# Patient Record
Sex: FEMALE | Race: BLACK OR AFRICAN AMERICAN | URBAN - METROPOLITAN AREA
[De-identification: names, ages, dates, MRNs, and addresses within clinical notes are randomized per-mention and may not be internally consistent; named-entity substitution may affect disease eponyms.]

---

## 2019-10-08 ENCOUNTER — OUTPATIENT (OUTPATIENT)
Dept: OUTPATIENT SERVICES | Facility: HOSPITAL | Age: 29
LOS: 1 days | Discharge: HOME | End: 2019-10-08
Payer: MEDICAID

## 2019-10-08 VITALS — HEART RATE: 108 BPM | SYSTOLIC BLOOD PRESSURE: 105 MMHG | DIASTOLIC BLOOD PRESSURE: 59 MMHG

## 2019-10-08 VITALS — HEART RATE: 121 BPM | DIASTOLIC BLOOD PRESSURE: 71 MMHG | SYSTOLIC BLOOD PRESSURE: 113 MMHG

## 2019-10-08 LAB — GLUCOSE BLDC GLUCOMTR-MCNC: 102 MG/DL — HIGH (ref 70–99)

## 2019-10-08 PROCEDURE — 76815 OB US LIMITED FETUS(S): CPT | Mod: 26

## 2019-10-08 PROCEDURE — 59025 FETAL NON-STRESS TEST: CPT | Mod: 26

## 2019-10-08 PROCEDURE — 99283 EMERGENCY DEPT VISIT LOW MDM: CPT

## 2019-10-08 NOTE — OB PROVIDER TRIAGE NOTE - NSHPPHYSICALEXAM_GEN_ALL_CORE
Vital Signs Last 24 Hrs  T(C): 37 (08 Oct 2019 10:56), Max: 37 (08 Oct 2019 10:56)  T(F): 98.6 (08 Oct 2019 10:56), Max: 98.6 (08 Oct 2019 10:56)  HR: 108 (08 Oct 2019 11:38) (108 - 121)  BP: 105/59 (08 Oct 2019 11:38) (105/59 - 113/71)  RR: 22 (08 Oct 2019 10:56) (22 - 22)  FS: 102  EFM: 150/mod/accels+  [place]: no ctx  Abd: soft, gravid, nontender, no palpable ctx, no incisional tenderness  Speculum: no active bleeding per os, cervix appears normal, closed   SVE: C/L/P, breech by sono, intact

## 2019-10-08 NOTE — OB PROVIDER TRIAGE NOTE - NS_OBGYNHISTORY_OBGYN_ALL_OB_FT
ObHx:     1. 7/4/16- FT C/S for failure of dilation and NRFHR, 3700g, (F)   2. 1/10/18- FT C/S, repeat C/S, 2900g, (F)     GynHx: denies h/o fibroids, ovarian cysts, abnormal pap smears, STIs.

## 2019-10-08 NOTE — OB PROVIDER TRIAGE NOTE - HISTORY OF PRESENT ILLNESS
28yo  at 33w1d GA by LMP c/w 1st trim joy, visiting from [place], planning to return to her country in 2 weeks and deliver there, GDMA2 on metformin BID (unsure of dose), c/o decreased FM last night and right sided abdominal pain, 3/10 intensity, worse with standing/walking, relieved with rest. H/o prior C/S x2. Denies ctx, incisional pain, VB/LOF. Good FM currently. Admits to decreased PO hydration yesterday- only 2 glasess of water. Pt states she does not take FS regularly as she was not told to do so by PMD. Denies fever, chills, nausea/vomiting, diarrhea/constipation, dysuria, urgency, frequency. Denies dizziness/lightheadedness/CP/SOB/palpitations. Last BM- yesterday. Last ate-2 hours prior. Last intercourse- 4 days ago. Pt has G6PD deficiency, FOB neg. Denies other complications during this pregnancy.

## 2019-10-08 NOTE — OB PROVIDER TRIAGE NOTE - NSOBPROVIDERNOTE_OBGYN_ALL_OB_FT
30yo  at 33w1d GA by LMP c/w 1st trim sono, visiting from [place], planning to return to her country in 2 weeks and deliver there, GDMA2 on metformin BID (unsure of dose), with BPP 10/10, reassuring maternal and fetal status, not in  labor  - counseled patient that in gestational diabetes, fingerstick should be measured fasting, 2hr postprandial and to discuss with her PMD  - if patient decides to deliver here, f/u at [facility] to transfer care  - d/c home   - ambulation/PO hydration  -  labor precautions/FKC     Dr. BIGG Zazueta aware.

## 2019-10-09 ENCOUNTER — OUTPATIENT (OUTPATIENT)
Dept: OUTPATIENT SERVICES | Facility: HOSPITAL | Age: 29
LOS: 1 days | Discharge: HOME | End: 2019-10-09

## 2019-10-09 ENCOUNTER — RESULT CHARGE (OUTPATIENT)
Age: 29
End: 2019-10-09

## 2019-10-09 ENCOUNTER — APPOINTMENT (OUTPATIENT)
Dept: OBGYN | Facility: CLINIC | Age: 29
End: 2019-10-09
Payer: MEDICAID

## 2019-10-09 ENCOUNTER — NON-APPOINTMENT (OUTPATIENT)
Age: 29
End: 2019-10-09

## 2019-10-09 VITALS
HEIGHT: 69 IN | DIASTOLIC BLOOD PRESSURE: 64 MMHG | BODY MASS INDEX: 32.58 KG/M2 | SYSTOLIC BLOOD PRESSURE: 98 MMHG | WEIGHT: 220 LBS

## 2019-10-09 DIAGNOSIS — D75.A GLUCOSE-6-PHOSPHATE DEHYDROGENASE: ICD-10-CM

## 2019-10-09 PROBLEM — Z00.00 ENCOUNTER FOR PREVENTIVE HEALTH EXAMINATION: Status: ACTIVE | Noted: 2019-10-09

## 2019-10-09 PROBLEM — E74.01 VON GIERKE DISEASE: Chronic | Status: ACTIVE | Noted: 2019-10-08

## 2019-10-09 LAB
BILIRUB UR QL STRIP: NORMAL
CLARITY UR: CLEAR
COLLECTION METHOD: NORMAL
GLUCOSE UR-MCNC: NORMAL
HCG UR QL: 0.2 EU/DL
HGB UR QL STRIP.AUTO: NORMAL
KETONES UR-MCNC: NORMAL
LEUKOCYTE ESTERASE UR QL STRIP: NORMAL
NITRITE UR QL STRIP: NORMAL
PH UR STRIP: 5
PROT UR STRIP-MCNC: NORMAL
SP GR UR STRIP: 1.01

## 2019-10-09 PROCEDURE — 99214 OFFICE O/P EST MOD 30 MIN: CPT | Mod: 25

## 2019-10-09 PROCEDURE — 76815 OB US LIMITED FETUS(S): CPT | Mod: 26

## 2019-10-09 RX ORDER — METFORMIN HYDROCHLORIDE 625 MG/1
TABLET ORAL
Refills: 0 | Status: ACTIVE | COMMUNITY

## 2019-10-10 ENCOUNTER — ASOB RESULT (OUTPATIENT)
Age: 29
End: 2019-10-10

## 2019-10-10 ENCOUNTER — APPOINTMENT (OUTPATIENT)
Dept: ANTEPARTUM | Facility: CLINIC | Age: 29
End: 2019-10-10
Payer: MEDICAID

## 2019-10-10 ENCOUNTER — OUTPATIENT (OUTPATIENT)
Dept: OUTPATIENT SERVICES | Facility: HOSPITAL | Age: 29
LOS: 1 days | Discharge: HOME | End: 2019-10-10

## 2019-10-10 ENCOUNTER — OTHER (OUTPATIENT)
Age: 29
End: 2019-10-10

## 2019-10-10 VITALS
SYSTOLIC BLOOD PRESSURE: 99 MMHG | HEART RATE: 114 BPM | WEIGHT: 216.56 LBS | OXYGEN SATURATION: 97 % | DIASTOLIC BLOOD PRESSURE: 59 MMHG | BODY MASS INDEX: 31.98 KG/M2

## 2019-10-10 DIAGNOSIS — O24.419 GESTATIONAL DIABETES MELLITUS IN PREGNANCY, UNSPECIFIED CONTROL: ICD-10-CM

## 2019-10-10 LAB
BILIRUB UR QL STRIP: NORMAL
CLARITY UR: CLEAR
COLLECTION METHOD: NORMAL
GLUCOSE BLDC GLUCOMTR-MCNC: 97
GLUCOSE BLDC GLUCOMTR-MCNC: 97 MG/DL — SIGNIFICANT CHANGE UP (ref 70–99)
GLUCOSE UR-MCNC: NORMAL
HCG UR QL: 0.2 EU/DL
HGB UR QL STRIP.AUTO: NORMAL
KETONES UR-MCNC: NORMAL
LEUKOCYTE ESTERASE UR QL STRIP: NORMAL
NITRITE UR QL STRIP: NORMAL
PH UR STRIP: 7
PROT UR STRIP-MCNC: NORMAL
SP GR UR STRIP: 1.01

## 2019-10-10 PROCEDURE — 99243 OFF/OP CNSLTJ NEW/EST LOW 30: CPT | Mod: 25

## 2019-10-10 PROCEDURE — ZZZZZ: CPT

## 2019-10-10 PROCEDURE — 76811 OB US DETAILED SNGL FETUS: CPT | Mod: 26

## 2019-10-10 PROCEDURE — 76819 FETAL BIOPHYS PROFIL W/O NST: CPT | Mod: 26

## 2019-10-10 RX ORDER — LANCETS 33 GAUGE
EACH MISCELLANEOUS
Qty: 120 | Refills: 6 | Status: ACTIVE | COMMUNITY
Start: 2019-10-10 | End: 1900-01-01

## 2019-10-10 RX ORDER — BLOOD SUGAR DIAGNOSTIC
STRIP MISCELLANEOUS 4 TIMES DAILY
Qty: 120 | Refills: 6 | Status: ACTIVE | COMMUNITY
Start: 2019-10-10 | End: 1900-01-01

## 2019-10-10 RX ORDER — SYRING-NEEDL,DISP,INSUL,0.3 ML 30 GX5/16"
SYRINGE, EMPTY DISPOSABLE MISCELLANEOUS
Qty: 1 | Refills: 0 | Status: ACTIVE | COMMUNITY
Start: 2019-10-10 | End: 1900-01-01

## 2019-10-10 RX ORDER — BLOOD-GLUCOSE METER
W/DEVICE EACH MISCELLANEOUS
Qty: 1 | Refills: 0 | Status: ACTIVE | COMMUNITY
Start: 2019-10-10 | End: 1900-01-01

## 2019-10-10 NOTE — HISTORY OF PRESENT ILLNESS
[FreeTextEntry1] : 28 yo  at 33w4d with RAMONE  by LMP  and first trimester sono per pt, late transfer from [facility] with first prenatal visit yesterday, presents for GDMA2 this pregnancy. Pt found out about GDM 4-5 weeks ago after taking the sugar test, was started on Metformin 500 mg BID. Has not been recording her FS.  Reports following regularly for prenatal care in [facility], with no issues and normal sonograms. Good FM, no LOF, VB or contractions.

## 2019-10-10 NOTE — OB HISTORY
[___] : of [unfilled] [Pregnancy History] : girl [LMP: ___] : LMP: [unfilled] [RAMONE: ___] : RAMONE: [unfilled] [EGA: ___ wks] : EGA: [unfilled] wks [Spontaneous] : Spontaneous conception [Sonogram] : sonogram [FreeTextEntry1] : GDMA2 on Metformin, repeat  [de-identified] : as per pt

## 2019-10-10 NOTE — END OF VISIT
[FreeTextEntry3] : [facility] Staff\par \par I saw and evaluated Ms. FROST with Dr. Schaefer and I agree with the documentation above.   I modified the note above (if indicated) and agree with its contents in the present form.  GDM A2, on Metformin.\par \par Counseling: \par \par 1.  The patient will be evaluated by a nutritionist and instructed in adhering to a diabetic diet.\par \par 2.  She was counseled by a nurse and instructed in capillary blood glucose testing (fasting and 2 hours after each meal).\par \par 3.  The significance and usual management of diabetes in pregnancy were reviewed, including risks of preeclampsia, Intra-Uterine Fetal Demise, macrosomia, birth trauma, pre-term labor,  section, NICU admission (the main reasons include  hypoglycemia and  jaundice) and the possible need for insulin therapy.\par \par 4.  Exercise was encouraged.  Ideally, she should walk briskly  after each meal.\par \par Recommendations:\par \par 1.  Glycemic Control.  Target glucose ranges to minimize excessive fetal growth are:  Fasting 60-90 mg/dl; preprandial  mg/dl; and 2-hour postprandial < 120 mg/dl.  If these values are elevated, insulin therapy is encouraged, although oral hypoglycemic agents are reasonable to try depending on the finger stick log.\par \par 2.  Antepartum testing.  Daily fetal movement counts are recommended from 28 weeks.  Weekly or twice weekly biophysical profiles are recommended, the timing will depend on glucose control.  Ultrasound assessment of fetal growth and macrosomic features is recommended.\par \par 3.  Timing of Delivery.  If spontaneous delivery has not occurred by 39 weeks gestation, delivery may be planned between 39-40 weeks.   If complications, such as preeclampsia, macrosomia or poor glucose control develop, then delivery plans may need to be revised.\par \par 4.  Route of delivery.  Diabetes is associated with about a six-fold risk for shoulder dystocia.  Operative vaginal deliveries should be approached with caution if at all.\par \par 5.  Glucose control in labor.  During labor, capillary glucose values should be checked every few hours (depending on their stability).  Insulin may be required to cover elevated glucose levels.\par \par 6.  Long-term diabetes surveillance.  The risk of developing diabetes outside of pregnancy over the next five years may be as high as 50%.  I recommend that she have a fasting plasma glucose at 6 to 12 weeks postpartum and counseling about ways to minimize her risk.  If her fasting glucose is normal, annual glucose screening by her primary care physician is advised.\par \par Ms. Frost has G6PD deficiency.  The father of the baby was tested per the patient and he does not have G6PD.  If the fetus is a boy there is a 50% chance he will be affected and if  the fetus is a girl there is a 50% she will be a carrier. \par Stay away from sulfa drugs.\par \par Prenatal care is with Dr. Curiel.\par \par Hypoglycemia, fetal movement, and labor precautions discussed.\par \par Weekly biophysical profiles.\par RTC 1 week with fingerstick log. \par \par Yvan Ontiveros MD\par \par \par \par \par \par \par \par

## 2019-10-10 NOTE — DISCUSSION/SUMMARY
[FreeTextEntry1] : 28 yo  at 33w4d with RAMONE  by LMP  and first trimester sono per pt, late transfer from [facility] with first prenatal visit yesterday, presents for GDMA2 this pregnancy.  Good FM, no LOF, VB or contractions. \par \par #GDM\par -No records today\par -Diagnosed with elevated sugar test 4-5 weeks ago\par -Started on Metformin 500 mg BID in [facility], has not been checking FS\par -s/p Diabetic teaching and counseling. \par -Will schedule with nutritionist\par -Discussed risks of GDM including but not limited to: macrosomia, shoulder dystocia, increased risk of , stillbirth, NICU admission for hypoglycemia and jaundice, and preeclampsia. Explained that if sugars are well controlled the above complications decrease.\par -Discussed increased risk of developing DM after pregnancy. She will need PP GTT and if normal, repeat glucose testing every 1-2 years. \par - Patient should start to measure FS four times a day. FS goal fasting <95, 2 hour PP<120\par -Supplies sent to pharmacy\par -Weekly BPPs, Monthly EFW. \par -Will do BPP today, EFW next week.\par \par #G6PD Deficiency\par -Avoid sulfa drugs\par -Per pt,  is negative.\par \par #Pregnancy\par -f/u prenatal labs, HbA1C\par -Continue care with Dr. Curiel\par \par FS logs\par RTC in 1 week\par

## 2019-10-10 NOTE — PHYSICAL EXAM
[FreeTextEntry1] : Gen: NAD, AAOx3\par CVS: RRR, normal S1, S2\par Thyroid:  No goiter\par Lungs: CTAB\par Abd: soft, non tender, gravid, no palpable contractions\par LE: no edema or tenderness\par Pelvic: deferred\par Neuro: 2+ DTR in b/l upper extremities.\par Psychiatry:  Happy mood.  Awake, alert, oriented to person, place, time.\par Face:  Hyperpigmented papules (she states she has had those since birth)

## 2019-10-11 ENCOUNTER — NON-APPOINTMENT (OUTPATIENT)
Age: 29
End: 2019-10-11

## 2019-10-11 ENCOUNTER — APPOINTMENT (OUTPATIENT)
Dept: OBGYN | Facility: CLINIC | Age: 29
End: 2019-10-11

## 2019-10-15 DIAGNOSIS — Z34.90 ENCOUNTER FOR SUPERVISION OF NORMAL PREGNANCY, UNSPECIFIED, UNSPECIFIED TRIMESTER: ICD-10-CM

## 2019-10-18 ENCOUNTER — APPOINTMENT (OUTPATIENT)
Dept: ANTEPARTUM | Facility: CLINIC | Age: 29
End: 2019-10-18
Payer: MEDICAID

## 2019-10-18 ENCOUNTER — ASOB RESULT (OUTPATIENT)
Age: 29
End: 2019-10-18

## 2019-10-18 ENCOUNTER — OUTPATIENT (OUTPATIENT)
Dept: OUTPATIENT SERVICES | Facility: HOSPITAL | Age: 29
LOS: 1 days | Discharge: HOME | End: 2019-10-18

## 2019-10-18 DIAGNOSIS — D75.A GLUCOSE-6-PHOSPHATE DEHYDROGENASE (G6PD) DEFICIENCY WITHOUT ANEMIA: ICD-10-CM

## 2019-10-18 DIAGNOSIS — O24.415 GESTATIONAL DIABETES MELLITUS IN PREGNANCY, CONTROLLED BY ORAL HYPOGLYCEMIC DRUGS: ICD-10-CM

## 2019-10-18 DIAGNOSIS — O36.8190 DECREASED FETAL MOVEMENTS, UNSPECIFIED TRIMESTER, NOT APPLICABLE OR UNSPECIFIED: ICD-10-CM

## 2019-10-18 DIAGNOSIS — O26.893 OTHER SPECIFIED PREGNANCY RELATED CONDITIONS, THIRD TRIMESTER: ICD-10-CM

## 2019-10-18 DIAGNOSIS — Z3A.33 33 WEEKS GESTATION OF PREGNANCY: ICD-10-CM

## 2019-10-18 PROCEDURE — 76819 FETAL BIOPHYS PROFIL W/O NST: CPT | Mod: 26

## 2019-10-18 PROCEDURE — 76816 OB US FOLLOW-UP PER FETUS: CPT | Mod: 26

## 2019-10-21 LAB
ABO + RH PNL BLD: NORMAL
BACTERIA UR CULT: NORMAL
BASOPHILS # BLD AUTO: 0.01 K/UL
BASOPHILS NFR BLD AUTO: 0.1 %
BLD GP AB SCN SERPL QL: NORMAL
EOSINOPHIL # BLD AUTO: 0.2 K/UL
EOSINOPHIL NFR BLD AUTO: 2.2 %
ESTIMATED AVERAGE GLUCOSE: 117 MG/DL
HBA1C MFR BLD HPLC: 5.7 %
HBV SURFACE AG SER QL: NONREACTIVE
HCT VFR BLD CALC: 32.5 %
HGB BLD-MCNC: 10.8 G/DL
HIV1+2 AB SPEC QL IA.RAPID: NONREACTIVE
IMM GRANULOCYTES NFR BLD AUTO: 0.8 %
LEAD BLD-MCNC: 1 UG/DL
LYMPHOCYTES # BLD AUTO: 2.24 K/UL
LYMPHOCYTES NFR BLD AUTO: 24.1 %
M TB IFN-G BLD-IMP: ABNORMAL
MAN DIFF?: NORMAL
MCHC RBC-ENTMCNC: 31.4 PG
MCHC RBC-ENTMCNC: 33.2 G/DL
MCV RBC AUTO: 94.5 FL
MEV IGG FLD QL IA: 102 AU/ML
MEV IGG+IGM SER-IMP: POSITIVE
MONOCYTES # BLD AUTO: 0.77 K/UL
MONOCYTES NFR BLD AUTO: 8.3 %
NEUTROPHILS # BLD AUTO: 6 K/UL
NEUTROPHILS NFR BLD AUTO: 64.5 %
PLATELET # BLD AUTO: 264 K/UL
QUANTIFERON TB PLUS MITOGEN MINUS NIL: 0.01 IU/ML
QUANTIFERON TB PLUS NIL: 0.01 IU/ML
QUANTIFERON TB PLUS TB1 MINUS NIL: 0 IU/ML
QUANTIFERON TB PLUS TB2 MINUS NIL: 0 IU/ML
RBC # BLD: 3.44 M/UL
RBC # FLD: 13.1 %
RUBV IGG FLD-ACNC: 13.2 INDEX
RUBV IGG SER-IMP: POSITIVE
T PALLIDUM AB SER QL IA: NEGATIVE
VZV AB TITR SER: POSITIVE
VZV IGG SER IF-ACNC: 396.5 INDEX
WBC # FLD AUTO: 9.29 K/UL

## 2019-10-22 ENCOUNTER — FORM ENCOUNTER (OUTPATIENT)
Age: 29
End: 2019-10-22

## 2019-10-22 LAB
HGB A MFR BLD: 96.4 %
HGB A2 MFR BLD: 2.6 %
HGB F MFR BLD: 1 %
HGB FRACT BLD-IMP: NORMAL

## 2019-10-23 ENCOUNTER — APPOINTMENT (OUTPATIENT)
Dept: OBGYN | Facility: CLINIC | Age: 29
End: 2019-10-23
Payer: MEDICAID

## 2019-10-23 ENCOUNTER — NON-APPOINTMENT (OUTPATIENT)
Age: 29
End: 2019-10-23

## 2019-10-23 ENCOUNTER — OUTPATIENT (OUTPATIENT)
Dept: OUTPATIENT SERVICES | Facility: HOSPITAL | Age: 29
LOS: 1 days | Discharge: HOME | End: 2019-10-23

## 2019-10-23 ENCOUNTER — RESULT CHARGE (OUTPATIENT)
Age: 29
End: 2019-10-23

## 2019-10-23 ENCOUNTER — OUTPATIENT (OUTPATIENT)
Dept: OUTPATIENT SERVICES | Facility: HOSPITAL | Age: 29
LOS: 1 days | Discharge: HOME | End: 2019-10-23
Payer: MEDICAID

## 2019-10-23 VITALS
HEIGHT: 69 IN | DIASTOLIC BLOOD PRESSURE: 60 MMHG | SYSTOLIC BLOOD PRESSURE: 100 MMHG | BODY MASS INDEX: 32.44 KG/M2 | WEIGHT: 219 LBS

## 2019-10-23 DIAGNOSIS — R79.9 ABNORMAL FINDING OF BLOOD CHEMISTRY, UNSPECIFIED: ICD-10-CM

## 2019-10-23 PROCEDURE — 71045 X-RAY EXAM CHEST 1 VIEW: CPT | Mod: 26

## 2019-10-23 PROCEDURE — 99213 OFFICE O/P EST LOW 20 MIN: CPT

## 2019-10-23 RX ORDER — METFORMIN HYDROCHLORIDE 500 MG/1
500 TABLET, COATED ORAL 3 TIMES DAILY
Qty: 63 | Refills: 0 | Status: ACTIVE | COMMUNITY
Start: 2019-10-23 | End: 1900-01-01

## 2019-10-24 DIAGNOSIS — O99.210 OBESITY COMPLICATING PREGNANCY, UNSPECIFIED TRIMESTER: ICD-10-CM

## 2019-10-24 DIAGNOSIS — O24.415 GESTATIONAL DIABETES MELLITUS IN PREGNANCY, CONTROLLED BY ORAL HYPOGLYCEMIC DRUGS: ICD-10-CM

## 2019-10-24 DIAGNOSIS — Z3A.34 34 WEEKS GESTATION OF PREGNANCY: ICD-10-CM

## 2019-10-24 DIAGNOSIS — Z04.89 ENCOUNTER FOR EXAMINATION AND OBSERVATION FOR OTHER SPECIFIED REASONS: ICD-10-CM

## 2019-10-25 DIAGNOSIS — Z34.90 ENCOUNTER FOR SUPERVISION OF NORMAL PREGNANCY, UNSPECIFIED, UNSPECIFIED TRIMESTER: ICD-10-CM

## 2019-10-25 DIAGNOSIS — O24.415 GESTATIONAL DIABETES MELLITUS IN PREGNANCY, CONTROLLED BY ORAL HYPOGLYCEMIC DRUGS: ICD-10-CM

## 2019-10-28 LAB
BILIRUB UR QL STRIP: NORMAL
CLARITY UR: CLEAR
COLLECTION METHOD: NORMAL
GLUCOSE UR-MCNC: NORMAL
HCG UR QL: 0.2 EU/DL
HGB UR QL STRIP.AUTO: NORMAL
KETONES UR-MCNC: NORMAL
LEUKOCYTE ESTERASE UR QL STRIP: NORMAL
NITRITE UR QL STRIP: NORMAL
PH UR STRIP: 6
PROT UR STRIP-MCNC: NORMAL
SP GR UR STRIP: 1.02

## 2019-10-30 ENCOUNTER — OUTPATIENT (OUTPATIENT)
Dept: OUTPATIENT SERVICES | Facility: HOSPITAL | Age: 29
LOS: 1 days | Discharge: HOME | End: 2019-10-30

## 2019-10-30 ENCOUNTER — RESULT CHARGE (OUTPATIENT)
Age: 29
End: 2019-10-30

## 2019-10-30 ENCOUNTER — APPOINTMENT (OUTPATIENT)
Dept: OBGYN | Facility: CLINIC | Age: 29
End: 2019-10-30
Payer: MEDICAID

## 2019-10-30 ENCOUNTER — NON-APPOINTMENT (OUTPATIENT)
Age: 29
End: 2019-10-30

## 2019-10-30 ENCOUNTER — APPOINTMENT (OUTPATIENT)
Dept: ANTEPARTUM | Facility: CLINIC | Age: 29
End: 2019-10-30
Payer: MEDICAID

## 2019-10-30 ENCOUNTER — ASOB RESULT (OUTPATIENT)
Age: 29
End: 2019-10-30

## 2019-10-30 VITALS
BODY MASS INDEX: 32.05 KG/M2 | DIASTOLIC BLOOD PRESSURE: 66 MMHG | WEIGHT: 217.06 LBS | SYSTOLIC BLOOD PRESSURE: 100 MMHG

## 2019-10-30 DIAGNOSIS — Z34.90 ENCOUNTER FOR SUPERVISION OF NORMAL PREGNANCY, UNSPECIFIED, UNSPECIFIED TRIMESTER: ICD-10-CM

## 2019-10-30 PROCEDURE — 99213 OFFICE O/P EST LOW 20 MIN: CPT

## 2019-10-30 PROCEDURE — 76819 FETAL BIOPHYS PROFIL W/O NST: CPT | Mod: 26

## 2019-10-31 DIAGNOSIS — Z3A.36 36 WEEKS GESTATION OF PREGNANCY: ICD-10-CM

## 2019-10-31 DIAGNOSIS — Z34.03 ENCOUNTER FOR SUPERVISION OF NORMAL FIRST PREGNANCY, THIRD TRIMESTER: ICD-10-CM

## 2019-10-31 DIAGNOSIS — O24.415 GESTATIONAL DIABETES MELLITUS IN PREGNANCY, CONTROLLED BY ORAL HYPOGLYCEMIC DRUGS: ICD-10-CM

## 2019-10-31 DIAGNOSIS — O99.210 OBESITY COMPLICATING PREGNANCY, UNSPECIFIED TRIMESTER: ICD-10-CM

## 2019-10-31 LAB
BILIRUB UR QL STRIP: NORMAL
CLARITY UR: CLEAR
COLLECTION METHOD: NORMAL
GLUCOSE UR-MCNC: NORMAL
HCG UR QL: 0.2 EU/DL
HGB UR QL STRIP.AUTO: NORMAL
KETONES UR-MCNC: NORMAL
LEUKOCYTE ESTERASE UR QL STRIP: NORMAL
NITRITE UR QL STRIP: NORMAL
PH UR STRIP: 6
PROT UR STRIP-MCNC: NORMAL
SP GR UR STRIP: 1.03

## 2019-11-01 ENCOUNTER — APPOINTMENT (OUTPATIENT)
Dept: ANTEPARTUM | Facility: CLINIC | Age: 29
End: 2019-11-01

## 2019-11-04 LAB
C TRACH RRNA SPEC QL NAA+PROBE: NOT DETECTED
GP B STREP DNA SPEC QL NAA+PROBE: NORMAL
GP B STREP DNA SPEC QL NAA+PROBE: NOT DETECTED
N GONORRHOEA RRNA SPEC QL NAA+PROBE: NOT DETECTED
SOURCE AMPLIFICATION: NORMAL
SOURCE GBS: NORMAL

## 2019-11-08 ENCOUNTER — APPOINTMENT (OUTPATIENT)
Dept: ANTEPARTUM | Facility: CLINIC | Age: 29
End: 2019-11-08

## 2019-11-11 ENCOUNTER — TRANSCRIPTION ENCOUNTER (OUTPATIENT)
Age: 29
End: 2019-11-11

## 2019-11-11 ENCOUNTER — APPOINTMENT (OUTPATIENT)
Dept: OBGYN | Facility: CLINIC | Age: 29
End: 2019-11-11
Payer: MEDICAID

## 2019-11-11 ENCOUNTER — OUTPATIENT (OUTPATIENT)
Dept: OUTPATIENT SERVICES | Facility: HOSPITAL | Age: 29
LOS: 1 days | Discharge: HOME | End: 2019-11-11

## 2019-11-11 ENCOUNTER — RESULT CHARGE (OUTPATIENT)
Age: 29
End: 2019-11-11

## 2019-11-11 ENCOUNTER — NON-APPOINTMENT (OUTPATIENT)
Age: 29
End: 2019-11-11

## 2019-11-11 VITALS
WEIGHT: 21 LBS | BODY MASS INDEX: 3.11 KG/M2 | DIASTOLIC BLOOD PRESSURE: 70 MMHG | SYSTOLIC BLOOD PRESSURE: 112 MMHG | HEIGHT: 69 IN

## 2019-11-11 PROCEDURE — 99213 OFFICE O/P EST LOW 20 MIN: CPT

## 2019-11-13 DIAGNOSIS — Z34.80 ENCOUNTER FOR SUPERVISION OF OTHER NORMAL PREGNANCY, UNSPECIFIED TRIMESTER: ICD-10-CM

## 2019-11-15 ENCOUNTER — ASOB RESULT (OUTPATIENT)
Age: 29
End: 2019-11-15

## 2019-11-15 ENCOUNTER — APPOINTMENT (OUTPATIENT)
Dept: ANTEPARTUM | Facility: CLINIC | Age: 29
End: 2019-11-15
Payer: MEDICAID

## 2019-11-15 DIAGNOSIS — Z34.90 ENCOUNTER FOR SUPERVISION OF NORMAL PREGNANCY, UNSPECIFIED, UNSPECIFIED TRIMESTER: ICD-10-CM

## 2019-11-15 PROCEDURE — 76816 OB US FOLLOW-UP PER FETUS: CPT | Mod: 26

## 2019-11-15 PROCEDURE — 76819 FETAL BIOPHYS PROFIL W/O NST: CPT | Mod: 26

## 2019-11-18 ENCOUNTER — RESULT CHARGE (OUTPATIENT)
Age: 29
End: 2019-11-18

## 2019-11-18 ENCOUNTER — OUTPATIENT (OUTPATIENT)
Dept: OUTPATIENT SERVICES | Facility: HOSPITAL | Age: 29
LOS: 1 days | Discharge: HOME | End: 2019-11-18

## 2019-11-18 ENCOUNTER — APPOINTMENT (OUTPATIENT)
Dept: OBGYN | Facility: CLINIC | Age: 29
End: 2019-11-18
Payer: MEDICAID

## 2019-11-18 ENCOUNTER — NON-APPOINTMENT (OUTPATIENT)
Age: 29
End: 2019-11-18

## 2019-11-18 VITALS
BODY MASS INDEX: 32.29 KG/M2 | HEIGHT: 69 IN | WEIGHT: 218 LBS | DIASTOLIC BLOOD PRESSURE: 72 MMHG | SYSTOLIC BLOOD PRESSURE: 114 MMHG

## 2019-11-18 DIAGNOSIS — Z3A.38 38 WEEKS GESTATION OF PREGNANCY: ICD-10-CM

## 2019-11-18 DIAGNOSIS — Z36.89 ENCOUNTER FOR OTHER SPECIFIED ANTENATAL SCREENING: ICD-10-CM

## 2019-11-18 DIAGNOSIS — O99.210 OBESITY COMPLICATING PREGNANCY, UNSPECIFIED TRIMESTER: ICD-10-CM

## 2019-11-18 DIAGNOSIS — O40.3XX1 POLYHYDRAMNIOS, THIRD TRIMESTER, FETUS 1: ICD-10-CM

## 2019-11-18 DIAGNOSIS — O09.90 SUPERVISION OF HIGH RISK PREGNANCY, UNSPECIFIED, UNSPECIFIED TRIMESTER: ICD-10-CM

## 2019-11-18 DIAGNOSIS — O24.415 GESTATIONAL DIABETES MELLITUS IN PREGNANCY, CONTROLLED BY ORAL HYPOGLYCEMIC DRUGS: ICD-10-CM

## 2019-11-18 PROCEDURE — 99213 OFFICE O/P EST LOW 20 MIN: CPT

## 2019-11-19 LAB
BILIRUB UR QL STRIP: NORMAL
CLARITY UR: CLEAR
COLLECTION METHOD: NORMAL
GLUCOSE UR-MCNC: NORMAL
HCG UR QL: 0.2 EU/DL
HGB UR QL STRIP.AUTO: NORMAL
KETONES UR-MCNC: NORMAL
LEUKOCYTE ESTERASE UR QL STRIP: NORMAL
NITRITE UR QL STRIP: NORMAL
PH UR STRIP: 6
PROT UR STRIP-MCNC: NORMAL
SP GR UR STRIP: 1.01

## 2019-11-20 ENCOUNTER — OUTPATIENT (OUTPATIENT)
Dept: OUTPATIENT SERVICES | Facility: HOSPITAL | Age: 29
LOS: 1 days | Discharge: HOME | End: 2019-11-20

## 2019-11-20 DIAGNOSIS — O34.211 MATERNAL CARE FOR LOW TRANSVERSE SCAR FROM PREVIOUS CESAREAN DELIVERY: ICD-10-CM

## 2019-11-20 DIAGNOSIS — Z01.818 ENCOUNTER FOR OTHER PREPROCEDURAL EXAMINATION: ICD-10-CM

## 2019-11-20 LAB
ALBUMIN SERPL ELPH-MCNC: 3.7 G/DL — SIGNIFICANT CHANGE UP (ref 3.5–5.2)
ALP SERPL-CCNC: 70 U/L — SIGNIFICANT CHANGE UP (ref 30–115)
ALT FLD-CCNC: 11 U/L — SIGNIFICANT CHANGE UP (ref 0–41)
ANION GAP SERPL CALC-SCNC: 12 MMOL/L — SIGNIFICANT CHANGE UP (ref 7–14)
APPEARANCE UR: CLEAR — SIGNIFICANT CHANGE UP
APTT BLD: 28.4 SEC — SIGNIFICANT CHANGE UP (ref 27–39.2)
AST SERPL-CCNC: 15 U/L — SIGNIFICANT CHANGE UP (ref 0–41)
BACTERIA # UR AUTO: ABNORMAL
BASOPHILS # BLD AUTO: 0.01 K/UL — SIGNIFICANT CHANGE UP (ref 0–0.2)
BASOPHILS NFR BLD AUTO: 0.1 % — SIGNIFICANT CHANGE UP (ref 0–1)
BILIRUB SERPL-MCNC: 0.5 MG/DL — SIGNIFICANT CHANGE UP (ref 0.2–1.2)
BILIRUB UR-MCNC: NEGATIVE — SIGNIFICANT CHANGE UP
BLD GP AB SCN SERPL QL: SIGNIFICANT CHANGE UP
BUN SERPL-MCNC: 9 MG/DL — LOW (ref 10–20)
CALCIUM SERPL-MCNC: 9.1 MG/DL — SIGNIFICANT CHANGE UP (ref 8.5–10.1)
CHLORIDE SERPL-SCNC: 103 MMOL/L — SIGNIFICANT CHANGE UP (ref 98–110)
CO2 SERPL-SCNC: 22 MMOL/L — SIGNIFICANT CHANGE UP (ref 17–32)
COLOR SPEC: YELLOW — SIGNIFICANT CHANGE UP
CREAT SERPL-MCNC: 0.7 MG/DL — SIGNIFICANT CHANGE UP (ref 0.7–1.5)
DIFF PNL FLD: NEGATIVE — SIGNIFICANT CHANGE UP
EOSINOPHIL # BLD AUTO: 0.1 K/UL — SIGNIFICANT CHANGE UP (ref 0–0.7)
EOSINOPHIL NFR BLD AUTO: 1.1 % — SIGNIFICANT CHANGE UP (ref 0–8)
EPI CELLS # UR: 3 /HPF — SIGNIFICANT CHANGE UP (ref 0–5)
GLUCOSE SERPL-MCNC: 107 MG/DL — HIGH (ref 70–99)
GLUCOSE UR QL: NEGATIVE — SIGNIFICANT CHANGE UP
HCT VFR BLD CALC: 32 % — LOW (ref 37–47)
HGB BLD-MCNC: 10.5 G/DL — LOW (ref 12–16)
HYALINE CASTS # UR AUTO: 5 /LPF — SIGNIFICANT CHANGE UP (ref 0–7)
IMM GRANULOCYTES NFR BLD AUTO: 0.7 % — HIGH (ref 0.1–0.3)
INR BLD: 0.96 RATIO — SIGNIFICANT CHANGE UP (ref 0.65–1.3)
KETONES UR-MCNC: NEGATIVE — SIGNIFICANT CHANGE UP
LEUKOCYTE ESTERASE UR-ACNC: NEGATIVE — SIGNIFICANT CHANGE UP
LYMPHOCYTES # BLD AUTO: 2.13 K/UL — SIGNIFICANT CHANGE UP (ref 1.2–3.4)
LYMPHOCYTES # BLD AUTO: 22.4 % — SIGNIFICANT CHANGE UP (ref 20.5–51.1)
MCHC RBC-ENTMCNC: 31.5 PG — HIGH (ref 27–31)
MCHC RBC-ENTMCNC: 32.8 G/DL — SIGNIFICANT CHANGE UP (ref 32–37)
MCV RBC AUTO: 96.1 FL — SIGNIFICANT CHANGE UP (ref 81–99)
MONOCYTES # BLD AUTO: 0.8 K/UL — HIGH (ref 0.1–0.6)
MONOCYTES NFR BLD AUTO: 8.4 % — SIGNIFICANT CHANGE UP (ref 1.7–9.3)
NEUTROPHILS # BLD AUTO: 6.4 K/UL — SIGNIFICANT CHANGE UP (ref 1.4–6.5)
NEUTROPHILS NFR BLD AUTO: 67.3 % — SIGNIFICANT CHANGE UP (ref 42.2–75.2)
NITRITE UR-MCNC: NEGATIVE — SIGNIFICANT CHANGE UP
NRBC # BLD: 0 /100 WBCS — SIGNIFICANT CHANGE UP (ref 0–0)
PH UR: 6.5 — SIGNIFICANT CHANGE UP (ref 5–8)
PLATELET # BLD AUTO: 224 K/UL — SIGNIFICANT CHANGE UP (ref 130–400)
POTASSIUM SERPL-MCNC: 4.2 MMOL/L — SIGNIFICANT CHANGE UP (ref 3.5–5)
POTASSIUM SERPL-SCNC: 4.2 MMOL/L — SIGNIFICANT CHANGE UP (ref 3.5–5)
PROT SERPL-MCNC: 6.6 G/DL — SIGNIFICANT CHANGE UP (ref 6–8)
PROT UR-MCNC: ABNORMAL
PROTHROM AB SERPL-ACNC: 11.1 SEC — SIGNIFICANT CHANGE UP (ref 9.95–12.87)
RBC # BLD: 3.33 M/UL — LOW (ref 4.2–5.4)
RBC # FLD: 13.1 % — SIGNIFICANT CHANGE UP (ref 11.5–14.5)
RBC CASTS # UR COMP ASSIST: 0 /HPF — SIGNIFICANT CHANGE UP (ref 0–4)
SODIUM SERPL-SCNC: 137 MMOL/L — SIGNIFICANT CHANGE UP (ref 135–146)
SP GR SPEC: 1.03 — HIGH (ref 1.01–1.02)
UROBILINOGEN FLD QL: SIGNIFICANT CHANGE UP
WBC # BLD: 9.51 K/UL — SIGNIFICANT CHANGE UP (ref 4.8–10.8)
WBC # FLD AUTO: 9.51 K/UL — SIGNIFICANT CHANGE UP (ref 4.8–10.8)
WBC UR QL: 3 /HPF — SIGNIFICANT CHANGE UP (ref 0–5)

## 2019-11-21 ENCOUNTER — INPATIENT (INPATIENT)
Facility: HOSPITAL | Age: 29
LOS: 2 days | Discharge: HOME | End: 2019-11-24
Attending: OBSTETRICS & GYNECOLOGY | Admitting: OBSTETRICS & GYNECOLOGY
Payer: MEDICAID

## 2019-11-21 ENCOUNTER — TRANSCRIPTION ENCOUNTER (OUTPATIENT)
Age: 29
End: 2019-11-21

## 2019-11-21 VITALS
SYSTOLIC BLOOD PRESSURE: 119 MMHG | RESPIRATION RATE: 18 BRPM | HEART RATE: 110 BPM | DIASTOLIC BLOOD PRESSURE: 64 MMHG | TEMPERATURE: 99 F

## 2019-11-21 DIAGNOSIS — D75.A GLUCOSE-6-PHOSPHATE DEHYDROGENASE (G6PD) DEFICIENCY WITHOUT ANEMIA: Chronic | ICD-10-CM

## 2019-11-21 DIAGNOSIS — Z34.80 ENCOUNTER FOR SUPERVISION OF OTHER NORMAL PREGNANCY, UNSPECIFIED TRIMESTER: ICD-10-CM

## 2019-11-21 LAB
ABO RH CONFIRMATION: SIGNIFICANT CHANGE UP
AMPHET UR-MCNC: NEGATIVE — SIGNIFICANT CHANGE UP
BARBITURATES UR SCN-MCNC: NEGATIVE — SIGNIFICANT CHANGE UP
BASOPHILS # BLD AUTO: 0.01 K/UL — SIGNIFICANT CHANGE UP (ref 0–0.2)
BASOPHILS NFR BLD AUTO: 0.1 % — SIGNIFICANT CHANGE UP (ref 0–1)
BENZODIAZ UR-MCNC: NEGATIVE — SIGNIFICANT CHANGE UP
BUPRENORPHINE SCREEN, URINE RESULT: NEGATIVE — SIGNIFICANT CHANGE UP
COCAINE METAB.OTHER UR-MCNC: NEGATIVE — SIGNIFICANT CHANGE UP
EOSINOPHIL # BLD AUTO: 0.04 K/UL — SIGNIFICANT CHANGE UP (ref 0–0.7)
EOSINOPHIL NFR BLD AUTO: 0.4 % — SIGNIFICANT CHANGE UP (ref 0–8)
FENTANYL UR QL: NEGATIVE — SIGNIFICANT CHANGE UP
GLUCOSE BLDC GLUCOMTR-MCNC: 109 MG/DL — HIGH (ref 70–99)
HCT VFR BLD CALC: 29.5 % — LOW (ref 37–47)
HGB BLD-MCNC: 9.9 G/DL — LOW (ref 12–16)
IMM GRANULOCYTES NFR BLD AUTO: 0.4 % — HIGH (ref 0.1–0.3)
L&D DRUG SCREEN, URINE: SIGNIFICANT CHANGE UP
LYMPHOCYTES # BLD AUTO: 1.44 K/UL — SIGNIFICANT CHANGE UP (ref 1.2–3.4)
LYMPHOCYTES # BLD AUTO: 15.1 % — LOW (ref 20.5–51.1)
MCHC RBC-ENTMCNC: 32 PG — HIGH (ref 27–31)
MCHC RBC-ENTMCNC: 33.6 G/DL — SIGNIFICANT CHANGE UP (ref 32–37)
MCV RBC AUTO: 95.5 FL — SIGNIFICANT CHANGE UP (ref 81–99)
METHADONE UR-MCNC: NEGATIVE — SIGNIFICANT CHANGE UP
MONOCYTES # BLD AUTO: 0.81 K/UL — HIGH (ref 0.1–0.6)
MONOCYTES NFR BLD AUTO: 8.5 % — SIGNIFICANT CHANGE UP (ref 1.7–9.3)
NEUTROPHILS # BLD AUTO: 7.2 K/UL — HIGH (ref 1.4–6.5)
NEUTROPHILS NFR BLD AUTO: 75.5 % — HIGH (ref 42.2–75.2)
NRBC # BLD: 0 /100 WBCS — SIGNIFICANT CHANGE UP (ref 0–0)
OPIATES UR-MCNC: NEGATIVE — SIGNIFICANT CHANGE UP
OXYCODONE UR-MCNC: NEGATIVE — SIGNIFICANT CHANGE UP
PCP UR-MCNC: NEGATIVE — SIGNIFICANT CHANGE UP
PLATELET # BLD AUTO: 205 K/UL — SIGNIFICANT CHANGE UP (ref 130–400)
PRENATAL SYPHILIS TEST: SIGNIFICANT CHANGE UP
PROPOXYPHENE QUALITATIVE URINE RESULT: NEGATIVE — SIGNIFICANT CHANGE UP
RBC # BLD: 3.09 M/UL — LOW (ref 4.2–5.4)
RBC # FLD: 12.9 % — SIGNIFICANT CHANGE UP (ref 11.5–14.5)
T PALLIDUM AB TITR SER: NEGATIVE — SIGNIFICANT CHANGE UP
WBC # BLD: 9.54 K/UL — SIGNIFICANT CHANGE UP (ref 4.8–10.8)
WBC # FLD AUTO: 9.54 K/UL — SIGNIFICANT CHANGE UP (ref 4.8–10.8)

## 2019-11-21 PROCEDURE — 59514 CESAREAN DELIVERY ONLY: CPT | Mod: U9

## 2019-11-21 RX ORDER — ONDANSETRON 8 MG/1
4 TABLET, FILM COATED ORAL EVERY 6 HOURS
Refills: 0 | Status: DISCONTINUED | OUTPATIENT
Start: 2019-11-21 | End: 2019-11-24

## 2019-11-21 RX ORDER — LANOLIN
1 OINTMENT (GRAM) TOPICAL EVERY 6 HOURS
Refills: 0 | Status: DISCONTINUED | OUTPATIENT
Start: 2019-11-21 | End: 2019-11-24

## 2019-11-21 RX ORDER — ACETAMINOPHEN 500 MG
1000 TABLET ORAL ONCE
Refills: 0 | Status: COMPLETED | OUTPATIENT
Start: 2019-11-21 | End: 2019-11-21

## 2019-11-21 RX ORDER — ENOXAPARIN SODIUM 100 MG/ML
40 INJECTION SUBCUTANEOUS EVERY 24 HOURS
Refills: 0 | Status: DISCONTINUED | OUTPATIENT
Start: 2019-11-21 | End: 2019-11-24

## 2019-11-21 RX ORDER — CEFAZOLIN SODIUM 1 G
2000 VIAL (EA) INJECTION ONCE
Refills: 0 | Status: COMPLETED | OUTPATIENT
Start: 2019-11-21 | End: 2019-11-21

## 2019-11-21 RX ORDER — CITRIC ACID/SODIUM CITRATE 300-500 MG
30 SOLUTION, ORAL ORAL ONCE
Refills: 0 | Status: DISCONTINUED | OUTPATIENT
Start: 2019-11-21 | End: 2019-11-21

## 2019-11-21 RX ORDER — OXYCODONE HYDROCHLORIDE 5 MG/1
5 TABLET ORAL
Refills: 0 | Status: DISCONTINUED | OUTPATIENT
Start: 2019-11-21 | End: 2019-11-24

## 2019-11-21 RX ORDER — KETOROLAC TROMETHAMINE 30 MG/ML
30 SYRINGE (ML) INJECTION EVERY 6 HOURS
Refills: 0 | Status: DISCONTINUED | OUTPATIENT
Start: 2019-11-21 | End: 2019-11-22

## 2019-11-21 RX ORDER — DIPHENHYDRAMINE HCL 50 MG
25 CAPSULE ORAL EVERY 6 HOURS
Refills: 0 | Status: DISCONTINUED | OUTPATIENT
Start: 2019-11-21 | End: 2019-11-24

## 2019-11-21 RX ORDER — ACETAMINOPHEN 500 MG
975 TABLET ORAL
Refills: 0 | Status: DISCONTINUED | OUTPATIENT
Start: 2019-11-21 | End: 2019-11-24

## 2019-11-21 RX ORDER — SIMETHICONE 80 MG/1
80 TABLET, CHEWABLE ORAL
Refills: 0 | Status: DISCONTINUED | OUTPATIENT
Start: 2019-11-21 | End: 2019-11-24

## 2019-11-21 RX ORDER — SODIUM CHLORIDE 9 MG/ML
500 INJECTION, SOLUTION INTRAVENOUS ONCE
Refills: 0 | Status: DISCONTINUED | OUTPATIENT
Start: 2019-11-21 | End: 2019-11-24

## 2019-11-21 RX ORDER — SODIUM CHLORIDE 9 MG/ML
1000 INJECTION, SOLUTION INTRAVENOUS ONCE
Refills: 0 | Status: COMPLETED | OUTPATIENT
Start: 2019-11-21 | End: 2019-11-21

## 2019-11-21 RX ORDER — CEFAZOLIN SODIUM 1 G
2000 VIAL (EA) INJECTION EVERY 8 HOURS
Refills: 0 | Status: COMPLETED | OUTPATIENT
Start: 2019-11-21 | End: 2019-11-22

## 2019-11-21 RX ORDER — OXYCODONE HYDROCHLORIDE 5 MG/1
5 TABLET ORAL ONCE
Refills: 0 | Status: DISCONTINUED | OUTPATIENT
Start: 2019-11-21 | End: 2019-11-24

## 2019-11-21 RX ORDER — OXYTOCIN 10 UNIT/ML
333.33 VIAL (ML) INJECTION
Qty: 20 | Refills: 0 | Status: DISCONTINUED | OUTPATIENT
Start: 2019-11-21 | End: 2019-11-24

## 2019-11-21 RX ORDER — FAMOTIDINE 10 MG/ML
20 INJECTION INTRAVENOUS ONCE
Refills: 0 | Status: COMPLETED | OUTPATIENT
Start: 2019-11-21 | End: 2019-11-21

## 2019-11-21 RX ORDER — IBUPROFEN 200 MG
600 TABLET ORAL EVERY 6 HOURS
Refills: 0 | Status: COMPLETED | OUTPATIENT
Start: 2019-11-21 | End: 2020-10-19

## 2019-11-21 RX ORDER — SIMETHICONE 80 MG/1
80 TABLET, CHEWABLE ORAL EVERY 4 HOURS
Refills: 0 | Status: DISCONTINUED | OUTPATIENT
Start: 2019-11-21 | End: 2019-11-21

## 2019-11-21 RX ORDER — SODIUM CHLORIDE 9 MG/ML
1000 INJECTION, SOLUTION INTRAVENOUS
Refills: 0 | Status: DISCONTINUED | OUTPATIENT
Start: 2019-11-21 | End: 2019-11-21

## 2019-11-21 RX ORDER — METOCLOPRAMIDE HCL 10 MG
10 TABLET ORAL ONCE
Refills: 0 | Status: COMPLETED | OUTPATIENT
Start: 2019-11-21 | End: 2019-11-21

## 2019-11-21 RX ORDER — MAGNESIUM HYDROXIDE 400 MG/1
30 TABLET, CHEWABLE ORAL
Refills: 0 | Status: DISCONTINUED | OUTPATIENT
Start: 2019-11-21 | End: 2019-11-24

## 2019-11-21 RX ORDER — GLYCERIN ADULT
1 SUPPOSITORY, RECTAL RECTAL AT BEDTIME
Refills: 0 | Status: DISCONTINUED | OUTPATIENT
Start: 2019-11-21 | End: 2019-11-24

## 2019-11-21 RX ORDER — SODIUM CHLORIDE 9 MG/ML
500 INJECTION, SOLUTION INTRAVENOUS ONCE
Refills: 0 | Status: COMPLETED | OUTPATIENT
Start: 2019-11-21 | End: 2019-11-21

## 2019-11-21 RX ADMIN — SIMETHICONE 80 MILLIGRAM(S): 80 TABLET, CHEWABLE ORAL at 17:52

## 2019-11-21 RX ADMIN — Medication 10 MILLIGRAM(S): at 04:05

## 2019-11-21 RX ADMIN — SODIUM CHLORIDE 2000 MILLILITER(S): 9 INJECTION, SOLUTION INTRAVENOUS at 04:30

## 2019-11-21 RX ADMIN — Medication 0.2 MILLIGRAM(S): at 18:26

## 2019-11-21 RX ADMIN — ONDANSETRON 4 MILLIGRAM(S): 8 TABLET, FILM COATED ORAL at 12:58

## 2019-11-21 RX ADMIN — Medication 0.2 MILLIGRAM(S): at 12:56

## 2019-11-21 RX ADMIN — SODIUM CHLORIDE 125 MILLILITER(S): 9 INJECTION, SOLUTION INTRAVENOUS at 05:22

## 2019-11-21 RX ADMIN — Medication 100 MILLIGRAM(S): at 14:05

## 2019-11-21 RX ADMIN — SODIUM CHLORIDE 500 MILLILITER(S): 9 INJECTION, SOLUTION INTRAVENOUS at 15:50

## 2019-11-21 RX ADMIN — Medication 975 MILLIGRAM(S): at 10:36

## 2019-11-21 RX ADMIN — Medication 400 MILLIGRAM(S): at 02:20

## 2019-11-21 RX ADMIN — ENOXAPARIN SODIUM 40 MILLIGRAM(S): 100 INJECTION SUBCUTANEOUS at 17:53

## 2019-11-21 RX ADMIN — Medication 100 MILLIGRAM(S): at 04:08

## 2019-11-21 RX ADMIN — Medication 30 MILLIGRAM(S): at 06:44

## 2019-11-21 RX ADMIN — Medication 975 MILLIGRAM(S): at 15:02

## 2019-11-21 RX ADMIN — SODIUM CHLORIDE 1000 MILLILITER(S): 9 INJECTION, SOLUTION INTRAVENOUS at 10:45

## 2019-11-21 RX ADMIN — FAMOTIDINE 20 MILLIGRAM(S): 10 INJECTION INTRAVENOUS at 04:03

## 2019-11-21 RX ADMIN — Medication 30 MILLIGRAM(S): at 17:53

## 2019-11-21 RX ADMIN — Medication 30 MILLIGRAM(S): at 18:25

## 2019-11-21 RX ADMIN — Medication 30 MILLIGRAM(S): at 23:25

## 2019-11-21 RX ADMIN — Medication 100 MILLIGRAM(S): at 21:09

## 2019-11-21 NOTE — BRIEF OPERATIVE NOTE - NSICDXBRIEFPREOP_GEN_ALL_CORE_FT
PRE-OP DIAGNOSIS:  History of  section 2019 06:12:19  Candace Yang  Uterine contractions during pregnancy 2019 06:09:26  Candace Yang  History of glucose-6-phosphatase deficiency (G6PD) 2019 06:06:05  Candace Yang  Gestational diabetes 2019 06:05:36  Candace Yang  39 weeks gestation of pregnancy 2019 06:05:15  Candace Yang

## 2019-11-21 NOTE — OB PROVIDER TRIAGE NOTE - NSOBPROVIDERNOTE_OBGYN_ALL_OB_FT
29  at 39.4wks, h/o G6PD deficiency, h/o 2 prior  sections, GBS neg, with reassuring maternal and fetal wellbeing, for scheduled repeat  section today  -

## 2019-11-21 NOTE — OB PROVIDER TRIAGE NOTE - NSHPLABSRESULTS_GEN_ALL_CORE
38.5 sono: AGA, vtx, ant placenta, MVP 10.39, BPP 8/8, 3700gm 76%ile  36.3 sono: vtx, ant placenta, MVP 10.09, BPP 8/8  34.5 sono: vtx, ant placenta, MVP 9.25cm, BPP 8/8  33.2 sono: vtx, ant placenta, MVP 10.27cm, BPP 8/8, 1986gm 16%ile

## 2019-11-21 NOTE — PROGRESS NOTE ADULT - ASSESSMENT
28 y/o P3, s/p repeat  #3, POD0, EBL 800cc, with vaginal bleeding postop total 370cc and decreased UO, s/p methergine x 1, clinically and hemodynamically stable.   - monitor vital signs, UO  - monitor bleeding  - crossed for 2 units PRBCs  - f/u AM CBC  - routine postop care      Dr. Goodman and Dr. Dee aware. 30 y/o P3, s/p repeat  #3, POD0, EBL 800cc, with vaginal bleeding postop total 370cc and decreased UO, s/p methergine x 1, clinically and hemodynamically stable.   - monitor vital signs, UO  - monitor bleeding  - crossed for 2 units PRBCs  - f/u stat CBC  - routine postop care      Dr. Goodman and Dr. Dee aware.

## 2019-11-21 NOTE — PROGRESS NOTE ADULT - SUBJECTIVE AND OBJECTIVE BOX
PGY4 Note    Patient seen at bedside for evaluation of labor progression, she is feeling her contractions much more now, stronger, more frequent, q4 mins, 8/10 pain.     T(F): 98.6 ( @ 02:00), Max: 98.6 ( @ 00:38)  HR: 88 ( @ 03:30)  BP: 108/60 ( @ 03:30) (108/60 - 119/64)  RR: 18 ( @ 00:38)  Abd: soft, gravid, strong palpable contractions, no incisional tenderness  EFM: 150, mod lucille, +accel  TOCO: q 4-5 mins  SVE: C/L/P    Medications:  acetaminophen  IVPB ..: 400 ( @ 02:20)    Labs:                        10.5   9.51  )-----------( 224      ( 2019 13:25 )             32.0         137  |  103  |  9<L>  ----------------------------<  107<H>  4.2   |  22  |  0.7    Ca    9.1      2019 13:25    TPro  6.6  /  Alb  3.7  /  TBili  0.5  /  DBili  x   /  AST  15  /  ALT  11  /  AlkPhos  70      Prenatal Syphilis Test: Nonreact ( @ 02:09)  ABO RH Interpretation: A POS (19 @ 13:56)  Antibody Screen: NEG (19 @ 13:56)    Urinalysis Basic - ( 2019 13:25 )  Color: Yellow / Appearance: Clear / S.027 / pH: x  Gluc: x / Ketone: Negative  / Bili: Negative / Urobili: <2 mg/dL   Blood: x / Protein: 30 mg/dL / Nitrite: Negative   Leuk Esterase: Negative / RBC: 0 /HPF / WBC 3 /HPF   Sq Epi: x / Non Sq Epi: 3 /HPF / Bacteria: Few    A/P:   29  at 39.4, GBS neg, jaime, cannot r/o labor, for scheduled  section #3 today, now with worsening pain and stronger and more frequent contractions  -in light of her increasing pain and more frequent contractions, the decision was made to proceed with  delivery now instead of waiting for the afternoon  -preop meds/ancef given  -on call to OR  -2u PRBC on hold    Dr. Antoine aware

## 2019-11-21 NOTE — PROGRESS NOTE ADULT - SUBJECTIVE AND OBJECTIVE BOX
MICHAEL LAL  29y  Female    PGY3 Note:    Pt recovering well, no acute complaints. Denies abdominal pain, heavy vaginal bleeding/foul smelling vaginal discharge. Denies fever, chills, nausea/vomiting, diarrhea/constipation, dysuria, urgency, frequency.     Ambulating: No  Voiding: Shen d/colette; 0973-6973: 60cc/hr (min 50cc/hr)   Flatus: No  Bowel movements: No  Diet: Regular    MEDICATIONS  (STANDING):  acetaminophen   Tablet .. 975 milliGRAM(s) Oral <User Schedule>  ceFAZolin   IVPB 2000 milliGRAM(s) IV Intermittent every 8 hours  enoxaparin Injectable 40 milliGRAM(s) SubCutaneous every 24 hours  ibuprofen  Tablet. 600 milliGRAM(s) Oral every 6 hours  ketorolac   Injectable 30 milliGRAM(s) IV Push every 6 hours  lactated ringers Bolus 500 milliLiter(s) IV Bolus once  methylergonovine 0.2 milliGRAM(s) Oral every 6 hours  oxytocin Infusion 333.333 milliUNIT(s)/Min (1000 mL/Hr) IV Continuous <Continuous>  simethicone 80 milliGRAM(s) Chew two times a day    MEDICATIONS  (PRN):  bisacodyl 5 milliGRAM(s) Oral every 12 hours PRN Constipation  diphenhydrAMINE 25 milliGRAM(s) Oral every 6 hours PRN Itching  glycerin Suppository - Adult 1 Suppository(s) Rectal at bedtime PRN Constipation  lanolin Ointment 1 Application(s) Topical every 6 hours PRN Sore Nipples  magnesium hydroxide Suspension 30 milliLiter(s) Oral two times a day PRN Constipation  ondansetron    Tablet 4 milliGRAM(s) Oral every 6 hours PRN Nausea and/or Vomiting  oxyCODONE    IR 5 milliGRAM(s) Oral every 3 hours PRN Moderate to Severe Pain (4-10)  oxyCODONE    IR 5 milliGRAM(s) Oral once PRN Moderate to Severe Pain (4-10)      PAST MEDICAL & SURGICAL HISTORY:  H/O  section  G6PD deficiency      Physical Exam  Vital Signs Last 24 Hrs  T(C): 36.6 (2019 12:31), Max: 37 (2019 00:38)  T(F): 97.8 (2019 12:31), Max: 98.6 (2019 00:38)  HR: 77 (2019 16:09) (74 - 110)  BP: 110/67 (2019 16:09) (100/55 - 121/68)  RR: 18 (2019 12:31) (18 - 18)  SpO2: 99% (2019 08:54) (94% - 100%)    Gen: AAOx3, NAD  Heart: RRR, S1S2+  Lungs: CTA B/L, no r/r/w   Fundus: firm, below umbilicus   Wound: dressing in place c/d/i   Abd: Soft, nontender, moderately distended, BS+  Lochia: minimal   Ext: No calf tenderness, no swelling; SCDs in place     Labs:                        9.9    9.54  )-----------( 205      ( 2019 14:34 )             29.5                         10.5   9.51  )-----------( 224      ( 2019 13:25 )             32.0

## 2019-11-21 NOTE — OB PROVIDER TRIAGE NOTE - HISTORY OF PRESENT ILLNESS
29y  at 39.4wks here with c/o LOF at 2300, clear, with scant drop of blood, still feels wet but denies it running down her leg. She also has contractions, q5-6mins, 6/10 pain, started around the same time. Denies eric VB. Good fetal movement. H/o G6PD deficiency. GDMA2 on metforming 1000mg qhs and 500mg AM. Fasting fs 90s, 2hpp 120s. Denies other complications with the pregnancy.

## 2019-11-21 NOTE — OB PROVIDER H&P - ASSESSMENT
29  at 39.4, GBS neg, jaime, cannot r/o labor, for scheduled  section #3 today, with reassuring maternal and fetal wellbeing  -admit to labor and delivery  -pain management prn  -cont efm/toco  -admission labs (RPR)  -iv hydration  -clear liquid diet  -ancef prior to c/section  -appreciate labs from pretesting  -NPO  -IVF  -garcia/skin prep    Dr. Antoine aware

## 2019-11-21 NOTE — PROGRESS NOTE ADULT - SUBJECTIVE AND OBJECTIVE BOX
PGY3 progress note    Patient transferred from  to L&D for monitoring after vaginal bleeding postop on maternity floor.   Patient seen and examined at bedside, resting comfortably. Reports mild abdominal tenderness. Denies dizziness, headache, chest pain, SOB, palpitations. Did not ambulate yet.   Postop patient had vaginal bleeding, assessed as 370cc together. She is s/p 1 L bolus and methergine x1 dose.     PE:  Vital Signs Last 24 Hrs  T(C): 36.6 (21 Nov 2019 12:31), Max: 37 (21 Nov 2019 00:38)  T(F): 97.8 (21 Nov 2019 12:31), Max: 98.6 (21 Nov 2019 00:38)  HR: 75 (21 Nov 2019 14:09) (74 - 110)  BP: 118/65 (21 Nov 2019 14:09) (100/55 - 121/68)  BP(mean): --  RR: 18 (21 Nov 2019 12:31) (18 - 18)  SpO2: 99% (21 Nov 2019 08:54) (94% - 100%)    GEN: NAD, AAOX3  abd: soft, mildly distended, appropriately tender, no r/g/r, fundus firm 2 cm below umbilicus  incision: dressing c/d/i  ext: no calf tenderness or edema  lochia: no vaginal bleeding on pad or chux    labs:    Preop CBC:                        10.5   9.51  )-----------( 224      ( 20 Nov 2019 13:25 )             32.0     MEDICATIONS  (STANDING):  acetaminophen   Tablet .. 975 milliGRAM(s) Oral <User Schedule>  ceFAZolin   IVPB 2000 milliGRAM(s) IV Intermittent every 8 hours  enoxaparin Injectable 40 milliGRAM(s) SubCutaneous every 24 hours  ibuprofen  Tablet. 600 milliGRAM(s) Oral every 6 hours  ketorolac   Injectable 30 milliGRAM(s) IV Push every 6 hours  methylergonovine 0.2 milliGRAM(s) Oral every 6 hours  oxytocin Infusion 333.333 milliUNIT(s)/Min (1000 mL/Hr) IV Continuous <Continuous>  simethicone 80 milliGRAM(s) Chew two times a day    MEDICATIONS  (PRN):  bisacodyl 5 milliGRAM(s) Oral every 12 hours PRN Constipation  diphenhydrAMINE 25 milliGRAM(s) Oral every 6 hours PRN Itching  glycerin Suppository - Adult 1 Suppository(s) Rectal at bedtime PRN Constipation  lanolin Ointment 1 Application(s) Topical every 6 hours PRN Sore Nipples  magnesium hydroxide Suspension 30 milliLiter(s) Oral two times a day PRN Constipation  ondansetron    Tablet 4 milliGRAM(s) Oral every 6 hours PRN Nausea and/or Vomiting  oxyCODONE    IR 5 milliGRAM(s) Oral every 3 hours PRN Moderate to Severe Pain (4-10)  oxyCODONE    IR 5 milliGRAM(s) Oral once PRN Moderate to Severe Pain (4-10)

## 2019-11-21 NOTE — BRIEF OPERATIVE NOTE - OPERATION/FINDINGS
normal bilateral fallopian tubes and ovaries, normal uterus. Female infant delivered in cephalic presentation, meconium amniotic fluid. APGAR 9/, 3300gr

## 2019-11-21 NOTE — CHART NOTE - NSCHARTNOTEFT_GEN_A_CORE
Patient was examined for bleeding, 350cc of clots were evacuated, bimanual massage of the uterus was performed. The fundus was nontender, firm, 2 cm below the umbilicus. The lower uterine segment was mildly soft, improved after massage. Patient denies headaches, blurry vision, palpitations, CP, SOB, n/v, dizziness.     Vital Signs Last 24 Hrs  T(C): 36.1 (21 Nov 2019 09:20), Max: 37 (21 Nov 2019 00:38)  T(F): 97 (21 Nov 2019 09:20), Max: 98.6 (21 Nov 2019 00:38)  HR: 91 (21 Nov 2019 09:20) (74 - 110) Bedside manual check 60bpm  BP: 112/57 (21 Nov 2019 09:20) (103/64 - 119/64)  RR: 18 (21 Nov 2019 09:20) (18 - 18)  SpO2: 99% (21 Nov 2019 08:54) (94% - 100%)    UO: 35cc in the past 15 minutes    Plan:  -reassess in 1 hour  -monitor vitals q4  -continue postpartum pitocin  -if bleeding persists give PO methergine x 24hrs    Dr. Goodman and Dr. Curiel aware. Patient was examined for bleeding, 350cc of clots were evacuated, bimanual massage of the uterus was performed. The fundus was nontender, firm, 2 cm below the umbilicus. The lower uterine segment was mildly soft, improved after massage. Patient denies headaches, blurry vision, palpitations, CP, SOB, n/v, dizziness.     Vital Signs Last 24 Hrs  T(C): 36.1 (21 Nov 2019 09:20), Max: 37 (21 Nov 2019 00:38)  T(F): 97 (21 Nov 2019 09:20), Max: 98.6 (21 Nov 2019 00:38)  HR: 91 (21 Nov 2019 09:20) (74 - 110) Bedside manual check 60bpm  BP: 112/57 (21 Nov 2019 09:20) (103/64 - 119/64)  RR: 18 (21 Nov 2019 09:20) (18 - 18)  SpO2: 99% (21 Nov 2019 08:54) (94% - 100%)    UO: 15cc in the past 15 minutes    Plan:  -reassess in 1 hour  -monitor vitals q4  -continue postpartum pitocin  -if bleeding persists give PO methergine x 24hrs    Dr. Goodman and Dr. Curiel aware.

## 2019-11-21 NOTE — OB PROVIDER H&P - NS_OBGYNHISTORY_OBGYN_ALL_OB_FT
ObHx: h/o c/s x2 in [facility]  GynHx: Denies history of STIs, abnormal pap, ovarian cysts, or fibroids

## 2019-11-21 NOTE — OB RN PATIENT PROFILE - NS TRANSFER PATIENT BELONGINGS
Fax received from [employer] for patient informing us that patient's Ron Elizabetho has shipped via [employer].  Given to [facility] to scan Clothing

## 2019-11-21 NOTE — OB PROVIDER TRIAGE NOTE - NS_OBGYNHISTORY_OBGYN_ALL_OB_FT
ObHx: h/o c/s x2 in [facility]  GynHx: Denies history of STIs, abnormal pap, ovarian cysts, or fibroids

## 2019-11-21 NOTE — OB PROVIDER H&P - NSHPPHYSICALEXAM_GEN_ALL_CORE
PHYSICAL EXAM:  T(F): 98.6 (11-21 @ 00:38)  HR: 110 (11-21 @ 00:38)  BP: 119/64 (11-21 @ 00:38)  RR: 18 (11-21 @ 00:38)  Constitutional: AAOx3, NAD  Abdomen: Soft, gravid, nontender, very lightly palpable ctx  EFM: 140, mod lucille, +accel  [place]: q6mins  SVE: C/L/P

## 2019-11-21 NOTE — ANESTHESIA FOLLOW-UP NOTE - NSEVALATIONFT_GEN_ALL_CORE
No anesthesia complications  Awake and alert  Airway: patent  Pain: controlled 2/10  BP: 112/92  HR: 87  RR: 18  Temp: 37  SpO2: 98  PONV:

## 2019-11-21 NOTE — PROGRESS NOTE ADULT - ASSESSMENT
A/P: 30yo P3, PMHx significant for GDMA2, G6PD deficiency, S/P repeat C/S #3, POD0, vaginal bleeding postop s/p methergine x1, now resolved, recovering well   - transfer back to 4D   - encourage ambulation, PO hydration  - f/u AM CBC  - TOV 2230   - monitor vitals, bleeding   - crossed for 2U pRBC   - regular diet   - encourage incentive spirometry   - pain mgmt   - simethicone/dulcolax   - routine postop care   - lovenox     Will inform Dr. Curiel A/P: 30yo P3, PMHx significant for GDMA2, G6PD deficiency, S/P repeat C/S #3, POD0, vaginal bleeding postop s/p methergine x1, now resolved, recovering well   - transfer back to 4D   - encourage ambulation, PO hydration  - f/u AM CBC  - TOV 2230   - monitor vitals, bleeding   - crossed for 2U pRBC   - c/w methergine PO x 24hr   - regular diet   - encourage incentive spirometry   - pain mgmt   - simethicone/dulcolax   - routine postop care   - [facility]     Will inform Dr. Curiel

## 2019-11-22 ENCOUNTER — APPOINTMENT (OUTPATIENT)
Dept: ANTEPARTUM | Facility: CLINIC | Age: 29
End: 2019-11-22

## 2019-11-22 LAB
BASOPHILS # BLD AUTO: 0.01 K/UL — SIGNIFICANT CHANGE UP (ref 0–0.2)
BASOPHILS NFR BLD AUTO: 0.1 % — SIGNIFICANT CHANGE UP (ref 0–1)
EOSINOPHIL # BLD AUTO: 0.13 K/UL — SIGNIFICANT CHANGE UP (ref 0–0.7)
EOSINOPHIL NFR BLD AUTO: 1.2 % — SIGNIFICANT CHANGE UP (ref 0–8)
HCT VFR BLD CALC: 24.8 % — LOW (ref 37–47)
HCT VFR BLD CALC: 26.9 % — LOW (ref 37–47)
HGB BLD-MCNC: 8.2 G/DL — LOW (ref 12–16)
HGB BLD-MCNC: 9 G/DL — LOW (ref 12–16)
IMM GRANULOCYTES NFR BLD AUTO: 0.4 % — HIGH (ref 0.1–0.3)
LYMPHOCYTES # BLD AUTO: 1.86 K/UL — SIGNIFICANT CHANGE UP (ref 1.2–3.4)
LYMPHOCYTES # BLD AUTO: 17.5 % — LOW (ref 20.5–51.1)
MCHC RBC-ENTMCNC: 31.9 PG — HIGH (ref 27–31)
MCHC RBC-ENTMCNC: 31.9 PG — HIGH (ref 27–31)
MCHC RBC-ENTMCNC: 33.1 G/DL — SIGNIFICANT CHANGE UP (ref 32–37)
MCHC RBC-ENTMCNC: 33.5 G/DL — SIGNIFICANT CHANGE UP (ref 32–37)
MCV RBC AUTO: 95.4 FL — SIGNIFICANT CHANGE UP (ref 81–99)
MCV RBC AUTO: 96.5 FL — SIGNIFICANT CHANGE UP (ref 81–99)
MONOCYTES # BLD AUTO: 0.79 K/UL — HIGH (ref 0.1–0.6)
MONOCYTES NFR BLD AUTO: 7.5 % — SIGNIFICANT CHANGE UP (ref 1.7–9.3)
NEUTROPHILS # BLD AUTO: 7.77 K/UL — HIGH (ref 1.4–6.5)
NEUTROPHILS NFR BLD AUTO: 73.3 % — SIGNIFICANT CHANGE UP (ref 42.2–75.2)
NRBC # BLD: 0 /100 WBCS — SIGNIFICANT CHANGE UP (ref 0–0)
NRBC # BLD: 0 /100 WBCS — SIGNIFICANT CHANGE UP (ref 0–0)
PLATELET # BLD AUTO: 209 K/UL — SIGNIFICANT CHANGE UP (ref 130–400)
PLATELET # BLD AUTO: 222 K/UL — SIGNIFICANT CHANGE UP (ref 130–400)
RBC # BLD: 2.57 M/UL — LOW (ref 4.2–5.4)
RBC # BLD: 2.82 M/UL — LOW (ref 4.2–5.4)
RBC # FLD: 12.8 % — SIGNIFICANT CHANGE UP (ref 11.5–14.5)
RBC # FLD: 13.2 % — SIGNIFICANT CHANGE UP (ref 11.5–14.5)
WBC # BLD: 10.6 K/UL — SIGNIFICANT CHANGE UP (ref 4.8–10.8)
WBC # BLD: 12.6 K/UL — HIGH (ref 4.8–10.8)
WBC # FLD AUTO: 10.6 K/UL — SIGNIFICANT CHANGE UP (ref 4.8–10.8)
WBC # FLD AUTO: 12.6 K/UL — HIGH (ref 4.8–10.8)

## 2019-11-22 PROCEDURE — 93010 ELECTROCARDIOGRAM REPORT: CPT

## 2019-11-22 PROCEDURE — 99231 SBSQ HOSP IP/OBS SF/LOW 25: CPT

## 2019-11-22 RX ORDER — IBUPROFEN 200 MG
600 TABLET ORAL EVERY 6 HOURS
Refills: 0 | Status: DISCONTINUED | OUTPATIENT
Start: 2019-11-22 | End: 2019-11-24

## 2019-11-22 RX ADMIN — ENOXAPARIN SODIUM 40 MILLIGRAM(S): 100 INJECTION SUBCUTANEOUS at 18:00

## 2019-11-22 RX ADMIN — Medication 600 MILLIGRAM(S): at 12:30

## 2019-11-22 RX ADMIN — Medication 975 MILLIGRAM(S): at 09:00

## 2019-11-22 RX ADMIN — Medication 30 MILLIGRAM(S): at 05:35

## 2019-11-22 RX ADMIN — SIMETHICONE 80 MILLIGRAM(S): 80 TABLET, CHEWABLE ORAL at 17:28

## 2019-11-22 RX ADMIN — Medication 975 MILLIGRAM(S): at 08:33

## 2019-11-22 RX ADMIN — SIMETHICONE 80 MILLIGRAM(S): 80 TABLET, CHEWABLE ORAL at 05:36

## 2019-11-22 RX ADMIN — Medication 975 MILLIGRAM(S): at 21:06

## 2019-11-22 RX ADMIN — Medication 975 MILLIGRAM(S): at 15:45

## 2019-11-22 RX ADMIN — Medication 600 MILLIGRAM(S): at 18:00

## 2019-11-22 RX ADMIN — Medication 0.2 MILLIGRAM(S): at 06:00

## 2019-11-22 RX ADMIN — Medication 25 MILLIGRAM(S): at 09:27

## 2019-11-22 RX ADMIN — Medication 975 MILLIGRAM(S): at 15:14

## 2019-11-22 RX ADMIN — Medication 600 MILLIGRAM(S): at 18:30

## 2019-11-22 RX ADMIN — Medication 600 MILLIGRAM(S): at 12:06

## 2019-11-22 RX ADMIN — Medication 0.2 MILLIGRAM(S): at 00:07

## 2019-11-22 RX ADMIN — Medication 100 MILLIGRAM(S): at 05:35

## 2019-11-22 RX ADMIN — Medication 600 MILLIGRAM(S): at 23:47

## 2019-11-22 NOTE — DISCHARGE NOTE OB - PATIENT PORTAL LINK FT
You can access the FollowMyHealth Patient Portal offered by [facility] by registering at the following website: [email or web address]. By joining [employer]’s FollowMyHealth portal, you will also be able to view your health information using other applications (apps) compatible with our system.

## 2019-11-22 NOTE — DISCHARGE NOTE OB - CARE PROVIDERS DIRECT ADDRESSES
[email or web address]

## 2019-11-22 NOTE — PROGRESS NOTE ADULT - SUBJECTIVE AND OBJECTIVE BOX
PGY4 Note    Subjective:   Patient doing well. No complaints. Minimal lochia. Pain controlled.    Objective:   T(F): 97.6 (11-22 @ 03:30), Max: 98.2 (11-21 @ 23:39)  HR: 84 (11-22 @ 03:30)  BP: 104/55 (11-22 @ 03:30) (100/55 - 121/68)  RR: 18 (11-22 @ 03:30)  Gen: AAOx3, NAD  Chest: S1S2 RRR, lungs CTA bilaterally  Abd: Soft, appropriately tender, mildly distended, BS+  Incision: Dressing removed. Clean, dry, intact, steris in place.  Fundus: Firm, appropriately tender, below the umbilicus  Lochia: Normal  Ext: No calf tenderness, no swelling    Labs:                        9.9    9.54  )-----------( 205      ( 21 Nov 2019 14:34 )             29.5   11-20    137  |  103  |  9<L>  ----------------------------<  107<H>  4.2   |  22  |  0.7    Ca    9.1      20 Nov 2019 13:25    TPro  6.6  /  Alb  3.7  /  TBili  0.5  /  DBili  x   /  AST  15  /  ALT  11  /  AlkPhos  70  11-20    Ambulating: Yes   Voiding: Yes   Flatus: Yes   Bowel movements: No  Breast or bottle feeding: Breast  Diet: regular    Assessment:   29y s/p repeat c/section POD#1, doing well    Plan:  -Encourage ambulation and PO hydration  -Regular diet  -F/u AM CBC  -Cont routine postpartum care  -pain management prn

## 2019-11-22 NOTE — DISCHARGE NOTE OB - HOSPITAL COURSE
Patient underwent an uncomplicated repeat LTCS for likely early labor. EBL 800cc, H/H as below. Patient’s postoperative course was unremarkable and she remained hemodynamically stable and afebrile throughout. Upon discharge on POD3, the patient is ambulating and voiding spontaneously, tolerating oral intake, pain was well controlled with oral medication, and vital signs were stable.

## 2019-11-22 NOTE — DISCHARGE NOTE OB - CARE PROVIDER_API CALL
Rajeev Mcbride (MD)  Obstetrics and Gynecology  [facility] [place] [address]  Phone: [phone]  Fax: [phone]  Follow Up Time: 1 week

## 2019-11-22 NOTE — CHART NOTE - NSCHARTNOTEFT_GEN_A_CORE
Called to patient bedside for complaint of chest pain. Reports feeling midsternal pain radiating to right arm since this morning 3/10 intensity. Pain is intermittent, worse with moving and ambulation. Denies lightheadedness, dizziness, palpitations, SOB. She has been ambulating, tolerating PO, voiding, passing flatus. Bedside HR 108bpm. 02 sat 99%, BP wnl, afebrile. Abdomen soft, nontender, +BS, firm uterine fundus below umbilicus. Lungs CTA no r/r/w, RRR no m/r/g. Mild tenderness to palpation of midsternal chest area. Likely musculoskeletal pain but will order EKG and repeat CBC to r/o anemia, cardiac origin of pain.    Dr. Diaz aware.

## 2019-11-22 NOTE — DISCHARGE NOTE OB - CARE PLAN
Principal Discharge DX:	H/O  section  Goal:	healthy patient  Assessment and plan of treatment:	Follow up with your doctor in 1 week. For the next 6 weeks, nothing in the vagina. No sex, tampons, or douching. No swimming pools or tub baths. May shower per usual. If you have fever higher than 100.4, severe abdominal pain, or heavy vaginal bleeding, call your doctor or return to the emergency room.  Secondary Diagnosis:	G6PD deficiency  Goal:	healthy patient  Assessment and plan of treatment:	Followup with your primary doctor for routine care

## 2019-11-22 NOTE — DISCHARGE NOTE OB - MEDICATION SUMMARY - MEDICATIONS TO TAKE
I will START or STAY ON the medications listed below when I get home from the hospital:    oxycodone-acetaminophen 5 mg-325 mg oral tablet  -- 1 tab(s) by mouth every 6 hours, As Needed MDD:4 tablets   -- Caution federal law prohibits the transfer of this drug to any person other  than the person for whom it was prescribed.  May cause drowsiness.  Alcohol may intensify this effect.  Use care when operating dangerous machinery.  This prescription cannot be refilled.  This product contains acetaminophen.  Do not use  with any other product containing acetaminophen to prevent possible liver damage.  Using more of this medication than prescribed may cause serious breathing problems.    -- Indication: For moderate pain    ibuprofen 600 mg oral tablet  -- 1 tab(s) by mouth every 6 hours, As Needed  -- Indication: For mild pain    Colace 100 mg oral capsule  -- 1 cap(s) by mouth 2 times a day, As Needed MDD:2 capsules   -- Medication should be taken with plenty of water.    -- Indication: For Constipation

## 2019-11-22 NOTE — DISCHARGE NOTE OB - PLAN OF CARE
healthy patient Follow up with your doctor in 1 week. For the next 6 weeks, nothing in the vagina. No sex, tampons, or douching. No swimming pools or tub baths. May shower per usual. If you have fever higher than 100.4, severe abdominal pain, or heavy vaginal bleeding, call your doctor or return to the emergency room. Followup with your primary doctor for routine care

## 2019-11-23 PROCEDURE — 99231 SBSQ HOSP IP/OBS SF/LOW 25: CPT

## 2019-11-23 RX ADMIN — Medication 975 MILLIGRAM(S): at 21:01

## 2019-11-23 RX ADMIN — Medication 600 MILLIGRAM(S): at 06:13

## 2019-11-23 RX ADMIN — Medication 975 MILLIGRAM(S): at 04:05

## 2019-11-23 RX ADMIN — SIMETHICONE 80 MILLIGRAM(S): 80 TABLET, CHEWABLE ORAL at 06:13

## 2019-11-23 RX ADMIN — Medication 600 MILLIGRAM(S): at 12:44

## 2019-11-23 RX ADMIN — Medication 975 MILLIGRAM(S): at 15:59

## 2019-11-23 RX ADMIN — ENOXAPARIN SODIUM 40 MILLIGRAM(S): 100 INJECTION SUBCUTANEOUS at 18:18

## 2019-11-23 RX ADMIN — Medication 975 MILLIGRAM(S): at 09:30

## 2019-11-23 RX ADMIN — Medication 600 MILLIGRAM(S): at 18:19

## 2019-11-23 RX ADMIN — SIMETHICONE 80 MILLIGRAM(S): 80 TABLET, CHEWABLE ORAL at 18:18

## 2019-11-23 NOTE — PROGRESS NOTE ADULT - ASSESSMENT
A/P: 29y yo P3 s/p repeat LTCS #3 POD  , recovering well   -ambulation encouraged  -PO hydration encouraged  -regular diet  -lovenox for DVT prophylaxis  -Incentive Spirometry encouraged  -pain management prn  -crossed for 2u PRBCs  - anticipate d/c home is XXX  - instructed to follow up in 1 week for incision check, and 6 weeks for postpartum check A/P: 29y yo P3 s/p repeat LTCS #3, POD#2, with fever of 100.6F 11/22 @2033, afebrile since, low likelihood of VTE, recovering well   -ambulation encouraged  -PO hydration encouraged  -regular diet  -lovenox for DVT prophylaxis  -Incentive Spirometry encouraged  -pain management prn  -crossed for 2u PRBCs  -anticipate d/c home tomorrow     Dr. Shin and Dr. Savage aware.

## 2019-11-23 NOTE — PROGRESS NOTE ADULT - SUBJECTIVE AND OBJECTIVE BOX
MICHAEL LAL  29y  Female    PGY1 Note:  Patient seen and examined bedside. Reported chest pain yesterday @1600 with stat EKG showing normal rhythm with sinus tachy. No overnight events and chest pain has now resolved. Denies heavy vaginal bleeding. Pain well controlled. Ambulating without difficulty. Tolerating diet, voiding, passing flatus, no BM. Denies fevers/chills, leg pain/swelling, SOB.    Physical Exam  Vital Signs Last 24 Hrs  T(C): 37.2 (2019 15:44), Max: 38.1 (2019 20:33)  T(F): 98.9 (2019 15:44), Max: 100.6 (2019 20:33)  HR: 99 (2019 15:44) (89 - 108)  BP: 88/60 (2019 15:44) (88/60 - 114/67)  RR: 18 (2019 15:44) (18 - 18)    Gen: NAD, sitting comfortably  CV: RRR. No murmurs gallops or rubs.  Pulm: CTAB. No wheezes or rales.  Ext: No calf tenderness, no swelling.   Abd: Nondistended, soft, nontender, BS+, fundus firm, and below umbilicus.   Lochia: Minimal rubra  Wound: Incision clean, dry intact. Steris in place. No surrounding edema or erythema.    PAST MEDICAL & SURGICAL HISTORY:  H/O  section  G6PD deficiency      Diet: regular    Labs:                        8.2    12.60 )-----------( 222      ( 2019 22:57 )             24.8                         9.0    10.60 )-----------( 209      ( 2019 06:26 )             26.9          MEDICATIONS  (STANDING):  acetaminophen   Tablet .. 975 milliGRAM(s) Oral <User Schedule>  enoxaparin Injectable 40 milliGRAM(s) SubCutaneous every 24 hours  ibuprofen  Tablet. 600 milliGRAM(s) Oral every 6 hours  simethicone 80 milliGRAM(s) Chew two times a day    MEDICATIONS  (PRN):  bisacodyl 5 milliGRAM(s) Oral every 12 hours PRN Constipation  diphenhydrAMINE 25 milliGRAM(s) Oral every 6 hours PRN Itching  glycerin Suppository - Adult 1 Suppository(s) Rectal at bedtime PRN Constipation  lanolin Ointment 1 Application(s) Topical every 6 hours PRN Sore Nipples  magnesium hydroxide Suspension 30 milliLiter(s) Oral two times a day PRN Constipation  ondansetron    Tablet 4 milliGRAM(s) Oral every 6 hours PRN Nausea and/or Vomiting  oxyCODONE    IR 5 milliGRAM(s) Oral every 3 hours PRN Moderate to Severe Pain (4-10)  oxyCODONE    IR 5 milliGRAM(s) Oral once PRN Moderate to Severe Pain (4-10) MICHAEL LAL  29y  Female    PGY1 Note:  Patient seen and examined bedside. Reported chest pain yesterday @1600 with stat EKG showing normal rhythm with sinus tachy. No overnight events and chest pain has now resolved. Denies heavy vaginal bleeding. Pain well controlled. Ambulating without difficulty. Tolerating diet, voiding, passing flatus, no BM. Denies fevers/chills, leg pain/swelling, SOB.    Physical Exam  Vital Signs Last 24 Hrs  T(C): 37.2 (2019 15:44), Max: 38.1 (2019 20:33)  T(F): 98.9 (2019 15:44), Max: 100.6 (2019 20:33)  HR: 99 (2019 15:44) (89 - 108)  BP: 88/60 (2019 15:44) (88/60 - 114/67)  RR: 18 (2019 15:44) (18 - 18)    Gen: NAD, sitting comfortably  CV: RRR. No murmurs gallops or rubs.  Pulm: CTAB. No wheezes or rales.  Ext: No calf tenderness, no swelling.   Abd: Nondistended, soft, nontender, BS+, fundus firm, and below umbilicus, no fundal tenderness  Lochia: Minimal rubra  Wound: Incision clean, dry intact. Steris in place. No surrounding edema or erythema.    PAST MEDICAL & SURGICAL HISTORY:  H/O  section  G6PD deficiency      Diet: regular    Labs:                        8.2    12.60 )-----------( 222      ( 2019 22:57 )             24.8                         9.0    10.60 )-----------( 209      ( 2019 06:26 )             26.9                 9.9    9.54  )-----------( 205      ( 2019 14:34 )             29.5   11-20    137  |  103  |  9<L>  ----------------------------<  107<H>  4.2   |  22  |  0.7    Ca    9.1      2019 13:25    TPro  6.6  /  Alb  3.7  /  TBili  0.5  /  DBili  x   /  AST  15  /  ALT  11  /  AlkPhos  70      pre h/h  10.5  UDS neg  RPR NR  A pos, antibody neg    EKG  sinus tachy 102    MEDICATIONS  (STANDING):  acetaminophen   Tablet .. 975 milliGRAM(s) Oral <User Schedule>  enoxaparin Injectable 40 milliGRAM(s) SubCutaneous every 24 hours  ibuprofen  Tablet. 600 milliGRAM(s) Oral every 6 hours  simethicone 80 milliGRAM(s) Chew two times a day    MEDICATIONS  (PRN):  bisacodyl 5 milliGRAM(s) Oral every 12 hours PRN Constipation  diphenhydrAMINE 25 milliGRAM(s) Oral every 6 hours PRN Itching  glycerin Suppository - Adult 1 Suppository(s) Rectal at bedtime PRN Constipation  lanolin Ointment 1 Application(s) Topical every 6 hours PRN Sore Nipples  magnesium hydroxide Suspension 30 milliLiter(s) Oral two times a day PRN Constipation  ondansetron    Tablet 4 milliGRAM(s) Oral every 6 hours PRN Nausea and/or Vomiting  oxyCODONE  IR 5 milliGRAM(s) Oral every 3 hours PRN Moderate to Severe Pain (4-10)  oxyCODONE  IR 5 milliGRAM(s) Oral once PRN Moderate to Severe Pain (4-10) MICHAEL LAL  29y  Female    PGY1 Note:  Patient seen and examined bedside. Reported chest pain yesterday @1600 with stat EKG showing normal rhythm with sinus tachy. No overnight events and chest pain has now resolved. Denies heavy vaginal bleeding. Pain well controlled. Ambulating without difficulty. Tolerating diet, voiding, passing flatus, no BM. Denies fevers/chills, leg pain/swelling, SOB.    Physical Exam  Vital Signs Last 24 Hrs  T(C): 37.2 (2019 15:44), Max: 38.1 (2019 20:33)  T(F): 98.9 (2019 15:44), Max: 100.6 (2019 20:33)  HR: 99 (2019 15:44) (89 - 108)  BP: 88/60 (2019 15:44) (88/60 - 114/67)  RR: 18 (2019 15:44) (18 - 18)    Gen: NAD, sitting comfortably  CV: RRR. No murmurs gallops or rubs.  Pulm: CTAB. No wheezes or rales.  Ext: No calf tenderness, no swelling.   Abd: Nondistended, soft, nontender, BS+, fundus firm, and below umbilicus, no fundal tenderness  Lochia: Minimal rubra  Wound: Pfannenstiel incision clean, dry intact. Steris in place. No surrounding edema or erythema.    PAST MEDICAL & SURGICAL HISTORY:  H/O  section  G6PD deficiency      Diet: regular    Labs:                        8.2    12.60 )-----------( 222      ( 2019 22:57 )             24.8                         9.0    10.60 )-----------( 209      ( 2019 06:26 )             26.9                 9.9    9.54  )-----------( 205      ( 2019 14:34 )             29.5   11-20    137  |  103  |  9<L>  ----------------------------<  107<H>  4.2   |  22  |  0.7    Ca    9.1      2019 13:25    TPro  6.6  /  Alb  3.7  /  TBili  0.5  /  DBili  x   /  AST  15  /  ALT  11  /  AlkPhos  70  -    pre h/h  10.5/32  UDS neg  RPR NR  A pos, antibody neg    EKG  sinus tachy 102    MEDICATIONS  (STANDING):  acetaminophen   Tablet .. 975 milliGRAM(s) Oral <User Schedule>  enoxaparin Injectable 40 milliGRAM(s) SubCutaneous every 24 hours  ibuprofen  Tablet. 600 milliGRAM(s) Oral every 6 hours  simethicone 80 milliGRAM(s) Chew two times a day    MEDICATIONS  (PRN):  bisacodyl 5 milliGRAM(s) Oral every 12 hours PRN Constipation  diphenhydrAMINE 25 milliGRAM(s) Oral every 6 hours PRN Itching  glycerin Suppository - Adult 1 Suppository(s) Rectal at bedtime PRN Constipation  lanolin Ointment 1 Application(s) Topical every 6 hours PRN Sore Nipples  magnesium hydroxide Suspension 30 milliLiter(s) Oral two times a day PRN Constipation  ondansetron    Tablet 4 milliGRAM(s) Oral every 6 hours PRN Nausea and/or Vomiting  oxyCODONE  IR 5 milliGRAM(s) Oral every 3 hours PRN Moderate to Severe Pain (4-10)  oxyCODONE  IR 5 milliGRAM(s) Oral once PRN Moderate to Severe Pain (4-10)

## 2019-11-24 VITALS
DIASTOLIC BLOOD PRESSURE: 51 MMHG | RESPIRATION RATE: 18 BRPM | HEART RATE: 104 BPM | TEMPERATURE: 99 F | SYSTOLIC BLOOD PRESSURE: 104 MMHG

## 2019-11-24 PROCEDURE — 99238 HOSP IP/OBS DSCHRG MGMT 30/<: CPT

## 2019-11-24 RX ORDER — IBUPROFEN 200 MG
1 TABLET ORAL
Qty: 0 | Refills: 0 | DISCHARGE
Start: 2019-11-24

## 2019-11-24 RX ORDER — DOCUSATE SODIUM 100 MG
1 CAPSULE ORAL
Qty: 10 | Refills: 0
Start: 2019-11-24 | End: 2019-11-28

## 2019-11-24 RX ADMIN — Medication 600 MILLIGRAM(S): at 00:18

## 2019-11-24 RX ADMIN — Medication 975 MILLIGRAM(S): at 03:06

## 2019-11-24 RX ADMIN — Medication 600 MILLIGRAM(S): at 12:08

## 2019-11-24 RX ADMIN — Medication 600 MILLIGRAM(S): at 06:16

## 2019-11-24 RX ADMIN — SIMETHICONE 80 MILLIGRAM(S): 80 TABLET, CHEWABLE ORAL at 06:16

## 2019-11-24 RX ADMIN — Medication 975 MILLIGRAM(S): at 15:56

## 2019-11-24 NOTE — PROGRESS NOTE ADULT - SUBJECTIVE AND OBJECTIVE BOX
PGY 1 Note:    Pt doing well, pain well controlled. No acute complaints. Denies headache, fever, chills, N/V, CP, SOB, palpitations, severe abdominal pain or heavy vaginal bleeding, dysuria.    Ambulating: Yes  Voiding  Flatus: Yes  Bowel movements: No  Breast or bottle feeding: Both  Diet: Regular    PAST MEDICAL & SURGICAL HISTORY:  H/O  section  G6PD deficiency      Physical Exam  Vital Signs Last 24 Hrs  T(F): 97.8 (2019 08:04), Max: 98.9 (2019 15:44)  HR: 96 (2019 08:04) (93 - 101) Bedside check 90bpm  BP: 111/62 (2019 08:04) (88/60 - 111/62)  RR: 18 (2019 08:04) (18 - 18)      Gen: AAOx3, NAD  Heart: S1S2, RRR  Lungs: CTABL  Abd: Soft, appropriately tender, mildly distended, BS+  Incision: Dressing changed, no saturation. Steri strips in place, incision c/d/i, no erythema/induration/drainage.  Fundus: Firm, appropriately tender, below the umbilicus  Lochia: Normal  Ext: No calf tenderness, no swelling    Labs:                        8.2    12.60 )-----------( 222      ( 2019 22:57 )             24.8                         9.0    10.60 )-----------( 209      ( 2019 06:26 )             26.9         137  |  103  |  9<L>  ----------------------------<  107<H>  4.2   |  22  |  0.7    Ca    9.1      2019 13:25    TPro  6.6  /  Alb  3.7  /  TBili  0.5  /  DBili  x   /  AST  15  /  ALT  11  /  AlkPhos  70      pre h/h  10.5/32  UDS neg  RPR NR  A pos, antibody neg    EKG  sinus tachy 102    MEDICATIONS  (STANDING):  acetaminophen   Tablet .. 975 milliGRAM(s) Oral <User Schedule>  enoxaparin Injectable 40 milliGRAM(s) SubCutaneous every 24 hours  ibuprofen  Tablet. 600 milliGRAM(s) Oral every 6 hours  lactated ringers Bolus 500 milliLiter(s) IV Bolus once  oxytocin Infusion 333.333 milliUNIT(s)/Min (1000 mL/Hr) IV Continuous <Continuous>  simethicone 80 milliGRAM(s) Chew two times a day

## 2019-11-24 NOTE — PROGRESS NOTE ADULT - ASSESSMENT
A/P: 29y yo P3 s/p repeat LTCS #3, POD#3, with fever of 100.6F 11/22 @2033, afebrile since, low likelihood of VTE, recovering well     -ambulation encouraged  -PO hydration encouraged  -regular diet  -lovenox for DVT prophylaxis  -Incentive Spirometry encouraged  -pain management prn  -d/c home today    Dr. Bravo and Dr. Fitch to be made aware.

## 2019-11-27 ENCOUNTER — OUTPATIENT (OUTPATIENT)
Dept: OUTPATIENT SERVICES | Facility: HOSPITAL | Age: 29
LOS: 1 days | Discharge: HOME | End: 2019-11-27

## 2019-11-27 ENCOUNTER — APPOINTMENT (OUTPATIENT)
Dept: OBGYN | Facility: CLINIC | Age: 29
End: 2019-11-27
Payer: MEDICAID

## 2019-11-27 VITALS
SYSTOLIC BLOOD PRESSURE: 104 MMHG | HEIGHT: 69 IN | DIASTOLIC BLOOD PRESSURE: 74 MMHG | WEIGHT: 199.5 LBS | BODY MASS INDEX: 29.55 KG/M2

## 2019-11-27 DIAGNOSIS — D75.A GLUCOSE-6-PHOSPHATE DEHYDROGENASE (G6PD) DEFICIENCY WITHOUT ANEMIA: Chronic | ICD-10-CM

## 2019-11-27 DIAGNOSIS — O99.210 OBESITY COMPLICATING PREGNANCY, UNSPECIFIED TRIMESTER: ICD-10-CM

## 2019-11-27 NOTE — HISTORY OF PRESENT ILLNESS
[Postpartum Follow Up] : postpartum follow up [Complications:___] : complications include: [unfilled] [Repeat C/S] : delivered by  section (repeat) [Breastfeeding] : not currently nursing [None] : The patient is currently asymptomatic [Clean/Dry/Intact] : clean, dry and intact [Erythema] : not erythematous [Swelling] : not swollen [Dehiscence] : not dehisced [Healed] : not healed [Doing Well] : is doing well [No Sign of Infection] : is showing no signs of infection [Excellent Pain Control] : has excellent pain control [de-identified] : no depression [de-identified] : routine instructions, wound care, 5 week f/u

## 2019-11-28 DIAGNOSIS — R11.0 NAUSEA: ICD-10-CM

## 2019-11-28 DIAGNOSIS — O26.893 OTHER SPECIFIED PREGNANCY RELATED CONDITIONS, THIRD TRIMESTER: ICD-10-CM

## 2019-11-28 DIAGNOSIS — O34.211 MATERNAL CARE FOR LOW TRANSVERSE SCAR FROM PREVIOUS CESAREAN DELIVERY: ICD-10-CM

## 2019-11-28 DIAGNOSIS — Z3A.39 39 WEEKS GESTATION OF PREGNANCY: ICD-10-CM

## 2019-11-28 DIAGNOSIS — O99.89 OTHER SPECIFIED DISEASES AND CONDITIONS COMPLICATING PREGNANCY, CHILDBIRTH AND THE PUERPERIUM: ICD-10-CM

## 2019-11-28 DIAGNOSIS — Z28.21 IMMUNIZATION NOT CARRIED OUT BECAUSE OF PATIENT REFUSAL: ICD-10-CM

## 2019-11-28 DIAGNOSIS — D75.A GLUCOSE-6-PHOSPHATE DEHYDROGENASE (G6PD) DEFICIENCY WITHOUT ANEMIA: ICD-10-CM

## 2019-11-28 DIAGNOSIS — Z88.2 ALLERGY STATUS TO SULFONAMIDES: ICD-10-CM

## 2019-12-30 ENCOUNTER — OUTPATIENT (OUTPATIENT)
Dept: OUTPATIENT SERVICES | Facility: HOSPITAL | Age: 29
LOS: 1 days | Discharge: HOME | End: 2019-12-30

## 2019-12-30 ENCOUNTER — APPOINTMENT (OUTPATIENT)
Dept: OBGYN | Facility: CLINIC | Age: 29
End: 2019-12-30
Payer: MEDICAID

## 2019-12-30 VITALS
DIASTOLIC BLOOD PRESSURE: 82 MMHG | BODY MASS INDEX: 29.19 KG/M2 | WEIGHT: 197.06 LBS | SYSTOLIC BLOOD PRESSURE: 112 MMHG | HEIGHT: 69 IN

## 2019-12-30 DIAGNOSIS — O09.90 SUPERVISION OF HIGH RISK PREGNANCY, UNSPECIFIED, UNSPECIFIED TRIMESTER: ICD-10-CM

## 2019-12-30 DIAGNOSIS — O40.3XX1 POLYHYDRAMNIOS, THIRD TRIMESTER, FETUS 1: ICD-10-CM

## 2019-12-30 DIAGNOSIS — D75.A GLUCOSE-6-PHOSPHATE DEHYDROGENASE (G6PD) DEFICIENCY WITHOUT ANEMIA: Chronic | ICD-10-CM

## 2019-12-30 DIAGNOSIS — O24.415 GESTATIONAL DIABETES MELLITUS IN PREGNANCY, CONTROLLED BY ORAL HYPOGLYCEMIC DRUGS: ICD-10-CM

## 2019-12-30 RX ORDER — NORETHINDRONE ACETATE AND ETHINYL ESTRADIOL 1MG-20(21)
1-20 KIT ORAL DAILY
Qty: 28 | Refills: 12 | Status: ACTIVE | COMMUNITY
Start: 2019-12-30 | End: 1900-01-01

## 2019-12-30 NOTE — HISTORY OF PRESENT ILLNESS
[Postpartum Follow Up] : postpartum follow up [Complications:___] : complications include: [unfilled] [Repeat C/S] : delivered by  section (repeat) [BF with Difficulty] : nursing without difficulty [Breastfeeding] : currently nursing [None] : No associated symptoms are reported [Clean/Dry/Intact] : clean, dry and intact [Erythema] : not erythematous [Swelling] : not swollen [Dehiscence] : not dehisced [Back to Normal] : is back to normal in size [Doing Well] : is doing well [No Sign of Infection] : is showing no signs of infection [de-identified] : discussed birth control-loestyrin-risk of dvt/pe/mi/stroke, 2hr ogtt

## 2020-01-22 ENCOUNTER — APPOINTMENT (OUTPATIENT)
Dept: OBGYN | Facility: CLINIC | Age: 30
End: 2020-01-22

## 2022-09-13 NOTE — OB PROVIDER DELIVERY SUMMARY - NS_DELIVERYANESTHES_OBGYN_ALL_OB_FT
Tanner
AAOx3 c/o NVD x 3 days. Denies trauma, fall, HA, SOB, or CP. States her PCP recommends hydration and blood work. Well appearing, clear speech noted.

## 2022-09-19 ENCOUNTER — OUTPATIENT (OUTPATIENT)
Dept: OUTPATIENT SERVICES | Facility: HOSPITAL | Age: 32
LOS: 1 days | Discharge: HOME | End: 2022-09-19

## 2022-09-19 ENCOUNTER — RESULT CHARGE (OUTPATIENT)
Age: 32
End: 2022-09-19

## 2022-09-19 ENCOUNTER — APPOINTMENT (OUTPATIENT)
Dept: OBGYN | Facility: CLINIC | Age: 32
End: 2022-09-19
Payer: MEDICAID

## 2022-09-19 VITALS
BODY MASS INDEX: 35.99 KG/M2 | SYSTOLIC BLOOD PRESSURE: 110 MMHG | WEIGHT: 243 LBS | HEIGHT: 69 IN | DIASTOLIC BLOOD PRESSURE: 78 MMHG

## 2022-09-19 DIAGNOSIS — D75.A GLUCOSE-6-PHOSPHATE DEHYDROGENASE (G6PD) DEFICIENCY WITHOUT ANEMIA: Chronic | ICD-10-CM

## 2022-09-19 PROCEDURE — 76815 OB US LIMITED FETUS(S): CPT | Mod: 26

## 2022-09-19 PROCEDURE — 99214 OFFICE O/P EST MOD 30 MIN: CPT | Mod: 25

## 2022-09-21 ENCOUNTER — NON-APPOINTMENT (OUTPATIENT)
Age: 32
End: 2022-09-21

## 2022-09-22 ENCOUNTER — OUTPATIENT (OUTPATIENT)
Dept: OUTPATIENT SERVICES | Facility: HOSPITAL | Age: 32
LOS: 1 days | Discharge: HOME | End: 2022-09-22

## 2022-09-22 ENCOUNTER — APPOINTMENT (OUTPATIENT)
Dept: ANTEPARTUM | Facility: CLINIC | Age: 32
End: 2022-09-22
Payer: MEDICAID

## 2022-09-22 ENCOUNTER — RESULT CHARGE (OUTPATIENT)
Age: 32
End: 2022-09-22

## 2022-09-22 ENCOUNTER — ASOB RESULT (OUTPATIENT)
Age: 32
End: 2022-09-22

## 2022-09-22 ENCOUNTER — APPOINTMENT (OUTPATIENT)
Dept: OBGYN | Facility: CLINIC | Age: 32
End: 2022-09-22
Payer: MEDICAID

## 2022-09-22 ENCOUNTER — NON-APPOINTMENT (OUTPATIENT)
Age: 32
End: 2022-09-22

## 2022-09-22 ENCOUNTER — APPOINTMENT (OUTPATIENT)
Dept: NUTRITION | Facility: CLINIC | Age: 32
End: 2022-09-22

## 2022-09-22 VITALS
TEMPERATURE: 98.6 F | BODY MASS INDEX: 35.84 KG/M2 | WEIGHT: 242 LBS | HEIGHT: 69 IN | HEART RATE: 108 BPM | SYSTOLIC BLOOD PRESSURE: 107 MMHG | DIASTOLIC BLOOD PRESSURE: 59 MMHG | OXYGEN SATURATION: 100 %

## 2022-09-22 DIAGNOSIS — D75.A GLUCOSE-6-PHOSPHATE DEHYDROGENASE (G6PD) DEFICIENCY WITHOUT ANEMIA: Chronic | ICD-10-CM

## 2022-09-22 DIAGNOSIS — O24.419 GESTATIONAL DIABETES MELLITUS IN PREGNANCY, UNSPECIFIED CONTROL: ICD-10-CM

## 2022-09-22 DIAGNOSIS — R94.31 ABNORMAL ELECTROCARDIOGRAM [ECG] [EKG]: ICD-10-CM

## 2022-09-22 LAB
BILIRUB UR QL STRIP: NORMAL
BP DIAS: 59 MM HG
BP SYS: 107 MM HG
CLARITY UR: CLEAR
COLLECTION METHOD: NORMAL
FETAL HEART RATE (BPM): 156
FETAL MOVEMENT: PRESENT
GLUCOSE BLDC GLUCOMTR-MCNC: 98
GLUCOSE BLDC GLUCOMTR-MCNC: 98 MG/DL — SIGNIFICANT CHANGE UP (ref 70–99)
GLUCOSE UR-MCNC: NORMAL
HCG UR QL: 0.2 EU/DL
HGB UR QL STRIP.AUTO: NORMAL
KETONES UR-MCNC: NORMAL
LEUKOCYTE ESTERASE UR QL STRIP: NORMAL
NITRITE UR QL STRIP: NORMAL
OB COMMENTS: NORMAL
PH UR STRIP: 5
PROT UR STRIP-MCNC: NORMAL
SCHEDULED VISIT: YES
SP GR UR STRIP: 1.01
URINE ALBUMIN/PROTEIN: NORMAL
URINE GLUCOSE: NORMAL
URINE KETONES: NORMAL
WEEKS GESTATION: 36.3

## 2022-09-22 PROCEDURE — 99213 OFFICE O/P EST LOW 20 MIN: CPT | Mod: 25

## 2022-09-22 PROCEDURE — 76816 OB US FOLLOW-UP PER FETUS: CPT | Mod: 26

## 2022-09-22 PROCEDURE — 76818 FETAL BIOPHYS PROFILE W/NST: CPT | Mod: 26,59

## 2022-09-22 PROCEDURE — 93010 ELECTROCARDIOGRAM REPORT: CPT

## 2022-09-22 RX ORDER — BLOOD-GLUCOSE METER
W/DEVICE EACH MISCELLANEOUS
Qty: 1 | Refills: 0 | Status: ACTIVE | COMMUNITY
Start: 2022-09-22 | End: 1900-01-01

## 2022-09-22 RX ORDER — BLOOD SUGAR DIAGNOSTIC
STRIP MISCELLANEOUS 4 TIMES DAILY
Qty: 100 | Refills: 1 | Status: ACTIVE | COMMUNITY
Start: 2022-09-22 | End: 1900-01-01

## 2022-09-22 RX ORDER — LANCETS 33 GAUGE
EACH MISCELLANEOUS
Qty: 100 | Refills: 1 | Status: ACTIVE | COMMUNITY
Start: 2022-09-22 | End: 1900-01-01

## 2022-09-22 NOTE — END OF VISIT
[FreeTextEntry3] : [facility] Staff\par \par I saw and evaluated Ms. FROST with Dr. Lantigua.  She has gestational diabetes and is on Metformin 500 mg PO BID.  She had gestational diabetes her last pregnancy as well and was on Metformin. \par \par She has some prenatal records with her including a report of the anatomy ultrasound.  She also has her due date as , confirmed by a first trimester ultrasound.\par \par Gestational diabetes\par \par Counseling: \par \par 1.  The patient will be evaluated by a nutritionist and instructed in adhering to a diabetic diet.\par \par 2.  She is familiar with capillary blood glucose testing (fasting and 2 hours after each meal).\par \par 3.  The significance and usual management of diabetes in pregnancy were reviewed, including risks of preeclampsia, Intra-Uterine Fetal Demise, macrosomia, birth trauma, pre-term labor,  section, NICU admission (the main reasons include  hypoglycemia and  jaundice) and the possible need for insulin therapy.\par \par 4.  Exercise was encouraged.  Ideally, she should walk briskly  after each meal.\par \par Recommendations:\par \par 1.  Glycemic Control.  Target glucose ranges to minimize excessive fetal growth are:  Fasting 60-90 mg/dl; preprandial  mg/dl; and 2-hour postprandial < 120 mg/dl.  If these values are elevated, insulin therapy is encouraged.\par \par 2.  Antepartum testing.  Daily fetal movement counts are recommended from 28 weeks.  Weekly or twice weekly biophysical profiles may be recommended, the frequency and timing will depend on glucose control.  Ultrasound assessment of fetal growth and macrosomic features is recommended.\par \par 3.  Timing of Delivery.  If spontaneous delivery has not occurred by 39 weeks gestation, delivery may be planned between 39-40 weeks.   If complications, such as preeclampsia, macrosomia or poor glucose control develop, then delivery plans may need to be revised.\par \par 4.  Route of delivery.  Via repeat  delivery\par \par 5.  Glucose control in labor.  During labor, capillary glucose values should be checked every few hours (depending on their stability).  Insulin may be required to cover elevated glucose levels.\par \par 6.  Long-term diabetes surveillance.  The risk of developing diabetes outside of pregnancy over the next five years may be as high as 50%.  I recommend that she have a 2 hour GTT or similar diabetes screen  at 6 to 12 weeks postpartum with her primary Obstetrician and counseling about ways to minimize her risk.  If her fasting glucose is normal, annual glucose screening by her primary care physician is advised.\par \par - On Metformin 500 mg PO BID for around the past month.\par - Limited Fingerstick log values were recorded  (in mmoL/L).  Below is a conversion of those values into mg/dL.\par \par                   Fasting               Breakfast                Lunch                 Dinner\par              100                       111                        124                     120\par               97                        118                        122                     108\par                 91                        102                        108                     117\par 9/3                88                        106                        106                     115\par                 90                        102                        100\par         \par Insulin is the first line medication to treat elevated blood sugars in pregnancy.  It does not cross the placenta.  It can cause hypoglycemia. Metformin is an alternative medication  that can be used.  It does cross the placenta.  Short term data regarding the use of Metformin in pregnancy and the effects on the  have been reassuring.  However long term data are lacking.  Metformin can cause GI upset, and a CMP should be checked when using Metformin.  Ms. Frost desires to remain on Metformin.\par \par She was prescribed a new glucometer that will record fingersticks in mg/dL.\par \par On ultrasound today the estimated fetal weight was 3302 grams (90th percentile).  The NST was equivocal and further evaluation on labor and delivery was recommended.  Ms. Monson declineddeclined.\par \par She has a history of three previous  deliveries and will deliver via repeat  delivery this pregnancy as well.\par \par The pulse today was 104 beats per minute.  We will order an EKG.\par \par She may (or may not) have G6PD diagnosed in [facility].  At the present time sulfa drugs should be avoided.\par \par Prenatal care is with Dr. Curiel.\par Continue Metformin 500 mg PO BID.\par Recommend twice weekly biophysical profiles with non stress tests.\par Fetal movement and labor precautions discussed\par Follow up in one week with the fingerstick log.\par \par Yvan Ontiveros MD\par \par \par \par \par \par \par

## 2022-09-22 NOTE — DISCUSSION/SUMMARY
[FreeTextEntry1] : Ms. Frost is a 31yo  @36w3d, RAMONE: 10/19/22 by LMP, with GDMA2, recently came from [facility], here for initial MFM consultation. Co-managed with Dr. Curiel. Denies contractions, vaginal bleeding, leakage of fluid. Good fetal movement. \par \par FS log from [facility] reviewed: appears to be moderately controlled on Metformin 500 mg BID. \par \par RAMONE: 10/19/22 \par \par Complications \par GDMA2, currently on Metformin 500 BID \par G6PD deficiency in mother \par Prior c/s x3 \par Late transfer from [facility] \par \par OBHx: \par FT LTCS x3 \par \par GynHx: denies \par \par PMHx: ?G6PD deficiency, patient was recently told her bloodwork did not confirm this, was however counseled to avoid sulfa drugs \par \par PSHx: c/s x3 \par \par Meds: Metformin 500 mg PO BID \par \par All: denies \par \par SocHx: denies \par \par Gen: AOx3, NAD\par Cardiac: RRR, S1S2, no m/r/g\par Pulm: CTA b/l\par Abd: soft, gravid, nontender, no palpable ctx\par Ext: no edema, nontender, no erythema or warmth\par Fundal Height: 38cm\par   \par A/P: 31yo  @36w3d, RAMONE: 10/19/22 by LMP, with GDMA2, here for initial MFM consultation. Co-managed with Dr. Curiel. \par \par #GDMA2  \par \par Counseling:  \par \par 1. The patient will be evaluated by a nutritionist and instructed in adhering to a diabetic diet. \par \par 2. She was counseled by a nurse and instructed in capillary blood glucose testing (fasting and 2 hours after each meal). \par \par 3. The significance and usual management of diabetes in pregnancy were reviewed, including risks of preeclampsia, Intra-Uterine Fetal Demise, macrosomia, birth trauma, pre-term labor,  section, NICU admission (the main reasons include  hypoglycemia and  jaundice) and the possible need for insulin therapy. \par \par 4. Exercise was encouraged. Ideally, she should walk briskly after each meal. \par \par Recommendations: \par \par 1. Glycemic Control. Target glucose ranges to minimize excessive fetal growth are: Fasting 60-90 mg/dl; preprandial  mg/dl; and 2-hour postprandial < 120 mg/dl. If these values are elevated, insulin therapy is encouraged. Patient encouraged to maintain fingerstick log. \par -Currently on Metformin 500 mg BID, continue with this dosage\par \par 2. Antepartum testing. Daily fetal movement counts are recommended from 28 weeks. Weekly or twice weekly biophysical profiles may be recommended, the frequency and timing will depend on glucose control. Ultrasound assessment of fetal growth and macrosomic features is recommended. \par \par 3. Timing of Delivery. To be determined \par \par 4. Route of delivery. Plan for repeat  section \par \par 5. Glucose control in labor. During labor, capillary glucose values should be checked every few hours (depending on their stability). Insulin may be required to cover elevated glucose levels. \par \par 6. Long-term diabetes surveillance. The risk of developing diabetes outside of pregnancy over the next five years may be as high as 50%. I recommend that she have a 2 hour GTT or similar diabetes screen at 6 to 12 weeks postpartum with her primary Obstetrician and counseling about ways to minimize her risk. If her fasting glucose is normal, annual glucose screening by her primary care physician is advised. \par \par #mild tachycardia\par -HR today: 104\par -EKG ordered\par \par #pregnancy \par -continue prenatal care with Dr. Curiel \par - twice weekly biophysical profiles with non stress tests\par -hydration and ambulation encouraged \par -PTL precautions given \par \par RTC 1w with FS log

## 2022-09-23 ENCOUNTER — ASOB RESULT (OUTPATIENT)
Age: 32
End: 2022-09-23

## 2022-09-23 ENCOUNTER — APPOINTMENT (OUTPATIENT)
Dept: ANTEPARTUM | Facility: CLINIC | Age: 32
End: 2022-09-23
Payer: MEDICAID

## 2022-09-23 ENCOUNTER — OUTPATIENT (OUTPATIENT)
Dept: OUTPATIENT SERVICES | Facility: HOSPITAL | Age: 32
LOS: 1 days | Discharge: HOME | End: 2022-09-23

## 2022-09-23 ENCOUNTER — RESULT CHARGE (OUTPATIENT)
Age: 32
End: 2022-09-23

## 2022-09-23 VITALS
TEMPERATURE: 97.8 F | HEART RATE: 102 BPM | BODY MASS INDEX: 35.59 KG/M2 | DIASTOLIC BLOOD PRESSURE: 59 MMHG | SYSTOLIC BLOOD PRESSURE: 102 MMHG | OXYGEN SATURATION: 100 % | WEIGHT: 241 LBS

## 2022-09-23 DIAGNOSIS — D75.A GLUCOSE-6-PHOSPHATE DEHYDROGENASE (G6PD) DEFICIENCY WITHOUT ANEMIA: Chronic | ICD-10-CM

## 2022-09-23 DIAGNOSIS — Z3A.36 36 WEEKS GESTATION OF PREGNANCY: ICD-10-CM

## 2022-09-23 DIAGNOSIS — O34.218 MATERNAL CARE FOR OTHER TYPE SCAR FROM PREVIOUS CESAREAN DELIVERY: ICD-10-CM

## 2022-09-23 DIAGNOSIS — O24.415 GESTATIONAL DIABETES MELLITUS IN PREGNANCY, CONTROLLED BY ORAL HYPOGLYCEMIC DRUGS: ICD-10-CM

## 2022-09-23 LAB
A VAGINAE DNA VAG QL NAA+PROBE: NORMAL
ABO + RH PNL BLD: NORMAL
BASOPHILS # BLD AUTO: 0.02 K/UL
BASOPHILS NFR BLD AUTO: 0.2 %
BLD GP AB SCN SERPL QL: NORMAL
BVAB2 DNA VAG QL NAA+PROBE: NORMAL
C KRUSEI DNA VAG QL NAA+PROBE: NEGATIVE
C TRACH RRNA SPEC QL NAA+PROBE: NEGATIVE
EOSINOPHIL # BLD AUTO: 0.16 K/UL
EOSINOPHIL NFR BLD AUTO: 1.7 %
ESTIMATED AVERAGE GLUCOSE: 126 MG/DL
GLUCOSE BLDC GLUCOMTR-MCNC: 94 MG/DL — SIGNIFICANT CHANGE UP (ref 70–99)
GP B STREP DNA SPEC QL NAA+PROBE: NORMAL
GP B STREP DNA SPEC QL NAA+PROBE: NOT DETECTED
HBA1C MFR BLD HPLC: 6 %
HBV SURFACE AG SER QL: NONREACTIVE
HCT VFR BLD CALC: 33.1 %
HGB BLD-MCNC: 10.9 G/DL
HIV1+2 AB SPEC QL IA.RAPID: NONREACTIVE
IMM GRANULOCYTES NFR BLD AUTO: 0.5 %
LYMPHOCYTES # BLD AUTO: 2.57 K/UL
LYMPHOCYTES NFR BLD AUTO: 27.5 %
MAN DIFF?: NORMAL
MCHC RBC-ENTMCNC: 31.5 PG
MCHC RBC-ENTMCNC: 32.9 G/DL
MCV RBC AUTO: 95.7 FL
MEGA1 DNA VAG QL NAA+PROBE: NORMAL
MONOCYTES # BLD AUTO: 0.69 K/UL
MONOCYTES NFR BLD AUTO: 7.4 %
N GONORRHOEA RRNA SPEC QL NAA+PROBE: NEGATIVE
NEUTROPHILS # BLD AUTO: 5.87 K/UL
NEUTROPHILS NFR BLD AUTO: 62.7 %
PLATELET # BLD AUTO: 271 K/UL
RBC # BLD: 3.46 M/UL
RBC # FLD: 13.2 %
SOURCE GBS: NORMAL
T VAGINALIS RRNA SPEC QL NAA+PROBE: NEGATIVE
WBC # FLD AUTO: 9.36 K/UL

## 2022-09-23 PROCEDURE — 76805 OB US >/= 14 WKS SNGL FETUS: CPT | Mod: 26

## 2022-09-23 PROCEDURE — 76818 FETAL BIOPHYS PROFILE W/NST: CPT | Mod: 26

## 2022-09-26 ENCOUNTER — OUTPATIENT (OUTPATIENT)
Dept: OUTPATIENT SERVICES | Facility: HOSPITAL | Age: 32
LOS: 1 days | Discharge: HOME | End: 2022-09-26

## 2022-09-26 ENCOUNTER — APPOINTMENT (OUTPATIENT)
Dept: ANTEPARTUM | Facility: CLINIC | Age: 32
End: 2022-09-26
Payer: MEDICAID

## 2022-09-26 ENCOUNTER — APPOINTMENT (OUTPATIENT)
Dept: ANTEPARTUM | Facility: CLINIC | Age: 32
End: 2022-09-26

## 2022-09-26 ENCOUNTER — APPOINTMENT (OUTPATIENT)
Dept: OBGYN | Facility: CLINIC | Age: 32
End: 2022-09-26
Payer: MEDICAID

## 2022-09-26 ENCOUNTER — ASOB RESULT (OUTPATIENT)
Age: 32
End: 2022-09-26

## 2022-09-26 DIAGNOSIS — Z3A.36 36 WEEKS GESTATION OF PREGNANCY: ICD-10-CM

## 2022-09-26 DIAGNOSIS — O34.218 MATERNAL CARE FOR OTHER TYPE SCAR FROM PREVIOUS CESAREAN DELIVERY: ICD-10-CM

## 2022-09-26 DIAGNOSIS — D75.A GLUCOSE-6-PHOSPHATE DEHYDROGENASE (G6PD) DEFICIENCY WITHOUT ANEMIA: Chronic | ICD-10-CM

## 2022-09-26 DIAGNOSIS — O24.419 GESTATIONAL DIABETES MELLITUS IN PREGNANCY, UNSPECIFIED CONTROL: ICD-10-CM

## 2022-09-26 DIAGNOSIS — O40.9XX0 POLYHYDRAMNIOS, UNSPECIFIED TRIMESTER, NOT APPLICABLE OR UNSPECIFIED: ICD-10-CM

## 2022-09-26 LAB
BACTERIA UR CULT: NORMAL
HCV RNA SERPL NAA+PROBE-LOG IU: NOT DETECTED LOGIU/ML
HEPC RNA INTERP: NOT DETECTED IU/ML
LEAD BLD-MCNC: <1 UG/DL
MEV IGG FLD QL IA: 50.8 AU/ML
MEV IGG+IGM SER-IMP: POSITIVE
RUBV IGG FLD-ACNC: 7.9 INDEX
RUBV IGG SER-IMP: POSITIVE
T PALLIDUM AB SER QL IA: NEGATIVE
VZV AB TITR SER: POSITIVE
VZV IGG SER IF-ACNC: 356.3 INDEX

## 2022-09-26 PROCEDURE — 99213 OFFICE O/P EST LOW 20 MIN: CPT

## 2022-09-26 PROCEDURE — 76818 FETAL BIOPHYS PROFILE W/NST: CPT | Mod: 26

## 2022-09-26 PROCEDURE — ZZZZZ: CPT

## 2022-09-28 ENCOUNTER — NON-APPOINTMENT (OUTPATIENT)
Age: 32
End: 2022-09-28

## 2022-09-29 ENCOUNTER — ASOB RESULT (OUTPATIENT)
Age: 32
End: 2022-09-29

## 2022-09-29 ENCOUNTER — RESULT REVIEW (OUTPATIENT)
Age: 32
End: 2022-09-29

## 2022-09-29 ENCOUNTER — APPOINTMENT (OUTPATIENT)
Dept: ANTEPARTUM | Facility: CLINIC | Age: 32
End: 2022-09-29
Payer: MEDICAID

## 2022-09-29 ENCOUNTER — TRANSCRIPTION ENCOUNTER (OUTPATIENT)
Age: 32
End: 2022-09-29

## 2022-09-29 ENCOUNTER — INPATIENT (INPATIENT)
Facility: HOSPITAL | Age: 32
LOS: 2 days | Discharge: HOME | End: 2022-10-02
Attending: OBSTETRICS & GYNECOLOGY | Admitting: OBSTETRICS & GYNECOLOGY

## 2022-09-29 ENCOUNTER — APPOINTMENT (OUTPATIENT)
Dept: ANTEPARTUM | Facility: CLINIC | Age: 32
End: 2022-09-29

## 2022-09-29 ENCOUNTER — OUTPATIENT (OUTPATIENT)
Dept: OUTPATIENT SERVICES | Facility: HOSPITAL | Age: 32
LOS: 1 days | Discharge: HOME | End: 2022-09-29

## 2022-09-29 VITALS — DIASTOLIC BLOOD PRESSURE: 59 MMHG | SYSTOLIC BLOOD PRESSURE: 121 MMHG

## 2022-09-29 DIAGNOSIS — O24.419 GESTATIONAL DIABETES MELLITUS IN PREGNANCY, UNSPECIFIED CONTROL: ICD-10-CM

## 2022-09-29 DIAGNOSIS — D75.A GLUCOSE-6-PHOSPHATE DEHYDROGENASE (G6PD) DEFICIENCY WITHOUT ANEMIA: Chronic | ICD-10-CM

## 2022-09-29 LAB
AMPHET UR-MCNC: NEGATIVE — SIGNIFICANT CHANGE UP
APPEARANCE UR: ABNORMAL
BACTERIA # UR AUTO: ABNORMAL
BARBITURATES UR SCN-MCNC: NEGATIVE — SIGNIFICANT CHANGE UP
BASOPHILS # BLD AUTO: 0.02 K/UL — SIGNIFICANT CHANGE UP (ref 0–0.2)
BASOPHILS NFR BLD AUTO: 0.2 % — SIGNIFICANT CHANGE UP (ref 0–1)
BENZODIAZ UR-MCNC: NEGATIVE — SIGNIFICANT CHANGE UP
BILIRUB UR-MCNC: NEGATIVE — SIGNIFICANT CHANGE UP
BLD GP AB SCN SERPL QL: SIGNIFICANT CHANGE UP
BUPRENORPHINE SCREEN, URINE RESULT: NEGATIVE — SIGNIFICANT CHANGE UP
COCAINE METAB.OTHER UR-MCNC: NEGATIVE — SIGNIFICANT CHANGE UP
COLOR SPEC: COLORLESS — SIGNIFICANT CHANGE UP
DIFF PNL FLD: NEGATIVE — SIGNIFICANT CHANGE UP
EOSINOPHIL # BLD AUTO: 0.15 K/UL — SIGNIFICANT CHANGE UP (ref 0–0.7)
EOSINOPHIL NFR BLD AUTO: 1.8 % — SIGNIFICANT CHANGE UP (ref 0–8)
EPI CELLS # UR: 2 /HPF — SIGNIFICANT CHANGE UP (ref 0–5)
FENTANYL UR QL: NEGATIVE — SIGNIFICANT CHANGE UP
GLUCOSE BLDC GLUCOMTR-MCNC: 84 MG/DL — SIGNIFICANT CHANGE UP (ref 70–99)
GLUCOSE UR QL: NEGATIVE — SIGNIFICANT CHANGE UP
HCT VFR BLD CALC: 34 % — LOW (ref 37–47)
HGB BLD-MCNC: 11.5 G/DL — LOW (ref 12–16)
HYALINE CASTS # UR AUTO: 1 /LPF — SIGNIFICANT CHANGE UP (ref 0–7)
IMM GRANULOCYTES NFR BLD AUTO: 0.5 % — HIGH (ref 0.1–0.3)
KETONES UR-MCNC: NEGATIVE — SIGNIFICANT CHANGE UP
L&D DRUG SCREEN, URINE: SIGNIFICANT CHANGE UP
LEUKOCYTE ESTERASE UR-ACNC: NEGATIVE — SIGNIFICANT CHANGE UP
LYMPHOCYTES # BLD AUTO: 2.38 K/UL — SIGNIFICANT CHANGE UP (ref 1.2–3.4)
LYMPHOCYTES # BLD AUTO: 28.5 % — SIGNIFICANT CHANGE UP (ref 20.5–51.1)
MCHC RBC-ENTMCNC: 31.4 PG — HIGH (ref 27–31)
MCHC RBC-ENTMCNC: 33.8 G/DL — SIGNIFICANT CHANGE UP (ref 32–37)
MCV RBC AUTO: 92.9 FL — SIGNIFICANT CHANGE UP (ref 81–99)
METHADONE UR-MCNC: NEGATIVE — SIGNIFICANT CHANGE UP
MONOCYTES # BLD AUTO: 0.66 K/UL — HIGH (ref 0.1–0.6)
MONOCYTES NFR BLD AUTO: 7.9 % — SIGNIFICANT CHANGE UP (ref 1.7–9.3)
NEUTROPHILS # BLD AUTO: 5.1 K/UL — SIGNIFICANT CHANGE UP (ref 1.4–6.5)
NEUTROPHILS NFR BLD AUTO: 61.1 % — SIGNIFICANT CHANGE UP (ref 42.2–75.2)
NITRITE UR-MCNC: NEGATIVE — SIGNIFICANT CHANGE UP
NRBC # BLD: 0 /100 WBCS — SIGNIFICANT CHANGE UP (ref 0–0)
OPIATES UR-MCNC: NEGATIVE — SIGNIFICANT CHANGE UP
OXYCODONE UR-MCNC: NEGATIVE — SIGNIFICANT CHANGE UP
PCP UR-MCNC: NEGATIVE — SIGNIFICANT CHANGE UP
PH UR: 6.5 — SIGNIFICANT CHANGE UP (ref 5–8)
PLATELET # BLD AUTO: 265 K/UL — SIGNIFICANT CHANGE UP (ref 130–400)
PRENATAL SYPHILIS TEST: SIGNIFICANT CHANGE UP
PROPOXYPHENE QUALITATIVE URINE RESULT: NEGATIVE — SIGNIFICANT CHANGE UP
PROT UR-MCNC: SIGNIFICANT CHANGE UP
RBC # BLD: 3.66 M/UL — LOW (ref 4.2–5.4)
RBC # FLD: 13.2 % — SIGNIFICANT CHANGE UP (ref 11.5–14.5)
RBC CASTS # UR COMP ASSIST: 1 /HPF — SIGNIFICANT CHANGE UP (ref 0–4)
SARS-COV-2 RNA SPEC QL NAA+PROBE: SIGNIFICANT CHANGE UP
SP GR SPEC: 1.01 — LOW (ref 1.01–1.03)
UROBILINOGEN FLD QL: SIGNIFICANT CHANGE UP
WBC # BLD: 8.35 K/UL — SIGNIFICANT CHANGE UP (ref 4.8–10.8)
WBC # FLD AUTO: 8.35 K/UL — SIGNIFICANT CHANGE UP (ref 4.8–10.8)
WBC UR QL: 4 /HPF — SIGNIFICANT CHANGE UP (ref 0–5)

## 2022-09-29 PROCEDURE — 59514 CESAREAN DELIVERY ONLY: CPT | Mod: U7

## 2022-09-29 PROCEDURE — 88307 TISSUE EXAM BY PATHOLOGIST: CPT | Mod: 26

## 2022-09-29 PROCEDURE — 99215 OFFICE O/P EST HI 40 MIN: CPT | Mod: 25

## 2022-09-29 PROCEDURE — 76815 OB US LIMITED FETUS(S): CPT | Mod: 26

## 2022-09-29 PROCEDURE — 88305 TISSUE EXAM BY PATHOLOGIST: CPT | Mod: 26

## 2022-09-29 PROCEDURE — 76818 FETAL BIOPHYS PROFILE W/NST: CPT | Mod: 26

## 2022-09-29 PROCEDURE — ZZZZZ: CPT

## 2022-09-29 PROCEDURE — 58925 REMOVAL OF OVARIAN CYST(S): CPT

## 2022-09-29 RX ORDER — OXYCODONE HYDROCHLORIDE 5 MG/1
5 TABLET ORAL
Refills: 0 | Status: COMPLETED | OUTPATIENT
Start: 2022-09-29 | End: 2022-10-06

## 2022-09-29 RX ORDER — TETANUS TOXOID, REDUCED DIPHTHERIA TOXOID AND ACELLULAR PERTUSSIS VACCINE, ADSORBED 5; 2.5; 8; 8; 2.5 [IU]/.5ML; [IU]/.5ML; UG/.5ML; UG/.5ML; UG/.5ML
0.5 SUSPENSION INTRAMUSCULAR ONCE
Refills: 0 | Status: DISCONTINUED | OUTPATIENT
Start: 2022-09-29 | End: 2022-10-02

## 2022-09-29 RX ORDER — SODIUM CHLORIDE 9 MG/ML
1000 INJECTION, SOLUTION INTRAVENOUS
Refills: 0 | Status: DISCONTINUED | OUTPATIENT
Start: 2022-09-29 | End: 2022-09-29

## 2022-09-29 RX ORDER — ACETAMINOPHEN 500 MG
975 TABLET ORAL
Refills: 0 | Status: DISCONTINUED | OUTPATIENT
Start: 2022-09-29 | End: 2022-10-02

## 2022-09-29 RX ORDER — OXYTOCIN 10 UNIT/ML
333.33 VIAL (ML) INJECTION
Qty: 20 | Refills: 0 | Status: DISCONTINUED | OUTPATIENT
Start: 2022-09-29 | End: 2022-10-02

## 2022-09-29 RX ORDER — KETOROLAC TROMETHAMINE 30 MG/ML
30 SYRINGE (ML) INJECTION EVERY 6 HOURS
Refills: 0 | Status: DISCONTINUED | OUTPATIENT
Start: 2022-09-29 | End: 2022-09-30

## 2022-09-29 RX ORDER — OXYTOCIN 10 UNIT/ML
333.33 VIAL (ML) INJECTION
Qty: 20 | Refills: 0 | Status: COMPLETED | OUTPATIENT
Start: 2022-09-29 | End: 2022-09-29

## 2022-09-29 RX ORDER — LANOLIN
1 OINTMENT (GRAM) TOPICAL EVERY 6 HOURS
Refills: 0 | Status: DISCONTINUED | OUTPATIENT
Start: 2022-09-29 | End: 2022-10-02

## 2022-09-29 RX ORDER — SODIUM CHLORIDE 9 MG/ML
1000 INJECTION, SOLUTION INTRAVENOUS ONCE
Refills: 0 | Status: COMPLETED | OUTPATIENT
Start: 2022-09-29 | End: 2022-09-29

## 2022-09-29 RX ORDER — SIMETHICONE 80 MG/1
80 TABLET, CHEWABLE ORAL EVERY 4 HOURS
Refills: 0 | Status: DISCONTINUED | OUTPATIENT
Start: 2022-09-29 | End: 2022-10-02

## 2022-09-29 RX ORDER — SENNA PLUS 8.6 MG/1
2 TABLET ORAL AT BEDTIME
Refills: 0 | Status: DISCONTINUED | OUTPATIENT
Start: 2022-09-29 | End: 2022-10-02

## 2022-09-29 RX ORDER — DIPHENHYDRAMINE HCL 50 MG
25 CAPSULE ORAL EVERY 6 HOURS
Refills: 0 | Status: DISCONTINUED | OUTPATIENT
Start: 2022-09-29 | End: 2022-10-02

## 2022-09-29 RX ORDER — MAGNESIUM HYDROXIDE 400 MG/1
30 TABLET, CHEWABLE ORAL
Refills: 0 | Status: DISCONTINUED | OUTPATIENT
Start: 2022-09-29 | End: 2022-10-02

## 2022-09-29 RX ORDER — INFLUENZA VIRUS VACCINE 15; 15; 15; 15 UG/.5ML; UG/.5ML; UG/.5ML; UG/.5ML
0.5 SUSPENSION INTRAMUSCULAR ONCE
Refills: 0 | Status: COMPLETED | OUTPATIENT
Start: 2022-09-29 | End: 2022-09-29

## 2022-09-29 RX ORDER — FAMOTIDINE 10 MG/ML
20 INJECTION INTRAVENOUS ONCE
Refills: 0 | Status: COMPLETED | OUTPATIENT
Start: 2022-09-29 | End: 2022-09-29

## 2022-09-29 RX ORDER — SODIUM CHLORIDE 9 MG/ML
1000 INJECTION, SOLUTION INTRAVENOUS
Refills: 0 | Status: DISCONTINUED | OUTPATIENT
Start: 2022-09-29 | End: 2022-10-02

## 2022-09-29 RX ORDER — ONDANSETRON 8 MG/1
8 TABLET, FILM COATED ORAL ONCE
Refills: 0 | Status: COMPLETED | OUTPATIENT
Start: 2022-09-29 | End: 2022-09-29

## 2022-09-29 RX ORDER — OXYCODONE HYDROCHLORIDE 5 MG/1
5 TABLET ORAL ONCE
Refills: 0 | Status: DISCONTINUED | OUTPATIENT
Start: 2022-09-29 | End: 2022-10-02

## 2022-09-29 RX ORDER — IBUPROFEN 200 MG
600 TABLET ORAL EVERY 6 HOURS
Refills: 0 | Status: COMPLETED | OUTPATIENT
Start: 2022-09-29 | End: 2023-08-28

## 2022-09-29 RX ORDER — CEFAZOLIN SODIUM 1 G
2000 VIAL (EA) INJECTION ONCE
Refills: 0 | Status: COMPLETED | OUTPATIENT
Start: 2022-09-29 | End: 2022-09-29

## 2022-09-29 RX ORDER — CITRIC ACID/SODIUM CITRATE 300-500 MG
30 SOLUTION, ORAL ORAL ONCE
Refills: 0 | Status: COMPLETED | OUTPATIENT
Start: 2022-09-29 | End: 2022-09-29

## 2022-09-29 RX ORDER — ENOXAPARIN SODIUM 100 MG/ML
60 INJECTION SUBCUTANEOUS EVERY 24 HOURS
Refills: 0 | Status: DISCONTINUED | OUTPATIENT
Start: 2022-09-30 | End: 2022-10-02

## 2022-09-29 RX ADMIN — Medication 30 MILLILITER(S): at 17:06

## 2022-09-29 RX ADMIN — Medication 975 MILLIGRAM(S): at 21:08

## 2022-09-29 RX ADMIN — FAMOTIDINE 20 MILLIGRAM(S): 10 INJECTION INTRAVENOUS at 17:07

## 2022-09-29 RX ADMIN — SODIUM CHLORIDE 2000 MILLILITER(S): 9 INJECTION, SOLUTION INTRAVENOUS at 17:04

## 2022-09-29 RX ADMIN — Medication 100 MILLIGRAM(S): at 17:40

## 2022-09-29 RX ADMIN — Medication 1000 MILLIUNIT(S)/MIN: at 18:47

## 2022-09-29 NOTE — OB PROVIDER H&P - HISTORY OF PRESENT ILLNESS
31yo  at 37w1d GA (by LMP, RAMONE 10/19/22) presents to triage from clinic due to new findings on her US. Today US was significant for mild ascites (4mm) and subcutaneous edema (11mm) suggestive of early mild hydrops and polyhydramnios (10.57cm). The decision was made to start delivery planning for repeat  delivery tonight. She denies contractions, leakage of fluids, and vaginal bleeding. Endorses good fetal movement.    Pregnancy complicated by:  - GDMA2     - on metformin 500mg qd     - fasting 95-98, 2hr -115  - polyhydramnios (10.57cm on )  - mild ascites (4mm) and subcutaneous edema (11mm) suggestive of early mild hydrops on  US  - late transfer of care  - h/o 3 prior  deliveries 31yo  at 37w1d GA (by LMP, RAMONE 10/19/22) presents to triage from clinic due to new findings of possible fetal hydrops on her US. Today US was significant for mild ascites (4mm) and subcutaneous edema (11mm) suggestive of early mild hydrops and polyhydramnios (10.57cm). The decision was made to start delivery planning for repeat  delivery tonight. She denies contractions, leakage of fluids, and vaginal bleeding. Endorses good fetal movement.    Pregnancy complicated by:  - GDMA2     - on metformin 500mg qd     - fasting 95-98, 2hr -115  - polyhydramnios (10.57cm on )  - mild ascites (4mm) and subcutaneous edema (11mm) suggestive of early mild hydrops on  US  - late transfer of care  - h/o 3 prior  deliveries

## 2022-09-29 NOTE — OB PROVIDER H&P - ASSESSMENT
A/P: 32y  at 37w1d GA, Rh pos, GBS neg, GDMA2 on metflormin, h/o 3 prior  deliveries, G6P deficiency, for repeat  delivery.  -discussed risks, benefits, and alternatives of    -dicussed the risks of bleeding, infection, injury to surrounding organs and structures, and potential need for a blood transfusion   -admit to L&D  -NPO   -IVF hydration   -ancef   -garcia   -skin prep   -pepcid, bicitra, reglan per routine  -peds notified of admission  -on call to OR  -anesthesia notified of admission    Dr. Cespedes and Dr. Curiel aware. A/P: 32y  at 37w1d GA, Rh pos, GBS neg, GDMA2 on metformin, G6P deficiency, now with suspected fetal hydrops for repeat  delivery.  -discussed risks, benefits, and alternatives of    discussed the risks of bleeding, infection, injury to surrounding organs and structures, and potential need for a blood transfusion   -admit to L&D  -NPO   -IVF hydration   -ancef preop  -garcia   -skin prep   -pepcid, bicitra, reglan per routine  -peds notified of admission  -on call to OR  -anesthesia notified of admission    Dr. Cespedes and Dr. Curiel aware.

## 2022-09-29 NOTE — OB RN INTRAOPERATIVE NOTE - NSSELHIDDEN_OBGYN_ALL_OB_FT
[NS_DeliveryAttending1_OBGYN_ALL_OB_FT:[email or web address]=],[NS_DeliveryAssist1_OBGYN_ALL_OB_FT:[id number]=],[NS_DeliveryRN_OBGYN_ALL_OB_FT:[id number]=]

## 2022-09-29 NOTE — OB PROVIDER H&P - NSPPHRISKEVAL_OBGYN_ALL_OB
Grand parity (>4 full term pregnancies)/Over distended uterus (polyhydramnios, macrosomia, multiple gestation)/Prior , uterine surgery, or multiple laparotomies Over distended uterus (polyhydramnios, macrosomia, multiple gestation)/Prior , uterine surgery, or multiple laparotomies

## 2022-09-29 NOTE — OB PROVIDER DELIVERY SUMMARY - NSSELHIDDEN_OBGYN_ALL_OB_FT
[NS_DeliveryAttending1_OBGYN_ALL_OB_FT:[email or web address]=],[NS_DeliveryAssist1_OBGYN_ALL_OB_FT:[id number]=],[NS_DeliveryRN_OBGYN_ALL_OB_FT:[id number]=]

## 2022-09-29 NOTE — CHART NOTE - NSCHARTNOTEFT_GEN_A_CORE
PACU ANESTHESIA ADMISSION NOTE      Procedure:   Post op diagnosis:  IUP, poly, prior section x3      ____  Intubated  TV:______       Rate: ______      FiO2: ______    __x__  Patent Airway    __x__  Full return of protective reflexes    __x__  Full recovery from anesthesia / back to baseline     Vitals:   T: 98           R:  16                BP:  115/76                Sat: 99%                  P: 84      Mental Status:  __x__ Awake   _____ Alert   _____ Drowsy   _____ Sedated    Nausea/Vomiting:  ___x_ NO  ______Yes,   See Post - Op Orders          Pain Scale (0-10):  _____    Treatment: ____ None    ___x_ See Post - Op/PCA Orders    Post - Operative Fluids:   ____ Oral   __x__ See Post - Op Orders    Plan: Discharge:   ____Home       ___x__Floor     _____Critical Care    _____  Other:_________________    Comments:

## 2022-09-29 NOTE — PRE-ANESTHESIA EVALUATION ADULT - NSANTHOSAYNRD_GEN_A_CORE
No. GAIL screening performed.  STOP BANG Legend: 0-2 = LOW Risk; 3-4 = INTERMEDIATE Risk; 5-8 = HIGH Risk

## 2022-09-29 NOTE — OB RN DELIVERY SUMMARY - NSSELHIDDEN_OBGYN_ALL_OB_FT
[NS_DeliveryAttending1_OBGYN_ALL_OB_FT:[email or web address]=],[NS_DeliveryAssist1_OBGYN_ALL_OB_FT:[id number]=],[NS_DeliveryRN_OBGYN_ALL_OB_FT:[id number]=]

## 2022-09-29 NOTE — OB PROVIDER H&P - NSHPPHYSICALEXAM_GEN_ALL_CORE
Vital Signs Last 24 Hrs  T(C): 36.9 (29 Sep 2022 14:22), Max: 36.9 (29 Sep 2022 14:22)  T(F): 98.4 (29 Sep 2022 14:22), Max: 98.4 (29 Sep 2022 14:22)  HR: 80 (29 Sep 2022 14:22) (80 - 80)  BP: 118/66 (29 Sep 2022 14:22) (118/66 - 121/59)  RR: 16 (29 Sep 2022 14:22) (16 - 16)    Physical Exam  Gen: AAOx3, NAD  Abd: soft, gravid, nontender, nondistended, no palpable contractions  Ext: no edema or erythema in bilateral upper and lower extremities  SVE: deferred    EFM: 140 bpm/moderate variability/+accels  Prentice: q4-7min  BSUS: R lateral placenta, vertex

## 2022-09-29 NOTE — BRIEF OPERATIVE NOTE - OPERATION/FINDINGS
Pfannenstiel skin incision, low transverse uterine incision, viable male infant delivered in cephalic presentation, APGARs 9/9, weight 8-6lbs. 0.5cm paratubal cyst, removed, sent to pathology. Minimal adhesive disease Pfannenstiel skin incision, low transverse uterine incision, viable male infant delivered in cephalic presentation, APGARs 9/9, weight 8-6lbs. ovarian cyst, removed, sent to pathology. Minimal adhesive disease

## 2022-09-29 NOTE — OB PROVIDER H&P - NSHPLABSRESULTS_GEN_ALL_CORE
Labs:   A+  antibody neg    HBsAg NR   Rubella immune   Measles immune   Trep NR   HIV NR     US  @37w1d: vtx, R lateral to ant placenta, polyhydraminos (MVP 10.57cm), mild ascites (4mm) and subcutaneous edema (11mm) suggestive of early mild hydrops, MVP 10.57, BPP 10/10  @36w5d: vtx, R lateral to ant placenta, MVP 10.86, BPP 10/10   @36w2d: EFW 3302gm, (90%ile), vtx, R lateral placenta, MVP 9.14, BPP 10/10

## 2022-09-29 NOTE — BRIEF OPERATIVE NOTE - NSICDXBRIEFPOSTOP_GEN_ALL_CORE_FT
POST-OP DIAGNOSIS:  37 weeks gestation of pregnancy 29-Sep-2022 19:08:35  Sarina Brunner  Gestational diabetes mellitus, class A2 29-Sep-2022 19:08:33  Sarina Brunner

## 2022-09-29 NOTE — BRIEF OPERATIVE NOTE - NSICDXBRIEFPREOP_GEN_ALL_CORE_FT
PRE-OP DIAGNOSIS:  Fetal hydrops 29-Sep-2022 19:08:27 suspected Sarina Brunner  Gestational diabetes mellitus, class A2 29-Sep-2022 19:07:55  Sarina Brunner  37 weeks gestation of pregnancy 29-Sep-2022 19:07:38  Sarina Brunner

## 2022-09-29 NOTE — OB RN PATIENT PROFILE - NS_OBGYNHISTORY_OBGYN_ALL_OB_FT
ObHx: :  delivery for failure to progress, 40w, 3700g  G2: repeat  delivery, 39w, 2900g  G3: repeat  delivery, 39w, 3400g, GDMA2 on metformin  G4: current    GynHx: patient denies h/o fibroids, ovarian cysts, abnormal PAPs, and STIs

## 2022-09-29 NOTE — DISCUSSION/SUMMARY
[FreeTextEntry1] : 22\par [facility]-US Att'g f/u Consult Note:\par 32y  @37w1d (RAMONE: 10/19/22 by LMP c/w outside 25wUS ) recent arrival from [facility], first seen by MFM 1w ago at which time she declined transfer to L&D for non-reactive NST in suboptimal control of GDMA2. Today she denies contractions, vaginal bleeding, leakage of fluid. +Active fetal movement. \par Pt did not \par Pt did not bring FS log for today's US visit. \par \par \par PMHx: ?G6PD deficiency, recently told her bloodwork did not confirm this, was however counseled to avoid sulfa drugs \par PSHx:  c/s x3 at term \par Meds: Metformin 500 mg PO BID \par All: denies \par SocHx: denies \par OBHx: FT LTCSx x3.  Clear reason.  \par GynHx: denies \par Vaccinations: Not reviewed.\par Family Planning: Not reviewed. \par ROS: As above. \par \par Cheerful, pleasant woman in NAD. VS not performed prior to transfer to L&D.  \par HEENT: NC/AT\par Cardiac: rrr\par Abd: Fundus soft, gravid, nontender, no palpable ctx\par Ext: no edema, nontender, no erythema or warmth\par Fundal Height: 38cm\par Extr: No CCE\par LAB: 22: Apos/AntibNeg, HbEP AA.  A1C 6.0. \par                       9.4k>10.9/33%<271k  RPR/HIV/HBSAg/HCAb } all NR\par OBUS: : SFVtx, Plac Ant R Lat, DVP 9.1, increased. Limited anatomy normal. EFW 3302g at 90th%ile for GA; BPP 8/10, -2 for NR NST. Declined referral to L&D. \par            : SFVtx, Plac ant R lat, DVP 10.6cm. BPP 10/10, NST initially not reactive.  Mild subcutaneous trunk edema (11mm) and ascites (4.5mm) suggest early fetal hydrops.\par \par Impression/Recommendations:  \par A/P: 33yo  @36w3d, RAMONE: 10/19/22 by LMP, with GDMA2, here for initial MFM consultation. Co-managed with Dr. Curiel. \par \par 1. Early fetal hydrops on today's US:\par -->Discussed with Dr Curiel, MFM and L&D teams:\par      With pt at term, suboptimal/undocumented BS control and new finding of ascites and subcutaneous edema, we recommend admisstion to the hospital for delivery.  Plan has been for CS #4. \par \par 2. GDMA2:  Derrick reinforce importance of f/u adult GTT after 6w PP. \par \par 3. Family Planning: Per Dr Mcbride.  \par \par MD Coty, FACOG\par \par 50 min total spent on this encounter. samaria

## 2022-09-30 DIAGNOSIS — Z02.9 ENCOUNTER FOR ADMINISTRATIVE EXAMINATIONS, UNSPECIFIED: ICD-10-CM

## 2022-09-30 DIAGNOSIS — O36.23X0 MATERNAL CARE FOR HYDROPS FETALIS, THIRD TRIMESTER, NOT APPLICABLE OR UNSPECIFIED: ICD-10-CM

## 2022-09-30 DIAGNOSIS — Z3A.37 37 WEEKS GESTATION OF PREGNANCY: ICD-10-CM

## 2022-09-30 DIAGNOSIS — O40.3XX1 POLYHYDRAMNIOS, THIRD TRIMESTER, FETUS 1: ICD-10-CM

## 2022-09-30 DIAGNOSIS — O24.415 GESTATIONAL DIABETES MELLITUS IN PREGNANCY, CONTROLLED BY ORAL HYPOGLYCEMIC DRUGS: ICD-10-CM

## 2022-09-30 DIAGNOSIS — O99.210 OBESITY COMPLICATING PREGNANCY, UNSPECIFIED TRIMESTER: ICD-10-CM

## 2022-09-30 LAB
BASOPHILS # BLD AUTO: 0.01 K/UL — SIGNIFICANT CHANGE UP (ref 0–0.2)
BASOPHILS NFR BLD AUTO: 0.1 % — SIGNIFICANT CHANGE UP (ref 0–1)
EOSINOPHIL # BLD AUTO: 0.14 K/UL — SIGNIFICANT CHANGE UP (ref 0–0.7)
EOSINOPHIL NFR BLD AUTO: 1.2 % — SIGNIFICANT CHANGE UP (ref 0–8)
HCT VFR BLD CALC: 27.9 % — LOW (ref 37–47)
HGB BLD-MCNC: 9.6 G/DL — LOW (ref 12–16)
IMM GRANULOCYTES NFR BLD AUTO: 0.4 % — HIGH (ref 0.1–0.3)
LYMPHOCYTES # BLD AUTO: 2.33 K/UL — SIGNIFICANT CHANGE UP (ref 1.2–3.4)
LYMPHOCYTES # BLD AUTO: 20.5 % — SIGNIFICANT CHANGE UP (ref 20.5–51.1)
MCHC RBC-ENTMCNC: 31.6 PG — HIGH (ref 27–31)
MCHC RBC-ENTMCNC: 34.4 G/DL — SIGNIFICANT CHANGE UP (ref 32–37)
MCV RBC AUTO: 91.8 FL — SIGNIFICANT CHANGE UP (ref 81–99)
MONOCYTES # BLD AUTO: 0.86 K/UL — HIGH (ref 0.1–0.6)
MONOCYTES NFR BLD AUTO: 7.6 % — SIGNIFICANT CHANGE UP (ref 1.7–9.3)
NEUTROPHILS # BLD AUTO: 7.96 K/UL — HIGH (ref 1.4–6.5)
NEUTROPHILS NFR BLD AUTO: 70.2 % — SIGNIFICANT CHANGE UP (ref 42.2–75.2)
NRBC # BLD: 0 /100 WBCS — SIGNIFICANT CHANGE UP (ref 0–0)
PLATELET # BLD AUTO: 198 K/UL — SIGNIFICANT CHANGE UP (ref 130–400)
RBC # BLD: 3.04 M/UL — LOW (ref 4.2–5.4)
RBC # FLD: 13.2 % — SIGNIFICANT CHANGE UP (ref 11.5–14.5)
WBC # BLD: 11.34 K/UL — HIGH (ref 4.8–10.8)
WBC # FLD AUTO: 11.34 K/UL — HIGH (ref 4.8–10.8)

## 2022-09-30 PROCEDURE — 99231 SBSQ HOSP IP/OBS SF/LOW 25: CPT | Mod: 24

## 2022-09-30 RX ORDER — IBUPROFEN 200 MG
600 TABLET ORAL EVERY 6 HOURS
Refills: 0 | Status: DISCONTINUED | OUTPATIENT
Start: 2022-09-30 | End: 2022-10-02

## 2022-09-30 RX ORDER — ONDANSETRON 8 MG/1
8 TABLET, FILM COATED ORAL ONCE
Refills: 0 | Status: COMPLETED | OUTPATIENT
Start: 2022-09-30 | End: 2022-09-30

## 2022-09-30 RX ORDER — DIPHENHYDRAMINE HCL 50 MG
25 CAPSULE ORAL ONCE
Refills: 0 | Status: COMPLETED | OUTPATIENT
Start: 2022-09-30 | End: 2022-09-30

## 2022-09-30 RX ADMIN — Medication 975 MILLIGRAM(S): at 10:22

## 2022-09-30 RX ADMIN — Medication 30 MILLIGRAM(S): at 06:38

## 2022-09-30 RX ADMIN — SIMETHICONE 80 MILLIGRAM(S): 80 TABLET, CHEWABLE ORAL at 15:03

## 2022-09-30 RX ADMIN — Medication 975 MILLIGRAM(S): at 21:39

## 2022-09-30 RX ADMIN — ENOXAPARIN SODIUM 60 MILLIGRAM(S): 100 INJECTION SUBCUTANEOUS at 06:37

## 2022-09-30 RX ADMIN — Medication 975 MILLIGRAM(S): at 22:28

## 2022-09-30 RX ADMIN — Medication 600 MILLIGRAM(S): at 19:30

## 2022-09-30 RX ADMIN — SENNA PLUS 2 TABLET(S): 8.6 TABLET ORAL at 21:39

## 2022-09-30 RX ADMIN — SIMETHICONE 80 MILLIGRAM(S): 80 TABLET, CHEWABLE ORAL at 06:48

## 2022-09-30 RX ADMIN — SIMETHICONE 80 MILLIGRAM(S): 80 TABLET, CHEWABLE ORAL at 18:09

## 2022-09-30 RX ADMIN — SIMETHICONE 80 MILLIGRAM(S): 80 TABLET, CHEWABLE ORAL at 10:20

## 2022-09-30 RX ADMIN — Medication 30 MILLIGRAM(S): at 01:16

## 2022-09-30 RX ADMIN — Medication 25 MILLIGRAM(S): at 16:43

## 2022-09-30 RX ADMIN — SIMETHICONE 80 MILLIGRAM(S): 80 TABLET, CHEWABLE ORAL at 21:39

## 2022-09-30 RX ADMIN — ONDANSETRON 8 MILLIGRAM(S): 8 TABLET, FILM COATED ORAL at 00:30

## 2022-09-30 RX ADMIN — Medication 600 MILLIGRAM(S): at 18:43

## 2022-09-30 RX ADMIN — Medication 600 MILLIGRAM(S): at 23:52

## 2022-09-30 RX ADMIN — Medication 975 MILLIGRAM(S): at 14:57

## 2022-09-30 RX ADMIN — Medication 975 MILLIGRAM(S): at 15:30

## 2022-09-30 NOTE — PROGRESS NOTE ADULT - ASSESSMENT
A/P: 31 y/o P4 s/p repeat LTCS x4, EBL 800cc, POD#1, pmhx of G6PD deficiency, recovering well  - continue routine PP care  -ambulation encouraged, PO hydration encouraged  -regular diet  -lovenox ordered for DVT prophylaxis  -Incentive Spirometry encouraged  -pain management per protocol  - IVF hydration until voiding  - TOV by 1200  - crossed for 2u  - f/u AM CBC     Dr. Curiel to be made aware

## 2022-09-30 NOTE — PROGRESS NOTE ADULT - ASSESSMENT
A/P: 32y P4 s/p repeat LTCS x4, EBL 800cc, GDMA2 on metformin, G6P deficiency, recovering well    -ambulation encouraged  -PO hydration encouraged  -regular diet  -lovenox ordered for DVT prophylaxis  -Incentive Spirometry encouraged  -pain management per routine  -UO adequate, garcia in until AM  -f/u AM CBC       Dr. Nielsen and Dr. Curiel to be aware

## 2022-10-01 ENCOUNTER — TRANSCRIPTION ENCOUNTER (OUTPATIENT)
Age: 32
End: 2022-10-01

## 2022-10-01 PROCEDURE — 99238 HOSP IP/OBS DSCHRG MGMT 30/<: CPT | Mod: 24

## 2022-10-01 RX ORDER — ACETAMINOPHEN 500 MG
3 TABLET ORAL
Qty: 96 | Refills: 0
Start: 2022-10-01 | End: 2022-10-08

## 2022-10-01 RX ORDER — FERROUS SULFATE 325(65) MG
1 TABLET ORAL
Qty: 60 | Refills: 1
Start: 2022-10-01 | End: 2022-11-29

## 2022-10-01 RX ORDER — DOCUSATE SODIUM 100 MG
1 CAPSULE ORAL
Qty: 30 | Refills: 1
Start: 2022-10-01 | End: 2022-11-29

## 2022-10-01 RX ORDER — IBUPROFEN 200 MG
1 TABLET ORAL
Qty: 32 | Refills: 0
Start: 2022-10-01 | End: 2022-10-08

## 2022-10-01 RX ORDER — OXYCODONE HYDROCHLORIDE 5 MG/1
5 TABLET ORAL ONCE
Refills: 0 | Status: DISCONTINUED | OUTPATIENT
Start: 2022-10-01 | End: 2022-10-01

## 2022-10-01 RX ORDER — SIMETHICONE 80 MG/1
1 TABLET, CHEWABLE ORAL
Qty: 30 | Refills: 0
Start: 2022-10-01 | End: 2022-10-05

## 2022-10-01 RX ORDER — OXYCODONE HYDROCHLORIDE 5 MG/1
5 TABLET ORAL
Refills: 0 | Status: DISCONTINUED | OUTPATIENT
Start: 2022-10-01 | End: 2022-10-02

## 2022-10-01 RX ADMIN — ENOXAPARIN SODIUM 60 MILLIGRAM(S): 100 INJECTION SUBCUTANEOUS at 06:15

## 2022-10-01 RX ADMIN — Medication 600 MILLIGRAM(S): at 12:00

## 2022-10-01 RX ADMIN — SIMETHICONE 80 MILLIGRAM(S): 80 TABLET, CHEWABLE ORAL at 14:11

## 2022-10-01 RX ADMIN — Medication 600 MILLIGRAM(S): at 11:30

## 2022-10-01 RX ADMIN — Medication 600 MILLIGRAM(S): at 00:43

## 2022-10-01 RX ADMIN — Medication 975 MILLIGRAM(S): at 10:20

## 2022-10-01 RX ADMIN — SIMETHICONE 80 MILLIGRAM(S): 80 TABLET, CHEWABLE ORAL at 01:24

## 2022-10-01 RX ADMIN — Medication 975 MILLIGRAM(S): at 09:51

## 2022-10-01 RX ADMIN — OXYCODONE HYDROCHLORIDE 5 MILLIGRAM(S): 5 TABLET ORAL at 23:48

## 2022-10-01 RX ADMIN — Medication 975 MILLIGRAM(S): at 14:45

## 2022-10-01 RX ADMIN — SENNA PLUS 2 TABLET(S): 8.6 TABLET ORAL at 21:27

## 2022-10-01 RX ADMIN — SIMETHICONE 80 MILLIGRAM(S): 80 TABLET, CHEWABLE ORAL at 18:03

## 2022-10-01 RX ADMIN — Medication 975 MILLIGRAM(S): at 14:12

## 2022-10-01 RX ADMIN — Medication 600 MILLIGRAM(S): at 06:16

## 2022-10-01 RX ADMIN — Medication 600 MILLIGRAM(S): at 07:07

## 2022-10-01 RX ADMIN — SIMETHICONE 80 MILLIGRAM(S): 80 TABLET, CHEWABLE ORAL at 21:27

## 2022-10-01 RX ADMIN — Medication 975 MILLIGRAM(S): at 03:40

## 2022-10-01 RX ADMIN — SIMETHICONE 80 MILLIGRAM(S): 80 TABLET, CHEWABLE ORAL at 09:51

## 2022-10-01 RX ADMIN — Medication 975 MILLIGRAM(S): at 21:27

## 2022-10-01 RX ADMIN — Medication 600 MILLIGRAM(S): at 18:35

## 2022-10-01 RX ADMIN — OXYCODONE HYDROCHLORIDE 5 MILLIGRAM(S): 5 TABLET ORAL at 03:53

## 2022-10-01 RX ADMIN — SIMETHICONE 80 MILLIGRAM(S): 80 TABLET, CHEWABLE ORAL at 06:15

## 2022-10-01 RX ADMIN — Medication 975 MILLIGRAM(S): at 02:53

## 2022-10-01 RX ADMIN — Medication 600 MILLIGRAM(S): at 18:04

## 2022-10-01 RX ADMIN — OXYCODONE HYDROCHLORIDE 5 MILLIGRAM(S): 5 TABLET ORAL at 04:45

## 2022-10-01 NOTE — DISCHARGE NOTE OB - MEDICATION SUMMARY - MEDICATIONS TO TAKE
I will START or STAY ON the medications listed below when I get home from the hospital:    acetaminophen 325 mg oral tablet  -- 3 tab(s) by mouth every 6 hours   -- Indication: For pain    ibuprofen 600 mg oral tablet  -- 1 tab(s) by mouth every 6 hours  -- Indication: For pain    ferrous sulfate 325 mg (65 mg elemental iron) oral tablet  -- 1 tab(s) by mouth 2 times a day   -- Check with your doctor before becoming pregnant.  Do not chew, break, or crush.  May discolor urine or feces.    -- Indication: For anemia    Colace 100 mg oral capsule  -- 1 cap(s) by mouth once a day (at bedtime)   -- Medication should be taken with plenty of water.    -- Indication: For constipation    simethicone 80 mg oral tablet, chewable  -- 1 tab(s) by mouth every 4 hours, As Needed  -- Indication: For gas

## 2022-10-01 NOTE — DISCHARGE NOTE OB - CARE PROVIDER_API CALL
Alexis Momin)  OBN  [address] [place] [address]  Phone: [phone]  Fax: [phone]  Follow Up Time:

## 2022-10-01 NOTE — DISCHARGE NOTE OB - CARE PLAN
Principal Discharge DX:	Delivery of pregnancy by  section  Assessment and plan of treatment:	Nothing in the vagina for 6 weeks (no sex, no tampons, no douching). Avoid tub baths, you may shower.  If you have a fever of 100.4F or greater, severe vaginal bleeding, or severe abdominal pain, call your Ob/Gyn or come to the emergency department immediately.  Please follow up with your provider in 1 week for incision check and 6 weeks for postpartum visit.  Secondary Diagnosis:	Gestational diabetes  Assessment and plan of treatment:	Please complete the Glucose tolerance test in 5 weeks to check if your gestational diabetes has resolved. The prescription has been sent to the lab   1

## 2022-10-01 NOTE — DISCHARGE NOTE OB - PLAN OF CARE
Nothing in the vagina for 6 weeks (no sex, no tampons, no douching). Avoid tub baths, you may shower.  If you have a fever of 100.4F or greater, severe vaginal bleeding, or severe abdominal pain, call your Ob/Gyn or come to the emergency department immediately.  Please follow up with your provider in 1 week for incision check and 6 weeks for postpartum visit. Please complete the Glucose tolerance test in 5 weeks to check if your gestational diabetes has resolved. The prescription has been sent to the lab

## 2022-10-01 NOTE — DISCHARGE NOTE OB - PATIENT PORTAL LINK FT
You can access the FollowMyHealth Patient Portal offered by [facility] by registering at the following website: [email or web address]. By joining [employer]’s FollowMyHealth portal, you will also be able to view your health information using other applications (apps) compatible with our system.

## 2022-10-01 NOTE — PROGRESS NOTE ADULT - ATTENDING COMMENTS
pod#1  doing well  stable  no complaints  routine care
POD2  no complaints  labs/VS stable  RH+  d/c home

## 2022-10-01 NOTE — PROGRESS NOTE ADULT - ASSESSMENT
A/P: 33 y/o P4 s/p repeat LTCS x4, EBL 800cc, POD#2, pmhx of G6PD deficiency, recovering well    - continue routine PP care  -ambulation encouraged, PO hydration encouraged  -regular diet  -lovenox ordered for DVT prophylaxis  -Incentive Spirometry encouraged  -pain management per protocol  - IVF hydration until voiding  - crossed for 2u prbcs  - anticipate discharge today     Dr. Lantigua and Dr. Momin aware.    A/P: 33 y/o P4 s/p repeat LTCS x4, EBL 800cc, POD#2, pmhx of G6PD deficiency, recovering well    - continue routine PP care  -ambulation encouraged, PO hydration encouraged  -regular diet  -lovenox ordered for DVT prophylaxis  -Incentive Spirometry encouraged  -pain management per protocol  - anticipate discharge today        A/P: 33 y/o P4 s/p repeat LTCS x4, EBL 800cc, POD#2, pmhx of G6PD deficiency, recovering well    - continue routine PP care  -ambulation encouraged, PO hydration encouraged  -regular diet  -lovenox ordered for DVT prophylaxis  -Incentive Spirometry encouraged  -pain management per protocol  - anticipate discharge today     Dr. Momin to be made aware and Dr. Grzegorz steiner

## 2022-10-02 VITALS
DIASTOLIC BLOOD PRESSURE: 58 MMHG | TEMPERATURE: 98 F | RESPIRATION RATE: 18 BRPM | SYSTOLIC BLOOD PRESSURE: 105 MMHG | HEART RATE: 92 BPM

## 2022-10-02 RX ADMIN — ENOXAPARIN SODIUM 60 MILLIGRAM(S): 100 INJECTION SUBCUTANEOUS at 06:01

## 2022-10-02 RX ADMIN — SIMETHICONE 80 MILLIGRAM(S): 80 TABLET, CHEWABLE ORAL at 09:52

## 2022-10-02 RX ADMIN — Medication 600 MILLIGRAM(S): at 06:01

## 2022-10-02 RX ADMIN — SIMETHICONE 80 MILLIGRAM(S): 80 TABLET, CHEWABLE ORAL at 06:01

## 2022-10-02 RX ADMIN — Medication 975 MILLIGRAM(S): at 09:53

## 2022-10-02 NOTE — PROGRESS NOTE ADULT - ASSESSMENT
A/P: 33 y/o P4 s/p repeat LTCS x4, EBL 800cc, POD#3, pmhx of G6PD deficiency, recovering well  -continue routine PP care  -ambulation encouraged, PO hydration encouraged  -regular diet  -lovenox ordered for DVT prophylaxis  -Incentive Spirometry encouraged  -pain management per protocol    Dr. Cespedes and OB attending to be made aware

## 2022-10-02 NOTE — PROGRESS NOTE ADULT - SUBJECTIVE AND OBJECTIVE BOX
MICHAEL LAL  32y  Female    PGY1 Note:  Patient seen and examined bedside. Denies heavy vaginal bleeding. Pain well controlled. Not yet ambulating. TOlerating fluids, not yet eating. No flatus. Breastfeeding.   \  Physical Exam  Vital Signs Last 24 Hrs  T(C): 35.6 (29 Sep 2022 22:30), Max: 36.9 (29 Sep 2022 14:22)  T(F): 96.1 (29 Sep 2022 22:30), Max: 98.4 (29 Sep 2022 14:22)  HR: 88 (29 Sep 2022 22:30) (80 - 111)  BP: 116/56 (29 Sep 2022 22:30) (102/58 - 121/59)  RR: 16 (29 Sep 2022 22:30) (16 - 16)  SpO2: 100% (29 Sep 2022 21:43) (98% - 100%)    UO: (min: 55cc/hr): (6718-8915) 150cc (2043-5012) 325cc      Gen: NAD, sitting comfortably  CV: RRR. No murmurs gallops or rubs.  Pulm: CTAB. No wheezes or rales.  Ext: No calf tenderness, no swelling.   Abd: Nondistended, soft, nontender, BS+, fundus firm, and below umbilicus.   Lochia: Minimal rubra  Wound: Dressing in place over. Pfannenstiel skin incision clean, dry intact.. No surrounding edema or erythema.      PAST MEDICAL & SURGICAL HISTORY:  Glucose 6 phosphatase deficiency   delivery delivered    Diet: regular    Labs:                        11.5   8.35  )-----------( 265      ( 29 Sep 2022 14:20 )             34.0          acetaminophen     Tablet .. 975 milliGRAM(s) Oral <User Schedule>  diphenhydrAMINE 25 milliGRAM(s) Oral every 6 hours PRN  diphtheria/tetanus/pertussis (acellular) Vaccine (ADAcel) 0.5 milliLiter(s) IntraMuscular once  enoxaparin Injectable 60 milliGRAM(s) SubCutaneous every 24 hours  ibuprofen  Tablet. 600 milliGRAM(s) Oral every 6 hours  ketorolac   Injectable 30 milliGRAM(s) IV Push every 6 hours  lactated ringers. 1000 milliLiter(s) IV Continuous <Continuous>  lanolin Ointment 1 Application(s) Topical every 6 hours PRN  magnesium hydroxide Suspension 30 milliLiter(s) Oral two times a day PRN  oxyCODONE    IR 5 milliGRAM(s) Oral every 3 hours PRN  oxyCODONE    IR 5 milliGRAM(s) Oral once PRN  oxytocin Infusion 333.333 milliUNIT(s)/Min IV Continuous <Continuous>  senna 2 Tablet(s) Oral at bedtime  simethicone 80 milliGRAM(s) Chew every 4 hours      
PGY1 Note:  Section    POD2. Pt seen and evaluated at bedside. Pain well controlled. Denies dizziness/lightheadedness/CP/SOB/palpitations. Denies fever, chills, nausea/vomiting, diarrhea, dysuria, LE pain.     Ambulating: Yes  Voiding: Yes  Flatus: Yes  Bowel movements: No  Breast or bottle feeding: breast  Diet: Tolerating regular diet  Contraception: does not want at this time.     Physical Exam  Vital Signs Last 24 Hrs  T(C): 37.4 (01 Oct 2022 08:08), Max: 37.4 (01 Oct 2022 08:08)  T(F): 99.3 (01 Oct 2022 08:08), Max: 99.3 (01 Oct 2022 08:08)  HR: 94 (01 Oct 2022 08:08) (88 - 96)  BP: 105/64 (01 Oct 2022 08:08) (98/53 - 114/59)  RR: 18 (01 Oct 2022 08:08) (18 - 18)    Gen: AAOx3, NAD  Heart: RRR, S1S2+  Lungs: CTA B/L, no r/r/w   Fundus: firm, below umbilicus   Wound: Pfannenstiel incision, steris in place c/d/i   Abd: Soft, nontender, nondistended, BS+  Lochia: minimal   Ext: No calf tenderness, no swelling    Labs:                        9.6    11.34 )-----------( 198      ( 30 Sep 2022 09:29 )             27.9                         11.5   8.35  )-----------( 265      ( 29 Sep 2022 14:20 )             34.0        MEDICATIONS  (STANDING):  acetaminophen     Tablet .. 975 milliGRAM(s) Oral <User Schedule>  diphtheria/tetanus/pertussis (acellular) Vaccine (ADAcel) 0.5 milliLiter(s) IntraMuscular once  enoxaparin Injectable 60 milliGRAM(s) SubCutaneous every 24 hours  ibuprofen  Tablet. 600 milliGRAM(s) Oral every 6 hours  lactated ringers. 1000 milliLiter(s) (125 mL/Hr) IV Continuous <Continuous>  oxytocin Infusion 333.333 milliUNIT(s)/Min (1000 mL/Hr) IV Continuous <Continuous>  senna 2 Tablet(s) Oral at bedtime  simethicone 80 milliGRAM(s) Chew every 4 hours    MEDICATIONS  (PRN):  diphenhydrAMINE 25 milliGRAM(s) Oral every 6 hours PRN Pruritus  lanolin Ointment 1 Application(s) Topical every 6 hours PRN Sore Nipples  magnesium hydroxide Suspension 30 milliLiter(s) Oral two times a day PRN Constipation  oxyCODONE    IR 5 milliGRAM(s) Oral every 3 hours PRN Severe Pain (7 - 10)  oxyCODONE    IR 5 milliGRAM(s) Oral once PRN Severe Pain (7 - 10)    
Pt without problems
CC: postpartum    HPI: Patient evaluated at bedside this morning on POD#1, resting comfortable in bed with no acute events overnight. She reports pain is well controlled with PO meds. She has not tried ambulating since procedure, garcia removed at this time, tolerating liquid diet. Has not passed flatus. She denies fever, chills, dizziness, chest pain, palpitations, shortness of breath, nausea, vomiting, or heavy vaginal bleeding.     Physical Exam:  Vital Signs Last 24 Hrs  T(C): 36 (30 Sep 2022 04:00), Max: 36.9 (29 Sep 2022 14:22)  T(F): 96.8 (30 Sep 2022 04:00), Max: 98.4 (29 Sep 2022 14:22)  HR: 82 (30 Sep 2022 04:00) (80 - 111)  BP: 97/56 (30 Sep 2022 04:00) (97/56 - 121/59)  RR: 17 (30 Sep 2022 04:00) (16 - 17)  SpO2: 100% (29 Sep 2022 21:43) (98% - 100%)    UO: (0217-7326) 325cc    GA: comfortable in NAD  Abd: BS +, soft, nontender, nondistended, no rebound or guarding, dressing changed today, pfannenstiel skin incision clean, dry and intact, uterus firm at midline, fundus below umbilicus  : garcia removed, lochia WNL  Extremities: no swelling or calf tenderness, SCDs in place                            11.5   8.35  )-----------( 265      ( 29 Sep 2022 14:20 )             34.0               acetaminophen     Tablet .. 975 milliGRAM(s) Oral <User Schedule>  diphenhydrAMINE 25 milliGRAM(s) Oral every 6 hours PRN  diphtheria/tetanus/pertussis (acellular) Vaccine (ADAcel) 0.5 milliLiter(s) IntraMuscular once  enoxaparin Injectable 60 milliGRAM(s) SubCutaneous every 24 hours  ibuprofen  Tablet. 600 milliGRAM(s) Oral every 6 hours  ketorolac   Injectable 30 milliGRAM(s) IV Push every 6 hours  lactated ringers. 1000 milliLiter(s) IV Continuous <Continuous>  lanolin Ointment 1 Application(s) Topical every 6 hours PRN  magnesium hydroxide Suspension 30 milliLiter(s) Oral two times a day PRN  oxyCODONE    IR 5 milliGRAM(s) Oral every 3 hours PRN  oxyCODONE    IR 5 milliGRAM(s) Oral once PRN  oxytocin Infusion 333.333 milliUNIT(s)/Min IV Continuous <Continuous>  senna 2 Tablet(s) Oral at bedtime  simethicone 80 milliGRAM(s) Chew every 4 hours  
PGY1 Note:  Section    POD3. Pt seen and evaluated at bedside. Pain well controlled. Denies dizziness/lightheadedness/CP/SOB/palpitations. Denies fever, chills, nausea/vomiting, diarrhea, dysuria, LE pain. Feels ready to go home.    Ambulating: Yes  Voiding: Yes  Flatus: Yes  Bowel movements: No  Breast or bottle feeding: breast  Diet: Tolerating regular diet    Physical Exam  Vital Signs Last 24 Hrs  T(C): 37.6 (01 Oct 2022 23:25), Max: 37.6 (01 Oct 2022 23:25)  T(F): 99.7 (01 Oct 2022 23:25), Max: 99.7 (01 Oct 2022 23:25)  HR: 101 (01 Oct 2022 23:25) (97 - 101)  BP: 111/57 (01 Oct 2022 23:25) (95/56 - 111/57)  RR: 18 (01 Oct 2022 23:25) (18 - 18)    Gen: AAOx3, NAD  Heart: RRR, S1S2+  Lungs: CTA B/L, no r/r/w   Fundus: firm, below umbilicus   Wound: Pfannenstiel incision, steris in place c/d/i   Abd: Soft, nontender, nondistended, BS+  Lochia: minimal   Ext: No calf tenderness, no swelling    Labs:                        9.6    11.34 )-----------( 198      ( 30 Sep 2022 09:29 )             27.9                         11.5   8.35  )-----------( 265      ( 29 Sep 2022 14:20 )             34.0        MEDICATIONS  (STANDING):  acetaminophen     Tablet .. 975 milliGRAM(s) Oral <User Schedule>  enoxaparin Injectable 60 milliGRAM(s) SubCutaneous every 24 hours  ibuprofen  Tablet. 600 milliGRAM(s) Oral every 6 hours  senna 2 Tablet(s) Oral at bedtime  simethicone 80 milliGRAM(s) Chew every 4 hours    MEDICATIONS  (PRN):  diphenhydrAMINE 25 milliGRAM(s) Oral every 6 hours PRN Pruritus  lanolin Ointment 1 Application(s) Topical every 6 hours PRN Sore Nipples  magnesium hydroxide Suspension 30 milliLiter(s) Oral two times a day PRN Constipation  oxyCODONE    IR 5 milliGRAM(s) Oral every 3 hours PRN Severe Pain (7 - 10)  oxyCODONE    IR 5 milliGRAM(s) Oral once PRN Severe Pain (7 - 10)

## 2022-10-03 ENCOUNTER — APPOINTMENT (OUTPATIENT)
Dept: ANTEPARTUM | Facility: CLINIC | Age: 32
End: 2022-10-03

## 2022-10-04 ENCOUNTER — NON-APPOINTMENT (OUTPATIENT)
Age: 32
End: 2022-10-04

## 2022-10-04 DIAGNOSIS — Z34.90 ENCOUNTER FOR SUPERVISION OF NORMAL PREGNANCY, UNSPECIFIED, UNSPECIFIED TRIMESTER: ICD-10-CM

## 2022-10-04 LAB — SURGICAL PATHOLOGY STUDY: SIGNIFICANT CHANGE UP

## 2022-10-05 ENCOUNTER — APPOINTMENT (OUTPATIENT)
Dept: OBGYN | Facility: CLINIC | Age: 32
End: 2022-10-05

## 2022-10-05 LAB — AR GENE MUT ANL BLD/T: NORMAL

## 2022-10-06 ENCOUNTER — APPOINTMENT (OUTPATIENT)
Dept: ANTEPARTUM | Facility: CLINIC | Age: 32
End: 2022-10-06

## 2022-10-06 DIAGNOSIS — O34.211 MATERNAL CARE FOR LOW TRANSVERSE SCAR FROM PREVIOUS CESAREAN DELIVERY: ICD-10-CM

## 2022-10-06 DIAGNOSIS — N83.201 UNSPECIFIED OVARIAN CYST, RIGHT SIDE: ICD-10-CM

## 2022-10-06 DIAGNOSIS — O34.83 MATERNAL CARE FOR OTHER ABNORMALITIES OF PELVIC ORGANS, THIRD TRIMESTER: ICD-10-CM

## 2022-10-06 DIAGNOSIS — O40.3XX0 POLYHYDRAMNIOS, THIRD TRIMESTER, NOT APPLICABLE OR UNSPECIFIED: ICD-10-CM

## 2022-10-06 DIAGNOSIS — O36.23X0 MATERNAL CARE FOR HYDROPS FETALIS, THIRD TRIMESTER, NOT APPLICABLE OR UNSPECIFIED: ICD-10-CM

## 2022-10-06 DIAGNOSIS — Z28.09 IMMUNIZATION NOT CARRIED OUT BECAUSE OF OTHER CONTRAINDICATION: ICD-10-CM

## 2022-10-06 DIAGNOSIS — Z28.21 IMMUNIZATION NOT CARRIED OUT BECAUSE OF PATIENT REFUSAL: ICD-10-CM

## 2022-10-06 DIAGNOSIS — Z20.822 CONTACT WITH AND (SUSPECTED) EXPOSURE TO COVID-19: ICD-10-CM

## 2022-10-06 DIAGNOSIS — Z3A.37 37 WEEKS GESTATION OF PREGNANCY: ICD-10-CM

## 2022-10-11 ENCOUNTER — NON-APPOINTMENT (OUTPATIENT)
Age: 32
End: 2022-10-11

## 2022-10-11 ENCOUNTER — APPOINTMENT (OUTPATIENT)
Dept: ANTEPARTUM | Facility: CLINIC | Age: 32
End: 2022-10-11

## 2022-10-12 ENCOUNTER — APPOINTMENT (OUTPATIENT)
Dept: OBGYN | Facility: CLINIC | Age: 32
End: 2022-10-12
Payer: SELF-PAY

## 2022-10-12 ENCOUNTER — OUTPATIENT (OUTPATIENT)
Dept: OUTPATIENT SERVICES | Facility: HOSPITAL | Age: 32
LOS: 1 days | Discharge: HOME | End: 2022-10-12

## 2022-10-12 VITALS
HEIGHT: 69 IN | SYSTOLIC BLOOD PRESSURE: 100 MMHG | WEIGHT: 220 LBS | DIASTOLIC BLOOD PRESSURE: 60 MMHG | BODY MASS INDEX: 32.58 KG/M2

## 2022-10-12 DIAGNOSIS — Z23 ENCOUNTER FOR IMMUNIZATION: ICD-10-CM

## 2022-10-12 PROCEDURE — 99213 OFFICE O/P EST LOW 20 MIN: CPT | Mod: 24

## 2022-10-12 NOTE — HISTORY OF PRESENT ILLNESS
[Postpartum Follow Up] : postpartum follow up [Complications:___] : complications include: [unfilled] [Repeat C/S] : delivered by  section (repeat) [Breastfeeding] : currently nursing [None] : No associated symptoms are reported [Clean/Dry/Intact] : clean, dry and intact [Erythema] : not erythematous [Swelling] : not swollen [Dehiscence] : not dehisced [Healed] : healed [Doing Well] : is doing well [No Sign of Infection] : is showing no signs of infection [de-identified] : f/u 4 weeks, flu vaccine

## 2022-10-13 ENCOUNTER — APPOINTMENT (OUTPATIENT)
Dept: ANTEPARTUM | Facility: CLINIC | Age: 32
End: 2022-10-13

## 2022-10-17 ENCOUNTER — APPOINTMENT (OUTPATIENT)
Dept: ANTEPARTUM | Facility: CLINIC | Age: 32
End: 2022-10-17

## 2022-10-20 ENCOUNTER — APPOINTMENT (OUTPATIENT)
Dept: ANTEPARTUM | Facility: CLINIC | Age: 32
End: 2022-10-20

## 2022-11-23 ENCOUNTER — APPOINTMENT (OUTPATIENT)
Dept: OBGYN | Facility: CLINIC | Age: 32
End: 2022-11-23

## 2023-01-27 LAB
BILIRUB UR QL STRIP: NEGATIVE
CLARITY UR: CLEAR
COLLECTION METHOD: NORMAL
FETAL HEART DESCRIPTION: NORMAL
FETAL HEART RATE (BPM): 140
FETAL MOVEMENT: PRESENT
GLUCOSE BLDC GLUCOMTR-MCNC: 94
GLUCOSE UR-MCNC: NEGATIVE
HCG UR QL: 0.2 EU/DL
HGB UR QL STRIP.AUTO: NEGATIVE
KETONES UR-MCNC: NEGATIVE
LEUKOCYTE ESTERASE UR QL STRIP: NEGATIVE
NITRITE UR QL STRIP: NEGATIVE
OB COMMENTS: NORMAL
PH UR STRIP: 6.5
PROT UR STRIP-MCNC: NORMAL
SCHEDULED VISIT: YES
SP GR UR STRIP: 1.01
URINE ALBUMIN/PROTEIN: NORMAL
URINE GLUCOSE: NEGATIVE
URINE KETONES: NEGATIVE
WEEKS GESTATION: 36.2

## 2023-10-05 NOTE — PROCEDURAL SAFETY CHECKLIST WITH OR WITHOUT SEDATION - NSPOSTPXDISPO3SD_GEN_ALL_CORE
Overdue to come in for dm2, BP etc, schedule with myself or partners  30 day refill given, needs to come in done

## 2024-06-24 NOTE — OB PROVIDER H&P - PRO PRENATAL LABS ORI SOURCE HBSAG
Medication: olmesartan  Last office visit date: 1/24/24  Medication Refill Protocol Failed.     Last BP was under 140/90 or if patient has diabetes, CAD, or PVD, BP under 130/80 in past year       SCM

## 2024-06-27 ENCOUNTER — INPATIENT (INPATIENT)
Facility: HOSPITAL | Age: 34
LOS: 10 days | Discharge: HOME HEALTH | DRG: 850 | End: 2024-07-08
Attending: STUDENT IN AN ORGANIZED HEALTH CARE EDUCATION/TRAINING PROGRAM | Admitting: STUDENT IN AN ORGANIZED HEALTH CARE EDUCATION/TRAINING PROGRAM
Payer: MEDICAID

## 2024-06-27 VITALS
WEIGHT: 198.42 LBS | SYSTOLIC BLOOD PRESSURE: 124 MMHG | RESPIRATION RATE: 17 BRPM | DIASTOLIC BLOOD PRESSURE: 84 MMHG | HEART RATE: 102 BPM | OXYGEN SATURATION: 100 % | TEMPERATURE: 99 F

## 2024-06-27 DIAGNOSIS — K80.20 CALCULUS OF GALLBLADDER WITHOUT CHOLECYSTITIS WITHOUT OBSTRUCTION: ICD-10-CM

## 2024-06-27 LAB
ALBUMIN SERPL ELPH-MCNC: 3.9 G/DL — SIGNIFICANT CHANGE UP (ref 3.5–5.2)
ALP SERPL-CCNC: 782 U/L — HIGH (ref 30–115)
ALT FLD-CCNC: 204 U/L — HIGH (ref 0–41)
ANION GAP SERPL CALC-SCNC: 11 MMOL/L — SIGNIFICANT CHANGE UP (ref 7–14)
AST SERPL-CCNC: 285 U/L — HIGH (ref 0–41)
BASOPHILS # BLD AUTO: 0.01 K/UL — SIGNIFICANT CHANGE UP (ref 0–0.2)
BASOPHILS NFR BLD AUTO: 0.2 % — SIGNIFICANT CHANGE UP (ref 0–1)
BILIRUB SERPL-MCNC: 1.1 MG/DL — SIGNIFICANT CHANGE UP (ref 0.2–1.2)
BUN SERPL-MCNC: 6 MG/DL — LOW (ref 10–20)
CALCIUM SERPL-MCNC: 9.4 MG/DL — SIGNIFICANT CHANGE UP (ref 8.4–10.4)
CHLORIDE SERPL-SCNC: 102 MMOL/L — SIGNIFICANT CHANGE UP (ref 98–110)
CO2 SERPL-SCNC: 25 MMOL/L — SIGNIFICANT CHANGE UP (ref 17–32)
CREAT SERPL-MCNC: 0.6 MG/DL — LOW (ref 0.7–1.5)
EGFR: 122 ML/MIN/1.73M2 — SIGNIFICANT CHANGE UP
EOSINOPHIL # BLD AUTO: 0.16 K/UL — SIGNIFICANT CHANGE UP (ref 0–0.7)
EOSINOPHIL NFR BLD AUTO: 3.2 % — SIGNIFICANT CHANGE UP (ref 0–8)
GLUCOSE SERPL-MCNC: 83 MG/DL — SIGNIFICANT CHANGE UP (ref 70–99)
HCG SERPL QL: NEGATIVE — SIGNIFICANT CHANGE UP
HCT VFR BLD CALC: 32 % — LOW (ref 37–47)
HGB BLD-MCNC: 10.5 G/DL — LOW (ref 12–16)
IMM GRANULOCYTES NFR BLD AUTO: 0.2 % — SIGNIFICANT CHANGE UP (ref 0.1–0.3)
LIDOCAIN IGE QN: 55 U/L — SIGNIFICANT CHANGE UP (ref 7–60)
LYMPHOCYTES # BLD AUTO: 1.86 K/UL — SIGNIFICANT CHANGE UP (ref 1.2–3.4)
LYMPHOCYTES # BLD AUTO: 37.7 % — SIGNIFICANT CHANGE UP (ref 20.5–51.1)
MCHC RBC-ENTMCNC: 29.4 PG — SIGNIFICANT CHANGE UP (ref 27–31)
MCHC RBC-ENTMCNC: 32.8 G/DL — SIGNIFICANT CHANGE UP (ref 32–37)
MCV RBC AUTO: 89.6 FL — SIGNIFICANT CHANGE UP (ref 81–99)
MONOCYTES # BLD AUTO: 0.39 K/UL — SIGNIFICANT CHANGE UP (ref 0.1–0.6)
MONOCYTES NFR BLD AUTO: 7.9 % — SIGNIFICANT CHANGE UP (ref 1.7–9.3)
NEUTROPHILS # BLD AUTO: 2.5 K/UL — SIGNIFICANT CHANGE UP (ref 1.4–6.5)
NEUTROPHILS NFR BLD AUTO: 50.8 % — SIGNIFICANT CHANGE UP (ref 42.2–75.2)
NRBC # BLD: 0 /100 WBCS — SIGNIFICANT CHANGE UP (ref 0–0)
PLATELET # BLD AUTO: 373 K/UL — SIGNIFICANT CHANGE UP (ref 130–400)
PMV BLD: 10.3 FL — SIGNIFICANT CHANGE UP (ref 7.4–10.4)
POTASSIUM SERPL-MCNC: 3.4 MMOL/L — LOW (ref 3.5–5)
POTASSIUM SERPL-SCNC: 3.4 MMOL/L — LOW (ref 3.5–5)
PROT SERPL-MCNC: 7.2 G/DL — SIGNIFICANT CHANGE UP (ref 6–8)
RBC # BLD: 3.57 M/UL — LOW (ref 4.2–5.4)
RBC # FLD: 16.6 % — HIGH (ref 11.5–14.5)
SODIUM SERPL-SCNC: 138 MMOL/L — SIGNIFICANT CHANGE UP (ref 135–146)
WBC # BLD: 4.93 K/UL — SIGNIFICANT CHANGE UP (ref 4.8–10.8)
WBC # FLD AUTO: 4.93 K/UL — SIGNIFICANT CHANGE UP (ref 4.8–10.8)

## 2024-06-27 PROCEDURE — 84133 ASSAY OF URINE POTASSIUM: CPT

## 2024-06-27 PROCEDURE — 87070 CULTURE OTHR SPECIMN AEROBIC: CPT

## 2024-06-27 PROCEDURE — C1729: CPT

## 2024-06-27 PROCEDURE — 47534 PLMT BILIARY DRAINAGE CATH: CPT

## 2024-06-27 PROCEDURE — 82746 ASSAY OF FOLIC ACID SERUM: CPT

## 2024-06-27 PROCEDURE — C1889: CPT

## 2024-06-27 PROCEDURE — 74183 MRI ABD W/O CNTR FLWD CNTR: CPT | Mod: MC

## 2024-06-27 PROCEDURE — 36415 COLL VENOUS BLD VENIPUNCTURE: CPT

## 2024-06-27 PROCEDURE — 85610 PROTHROMBIN TIME: CPT

## 2024-06-27 PROCEDURE — A9579: CPT

## 2024-06-27 PROCEDURE — 83615 LACTATE (LD) (LDH) ENZYME: CPT

## 2024-06-27 PROCEDURE — 99285 EMERGENCY DEPT VISIT HI MDM: CPT

## 2024-06-27 PROCEDURE — 82248 BILIRUBIN DIRECT: CPT

## 2024-06-27 PROCEDURE — 82607 VITAMIN B-12: CPT

## 2024-06-27 PROCEDURE — 83010 ASSAY OF HAPTOGLOBIN QUANT: CPT

## 2024-06-27 PROCEDURE — 85027 COMPLETE CBC AUTOMATED: CPT

## 2024-06-27 PROCEDURE — 82977 ASSAY OF GGT: CPT

## 2024-06-27 PROCEDURE — 82728 ASSAY OF FERRITIN: CPT

## 2024-06-27 PROCEDURE — 76770 US EXAM ABDO BACK WALL COMP: CPT

## 2024-06-27 PROCEDURE — 85045 AUTOMATED RETICULOCYTE COUNT: CPT

## 2024-06-27 PROCEDURE — 87077 CULTURE AEROBIC IDENTIFY: CPT

## 2024-06-27 PROCEDURE — 86901 BLOOD TYPING SEROLOGIC RH(D): CPT

## 2024-06-27 PROCEDURE — 81001 URINALYSIS AUTO W/SCOPE: CPT

## 2024-06-27 PROCEDURE — 74018 RADEX ABDOMEN 1 VIEW: CPT

## 2024-06-27 PROCEDURE — 93005 ELECTROCARDIOGRAM TRACING: CPT

## 2024-06-27 PROCEDURE — 88305 TISSUE EXAM BY PATHOLOGIST: CPT

## 2024-06-27 PROCEDURE — 86850 RBC ANTIBODY SCREEN: CPT

## 2024-06-27 PROCEDURE — 83935 ASSAY OF URINE OSMOLALITY: CPT

## 2024-06-27 PROCEDURE — 86900 BLOOD TYPING SEROLOGIC ABO: CPT

## 2024-06-27 PROCEDURE — C1769: CPT

## 2024-06-27 PROCEDURE — 83540 ASSAY OF IRON: CPT

## 2024-06-27 PROCEDURE — 74177 CT ABD & PELVIS W/CONTRAST: CPT | Mod: 26,MC

## 2024-06-27 PROCEDURE — 88312 SPECIAL STAINS GROUP 1: CPT

## 2024-06-27 PROCEDURE — C9399: CPT

## 2024-06-27 PROCEDURE — 80053 COMPREHEN METABOLIC PANEL: CPT

## 2024-06-27 PROCEDURE — 85025 COMPLETE CBC W/AUTO DIFF WBC: CPT

## 2024-06-27 PROCEDURE — C1748: CPT

## 2024-06-27 PROCEDURE — 82247 BILIRUBIN TOTAL: CPT

## 2024-06-27 PROCEDURE — 84300 ASSAY OF URINE SODIUM: CPT

## 2024-06-27 PROCEDURE — 80048 BASIC METABOLIC PNL TOTAL CA: CPT

## 2024-06-27 PROCEDURE — 84156 ASSAY OF PROTEIN URINE: CPT

## 2024-06-27 PROCEDURE — C1887: CPT

## 2024-06-27 PROCEDURE — 87040 BLOOD CULTURE FOR BACTERIA: CPT

## 2024-06-27 PROCEDURE — 84540 ASSAY OF URINE/UREA-N: CPT

## 2024-06-27 PROCEDURE — 82570 ASSAY OF URINE CREATININE: CPT

## 2024-06-27 PROCEDURE — 83735 ASSAY OF MAGNESIUM: CPT

## 2024-06-27 RX ORDER — MORPHINE SULFATE 100 MG/1
4 TABLET, EXTENDED RELEASE ORAL ONCE
Refills: 0 | Status: DISCONTINUED | OUTPATIENT
Start: 2024-06-27 | End: 2024-06-27

## 2024-06-27 RX ORDER — POTASSIUM CHLORIDE 600 MG/1
40 TABLET, FILM COATED, EXTENDED RELEASE ORAL ONCE
Refills: 0 | Status: COMPLETED | OUTPATIENT
Start: 2024-06-27 | End: 2024-06-27

## 2024-06-27 RX ORDER — KETOROLAC TROMETHAMINE 30 MG/ML
15 INJECTION, SOLUTION INTRAMUSCULAR ONCE
Refills: 0 | Status: DISCONTINUED | OUTPATIENT
Start: 2024-06-27 | End: 2024-06-27

## 2024-06-27 RX ORDER — SODIUM CHLORIDE 0.9 % (FLUSH) 0.9 %
1000 SYRINGE (ML) INJECTION ONCE
Refills: 0 | Status: COMPLETED | OUTPATIENT
Start: 2024-06-27 | End: 2024-06-27

## 2024-06-27 RX ORDER — CEFOTETAN DISODIUM 2 G
1 VIAL (EA) INJECTION ONCE
Refills: 0 | Status: COMPLETED | OUTPATIENT
Start: 2024-06-27 | End: 2024-06-27

## 2024-06-27 RX ADMIN — Medication 100 GRAM(S): at 21:06

## 2024-06-27 RX ADMIN — KETOROLAC TROMETHAMINE 15 MILLIGRAM(S): 30 INJECTION, SOLUTION INTRAMUSCULAR at 14:58

## 2024-06-27 RX ADMIN — Medication 1000 MILLILITER(S): at 14:58
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
## 2024-06-27 NOTE — ED PROVIDER NOTE - PROGRESS NOTE DETAILS
EP: Patient endorsed to Dr Garg to follow-up imaging, reassess and dispo. MS– Consult placed to surgery who are aware and will evaluate the patient shortly.

## 2024-06-27 NOTE — ED PROVIDER NOTE - PHYSICAL EXAMINATION
VITAL SIGNS: I have reviewed nursing notes and confirm.  CONSTITUTIONAL: well-appearing, non-toxic, NAD  SKIN: Warm dry, normal skin turgor  HEAD: NCAT  EYES: EOMI, PERRLA, no scleral icterus  ENT: Moist mucous membranes, normal pharynx with no erythema or exudates  NECK: Supple; non tender. Full ROM. No cervical LAD  CARD: RRR, no murmurs, rubs or gallops  RESP: clear to ausculation b/l.  No rales, rhonchi, or wheezing.  ABD: soft, (+) localized firm swelling underlying vertical scar in the upper abdomen, gentle pressure causes purulent drainage from his skin defect inferior to it. No CVA tenderness  EXT: Full ROM, no bony tenderness, no pedal edema, no calf tenderness  NEURO: normal motor. normal sensory. CN II-XII intact. Cerebellar testing normal. Normal gait.  PSYCH: Cooperative, appropriate.
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
## 2024-06-27 NOTE — ED PROVIDER NOTE - CLINICAL SUMMARY MEDICAL DECISION MAKING FREE TEXT BOX
Pt evaluated after sign out. Comfortable. Work up reviewed. Pt evaluated by surgery, recommend admission to medicine for GI consult and possible MRCP. Will admit.

## 2024-06-28 LAB
ALBUMIN SERPL ELPH-MCNC: 3.7 G/DL — SIGNIFICANT CHANGE UP (ref 3.5–5.2)
ALBUMIN SERPL ELPH-MCNC: 4.2 G/DL — SIGNIFICANT CHANGE UP (ref 3.5–5.2)
ALP SERPL-CCNC: 945 U/L — HIGH (ref 30–115)
ALP SERPL-CCNC: 953 U/L — HIGH (ref 30–115)
ALT FLD-CCNC: 379 U/L — HIGH (ref 0–41)
ALT FLD-CCNC: 457 U/L — HIGH (ref 0–41)
ANION GAP SERPL CALC-SCNC: 13 MMOL/L — SIGNIFICANT CHANGE UP (ref 7–14)
ANION GAP SERPL CALC-SCNC: 14 MMOL/L — SIGNIFICANT CHANGE UP (ref 7–14)
AST SERPL-CCNC: 353 U/L — HIGH (ref 0–41)
AST SERPL-CCNC: 465 U/L — HIGH (ref 0–41)
BASOPHILS # BLD AUTO: 0.02 K/UL — SIGNIFICANT CHANGE UP (ref 0–0.2)
BASOPHILS NFR BLD AUTO: 0.4 % — SIGNIFICANT CHANGE UP (ref 0–1)
BILIRUB DIRECT SERPL-MCNC: 0.7 MG/DL — HIGH (ref 0–0.3)
BILIRUB INDIRECT FLD-MCNC: 1 MG/DL — SIGNIFICANT CHANGE UP (ref 0.2–1.2)
BILIRUB SERPL-MCNC: 1.6 MG/DL — HIGH (ref 0.2–1.2)
BILIRUB SERPL-MCNC: 1.7 MG/DL — HIGH (ref 0.2–1.2)
BILIRUB SERPL-MCNC: 1.7 MG/DL — HIGH (ref 0.2–1.2)
BUN SERPL-MCNC: <3 MG/DL — LOW (ref 10–20)
BUN SERPL-MCNC: <3 MG/DL — LOW (ref 10–20)
CALCIUM SERPL-MCNC: 9.3 MG/DL — SIGNIFICANT CHANGE UP (ref 8.4–10.5)
CALCIUM SERPL-MCNC: 9.5 MG/DL — SIGNIFICANT CHANGE UP (ref 8.4–10.4)
CHLORIDE SERPL-SCNC: 100 MMOL/L — SIGNIFICANT CHANGE UP (ref 98–110)
CHLORIDE SERPL-SCNC: 103 MMOL/L — SIGNIFICANT CHANGE UP (ref 98–110)
CO2 SERPL-SCNC: 24 MMOL/L — SIGNIFICANT CHANGE UP (ref 17–32)
CO2 SERPL-SCNC: 26 MMOL/L — SIGNIFICANT CHANGE UP (ref 17–32)
CREAT SERPL-MCNC: 0.5 MG/DL — LOW (ref 0.7–1.5)
CREAT SERPL-MCNC: 0.5 MG/DL — LOW (ref 0.7–1.5)
EGFR: 128 ML/MIN/1.73M2 — SIGNIFICANT CHANGE UP
EGFR: 128 ML/MIN/1.73M2 — SIGNIFICANT CHANGE UP
EOSINOPHIL # BLD AUTO: 0.2 K/UL — SIGNIFICANT CHANGE UP (ref 0–0.7)
EOSINOPHIL NFR BLD AUTO: 4.4 % — SIGNIFICANT CHANGE UP (ref 0–8)
FERRITIN SERPL-MCNC: 551 NG/ML — HIGH (ref 15–150)
FOLATE SERPL-MCNC: 8.4 NG/ML — SIGNIFICANT CHANGE UP
GGT SERPL-CCNC: 870 U/L — HIGH (ref 1–40)
GLUCOSE SERPL-MCNC: 87 MG/DL — SIGNIFICANT CHANGE UP (ref 70–99)
GLUCOSE SERPL-MCNC: 96 MG/DL — SIGNIFICANT CHANGE UP (ref 70–99)
HAPTOGLOB SERPL-MCNC: 287 MG/DL — HIGH (ref 34–200)
HCT VFR BLD CALC: 34.7 % — LOW (ref 37–47)
HGB BLD-MCNC: 11.3 G/DL — LOW (ref 12–16)
IMM GRANULOCYTES NFR BLD AUTO: 0.2 % — SIGNIFICANT CHANGE UP (ref 0.1–0.3)
INR BLD: 1.14 RATIO — SIGNIFICANT CHANGE UP (ref 0.65–1.3)
IRON SATN MFR SERPL: 90 UG/DL — SIGNIFICANT CHANGE UP (ref 35–150)
LDH SERPL L TO P-CCNC: 484 — HIGH (ref 50–242)
LYMPHOCYTES # BLD AUTO: 1.49 K/UL — SIGNIFICANT CHANGE UP (ref 1.2–3.4)
LYMPHOCYTES # BLD AUTO: 32.6 % — SIGNIFICANT CHANGE UP (ref 20.5–51.1)
MAGNESIUM SERPL-MCNC: 1.9 MG/DL — SIGNIFICANT CHANGE UP (ref 1.8–2.4)
MCHC RBC-ENTMCNC: 29.2 PG — SIGNIFICANT CHANGE UP (ref 27–31)
MCHC RBC-ENTMCNC: 32.6 G/DL — SIGNIFICANT CHANGE UP (ref 32–37)
MCV RBC AUTO: 89.7 FL — SIGNIFICANT CHANGE UP (ref 81–99)
MONOCYTES # BLD AUTO: 0.41 K/UL — SIGNIFICANT CHANGE UP (ref 0.1–0.6)
MONOCYTES NFR BLD AUTO: 9 % — SIGNIFICANT CHANGE UP (ref 1.7–9.3)
NEUTROPHILS # BLD AUTO: 2.44 K/UL — SIGNIFICANT CHANGE UP (ref 1.4–6.5)
NEUTROPHILS NFR BLD AUTO: 53.4 % — SIGNIFICANT CHANGE UP (ref 42.2–75.2)
NRBC # BLD: 0 /100 WBCS — SIGNIFICANT CHANGE UP (ref 0–0)
PLATELET # BLD AUTO: 393 K/UL — SIGNIFICANT CHANGE UP (ref 130–400)
PMV BLD: 10.8 FL — HIGH (ref 7.4–10.4)
POTASSIUM SERPL-MCNC: 3.5 MMOL/L — SIGNIFICANT CHANGE UP (ref 3.5–5)
POTASSIUM SERPL-MCNC: 3.8 MMOL/L — SIGNIFICANT CHANGE UP (ref 3.5–5)
POTASSIUM SERPL-SCNC: 3.5 MMOL/L — SIGNIFICANT CHANGE UP (ref 3.5–5)
POTASSIUM SERPL-SCNC: 3.8 MMOL/L — SIGNIFICANT CHANGE UP (ref 3.5–5)
PROT SERPL-MCNC: 7.2 G/DL — SIGNIFICANT CHANGE UP (ref 6–8)
PROT SERPL-MCNC: 7.9 G/DL — SIGNIFICANT CHANGE UP (ref 6–8)
PROTHROM AB SERPL-ACNC: 13 SEC — HIGH (ref 9.95–12.87)
RBC # BLD: 3.87 M/UL — LOW (ref 4.2–5.4)
RBC # BLD: 3.87 M/UL — LOW (ref 4.2–5.4)
RBC # FLD: 16.6 % — HIGH (ref 11.5–14.5)
RETICS #: 53 K/UL — SIGNIFICANT CHANGE UP (ref 25–125)
RETICS/RBC NFR: 1.4 % — SIGNIFICANT CHANGE UP (ref 0.5–1.5)
SODIUM SERPL-SCNC: 138 MMOL/L — SIGNIFICANT CHANGE UP (ref 135–146)
SODIUM SERPL-SCNC: 142 MMOL/L — SIGNIFICANT CHANGE UP (ref 135–146)
VIT B12 SERPL-MCNC: >2000 PG/ML — HIGH (ref 232–1245)
WBC # BLD: 4.57 K/UL — LOW (ref 4.8–10.8)
WBC # FLD AUTO: 4.57 K/UL — LOW (ref 4.8–10.8)

## 2024-06-28 PROCEDURE — 99232 SBSQ HOSP IP/OBS MODERATE 35: CPT

## 2024-06-28 PROCEDURE — 99221 1ST HOSP IP/OBS SF/LOW 40: CPT

## 2024-06-28 PROCEDURE — 99223 1ST HOSP IP/OBS HIGH 75: CPT

## 2024-06-28 RX ORDER — CEFTRIAXONE SODIUM 500 MG
2000 VIAL (EA) INJECTION EVERY 24 HOURS
Refills: 0 | Status: DISCONTINUED | OUTPATIENT
Start: 2024-06-28 | End: 2024-07-02

## 2024-06-28 RX ORDER — METRONIDAZOLE 500 MG/1
500 TABLET ORAL EVERY 8 HOURS
Refills: 0 | Status: DISCONTINUED | OUTPATIENT
Start: 2024-06-28 | End: 2024-07-02

## 2024-06-28 RX ORDER — ONDANSETRON HYDROCHLORIDE 2 MG/ML
4 INJECTION INTRAMUSCULAR; INTRAVENOUS ONCE
Refills: 0 | Status: COMPLETED | OUTPATIENT
Start: 2024-06-28 | End: 2024-06-28

## 2024-06-28 RX ORDER — POLYETHYLENE GLYCOL 3350 1 G/G
17 POWDER ORAL DAILY
Refills: 0 | Status: DISCONTINUED | OUTPATIENT
Start: 2024-06-28 | End: 2024-07-08

## 2024-06-28 RX ORDER — DEXTROSE MONOHYDRATE AND SODIUM CHLORIDE 5; .3 G/100ML; G/100ML
1000 INJECTION, SOLUTION INTRAVENOUS
Refills: 0 | Status: DISCONTINUED | OUTPATIENT
Start: 2024-06-28 | End: 2024-06-29

## 2024-06-28 RX ORDER — ENOXAPARIN SODIUM 100 MG/ML
40 INJECTION SUBCUTANEOUS EVERY 24 HOURS
Refills: 0 | Status: DISCONTINUED | OUTPATIENT
Start: 2024-06-28 | End: 2024-07-01

## 2024-06-28 RX ORDER — METRONIDAZOLE 500 MG/1
500 TABLET ORAL EVERY 8 HOURS
Refills: 0 | Status: DISCONTINUED | OUTPATIENT
Start: 2024-06-28 | End: 2024-06-28

## 2024-06-28 RX ORDER — CEFTRIAXONE SODIUM 500 MG
1000 VIAL (EA) INJECTION EVERY 24 HOURS
Refills: 0 | Status: DISCONTINUED | OUTPATIENT
Start: 2024-06-28 | End: 2024-06-28

## 2024-06-28 RX ORDER — SENNOSIDES 8.6 MG
2 TABLET ORAL AT BEDTIME
Refills: 0 | Status: DISCONTINUED | OUTPATIENT
Start: 2024-06-28 | End: 2024-07-08

## 2024-06-28 RX ADMIN — Medication 2 TABLET(S): at 21:43

## 2024-06-28 RX ADMIN — ONDANSETRON HYDROCHLORIDE 4 MILLIGRAM(S): 2 INJECTION INTRAMUSCULAR; INTRAVENOUS at 22:09

## 2024-06-28 RX ADMIN — METRONIDAZOLE 500 MILLIGRAM(S): 500 TABLET ORAL at 21:43

## 2024-06-28 RX ADMIN — POTASSIUM CHLORIDE 40 MILLIEQUIVALENT(S): 600 TABLET, FILM COATED, EXTENDED RELEASE ORAL at 01:14

## 2024-06-28 RX ADMIN — METRONIDAZOLE 500 MILLIGRAM(S): 500 TABLET ORAL at 15:18

## 2024-06-28 RX ADMIN — DEXTROSE MONOHYDRATE AND SODIUM CHLORIDE 70 MILLILITER(S): 5; .3 INJECTION, SOLUTION INTRAVENOUS at 06:54

## 2024-06-28 RX ADMIN — Medication 100 MILLIGRAM(S): at 08:54

## 2024-06-28 RX ADMIN — Medication 2 TABLET(S): at 18:24

## 2024-06-28 RX ADMIN — METRONIDAZOLE 100 MILLIGRAM(S): 500 TABLET ORAL at 02:18

## 2024-06-28 RX ADMIN — METRONIDAZOLE 100 MILLIGRAM(S): 500 TABLET ORAL at 08:54

## 2024-06-28 RX ADMIN — ENOXAPARIN SODIUM 40 MILLIGRAM(S): 100 INJECTION SUBCUTANEOUS at 18:25

## 2024-06-28 RX ADMIN — Medication 100 MILLIGRAM(S): at 18:26

## 2024-06-28 NOTE — CONSULT NOTE ADULT - CONSULT REASON
33 year old female here with abdominal pain CTAP with intra hepatic biliary dilatation and GB fossa rim enchancing fluid. IR consult to assess for possible drainage of rim enchancing fluid if no endoscopic intervention
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
## 2024-06-28 NOTE — CONSULT NOTE ADULT - ASSESSMENT
Pt is a 34yo Female with PMH/PSH G6PD, s/p lap vania (4/22) c/b postop bile leak s/p ex lap x 2 who presented to the ED with abdominal pain & drainage from incision, admitted with abscess in the gallbladder fossa.  consulted for left hydro. Cr is wnl.    PLAN:  -CTAP reviewed, mild hydro  -Send UA, UCx  -Check bedside bladder scan PVR  -Follow-up outpatient with urology for further evaluation  -Will d/w attending Pt is a 34yo Female with PMH/PSH G6PD, s/p lap vania (4/22) c/b postop bile leak s/p ex lap x 2 who presented to the ED with abdominal pain & drainage from incision, admitted with abscess in the gallbladder fossa.  consulted for left hydro. Cr is wnl.    PLAN:  -CTAP reviewed, - I did not appreciate any hydronephrosis   -Send UA, UCx  -Check bedside bladder scan PVR  -Follow-up outpatient with urology for further evaluation  -no indication for intervention

## 2024-06-28 NOTE — PROGRESS NOTE ADULT - SUBJECTIVE AND OBJECTIVE BOX
GENERAL SURGERY PROGRESS NOTE    Patient: MICHAEL LAL , 33y ([birth year])Female   MRN: [id number]  Location: [facility] ED Hold 004 A  Visit: 24 Inpatient  Date: 24 @ 10:59    Hospital Day #: 2    Procedure/Dx/Injuries: S/P MACO SMITH (2024 IN [place])   WOUND INFECTION AND BILOMA    Events of past 24 hours: no acute events overnight    PAST MEDICAL & SURGICAL HISTORY:  Glucose 6 phosphatase deficiency   delivery delivered    Vitals:   T(F): 97.8 (24 @ 07:37), Max: 99 (24 @ 12:58)  HR: 83 (24 @ 07:37)  BP: 137/93 (24 @ 07:37)  RR: 18 (24 @ 07:37)  SpO2: 100% (24 @ 07:37)    Diet, NPO:   Except Medications    Fluids: lactated ringers.: Solution, 1000 milliLiter(s) infuse at 125 mL/Hr, Stop After 24 Hours    PHYSICAL EXAM:  General: NAD, resting comfortably  Cardiovascular: RRR  Respiratory: chest rise equal b/l  Abdominal: Soft, nontender, nondistended. Midline incision nearly fully closed but with two small open areas with purulent drainage  Skin: Warm, dry, no color changes    MEDICATIONS  (STANDING):  cefTRIAXone   IVPB 2000 milliGRAM(s) IV Intermittent every 24 hours  enoxaparin Injectable 40 milliGRAM(s) SubCutaneous every 24 hours  lactated ringers. 1000 milliLiter(s) (125 mL/Hr) IV Continuous <Continuous>  metroNIDAZOLE    Tablet 500 milliGRAM(s) Oral every 8 hours  polyethylene glycol 3350 17 Gram(s) Oral daily  senna 2 Tablet(s) Oral at bedtime    MEDICATIONS  (PRN):  DVT PROPHYLAXIS: enoxaparin Injectable 40 milliGRAM(s) SubCutaneous every 24 hours  ANTIBIOTICS:  cefTRIAXone   IVPB 2000 milliGRAM(s)  metroNIDAZOLE    Tablet 500 milliGRAM(s)      LAB/STUDIES:               11.3   4.57  )-----------( 393      ( 2024 08:03 )             34.7       Auto Neutrophil %: 53.4 % (24 @ 08:03)  Auto Immature Granulocyte %: 0.2 % (24 @ 08:03)  Auto Neutrophil %: 50.8 % (24 @ 14:20)  Auto Immature Granulocyte %: 0.2 % (24 @ 14:20)        142  |  103  |  <3<L>  ----------------------------<  96  3.8   |  26  |  0.5<L>      Calcium: 9.3 mg/dL (24 @ 08:03)      LFTs:             7.9  | 1.7  | 465      ------------------[953     ( 2024 08:03 )  4.2  | 0.7  | 457         Urinalysis Basic - ( 2024 08:03 )    Color: x / Appearance: x / SG: x / pH: x  Gluc: 96 mg/dL / Ketone: x  / Bili: x / Urobili: x   Blood: x / Protein: x / Nitrite: x   Leuk Esterase: x / RBC: x / WBC x   Sq Epi: x / Non Sq Epi: x / Bacteria: x
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
## 2024-06-28 NOTE — CONSULT NOTE ADULT - SUBJECTIVE AND OBJECTIVE BOX
Pt is a 34yo Female with PMH/PSH G6PD, s/p lap vania () c/b postop bile leak s/p ex lap x 2 who presented to the ED with abdominal pain & drainage from incision, admitted with gallbladder fossa abscess.      consulted for left hydro. Patient reports hx of UTI following second ex lap, but reports that it was treated at that time and she has been asymptomatic since. Currently Reporting RUQ discomfort & suprapubic cramps (pt reports she is currently menstruating & that these feel like her normal cramps). Otherwise denies sxs.     PAST MEDICAL & SURGICAL HISTORY:  Glucose 6 phosphatase deficiency   delivery delivered    MEDICATIONS  (STANDING):  cefTRIAXone   IVPB 2000 milliGRAM(s) IV Intermittent every 24 hours  enoxaparin Injectable 40 milliGRAM(s) SubCutaneous every 24 hours  lactated ringers. 1000 milliLiter(s) (125 mL/Hr) IV Continuous <Continuous>  lactobacillus acidophilus 2 Tablet(s) Oral daily  metroNIDAZOLE    Tablet 500 milliGRAM(s) Oral every 8 hours  polyethylene glycol 3350 17 Gram(s) Oral daily  senna 2 Tablet(s) Oral at bedtime    Allergies  sulfa drugs (Rash)    SOCIAL HISTORY: No illicit drug use    FAMILY HISTORY:  FHx: diabetes mellitus (Mother)    REVIEW OF SYSTEMS   [x] A ten-point review of systems was otherwise negative except as noted.    Vital Signs Last 24 Hrs  T(C): 36.8 (2024 16:02), Max: 37 (2024 20:29)  T(F): 98.2 (2024 16:02), Max: 98.6 (2024 20:29)  HR: 83 (2024 16:02) (83 - 93)  BP: 121/77 (2024 16:02) (121/77 - 144/99)  BP(mean): 92 (2024 16:02) (92 - 92)  RR: 18 (2024 16:02) (18 - 18)  SpO2: 96% (2024 16:02) (96% - 100%)    Parameters below as of 2024 16:02  Patient On (Oxygen Delivery Method): room air    PHYSICAL EXAM:  GEN: NAD, awake and alert.  SKIN: Non diaphoretic.  RESP: Non-labored breathing. No use of accessory muscles.  CARDIO: +S1/S2  ABDO: Abdominal dressing in place, c/d/i. Soft, NT/ND, no palpable bladder, no suprapubic tenderness.  BACK: No CVAT B/L.  : Voiding freely.  EXT: HUDDLESTON x 4    LABS:                      11.3   4.57  )-----------( 393      ( 2024 08:03 )             34.7       142  |  103  |  <3<L>  ----------------------------<  96  3.8   |  26  |  0.5<L>    Ca    9.3      2024 08:03  Mg     1.9         TPro  7.9  /  Alb  4.2  /  TBili  1.7<H>  /  DBili  0.7<H>  /  AST  465<H>  /  ALT  457<H>  /  AlkPhos  953<H>      RADIOLOGY & ADDITIONAL STUDIES:  < from: CT Abdomen and Pelvis w/ IV Cont (24 @ 16:20) >  FINDINGS:    LOWER CHEST/HEART: Unremarkable.    HEPATOBILIARY: Status post cholecystectomy.   In the gallbladder fossa   there is a 2.5 cm rim-enhancing fluid collection. Intrahepatic biliary   ductal dilatation.    SPLEEN: Unremarkable.    PANCREAS: Unremarkable.    ADRENAL GLANDS: Unremarkable.    KIDNEYS: Symmetric renal enhancement. Left-sided hydronephrosis. An   obstructing stone isn'tvisualized.    ABDOMINOPELVIC NODES: No lymphadenopathy by size criteria.    PELVIC ORGANS: Distended urinary bladder. Calcified fibroid. Trace pelvic   ascites.    PERITONEUM/MESENTERY/BOWEL: No obstruction or pneumoperitoneum.    BONES/SOFT TISSUES: Mild degenerative changes. Inflammatory changes from   the gallbladder fossa extends anteriorly to the abdominal wall. There is   diffuse subcutaneous edema and soft tissue swelling of the right abdomen.    VASCULAR: Normal caliber abdominal aorta.    IMPRESSION:    Status post cholecystectomy. In the gallbladder fossa there is a 2.5 cm   rim-enhancing fluid collection which may represent an abscess.    Inflammatory changes from the gallbladder fossa extends anteriorly to the   abdominal wall.There is diffuse subcutaneous edema and soft tissue   swelling of the right abdomen. A fistulous tract cannot be excluded.  < end of copied text >
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
## 2024-06-28 NOTE — PATIENT PROFILE ADULT - FUNCTIONAL ASSESSMENT - DAILY ACTIVITY 2.
Leukocytosis Leukocytosis Leukocytosis Liver laceration Liver laceration 4 = No assist / stand by assistance

## 2024-06-28 NOTE — PATIENT PROFILE ADULT - FALL HARM RISK - HARM RISK INTERVENTIONS

## 2024-06-28 NOTE — PROGRESS NOTE ADULT - ASSESSMENT
33 yo female w/ PMHx of G6PD deficiency, c-sections x4, s/p laparoscopic cholecystectomy (4/22/24) complicated by post-op leak s/p ex lap x2 (4/28/24 and May 2024) presenting to the ED for evaluation of abdominal pain. General surgery consulted d/t CT findings of rim enhancing fluid collection in gallbladder fossa. Patient reports that she had a laparoscopic cholecystectomy in [facility] on 4/22/2022 which was complicated by a bile leak requiring 2 subsequent exploratory laparotomies, drain was placed after first ex lap.  CT shows 2.5 cm rim-enhancing fluid collection which may represent an abscess in the gallbladder fossa, inflammatory changes from the gallbladder fossa extending anteriorly to the abdominal wall, diffuse subcutaneous edema and soft tissue swelling of the right abdomen, a fistulous tract cannot be excluded. Lab work significantly elevated LFTs (ASP, AST, ALT) no elevated WBC, bilirubin not elevated.     PLAN:  - Clinical and radiographic findings suggestive of superficial wound infection, currently draining  - Recommend advanced GI consult to evaluate for possible MRCP/ERCP  - Local wound care  - Recall surgery PRN    TRAUMA SURGERY SPECTRA: [id number] 34 yo female w/ PMHx of G6PD deficiency, c-sections x4, s/p laparoscopic cholecystectomy (4/22/24) complicated by post-op leak s/p ex lap x2 (4/28/24 and May 2024) presenting to the ED for evaluation of abdominal pain. General surgery consulted d/t CT findings of rim enhancing fluid collection in gallbladder fossa. Patient reports that she had a laparoscopic cholecystectomy in [facility] on 4/22/2022 which was complicated by a bile leak requiring 2 subsequent exploratory laparotomies, drain was placed after first ex lap.  CT shows 2.5 cm rim-enhancing fluid collection which may represent an abscess in the gallbladder fossa, inflammatory changes from the gallbladder fossa extending anteriorly to the abdominal wall, diffuse subcutaneous edema and soft tissue swelling of the right abdomen, a fistulous tract cannot be excluded. Lab work significantly elevated LFTs (ASP, AST, ALT) no elevated WBC, bilirubin not elevated.     PLAN:  - Clinical and radiographic findings suggestive of superficial wound infection, currently draining  - Recommend advanced GI consult to evaluate for possible MRCP/ERCP  - Local wound care  - Recall surgery PRN    TRAUMA SURGERY SPECTRA: [id number]
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
## 2024-06-28 NOTE — H&P ADULT - NSHPPHYSICALEXAM_GEN_ALL_CORE
Gen: no apparent distress, lying comfortably on bed  Eyes: bilateral equal and reactive pupils   HENT: normal ear an throat exam  CV: normal s1/s2, no murmur  Resp: bilateral clear  Abd: soft, non tender  Back: No CVAT bilaterally, no midline ttp  Skin: No rash  MSK: No LE edema   Neuro: AOx3, no focal deficits

## 2024-06-28 NOTE — H&P ADULT - NSHPLABSRESULTS_GEN_ALL_CORE
Labs: Hb 10.5, MCV 89.6, K 3.4, AST/ALT/ALK phos 285/204/782, T.bili 1.1     CT abdomen/pelvis with iv contrast:  Status post cholecystectomy. In the gallbladder fossa there is a 2.5 cm   rim-enhancing fluid collection which may represent an abscess. Intrahepatic biliary   ductal dilatation.      Inflammatory changes from the gallbladder fossa extends anteriorly to the   abdominal wall. There is diffuse subcutaneous edema and soft tissue   swelling of the right abdomen. A fistulous tract cannot be excluded.    Left-sided hydronephrosis. An obstructing stone isn'tvisualized.

## 2024-06-29 LAB
ALBUMIN SERPL ELPH-MCNC: 4 G/DL — SIGNIFICANT CHANGE UP (ref 3.5–5.2)
ALP SERPL-CCNC: 920 U/L — HIGH (ref 30–115)
ALT FLD-CCNC: 335 U/L — HIGH (ref 0–41)
ANION GAP SERPL CALC-SCNC: 16 MMOL/L — HIGH (ref 7–14)
APPEARANCE UR: ABNORMAL
AST SERPL-CCNC: 202 U/L — HIGH (ref 0–41)
BILIRUB SERPL-MCNC: 1.2 MG/DL — SIGNIFICANT CHANGE UP (ref 0.2–1.2)
BILIRUB UR-MCNC: NEGATIVE — SIGNIFICANT CHANGE UP
BUN SERPL-MCNC: <3 MG/DL — LOW (ref 10–20)
CALCIUM SERPL-MCNC: 9 MG/DL — SIGNIFICANT CHANGE UP (ref 8.4–10.5)
CHLORIDE SERPL-SCNC: 100 MMOL/L — SIGNIFICANT CHANGE UP (ref 98–110)
CO2 SERPL-SCNC: 24 MMOL/L — SIGNIFICANT CHANGE UP (ref 17–32)
COLOR SPEC: YELLOW — SIGNIFICANT CHANGE UP
CREAT SERPL-MCNC: 0.5 MG/DL — LOW (ref 0.7–1.5)
DIFF PNL FLD: ABNORMAL
EGFR: 128 ML/MIN/1.73M2 — SIGNIFICANT CHANGE UP
GLUCOSE SERPL-MCNC: 100 MG/DL — HIGH (ref 70–99)
GLUCOSE UR QL: NEGATIVE MG/DL — SIGNIFICANT CHANGE UP
HCT VFR BLD CALC: 33.6 % — LOW (ref 37–47)
HGB BLD-MCNC: 11.2 G/DL — LOW (ref 12–16)
INR BLD: 1.15 RATIO — SIGNIFICANT CHANGE UP (ref 0.65–1.3)
KETONES UR-MCNC: 15 MG/DL
LEUKOCYTE ESTERASE UR-ACNC: ABNORMAL
MAGNESIUM SERPL-MCNC: 1.7 MG/DL — LOW (ref 1.8–2.4)
MCHC RBC-ENTMCNC: 29.7 PG — SIGNIFICANT CHANGE UP (ref 27–31)
MCHC RBC-ENTMCNC: 33.3 G/DL — SIGNIFICANT CHANGE UP (ref 32–37)
MCV RBC AUTO: 89.1 FL — SIGNIFICANT CHANGE UP (ref 81–99)
NITRITE UR-MCNC: NEGATIVE — SIGNIFICANT CHANGE UP
NRBC # BLD: 0 /100 WBCS — SIGNIFICANT CHANGE UP (ref 0–0)
PH UR: 7 — SIGNIFICANT CHANGE UP (ref 5–8)
PLATELET # BLD AUTO: 399 K/UL — SIGNIFICANT CHANGE UP (ref 130–400)
PMV BLD: 11.2 FL — HIGH (ref 7.4–10.4)
POTASSIUM SERPL-MCNC: 3.6 MMOL/L — SIGNIFICANT CHANGE UP (ref 3.5–5)
POTASSIUM SERPL-SCNC: 3.6 MMOL/L — SIGNIFICANT CHANGE UP (ref 3.5–5)
PROT SERPL-MCNC: 7.4 G/DL — SIGNIFICANT CHANGE UP (ref 6–8)
PROT UR-MCNC: NEGATIVE MG/DL — SIGNIFICANT CHANGE UP
PROTHROM AB SERPL-ACNC: 13.1 SEC — HIGH (ref 9.95–12.87)
RBC # BLD: 3.77 M/UL — LOW (ref 4.2–5.4)
RBC # FLD: 16.4 % — HIGH (ref 11.5–14.5)
SODIUM SERPL-SCNC: 140 MMOL/L — SIGNIFICANT CHANGE UP (ref 135–146)
SP GR SPEC: 1.01 — SIGNIFICANT CHANGE UP (ref 1–1.03)
UROBILINOGEN FLD QL: 1 MG/DL — SIGNIFICANT CHANGE UP (ref 0.2–1)
WBC # BLD: 5.66 K/UL — SIGNIFICANT CHANGE UP (ref 4.8–10.8)
WBC # FLD AUTO: 5.66 K/UL — SIGNIFICANT CHANGE UP (ref 4.8–10.8)

## 2024-06-29 PROCEDURE — 99233 SBSQ HOSP IP/OBS HIGH 50: CPT

## 2024-06-29 PROCEDURE — 74183 MRI ABD W/O CNTR FLWD CNTR: CPT | Mod: 26

## 2024-06-29 RX ORDER — ACETAMINOPHEN 325 MG
650 TABLET ORAL EVERY 6 HOURS
Refills: 0 | Status: DISCONTINUED | OUTPATIENT
Start: 2024-06-29 | End: 2024-06-29

## 2024-06-29 RX ORDER — ACETAMINOPHEN 325 MG
650 TABLET ORAL EVERY 6 HOURS
Refills: 0 | Status: DISCONTINUED | OUTPATIENT
Start: 2024-06-29 | End: 2024-07-08

## 2024-06-29 RX ORDER — DEXTROSE MONOHYDRATE AND SODIUM CHLORIDE 5; .3 G/100ML; G/100ML
1000 INJECTION, SOLUTION INTRAVENOUS
Refills: 0 | Status: DISCONTINUED | OUTPATIENT
Start: 2024-06-29 | End: 2024-06-30

## 2024-06-29 RX ORDER — MAGNESIUM SULFATE 100 %
2 POWDER (GRAM) MISCELLANEOUS ONCE
Refills: 0 | Status: COMPLETED | OUTPATIENT
Start: 2024-06-29 | End: 2024-06-29

## 2024-06-29 RX ADMIN — DEXTROSE MONOHYDRATE AND SODIUM CHLORIDE 75 MILLILITER(S): 5; .3 INJECTION, SOLUTION INTRAVENOUS at 11:30

## 2024-06-29 RX ADMIN — METRONIDAZOLE 500 MILLIGRAM(S): 500 TABLET ORAL at 22:08

## 2024-06-29 RX ADMIN — Medication 650 MILLIGRAM(S): at 17:03

## 2024-06-29 RX ADMIN — ENOXAPARIN SODIUM 40 MILLIGRAM(S): 100 INJECTION SUBCUTANEOUS at 18:07

## 2024-06-29 RX ADMIN — Medication 25 GRAM(S): at 15:55

## 2024-06-29 RX ADMIN — METRONIDAZOLE 500 MILLIGRAM(S): 500 TABLET ORAL at 17:03

## 2024-06-29 RX ADMIN — Medication 2 TABLET(S): at 22:08

## 2024-06-29 RX ADMIN — Medication 100 MILLIGRAM(S): at 18:05

## 2024-06-29 NOTE — PROGRESS NOTE ADULT - SUBJECTIVE AND OBJECTIVE BOX
SUBJECTIVE/OVERNIGHT EVENTS    32y/o F with PMH of G6PD deficiency, visiting [place] from [facility], recent hx of complicated cholecystectomy on 2024 with subsequent return to the OR for bile leak and adhesion release presenting to ED for evaluation of abdominal pain after her 1-year-old son accidentally kicked her in the area of prior surgical intervention 6/27 am.  Patient states that since her surgery she experienced  mild drainage from the surgical site for which she has been using po antibiotics but no fever/ chills, nausea /vomiting ,no change in appetite or any other additional complaints. Of note, surgery was performed in [facility].      Today is hospital day 2d. This morning patient was seen and examined at bedside, resting comfortably in bed. No acute or major events overnight.      CODE STATUS: FULL      MEDICATIONS  STANDING MEDICATIONS  cefTRIAXone   IVPB 2000 milliGRAM(s) IV Intermittent every 24 hours  enoxaparin Injectable 40 milliGRAM(s) SubCutaneous every 24 hours  lactated ringers. 1000 milliLiter(s) IV Continuous <Continuous>  lactobacillus acidophilus 2 Tablet(s) Oral daily  magnesium sulfate  IVPB 2 Gram(s) IV Intermittent once  metroNIDAZOLE    Tablet 500 milliGRAM(s) Oral every 8 hours  polyethylene glycol 3350 17 Gram(s) Oral daily  senna 2 Tablet(s) Oral at bedtime    PRN MEDICATIONS  acetaminophen     Tablet .. 650 milliGRAM(s) Oral every 6 hours PRN    VITALS  T(F): 98.3 (24 @ 12:31), Max: 98.3 (24 @ 21:29)  HR: 79 (24 @ 12:31) (78 - 105)  BP: 135/83 (24 @ 12:31) (121/77 - 147/102)  RR: 18 (24 @ 12:31) (18 - 19)  SpO2: 98% (24 @ 21:51) (96% - 100%)    PHYSICAL EXAM  GENERAL  ( x ) NAD, lying in bed comfortably     (  ) obtunded     (  ) lethargic     (  ) somnolent    HEAD  ( x ) Atraumatic     (  ) hematoma     (  ) laceration (specify location:       )     NECK  ( x ) Supple     (  ) neck stiffness     (  ) nuchal rigidity     (  )  no JVD     (  ) JVD present ( -- cm)    HEART  Rate -->  ( x ) normal rate    (  ) bradycardic    (  ) tachycardic  Rhythm -->  ( x ) regular    (  ) regularly irregular    (  ) irregularly irregular  Murmurs -->  ( x ) normal s1/s2    (  ) systolic murmur    (  ) diastolic murmur    (  ) continuous murmur     (  ) S3 present    (  ) S4 present    LUNGS  ( x )Unlabored respirations     (  ) tachypnea  ( x ) B/L air entry     (  ) decreased breath sounds in:  (location     )    ( x ) no adventitious sound     (  ) crackles     (  ) wheezing      (  ) rhonchi      (specify location:       )  (  ) chest wall tenderness (specify location:       )    ABDOMEN  ( x ) Soft     (  ) tense   |   ( x ) nondistended     (  ) distended   |   ( x ) +BS     (  ) hypoactive bowel sounds     (  ) hyperactive bowel sounds  ( x ) nontender     (  ) RUQ tenderness     (  ) RLQ tenderness     (  ) LLQ tenderness     (  ) epigastric tenderness     (  ) diffuse tenderness  (  ) Splenomegaly      (  ) Hepatomegaly      (  ) Jaundice     (  ) ecchymosis     EXTREMITIES  ( x ) Normal     (  ) Rash     (  ) ecchymosis     (  ) varicose veins      (  ) pitting edema     (  ) non-pitting edema   (  ) ulceration     (  ) gangrene:     (location:     )    NERVOUS SYSTEM  ( x ) A&Ox3     (  ) confused     (  ) lethargic  CN II-XII:     ( x ) Intact     (  ) focal deficits  (Specify:     )   Upper extremities:     (  ) strength X/5     (  ) focal deficit (specify:    )  Lower extremities:     (  ) strength  X/5    (  ) focal deficit (specify:    )    SKIN  ( x ) No rashes or lesions     (  ) maculopapular rash     (  ) pustules     (  ) vesicles     (  ) ulcer     (  ) ecchymosis     (specify location:     )    (  ) Indwelling Shen Catheter   Date insterted:    Reason (  ) Critical illness     (  ) urinary retention    (  ) Accurate Ins/Outs Monitoring     (  ) CMO patient    (  ) Central Line  Date inserted:  Location: (  ) Right IJ   (  ) Left IJ   (  ) Right Fem   (  ) Left Fem    (  ) SPC  (  ) pigtail  (  ) PEG tube  (  ) colostomy  (  ) jejunostomy  (  ) U-Dall    LABS             11.2   5.66  )-----------( 399      ( 24 @ 07:45 )             33.6     140  |  100  |  <3  -------------------------<  100   24 @ 07:45  3.6  |  24  |  0.5    Ca      9.0     24 @ 07:45  Mg     1.7     24 @ 07:45    TPro  7.4  /  Alb  4.0  /  TBili  1.2  /  DBili  x   /  AST  202  /  ALT  335  /  AlkPhos  920  /  GGT  x     24 @ 07:45    PT/INR - ( 24 @ 07:45 )   PT: 13.10 sec<H>;   INR: 1.15 ratio      Urinalysis Basic - ( 2024 10:02 )    Color: Yellow / Appearance: Cloudy / S.010 / pH: x  Gluc: x / Ketone: 15 mg/dL  / Bili: Negative / Urobili: 1.0 mg/dL   Blood: x / Protein: Negative mg/dL / Nitrite: Negative   Leuk Esterase: Trace / RBC: 1 /HPF / WBC 1 /HPF   Sq Epi: x / Non Sq Epi: 1 /HPF / Bacteria: Negative /HPF          Urinalysis with Rflx Culture (collected 2024 10:02)    Culture - Blood (collected 2024 00:20)  Source: .Blood Blood  Preliminary Report (2024 10:02):    No growth at 24 hours    Culture - Blood (collected 2024 00:20)  Source: .Blood Blood  Preliminary Report (2024 10:02):    No growth at 24 hours      IMAGING    < from: CT Abdomen and Pelvis w/ IV Cont (24 @ 16:20) >  ACC: [id number] EXAM:  CT ABDOMEN AND PELVIS IC   ORDERED BY: TANISHA HUERTAS     PROCEDURE DATE:  2024    INTERPRETATION:  CLINICAL HISTORY / REASON FOR EXAM: Abdominal pain.   Cholecystectomy in [place] with pus at the surgical site. WBC 4.93    TECHNIQUE: Contiguous axial CT images were obtained from the lower chest   to the pubic symphysis following administration of 95 mL Omnipaque 350   intravenous contrast, 5 mL discarded . Oral contrast was not   administered. Coronal and sagittalreformats were submitted for review.    COMPARISON CT: No studies are available for direct comparison.    FINDINGS:    LOWER CHEST/HEART: Unremarkable.    HEPATOBILIARY: Status post cholecystectomy.   In the gallbladder fossa   there is a 2.5 cm rim-enhancing fluid collection. Intrahepatic biliary   ductal dilatation.    SPLEEN: Unremarkable.    PANCREAS: Unremarkable.    ADRENAL GLANDS: Unremarkable.    KIDNEYS: Symmetric renal enhancement. Left-sided hydronephrosis. An   obstructing stone isn'tvisualized.    ABDOMINOPELVIC NODES: No lymphadenopathy by size criteria.    PELVIC ORGANS: Distended urinary bladder. Calcified fibroid. Trace pelvic   ascites.    PERITONEUM/MESENTERY/BOWEL: No obstruction or pneumoperitoneum.    BONES/SOFT TISSUES: Mild degenerative changes. Inflammatory changes from   the gallbladder fossa extends anteriorly to the abdominal wall. There is   diffuse subcutaneous edema and soft tissue swelling of the right abdomen.    VASCULAR: Normal caliber abdominal aorta.      IMPRESSION:    Status post cholecystectomy. In the gallbladder fossa there is a 2.5 cm   rim-enhancing fluid collection which may represent an abscess.    Inflammatory changes from the gallbladder fossa extends anteriorly to the   abdominal wall.There is diffuse subcutaneous edema and soft tissue   swelling of the right abdomen. A fistulous tract cannot be excluded.

## 2024-06-29 NOTE — PROGRESS NOTE ADULT - ASSESSMENT
33-year-old woman past medical history of G6PD deficiency, visiting [place] from [facility], recent history of complicated cholecystectomy on 4/22/2024 with subsequent return to the OR for bile leak and adhesion release presenting to ED for evaluation of abdominal pain after her 1-year-old son accidentally kicked her in the area of prior surgical intervention 6/27 am. Admitted for further evaluation and management of GB fossa rim enhancing collection with intrahepatic biliary dilatation.    # pt is visiting from [facility] for 6 wks (this is her 1st week); she has 4 children ages 1-8; she has no insurance currently (working on getting [employer])    # s/p CCY April c/b jake leak and re-op; now w/ GB fossa 2.5 cm rim-enhancing fluid collection suspected abscess; Surgical site infection -> suspect fistulous tract with GB fossa; Intrahepatic biliary ductal dilatation; Transaminitis, including LDH and GGT incr  NO SEPSIS  HD stable, on room air  BCx: neg x2  f/u Wnd Cx  CT AP w iv cont: Status post cholecystectomy. In the gallbladder fossa there is a 2.5 cm rim-enhancing fluid collection which may represent an abscess. Intrahepatic biliary   ductal dilatation. Inflammatory changes from the gallbladder fossa extends anteriorly to the abdominal wall. There is diffuse subcutaneous edema and soft tissue swelling of the right abdomen. A fistulous tract cannot be excluded.  Sx: no intervent  IR: no drainage  GI: obtain MRCP; poss intervention this week  ID noted  abx: rocephin 2gm iv q12 + flagyl 500mg po q8  lactobaccilus 2 tab q24  bowel reg: PEG q24 + senna 2 hs  pain: tylenol  dressing change: wash soap/water; dry; abd pad; tape q12  Diet: NPO for MRCP, can feed dash afterwards  IVFs while NPO - can stop once eating/drinking again  CBC, CMP q24    # Left-sided hydronephrosis noted on CT abdomen/pelvis ?related to adhesions prior abd surgery (4 LSCS, 2 ex lap)  Cr wnl  No stone reported on CT  Repeat RB US on SUN/MON to reassess  Uro noted: U/A neg; UCx not needed    # hypomagnesemia  replete    # Normocytic anemia of chr dz; could also be related to menses  hapto 287 - no hemolysis  B12, fol, ferr - all OK    # h/o G6PD ?carrier   Got tested after her first child diagnosed with G6PD  avoid sulpha drugs and others that may provoke hemolysis     # DVT prophylaxis: LMWH    # GI prophylaxis - none    # Activity: as ciro    Dispo: dressing chng q12; abx; f/u ID; f/u wnd cx; f/u RB US SUN/MON; NPO/IVFs for MRCP; f/u GI; daily labs  eventually, pt will go home w/ wnd care and abx - f/u CM/SW - still acute

## 2024-06-29 NOTE — PROGRESS NOTE ADULT - SUBJECTIVE AND OBJECTIVE BOX
MICHAEL LAL  33y  Female  ***My note supersedes ALL resident notes that I sign.  My corrections for their notes are in my note.***    I can be reached directly on "[employer] 1820. My office number is [phone]. My personal cell number is [phone].    INTERVAL EVENTS: Here for f/u of abd pain. Pt really only has pain w/ palp. Still w/ minor leak from surg wound (thin, yellow fluid; no bleeding). Pt is hungry (NPO for MRCP).     T(F): 98.3 (24 @ 12:31), Max: 98.3 (24 @ 21:29)  HR: 79 (24 @ 12:31) (78 - 105)  BP: 135/83 (24 @ 12:31) (121/77 - 147/102)  RR: 18 (24 @ 12:31) (18 - 19)  SpO2: 98% (24 @ 21:51) (96% - 100%)    Gen: NAD  HEENT: PERRL, EOMI, mouth clr, nose clr  Neck: no nodes, no JVD, thyroid nl  lungs: clr  hrt: s1 s2 rrr no murmur  abd: soft, ND, no HS megaly; + tender around surg scar to palp  horizontal. mid abd scar w/ minor dehis at both ends and 3/4" seg in about the middle of surg scar; these 3 areas leak a small amount of thin, yellow drainage (no blood)  ext: no edema, no c/c  neuro: aa ox3, cn intact, can move all 4 ext    LABS:                      11.2    (    89.1   5.66  )-----------( ---------      399      ( 2024 07:45 )             33.6    (    16.4     WBC Count: 5.66 K/uL (24 @ 07:45)  WBC Count: 4.57 K/uL (24 @ 08:03)  WBC Count: 4.93 K/uL (24 @ 14:20)    140   (   100   (   100      24 @ 07:45  ----------------------               3.6   (   24   (   <3                             -----                        0.5  Ca  9.0   Mg  1.7    P   --     138   (   100   (   87      24 @ 17:19  ----------------------               3.5   (   24   (   <3                             -----                        0.5  Ca  9.5   Mg  --    P   --     LFT  7.4  (  1.2  (  202       24 @ 07:45  -------------------------  4.0  (  920  (  335    Alb 4.0  T jake 1.2         24 @ 07:45 - mildly better  Alb 3.7  T jake 1.6         24 @ 17:19  Alb 4.2  T jake 1.7         24 @ 08:03  Alb 3.9  T jake 1.1         24 @ 14:20    PT/INR - ( 2024 07:45 )   PT: 13.10 sec;   INR: 1.15 ratio      Urinalysis Basic - ( 2024 10:02 )    Color: Yellow / Appearance: Cloudy / S.010 / pH: x  Gluc: x / Ketone: 15 mg/dL  / Bili: Negative / Urobili: 1.0 mg/dL   Blood: x / Protein: Negative mg/dL / Nitrite: Negative   Leuk Esterase: Trace / RBC: 1 /HPF / WBC 1 /HPF   Sq Epi: x / Non Sq Epi: 1 /HPF / Bacteria: Negative /HPF    Culture - Blood (collected 24 @ 00:20)  Source: .Blood Blood  Preliminary Report (24 @ 10:02):    No growth at 24 hours    Culture - Blood (collected 24 @ 00:20)  Source: .Blood Blood  Preliminary Report (24 @ 10:02):    No growth at 24 hours    RADIOLOGY & ADDITIONAL TESTS:  < from: CT Abdomen and Pelvis w/ IV Cont (24 @ 16:20) >  IMPRESSION:    Status post cholecystectomy. In the gallbladder fossa there is a 2.5 cm   rim-enhancing fluid collection which may represent an abscess.    Inflammatory changes from the gallbladder fossa extends anteriorly to the   abdominal wall.There is diffuse subcutaneous edema and soft tissue   swelling of the right abdomen. A fistulous tract cannot be excluded.    < end of copied text >    MEDICATIONS:  cefTRIAXone   IVPB 2000 milliGRAM(s) IV Intermittent every 24 hours  metroNIDAZOLE    Tablet 500 milliGRAM(s) Oral every 8 hours    acetaminophen     Tablet .. 650 milliGRAM(s) Oral every 6 hours PRN  enoxaparin Injectable 40 milliGRAM(s) SubCutaneous every 24 hours  lactated ringers. 1000 milliLiter(s) IV Continuous <Continuous>  lactobacillus acidophilus 2 Tablet(s) Oral daily  magnesium sulfate  IVPB 2 Gram(s) IV Intermittent once  polyethylene glycol 3350 17 Gram(s) Oral daily  senna 2 Tablet(s) Oral at bedtime

## 2024-06-29 NOTE — PROGRESS NOTE ADULT - ASSESSMENT
33-year-old female past medical history of G6PD deficiency, visiting [place] from [facility], recent history of complicated cholecystectomy on 4/22/2024 with subsequent return to the OR for bile leak and adhesion release presenting to ED for evaluation of abdominal pain after her 1-year-old son accidentally kicked her in the area of prior surgical intervention 6/27 am. Admitted for further evaluation and management of GB fossa rim enhancing collection with intrahepatic biliary dilatation.    s/p laparoscopic cholecystectomy (4/22/24) complicated by post-op leak s/p ex lap x2 (4/28/24 and May 2024).    #GB fossa 2.5 cm rim-enhancing fluid collection suspected abscess  #Surgical site infection ?fistulous tract with GB fossa   #Intrahepatic biliary ductal dilatation  #Transaminitis  #Recent cholecystectomy on 4/22/2024 in [facility] c/b biliary leak  - HD stable, on room air  - abd pain since 6/27 am and pus like discharge from surgical incision site since surgery on po antibiotics (family to get the medication in am)  - Came to [place] few days ago from [facility]   - normal wbc, Sepsis ruled out on admission  - AST/ALT/ALK phos 285/204/782, T.bili 1.1   - CT abdomen/pelvis with iv contrast:  Status post cholecystectomy. In the gallbladder fossa there is a 2.5 cm   rim-enhancing fluid collection which may represent an abscess. Intrahepatic biliary   ductal dilatation. Inflammatory changes from the gallbladder fossa extends anteriorly to the   abdominal wall. There is diffuse subcutaneous edema and soft tissue   swelling of the right abdomen. A fistulous tract cannot be excluded.    - s/p cefotetan + 1lit NS + Ketoralac + morphine in the ED  - s/p surgery eval, no acute surgical intervention, recommended advanced GI eval for possible CBD stricture given recent bile leak and repair  - Rocephin 2 gm iv q24h  - Flagyl 500 mg po q8h  - LR@70cc/hr  - Consulted IR to evaluate for GB fossa fluid abscess drainage - No acute IR intervention.  - Follow up Bcx, wound culture  - NPO for MRCP, feed if cleared by GI and no procedures  - MRCP ordered for ductal mapping  - May plan procedure possibly next week ( To determine if ERCP with CBD stenting may be of benefit- Never Had endoscopic intervention overseas Or EUS Sampling- drainage of collection )    #Left-sided hydronephrosis noted on CT abdomen/pelvis ?related to adhesions prior abd surgery (4 LSCS, 2 ex lap)  - Cr wnl  - No stone reported on CT, severity of hydro not reported  - peeing okay per patient  - Repeat RBUS in 48 hrs to reassess, consider inpatient urology if worsening renal function or hydro  - Follow-up outpatient with urology for further evaluation    #Normocytic anemia r/o hemolysis   - Hb 10.5, MCV 89.6  - Follow up anemia workup ordered    #h/o G6PD ?carrier   - Got tested after her first child diagnosed with G6PD  - avoid sulpha drugs and others that may provoke hemolysis     DVT prophylaxis - SCDs for now, lovenox when no further procedures  GI prophylaxis - not indicated  NPO for now  AAT

## 2024-06-30 LAB
ALBUMIN SERPL ELPH-MCNC: 4.3 G/DL — SIGNIFICANT CHANGE UP (ref 3.5–5.2)
ALP SERPL-CCNC: 855 U/L — HIGH (ref 30–115)
ALT FLD-CCNC: 253 U/L — HIGH (ref 0–41)
ANION GAP SERPL CALC-SCNC: 17 MMOL/L — HIGH (ref 7–14)
AST SERPL-CCNC: 101 U/L — HIGH (ref 0–41)
BASOPHILS # BLD AUTO: 0.02 K/UL — SIGNIFICANT CHANGE UP (ref 0–0.2)
BASOPHILS NFR BLD AUTO: 0.3 % — SIGNIFICANT CHANGE UP (ref 0–1)
BILIRUB SERPL-MCNC: 1 MG/DL — SIGNIFICANT CHANGE UP (ref 0.2–1.2)
BUN SERPL-MCNC: 4 MG/DL — LOW (ref 10–20)
CALCIUM SERPL-MCNC: 9.4 MG/DL — SIGNIFICANT CHANGE UP (ref 8.4–10.5)
CHLORIDE SERPL-SCNC: 97 MMOL/L — LOW (ref 98–110)
CO2 SERPL-SCNC: 24 MMOL/L — SIGNIFICANT CHANGE UP (ref 17–32)
CREAT SERPL-MCNC: 0.6 MG/DL — LOW (ref 0.7–1.5)
CULTURE RESULTS: ABNORMAL
EGFR: 122 ML/MIN/1.73M2 — SIGNIFICANT CHANGE UP
EOSINOPHIL # BLD AUTO: 0.2 K/UL — SIGNIFICANT CHANGE UP (ref 0–0.7)
EOSINOPHIL NFR BLD AUTO: 2.6 % — SIGNIFICANT CHANGE UP (ref 0–8)
GLUCOSE SERPL-MCNC: 92 MG/DL — SIGNIFICANT CHANGE UP (ref 70–99)
HCT VFR BLD CALC: 38 % — SIGNIFICANT CHANGE UP (ref 37–47)
HGB BLD-MCNC: 12.5 G/DL — SIGNIFICANT CHANGE UP (ref 12–16)
IMM GRANULOCYTES NFR BLD AUTO: 0.3 % — SIGNIFICANT CHANGE UP (ref 0.1–0.3)
LYMPHOCYTES # BLD AUTO: 2.37 K/UL — SIGNIFICANT CHANGE UP (ref 1.2–3.4)
LYMPHOCYTES # BLD AUTO: 30.8 % — SIGNIFICANT CHANGE UP (ref 20.5–51.1)
MAGNESIUM SERPL-MCNC: 2 MG/DL — SIGNIFICANT CHANGE UP (ref 1.8–2.4)
MCHC RBC-ENTMCNC: 29.4 PG — SIGNIFICANT CHANGE UP (ref 27–31)
MCHC RBC-ENTMCNC: 32.9 G/DL — SIGNIFICANT CHANGE UP (ref 32–37)
MCV RBC AUTO: 89.4 FL — SIGNIFICANT CHANGE UP (ref 81–99)
MONOCYTES # BLD AUTO: 0.62 K/UL — HIGH (ref 0.1–0.6)
MONOCYTES NFR BLD AUTO: 8.1 % — SIGNIFICANT CHANGE UP (ref 1.7–9.3)
NEUTROPHILS # BLD AUTO: 4.46 K/UL — SIGNIFICANT CHANGE UP (ref 1.4–6.5)
NEUTROPHILS NFR BLD AUTO: 57.9 % — SIGNIFICANT CHANGE UP (ref 42.2–75.2)
NRBC # BLD: 0 /100 WBCS — SIGNIFICANT CHANGE UP (ref 0–0)
PLATELET # BLD AUTO: 503 K/UL — HIGH (ref 130–400)
PMV BLD: 10.6 FL — HIGH (ref 7.4–10.4)
POTASSIUM SERPL-MCNC: 4.1 MMOL/L — SIGNIFICANT CHANGE UP (ref 3.5–5)
POTASSIUM SERPL-SCNC: 4.1 MMOL/L — SIGNIFICANT CHANGE UP (ref 3.5–5)
PROT SERPL-MCNC: 8.2 G/DL — HIGH (ref 6–8)
RBC # BLD: 4.25 M/UL — SIGNIFICANT CHANGE UP (ref 4.2–5.4)
RBC # FLD: 16.5 % — HIGH (ref 11.5–14.5)
SODIUM SERPL-SCNC: 138 MMOL/L — SIGNIFICANT CHANGE UP (ref 135–146)
SPECIMEN SOURCE: SIGNIFICANT CHANGE UP
WBC # BLD: 7.69 K/UL — SIGNIFICANT CHANGE UP (ref 4.8–10.8)
WBC # FLD AUTO: 7.69 K/UL — SIGNIFICANT CHANGE UP (ref 4.8–10.8)

## 2024-06-30 PROCEDURE — 99233 SBSQ HOSP IP/OBS HIGH 50: CPT

## 2024-06-30 RX ADMIN — METRONIDAZOLE 500 MILLIGRAM(S): 500 TABLET ORAL at 16:38

## 2024-06-30 RX ADMIN — Medication 100 MILLIGRAM(S): at 18:06

## 2024-06-30 RX ADMIN — ENOXAPARIN SODIUM 40 MILLIGRAM(S): 100 INJECTION SUBCUTANEOUS at 17:27

## 2024-06-30 RX ADMIN — Medication 2 TABLET(S): at 11:47

## 2024-06-30 RX ADMIN — METRONIDAZOLE 500 MILLIGRAM(S): 500 TABLET ORAL at 21:27

## 2024-06-30 RX ADMIN — METRONIDAZOLE 500 MILLIGRAM(S): 500 TABLET ORAL at 06:28

## 2024-06-30 NOTE — PROGRESS NOTE ADULT - ASSESSMENT
33-year-old woman past medical history of G6PD deficiency, visiting [place] from [facility], recent history of complicated cholecystectomy on 4/22/2024 with subsequent return to the OR for bile leak and adhesion release presenting to ED for evaluation of abdominal pain after her 1-year-old son accidentally kicked her in the area of prior surgical intervention 6/27 am. Admitted for further evaluation and management of GB fossa rim enhancing collection with intrahepatic biliary dilatation.    # pt is visiting from [facility] for 6 wks (this is her 1st week); she has 4 children ages 1-8; she has no insurance currently (working on [employer])    # s/p CCY April c/b jake leak and re-op; now w/ GB fossa 2.5 cm rim-enhancing fluid collection suspected abscess; Surgical site infection -> suspect fistulous tract with GB fossa; Intrahepatic biliary ductal dilatation; Transaminitis, including LDH and GGT incr  NO SEPSIS  HD stable, on room air  BCx: neg x2  f/u Wnd Cx  CT AP w iv cont: Status post cholecystectomy. In the gallbladder fossa there is a 2.5 cm rim-enhancing fluid collection which may represent an abscess. Intrahepatic biliary   ductal dilatation. Inflammatory changes from the gallbladder fossa extends anteriorly to the abdominal wall. There is diffuse subcutaneous edema and soft tissue swelling of the right abdomen. A fistulous tract cannot be excluded.  Sx: no intervent  IR: no drainage  GI: obtain MRCP; poss intervention this week -> f/u Adv GI  MRCP: 1.5cm linear hyperint reg c/w cyst vs small laceration; 2.2cm GB fossa fluid farida could be GB remnant or biloma (rather than abscess); mod interahep jake dil w/ tapering at hilum along fluid farida; min upper abd free fluid ant to spleen; post-sx abd wall w/ ext inflam changes;   ID noted  abx: rocephin 2gm iv q12 + flagyl 500mg po q8 (likely oral abx on d/c)  lactobaccilus 2 tab q24  BCX: neg  Wnd Cx: neg  bowel reg: PEG q24 + senna 2 hs  pain: tylenol  dressing change: wash soap/water; dry; abd pad; tape q12  Diet: DASH  IVFs: off  CBC, CMP q24    # Left-sided hydronephrosis noted on CT abdomen/pelvis ?related to adhesions prior abd surgery (4 LSCS, 2 ex lap)  Cr wnl  No stone reported on CT  f/u Rpt RB US (ordered) to reassess hydro  Uro noted: U/A neg; UCx not needed    # hypomagnesemia  replete    # Normocytic anemia of chr dz; could also be related to menses  hapto 287 - no hemolysis  B12, fol, ferr - all OK    # h/o G6PD ?carrier   Got tested after her first child diagnosed with G6PD  avoid sulpha drugs and others that may provoke hemolysis     # DVT prophylaxis: LMWH    # GI prophylaxis - none    # Activity: as ciro    Dispo: dressing chng q12; abx; f/u ID; f/u RB US; d/c IVFs; f/u GI; daily labs  eventually, pt will go home w/ wnd care and abx - f/u CM/SW - still acute

## 2024-06-30 NOTE — PROGRESS NOTE ADULT - SUBJECTIVE AND OBJECTIVE BOX
MICHAEL LAL  33y  Female  ***My note supersedes ALL resident notes that I sign.  My corrections for their notes are in my note.***    I can be reached directly on [email or web address]. My office number is [phone]. My personal cell number is [phone].    INTERVAL EVENTS: Here for f/u of abd abscess. PT doing well. Hoping to go home soon. Pain is controlled.    T(F): 98.5 (06-30-24 @ 05:00), Max: 98.5 (06-30-24 @ 05:00)  HR: 101 (06-30-24 @ 05:00) (101 - 101)  BP: 132/88 (06-30-24 @ 05:00) (111/64 - 132/88)  RR: 18 (06-30-24 @ 05:00) (18 - 18)  SpO2: --    Gen: NAD  HEENT: PERRL, EOMI, mouth clr, nose clr  Neck: no nodes, no JVD, thyroid nl  lungs: clr  hrt: s1 s2 rrr no murmur  abd: soft, ND, no HS megaly; + tender around surg scar to palp  horizontal. mid abd scar w/ minor dehis at both ends and 3/4" seg in about the middle of surg scar; these 3 areas leak a small amount of thin, yellow drainage (no blood)  ext: no edema, no c/c  neuro: aa ox3, cn intact, can move all 4 ext    LABS:                      12.5    (    89.4   7.69  )-----------( ---------      503      ( 30 Jun 2024 05:53 )             38.0    (    16.5     138   (   97   (   92      06-30-24 @ 05:53  ----------------------               4.1   (   24   (   4                             -----                        0.6  Ca  9.4   Mg  2.0    P   --     LFT  8.2  (  1.0  (  101       06-30-24 @ 05:53  -------------------------  4.3  (  855  (  253    PT/INR - ( 29 Jun 2024 07:45 )   PT: 13.10 sec;   INR: 1.15 ratio      Urinalysis Basic - ( 30 Jun 2024 05:53 )    Color: x / Appearance: x / SG: x / pH: x  Gluc: 92 mg/dL / Ketone: x  / Bili: x / Urobili: x   Blood: x / Protein: x / Nitrite: x   Leuk Esterase: x / RBC: x / WBC x   Sq Epi: x / Non Sq Epi: x / Bacteria: x    Culture - Other (collected 06-28-24 @ 04:00)  Source: Wound Wound  Preliminary Report (06-29-24 @ 15:49):    No growth    Culture - Blood (collected 06-28-24 @ 00:20)  Source: .Blood Blood  Preliminary Report (06-30-24 @ 10:01):    No growth at 48 Hours    Culture - Blood (collected 06-28-24 @ 00:20)  Source: .Blood Blood  Preliminary Report (06-30-24 @ 10:01):    No growth at 48 Hours    RADIOLOGY & ADDITIONAL TESTS:  < from: MR MRCP w/wo IV Cont (06.29.24 @ 15:43) >  Liver: Noncirrhotic. No significant hepatic steatosis or iron deposition.   Right inferior hepatic tip 1.5 cm linear T2 hyperintense region, may   represent atypical cyst versus small grade II AAST liver laceration   (series 2, image 27)    Gallbladder/Biliary system: Postcholecystectomy. Gallbladder fossa 2.2 cm   thick-walled fluid signal structure, correlating with CT. No definite   restricted diffusion, raising possibility of gallbladder remnant,   subtotal cholecystectomy, or biloma rather than abscess (series 1350,   image 20). Moderate intrahepatic biliary distention, with central   tapering at hilum along superior aspect of fluid collection, without MR   evidence of mass.    Pancreas: Normal pancreas. Nondilated main pancreatic duct.    Additional abdominal organs: Spleen, kidneys, and adrenal glands are   unremarkable.    Additional findings: Minimal upper abdominal free fluid and more   loculated appearing fluid anterior to spleen. No suspicious   lymphadenopathy. Normal bone marrow signal. Postsurgical change of   abdominal wall with extensive inflammatory change; no evidence of   abdominal wall abscess.    Impression:    Postcholecystectomy.    Gallbladder fossa 2.2 cm thick-walled fluid signal structure, correlating   with CT. No definite restricted diffusion, raising possibility of   gallbladder remnant, subtotal cholecystectomy, or biloma rather than   abscess. Nuclear medicine HIDA scan could be helpful.    Moderate intrahepatic biliary distention, with central tapering at hilum   along superior aspect of fluid collection, without MR evidence of mass.    Right inferior hepatic tip 1.5 cm linear T2 hyperintense region, may   represent atypical cyst versus small grade II AAST liver laceration.    < end of copied text >    MEDICATIONS:  cefTRIAXone   IVPB 2000 milliGRAM(s) IV Intermittent every 24 hours  metroNIDAZOLE    Tablet 500 milliGRAM(s) Oral every 8 hours    acetaminophen     Tablet .. 650 milliGRAM(s) Oral every 6 hours PRN  enoxaparin Injectable 40 milliGRAM(s) SubCutaneous every 24 hours  lactated ringers. 1000 milliLiter(s) IV Continuous <Continuous>  lactobacillus acidophilus 2 Tablet(s) Oral daily  polyethylene glycol 3350 17 Gram(s) Oral daily  senna 2 Tablet(s) Oral at bedtime

## 2024-07-01 LAB
ALBUMIN SERPL ELPH-MCNC: 4 G/DL — SIGNIFICANT CHANGE UP (ref 3.5–5.2)
ALP SERPL-CCNC: 652 U/L — HIGH (ref 30–115)
ALT FLD-CCNC: 167 U/L — HIGH (ref 0–41)
ANION GAP SERPL CALC-SCNC: 14 MMOL/L — SIGNIFICANT CHANGE UP (ref 7–14)
AST SERPL-CCNC: 58 U/L — HIGH (ref 0–41)
BASOPHILS # BLD AUTO: 0.02 K/UL — SIGNIFICANT CHANGE UP (ref 0–0.2)
BASOPHILS NFR BLD AUTO: 0.3 % — SIGNIFICANT CHANGE UP (ref 0–1)
BILIRUB SERPL-MCNC: 0.7 MG/DL — SIGNIFICANT CHANGE UP (ref 0.2–1.2)
BUN SERPL-MCNC: 4 MG/DL — LOW (ref 10–20)
CALCIUM SERPL-MCNC: 9 MG/DL — SIGNIFICANT CHANGE UP (ref 8.4–10.5)
CHLORIDE SERPL-SCNC: 97 MMOL/L — LOW (ref 98–110)
CO2 SERPL-SCNC: 27 MMOL/L — SIGNIFICANT CHANGE UP (ref 17–32)
CREAT SERPL-MCNC: 0.5 MG/DL — LOW (ref 0.7–1.5)
EGFR: 128 ML/MIN/1.73M2 — SIGNIFICANT CHANGE UP
EOSINOPHIL # BLD AUTO: 0.17 K/UL — SIGNIFICANT CHANGE UP (ref 0–0.7)
EOSINOPHIL NFR BLD AUTO: 2.8 % — SIGNIFICANT CHANGE UP (ref 0–8)
GLUCOSE SERPL-MCNC: 101 MG/DL — HIGH (ref 70–99)
HCT VFR BLD CALC: 34.1 % — LOW (ref 37–47)
HGB BLD-MCNC: 11.1 G/DL — LOW (ref 12–16)
IMM GRANULOCYTES NFR BLD AUTO: 0.3 % — SIGNIFICANT CHANGE UP (ref 0.1–0.3)
LYMPHOCYTES # BLD AUTO: 1.68 K/UL — SIGNIFICANT CHANGE UP (ref 1.2–3.4)
LYMPHOCYTES # BLD AUTO: 27.4 % — SIGNIFICANT CHANGE UP (ref 20.5–51.1)
MAGNESIUM SERPL-MCNC: 1.9 MG/DL — SIGNIFICANT CHANGE UP (ref 1.8–2.4)
MCHC RBC-ENTMCNC: 29.3 PG — SIGNIFICANT CHANGE UP (ref 27–31)
MCHC RBC-ENTMCNC: 32.6 G/DL — SIGNIFICANT CHANGE UP (ref 32–37)
MCV RBC AUTO: 90 FL — SIGNIFICANT CHANGE UP (ref 81–99)
MONOCYTES # BLD AUTO: 0.49 K/UL — SIGNIFICANT CHANGE UP (ref 0.1–0.6)
MONOCYTES NFR BLD AUTO: 8 % — SIGNIFICANT CHANGE UP (ref 1.7–9.3)
NEUTROPHILS # BLD AUTO: 3.75 K/UL — SIGNIFICANT CHANGE UP (ref 1.4–6.5)
NEUTROPHILS NFR BLD AUTO: 61.2 % — SIGNIFICANT CHANGE UP (ref 42.2–75.2)
NRBC # BLD: 0 /100 WBCS — SIGNIFICANT CHANGE UP (ref 0–0)
PLATELET # BLD AUTO: 422 K/UL — HIGH (ref 130–400)
PMV BLD: 10.6 FL — HIGH (ref 7.4–10.4)
POTASSIUM SERPL-MCNC: 3.6 MMOL/L — SIGNIFICANT CHANGE UP (ref 3.5–5)
POTASSIUM SERPL-SCNC: 3.6 MMOL/L — SIGNIFICANT CHANGE UP (ref 3.5–5)
PROT SERPL-MCNC: 7.3 G/DL — SIGNIFICANT CHANGE UP (ref 6–8)
RBC # BLD: 3.79 M/UL — LOW (ref 4.2–5.4)
RBC # FLD: 16.5 % — HIGH (ref 11.5–14.5)
SODIUM SERPL-SCNC: 138 MMOL/L — SIGNIFICANT CHANGE UP (ref 135–146)
WBC # BLD: 6.13 K/UL — SIGNIFICANT CHANGE UP (ref 4.8–10.8)
WBC # FLD AUTO: 6.13 K/UL — SIGNIFICANT CHANGE UP (ref 4.8–10.8)

## 2024-07-01 PROCEDURE — 76770 US EXAM ABDO BACK WALL COMP: CPT | Mod: 26

## 2024-07-01 PROCEDURE — 99232 SBSQ HOSP IP/OBS MODERATE 35: CPT

## 2024-07-01 PROCEDURE — 99233 SBSQ HOSP IP/OBS HIGH 50: CPT

## 2024-07-01 RX ORDER — DEXTROSE MONOHYDRATE AND SODIUM CHLORIDE 5; .3 G/100ML; G/100ML
1000 INJECTION, SOLUTION INTRAVENOUS
Refills: 0 | Status: DISCONTINUED | OUTPATIENT
Start: 2024-07-02 | End: 2024-07-05

## 2024-07-01 RX ADMIN — Medication 650 MILLIGRAM(S): at 05:32

## 2024-07-01 RX ADMIN — Medication 2 TABLET(S): at 21:05

## 2024-07-01 RX ADMIN — Medication 100 MILLIGRAM(S): at 18:35

## 2024-07-01 RX ADMIN — METRONIDAZOLE 500 MILLIGRAM(S): 500 TABLET ORAL at 13:05

## 2024-07-01 RX ADMIN — METRONIDAZOLE 500 MILLIGRAM(S): 500 TABLET ORAL at 05:11

## 2024-07-01 RX ADMIN — Medication 2 TABLET(S): at 11:55

## 2024-07-01 RX ADMIN — METRONIDAZOLE 500 MILLIGRAM(S): 500 TABLET ORAL at 21:04

## 2024-07-01 RX ADMIN — Medication 650 MILLIGRAM(S): at 06:32

## 2024-07-01 NOTE — PROGRESS NOTE ADULT - NS ATTEND AMEND GEN_ALL_CORE FT
Agree with the above.  Patient scheduled for EUS and ERCP tomorrow to evaluate the gallbladder fossa collection and perform biliary sphincterotomy in the setting of the patient's subtotal cholecystectomy.  Rest of recommendations per the note above. Agree with the above.  Patient scheduled for EUS and ERCP tomorrow to evaluate the gallbladder fossa collection and perform biliary sphincterotomy in the setting of the patient's subtotal cholecystectomy. daily LFTs.  Rest of recommendations per the note above.
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
## 2024-07-01 NOTE — PROGRESS NOTE ADULT - SUBJECTIVE AND OBJECTIVE BOX
MICHAEL LAL  33y  Female  ***My note supersedes ALL resident notes that I sign.  My corrections for their notes are in my note.***    I can be reached directly on [id number]. My office number is [phone]. My personal cell number is [phone].    INTERVAL EVENTS: Here for f/u of abd pain. Pt doing well, pain controlled. Can eat/drink. Pt understands that she needs to stay in hospital to get herself properly fixed. She will arrange for family to take care of her children. She understands that she cannot leave and come back to hospital.    T(F): 98.2 (07-01-24 @ 12:33), Max: 98.2 (07-01-24 @ 12:33)  HR: 95 (07-01-24 @ 12:33) (92 - 95)  BP: 102/66 (07-01-24 @ 12:33) (102/66 - 120/84)  RR: 18 (07-01-24 @ 12:33) (18 - 18)  SpO2: 100% (07-01-24 @ 12:33) (98% - 100%)    Gen: NAD  HEENT: PERRL, EOMI, mouth clr, nose clr  Neck: no nodes, no JVD, thyroid nl  lungs: clr  hrt: s1 s2 rrr no murmur  abd: soft, ND, no HS megaly; + tender around surg scar to palp  horizontal. mid abd scar w/ minor dehis at both ends and 3/4" seg in about the middle of surg scar; these 3 areas leak a small amount of thin, yellow drainage (no blood)  ext: no edema, no c/c  neuro: aa ox3, cn intact, can move all 4 ext    LABS:                      11.1    (    90.0   6.13  )-----------( ---------      422      ( 01 Jul 2024 06:26 )             34.1    (    16.5     138   (   97   (   101      07-01-24 @ 06:26  ----------------------               3.6   (   27   (   4                             -----                        0.5  Ca  9.0   Mg  1.9    P   --     LFT  7.3  (  0.7  (  58       07-01-24 @ 06:26  -------------------------  4.0  (  652  (  167    Alb 4.0  T jake 0.7     AST 58    07-01-24 @ 06:26 - much better  Alb 4.3  T jake 1.0         06-30-24 @ 05:53  Alb 4.0  T jake 1.2         06-29-24 @ 07:45  Alb 3.7  T jake 1.6         06-28-24 @ 17:19  Alb 4.2  T jake 1.7         06-28-24 @ 08:03    Urinalysis Basic - ( 01 Jul 2024 06:26 )    Color: x / Appearance: x / SG: x / pH: x  Gluc: 101 mg/dL / Ketone: x  / Bili: x / Urobili: x   Blood: x / Protein: x / Nitrite: x   Leuk Esterase: x / RBC: x / WBC x   Sq Epi: x / Non Sq Epi: x / Bacteria: x    Culture - Other (collected 06-28-24 @ 04:00)  Source: Wound Wound  Final Report (06-30-24 @ 19:14):    Rare Staphylococcus epidermidis "Susceptibilities not performed"    Culture - Blood (collected 06-28-24 @ 00:20)  Source: .Blood Blood  Preliminary Report (07-01-24 @ 10:02):    No growth at 72 Hours    Culture - Blood (collected 06-28-24 @ 00:20)  Source: .Blood Blood  Preliminary Report (07-01-24 @ 10:02):    No growth at 72 Hours    RADIOLOGY & ADDITIONAL TESTS:  < from: US Kidney and Bladder (07.01.24 @ 08:50) >  IMPRESSION:  No hydronephrosis bilaterally.    < end of copied text >    MEDICATIONS:  cefTRIAXone   IVPB 2000 milliGRAM(s) IV Intermittent every 24 hours  metroNIDAZOLE    Tablet 500 milliGRAM(s) Oral every 8 hours    acetaminophen     Tablet .. 650 milliGRAM(s) Oral every 6 hours PRN  lactobacillus acidophilus 2 Tablet(s) Oral daily  polyethylene glycol 3350 17 Gram(s) Oral daily  senna 2 Tablet(s) Oral at bedtime

## 2024-07-01 NOTE — PROGRESS NOTE ADULT - SUBJECTIVE AND OBJECTIVE BOX
MICHAEL LAL  33y, Female    All available historical data reviewed    OVERNIGHT EVENTS:  feels well and has no new complaints  No fevers     ROS:  General: Denies rigors, nightsweats  HEENT: Denies headache, rhinorrhea, sore throat, eye pain  CV: Denies CP, palpitations  PULM: Denies wheezing, hemoptysis  GI: Denies hematemesis, hematochezia, melena  : Denies discharge, hematuria  MSK: Denies arthralgias, myalgias  SKIN: Denies rash, lesions  NEURO: Denies paresthesias, weakness  PSYCH: Denies depression, anxiety    VITALS:  T(F): 98.2, Max: 98.2 (06-30-24 @ 20:14)  HR: 95  BP: 102/66  RR: 18Vital Signs Last 24 Hrs  T(C): 36.8 (01 Jul 2024 12:33), Max: 36.8 (30 Jun 2024 20:14)  T(F): 98.2 (01 Jul 2024 12:33), Max: 98.2 (30 Jun 2024 20:14)  HR: 95 (01 Jul 2024 12:33) (85 - 95)  BP: 102/66 (01 Jul 2024 12:33) (102/66 - 124/82)  BP(mean): --  RR: 18 (01 Jul 2024 12:33) (18 - 18)  SpO2: 100% (01 Jul 2024 12:33) (98% - 100%)    Parameters below as of 30 Jun 2024 19:35  Patient On (Oxygen Delivery Method): room air        TESTS & MEASUREMENTS:                        11.1   6.13  )-----------( 422      ( 01 Jul 2024 06:26 )             34.1     07-01    138  |  97<L>  |  4<L>  ----------------------------<  101<H>  3.6   |  27  |  0.5<L>    Ca    9.0      01 Jul 2024 06:26  Mg     1.9     07-01    TPro  7.3  /  Alb  4.0  /  TBili  0.7  /  DBili  x   /  AST  58<H>  /  ALT  167<H>  /  AlkPhos  652<H>  07-01    LIVER FUNCTIONS - ( 01 Jul 2024 06:26 )  Alb: 4.0 g/dL / Pro: 7.3 g/dL / ALK PHOS: 652 U/L / ALT: 167 U/L / AST: 58 U/L / GGT: x             Urinalysis with Rflx Culture (collected 06-29-24 @ 10:02)    Culture - Other (collected 06-28-24 @ 04:00)  Source: Wound Wound  Final Report (06-30-24 @ 19:14):    Rare Staphylococcus epidermidis "Susceptibilities not performed"    Culture - Blood (collected 06-28-24 @ 00:20)  Source: .Blood Blood  Preliminary Report (07-01-24 @ 10:02):    No growth at 72 Hours    Culture - Blood (collected 06-28-24 @ 00:20)  Source: .Blood Blood  Preliminary Report (07-01-24 @ 10:02):    No growth at 72 Hours      Urinalysis Basic - ( 01 Jul 2024 06:26 )    Color: x / Appearance: x / SG: x / pH: x  Gluc: 101 mg/dL / Ketone: x  / Bili: x / Urobili: x   Blood: x / Protein: x / Nitrite: x   Leuk Esterase: x / RBC: x / WBC x   Sq Epi: x / Non Sq Epi: x / Bacteria: x          Social History:  Tobacco Use: no  Alcohol Use: no  Drug Use: no    RADIOLOGY & ADDITIONAL TESTS:  Personal review of radiological diagnostics performed  Echo and EKG results noted when applicable.     MEDICATIONS:  acetaminophen     Tablet .. 650 milliGRAM(s) Oral every 6 hours PRN  cefTRIAXone   IVPB 2000 milliGRAM(s) IV Intermittent every 24 hours  lactobacillus acidophilus 2 Tablet(s) Oral daily  metroNIDAZOLE    Tablet 500 milliGRAM(s) Oral every 8 hours  polyethylene glycol 3350 17 Gram(s) Oral daily  senna 2 Tablet(s) Oral at bedtime      ANTIBIOTICS:  cefTRIAXone   IVPB 2000 milliGRAM(s) IV Intermittent every 24 hours  metroNIDAZOLE    Tablet 500 milliGRAM(s) Oral every 8 hours

## 2024-07-01 NOTE — PROGRESS NOTE ADULT - SUBJECTIVE AND OBJECTIVE BOX
Patient is a 33y old  Female who presents with a chief complaint of abdominal pain (01 Jul 2024 11:54)      INTERVAL HPI/OVERNIGHT EVENTS:  Patient is doing well with pain controlled.      Vital Signs Last 24 Hrs  T(C): 36.8 (01 Jul 2024 12:33), Max: 37.2 (30 Jun 2024 14:41)  T(F): 98.2 (01 Jul 2024 12:33), Max: 98.9 (30 Jun 2024 14:41)  HR: 95 (01 Jul 2024 12:33) (85 - 95)  BP: 102/66 (01 Jul 2024 12:33) (102/66 - 124/82)  BP(mean): --  RR: 18 (01 Jul 2024 12:33) (18 - 18)  SpO2: 100% (01 Jul 2024 12:33) (98% - 100%)    Parameters below as of 30 Jun 2024 19:35  Patient On (Oxygen Delivery Method): room air      sulfa drugs (Rash)      PHYSICAL EXAM:  GENERAL: NAD  HEAD:  Atraumatic, Normocephalic  EYES: EOMI  NECK: Supple  NERVOUS SYSTEM:  Alert & Oriented X3  CHEST/LUNG: Clear to percussion bilaterally; No rales, rhonchi, wheezing, or rubs  HEART: Regular rate and rhythm; No murmurs, rubs, or gallops  ABDOMEN: Soft, Nontender, Nondistended; Bowel sounds present. Midabdominal scar with scant, non-bloody drainage.  EXTREMITIES:  No edema    LABS:    RADIOLOGY & ADDITIONAL TESTS:    acetaminophen     Tablet .. 650 milliGRAM(s) Oral every 6 hours PRN  cefTRIAXone   IVPB 2000 milliGRAM(s) IV Intermittent every 24 hours  enoxaparin Injectable 40 milliGRAM(s) SubCutaneous every 24 hours  lactobacillus acidophilus 2 Tablet(s) Oral daily  metroNIDAZOLE    Tablet 500 milliGRAM(s) Oral every 8 hours  polyethylene glycol 3350 17 Gram(s) Oral daily  senna 2 Tablet(s) Oral at bedtime   Patient is a 33y old  Female who presents with a chief complaint of abdominal pain (01 Jul 2024 11:54)      INTERVAL HPI/OVERNIGHT EVENTS:  Patient is doing well, with pain controlled.      Vital Signs Last 24 Hrs  T(C): 36.8 (01 Jul 2024 12:33), Max: 37.2 (30 Jun 2024 14:41)  T(F): 98.2 (01 Jul 2024 12:33), Max: 98.9 (30 Jun 2024 14:41)  HR: 95 (01 Jul 2024 12:33) (85 - 95)  BP: 102/66 (01 Jul 2024 12:33) (102/66 - 124/82)  BP(mean): --  RR: 18 (01 Jul 2024 12:33) (18 - 18)  SpO2: 100% (01 Jul 2024 12:33) (98% - 100%)    Parameters below as of 30 Jun 2024 19:35  Patient On (Oxygen Delivery Method): room air    Allergies  sulfa drugs (Rash)      PHYSICAL EXAM:  GENERAL: NAD  HEAD:  Atraumatic, Normocephalic  EYES: EOMI  NECK: Supple  NERVOUS SYSTEM:  Alert & Oriented X3  CHEST/LUNG: Clear to percussion bilaterally; No rales, rhonchi, wheezing, or rubs  HEART: Regular rate and rhythm; No murmurs, rubs, or gallops  ABDOMEN: Soft, Nontender, Nondistended; Bowel sounds present. Midabdominal scar with scant, non-bloody drainage.  EXTREMITIES:  No edema    LABS:    RADIOLOGY & ADDITIONAL TESTS:  < from: US Kidney and Bladder (07.01.24 @ 08:50) >  FINDINGS:  Right kidney: 10.9 cm. No hydronephrosis or calculi.    Left kidney: 11.9 cm. No hydronephrosis or calculi.    Urinary bladder: Nondistended and not evaluated    IMPRESSION:  No hydronephrosis bilaterally.    < end of copied text >      acetaminophen     Tablet .. 650 milliGRAM(s) Oral every 6 hours PRN  cefTRIAXone   IVPB 2000 milliGRAM(s) IV Intermittent every 24 hours  enoxaparin Injectable 40 milliGRAM(s) SubCutaneous every 24 hours  lactobacillus acidophilus 2 Tablet(s) Oral daily    < from: MR MRCP w/wo IV Cont (06.29.24 @ 15:43) >  Impression:    Postcholecystectomy.    Gallbladder fossa 2.2 cm thick-walled fluid signal structure, correlating   with CT. No definite restricted diffusion, raising possibility of   gallbladder remnant, subtotal cholecystectomy, or biloma rather than   abscess. Nuclear medicine HIDA scan could be helpful.    Moderate intrahepatic biliary distention, with central tapering at hilum   along superior aspect of fluid collection, without MR evidence of mass.    Right inferior hepatic tip 1.5 cm linear T2 hyperintense region, may   represent atypical cyst versus small grade II AAST liver laceration.    < end of copied text >    Medications:  metroNIDAZOLE    Tablet 500 milliGRAM(s) Oral every 8 hours  polyethylene glycol 3350 17 Gram(s) Oral daily  senna 2 Tablet(s) Oral at bedtime   Patient is a 33y old  Female who presents with a chief complaint of abdominal pain (01 Jul 2024 11:54)      INTERVAL HPI/OVERNIGHT EVENTS:  Patient is doing well, with pain controlled. Patient is concerned about her children being at her mother's house, and would like to find a solution for how to get them to her her cousin's house without her having to leave the hospital right now.      Vital Signs Last 24 Hrs  T(C): 36.8 (01 Jul 2024 12:33), Max: 37.2 (30 Jun 2024 14:41)  T(F): 98.2 (01 Jul 2024 12:33), Max: 98.9 (30 Jun 2024 14:41)  HR: 95 (01 Jul 2024 12:33) (85 - 95)  BP: 102/66 (01 Jul 2024 12:33) (102/66 - 124/82)  BP(mean): --  RR: 18 (01 Jul 2024 12:33) (18 - 18)  SpO2: 100% (01 Jul 2024 12:33) (98% - 100%)    Parameters below as of 30 Jun 2024 19:35  Patient On (Oxygen Delivery Method): room air    Allergies  sulfa drugs (Rash)      PHYSICAL EXAM:  GENERAL: NAD  HEAD:  Atraumatic, Normocephalic  EYES: EOMI  NECK: Supple  NERVOUS SYSTEM:  Alert & Oriented X3  CHEST/LUNG: Clear to percussion bilaterally; No rales, rhonchi, wheezing, or rubs  HEART: Regular rate and rhythm; No murmurs, rubs, or gallops  ABDOMEN: Soft, Nontender, Nondistended; Bowel sounds present. Midabdominal scar with scant, non-bloody drainage.  EXTREMITIES:  No edema    LABS:    RADIOLOGY & ADDITIONAL TESTS:  < from: US Kidney and Bladder (07.01.24 @ 08:50) >  FINDINGS:  Right kidney: 10.9 cm. No hydronephrosis or calculi.    Left kidney: 11.9 cm. No hydronephrosis or calculi.    Urinary bladder: Nondistended and not evaluated    IMPRESSION:  No hydronephrosis bilaterally.    < end of copied text >      acetaminophen     Tablet .. 650 milliGRAM(s) Oral every 6 hours PRN  cefTRIAXone   IVPB 2000 milliGRAM(s) IV Intermittent every 24 hours  enoxaparin Injectable 40 milliGRAM(s) SubCutaneous every 24 hours  lactobacillus acidophilus 2 Tablet(s) Oral daily    < from: MR MRCP w/wo IV Cont (06.29.24 @ 15:43) >  Impression:    Postcholecystectomy.    Gallbladder fossa 2.2 cm thick-walled fluid signal structure, correlating   with CT. No definite restricted diffusion, raising possibility of   gallbladder remnant, subtotal cholecystectomy, or biloma rather than   abscess. Nuclear medicine HIDA scan could be helpful.    Moderate intrahepatic biliary distention, with central tapering at hilum   along superior aspect of fluid collection, without MR evidence of mass.    Right inferior hepatic tip 1.5 cm linear T2 hyperintense region, may   represent atypical cyst versus small grade II AAST liver laceration.    < end of copied text >    Medications:  metroNIDAZOLE    Tablet 500 milliGRAM(s) Oral every 8 hours  polyethylene glycol 3350 17 Gram(s) Oral daily  senna 2 Tablet(s) Oral at bedtime
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
## 2024-07-01 NOTE — PROGRESS NOTE ADULT - SUBJECTIVE AND OBJECTIVE BOX
Patient is a 33-year-old female with a PMHx of G6PD deficiency, visiting [place] from [facility], recent history of complicated cholecystectomy on 2024 with subsequent return to the OR for bile leak and adhesion release presenting to ED for evaluation of abdominal pain after her 1-year-old son accidentally kicked her in the area of prior surgical intervention .  Patient states that since her surgery she experienced  mild drainage from the surgical site for which she has been using oral antibiotics but no fever/ chills, nausea /vomiting ,no change in appetite or any other additional complaints. Of note, surgery was performed in [facility]. Patient seen by Surgery and IR and Advanced GI consulted for ongoing abdominal drainage and collection at level of GB Fossa. Patient doing well from over the weekend. MRI requested for mapping and results noted.       PAST MEDICAL & SURGICAL HISTORY:  Glucose 6 phosphatase deficiency   delivery delivered      MEDICATIONS  (STANDING):  cefTRIAXone   IVPB 2000 milliGRAM(s) IV Intermittent every 24 hours  enoxaparin Injectable 40 milliGRAM(s) SubCutaneous every 24 hours  lactated ringers. 1000 milliLiter(s) (125 mL/Hr) IV Continuous <Continuous>  metroNIDAZOLE    Tablet 500 milliGRAM(s) Oral every 8 hours  polyethylene glycol 3350 17 Gram(s) Oral daily  senna 2 Tablet(s) Oral at bedtime      Allergies  sulfa drugs (Rash)      Review of Systems  General:  Denies Fatigue, Denies Fever, Denies Weakness ,Denies Weight Loss   HEENT: Denies Trouble Swallowing ,Denies  Sore Throat , Denies Change in hearing/vision/speech ,Denies Dizziness    Cardio: Denies  Chest Pain , Palpitations    Respiratory: Denies worsening of SOB, Denies Cough  Abdomen: See detailed HPI  Neuro: Denies Headache Denies Dizziness, Denies Paresthesias  MSK: Denies pain in Bones/Joints/Muscles   Psych: Patient denies depression, denies suicidal or homicidal ideations  Integ: Patient Denies rash, or new skin lesions     Vital Signs Last 24 Hrs  T(C): 36.6 (2024 06:03), Max: 37.2 (2024 14:41)  T(F): 97.8 (2024 06:03), Max: 98.9 (2024 14:41)  HR: 92 (2024 06:03) (85 - 94)  BP: 120/84 (2024 06:03) (120/84 - 124/82)  BP(mean): --  RR: 18 (2024 06:03) (18 - 18)  SpO2: 98% (2024 06:03) (98% - 100%)    Parameters below as of 2024 19:35  Patient On (Oxygen Delivery Method): room air      Physical Exam  Gen: NAD  Head: NC/AT, no visible deformity  ENT: PERRLA, Sclera Non Icteric   Cardio: S1/S2 No S3/S4, Regular  Resp: CTA B/L  Abdomen: Soft, ND/ NT, Midline- Sutures, small central area with scant leakage   Neuro: AAOx3, Cranial Nerve II-XII intact   Extremities: FROM x 4  Skin: No jaundice, no excoriation         Labs:                        11.1   6.13  )-----------( 422      ( 2024 06:26 )             34.1     07-01    138  |  97<L>  |  4<L>  ----------------------------<  101<H>  3.6   |  27  |  0.5<L>    Ca    9.0      2024 06:26  Mg     1.9     07-    TPro  7.3  /  Alb  4.0  /  TBili  0.7  /  DBili  x   /  AST  58<H>  /  ALT  167<H>  /  AlkPhos  652<H>  07-      RADIOLOGY & ADDITIONAL STUDIES:  CT Abdomen and Pelvis w/ IV Cont 24     IMPRESSION:    Status post cholecystectomy. In the gallbladder fossa there is a 2.5 cm   rim-enhancing fluid collection which may represent an abscess.    Inflammatory changes from the gallbladder fossa extends anteriorly to the   abdominal wall. There is diffuse subcutaneous edema and soft tissue   swelling of the right abdomen. A fistulous tract cannot be excluded.      MR MRCP w/wo IV Cont (24 @ 15:43) >  Impression:    Postcholecystectomy.    Gallbladder fossa 2.2 cm thick-walled fluid signal structure, correlating   with CT. No definite restricted diffusion, raising possibility of   gallbladder remnant, subtotal cholecystectomy, or biloma rather than   abscess.Nuclear medicine HIDA scan could be helpful.    Moderate intrahepatic biliary distention, with central tapering at hilum   along superior aspect of fluid collection, without MR evidence of mass.    Right inferior hepatic tip 1.5 cm linear T2 hyperintense region, may   represent atypical cyst versus small grade II AAST liver laceration.

## 2024-07-01 NOTE — PROGRESS NOTE ADULT - ASSESSMENT
Patient is a 33-year-old female with a PMHx of G6PD deficiency, visiting [place] from [facility], recent history of complicated cholecystectomy on 4/22/2024 with subsequent return to the OR for bile leak and adhesion release presenting to ED for evaluation of abdominal pain after her 1-year-old son accidentally kicked her in the area of prior surgical intervention 6/27.  Patient states that since her surgery she experienced mild drainage from the surgical site for which she has been using oral antibiotics but no fever/ chills, nausea/vomiting ,no change in appetite or any other additional complaints. Of note, surgery was performed in [facility].    #Gallbladder fossa 2.2 cm thick-walled fluid signal structure on MRCP  Sx: No intervention  IR: No drainage  ID: rocephin 2gm iv q12 + flagyl 500mg po q8 (likely oral abx on d/c)  Advanced GI consulted:   - NPO after midnight/ Hold AC tomorrow  - Plan EUS with drainage of GB fossa collection with also ductal evaluation followed by ERCP with possible stenting   lactobaccilus 2 tab q24  BCX: neg  Wnd Cx: neg  bowel reg: PEG q24 + senna 2 hs  pain: tylenol  dressing change: wash soap/water; dry; abd pad; tape q12  Diet: DASH  IVFs: off  CBC, CMP q24    #Left-sided hydronephrosis noted on CT abdomen/pelvis   KB US has resulted.  No hydronephrosis bilaterally.    #Hypomagnesemia - resolved    #Normocytic anemia  Possibly of chronic disease  Could also be related to menses    # h/o G6PD ?carrier   Got tested after her first child diagnosed with G6PD  avoid sulpha drugs and others that may provoke hemolysis  Patient is a 33-year-old female with a PMHx of G6PD deficiency, visiting [place] from [facility], recent history of complicated cholecystectomy on 4/22/2024 with subsequent return to the OR for bile leak and adhesion release presenting to ED for evaluation of abdominal pain after her 1-year-old son accidentally kicked her in the area of prior surgical intervention 6/27.  Patient states that since her surgery she experienced mild drainage from the surgical site for which she has been using oral antibiotics but no fever/ chills, nausea/vomiting ,no change in appetite or any other additional complaints. Of note, surgery was performed in [facility].    #Gallbladder fossa 2.2 cm thick-walled fluid signal structure on MRCP  Sx: No intervention  IR: No drainage  ID: rocephin 2gm iv q12 + flagyl 500mg po q8 (likely oral abx on d/c)  Advanced GI consulted:   - NPO after midnight/ Hold AC tomorrow  - Plan EUS with drainage of GB fossa collection with also ductal evaluation followed by ERCP with possible stenting   lactobaccilus 2 tab q24  BCX: neg  Wnd Cx: neg  bowel reg: PEG q24 + senna 2 hs  pain: tylenol  dressing change: wash soap/water; dry; abd pad; tape q12  Diet: DASH  IVFs: off  CBC, CMP q24    #Left-sided hydronephrosis noted on CT abdomen/pelvis   KB US has resulted.  No hydronephrosis bilaterally.    #Hypomagnesemia - resolved    #Normocytic anemia  Possibly of chronic disease  Could also be related to menses  Not of acute concern    # h/o G6PD ?carrier   Got tested after her first child diagnosed with G6PD  Avoid sulpha drugs and others that may provoke hemolysis    # DVT prophylaxis: Hold    # GI prophylaxis - none    # Activity: as ciro    Dispo: Pending advanced GI procedures tomorrow and results. Eventually, PT will go home w/ wnd care and abx - f/u CM/SW - still acute      Dispo: dressing chng q12; abx; f/u ID; f/u RB US; d/c IVFs; f/u GI; daily labs  eventually, pt will go home w/ wnd care and abx - f/u CM/SW - still acute

## 2024-07-02 ENCOUNTER — RESULT REVIEW (OUTPATIENT)
Age: 34
End: 2024-07-02

## 2024-07-02 ENCOUNTER — TRANSCRIPTION ENCOUNTER (OUTPATIENT)
Age: 34
End: 2024-07-02

## 2024-07-02 LAB
ALBUMIN SERPL ELPH-MCNC: 4.2 G/DL — SIGNIFICANT CHANGE UP (ref 3.5–5.2)
ALP SERPL-CCNC: 531 U/L — HIGH (ref 30–115)
ALT FLD-CCNC: 142 U/L — HIGH (ref 0–41)
ANION GAP SERPL CALC-SCNC: 14 MMOL/L — SIGNIFICANT CHANGE UP (ref 7–14)
AST SERPL-CCNC: 64 U/L — HIGH (ref 0–41)
BASOPHILS # BLD AUTO: 0.01 K/UL — SIGNIFICANT CHANGE UP (ref 0–0.2)
BASOPHILS NFR BLD AUTO: 0.1 % — SIGNIFICANT CHANGE UP (ref 0–1)
BILIRUB SERPL-MCNC: 0.6 MG/DL — SIGNIFICANT CHANGE UP (ref 0.2–1.2)
BUN SERPL-MCNC: 3 MG/DL — LOW (ref 10–20)
CALCIUM SERPL-MCNC: 9.6 MG/DL — SIGNIFICANT CHANGE UP (ref 8.4–10.5)
CHLORIDE SERPL-SCNC: 100 MMOL/L — SIGNIFICANT CHANGE UP (ref 98–110)
CO2 SERPL-SCNC: 26 MMOL/L — SIGNIFICANT CHANGE UP (ref 17–32)
CREAT SERPL-MCNC: 0.5 MG/DL — LOW (ref 0.7–1.5)
EGFR: 128 ML/MIN/1.73M2 — SIGNIFICANT CHANGE UP
EOSINOPHIL # BLD AUTO: 0.18 K/UL — SIGNIFICANT CHANGE UP (ref 0–0.7)
EOSINOPHIL NFR BLD AUTO: 2.6 % — SIGNIFICANT CHANGE UP (ref 0–8)
GLUCOSE SERPL-MCNC: 112 MG/DL — HIGH (ref 70–99)
HCT VFR BLD CALC: 35.9 % — LOW (ref 37–47)
HGB BLD-MCNC: 11.7 G/DL — LOW (ref 12–16)
IMM GRANULOCYTES NFR BLD AUTO: 0.3 % — SIGNIFICANT CHANGE UP (ref 0.1–0.3)
LYMPHOCYTES # BLD AUTO: 2.04 K/UL — SIGNIFICANT CHANGE UP (ref 1.2–3.4)
LYMPHOCYTES # BLD AUTO: 29.8 % — SIGNIFICANT CHANGE UP (ref 20.5–51.1)
MCHC RBC-ENTMCNC: 29.4 PG — SIGNIFICANT CHANGE UP (ref 27–31)
MCHC RBC-ENTMCNC: 32.6 G/DL — SIGNIFICANT CHANGE UP (ref 32–37)
MCV RBC AUTO: 90.2 FL — SIGNIFICANT CHANGE UP (ref 81–99)
MONOCYTES # BLD AUTO: 0.47 K/UL — SIGNIFICANT CHANGE UP (ref 0.1–0.6)
MONOCYTES NFR BLD AUTO: 6.9 % — SIGNIFICANT CHANGE UP (ref 1.7–9.3)
NEUTROPHILS # BLD AUTO: 4.13 K/UL — SIGNIFICANT CHANGE UP (ref 1.4–6.5)
NEUTROPHILS NFR BLD AUTO: 60.3 % — SIGNIFICANT CHANGE UP (ref 42.2–75.2)
NRBC # BLD: 0 /100 WBCS — SIGNIFICANT CHANGE UP (ref 0–0)
PLATELET # BLD AUTO: 468 K/UL — HIGH (ref 130–400)
PMV BLD: 10.1 FL — SIGNIFICANT CHANGE UP (ref 7.4–10.4)
POTASSIUM SERPL-MCNC: 3.6 MMOL/L — SIGNIFICANT CHANGE UP (ref 3.5–5)
POTASSIUM SERPL-SCNC: 3.6 MMOL/L — SIGNIFICANT CHANGE UP (ref 3.5–5)
PROT SERPL-MCNC: 7.5 G/DL — SIGNIFICANT CHANGE UP (ref 6–8)
RBC # BLD: 3.98 M/UL — LOW (ref 4.2–5.4)
RBC # FLD: 16.4 % — HIGH (ref 11.5–14.5)
SODIUM SERPL-SCNC: 140 MMOL/L — SIGNIFICANT CHANGE UP (ref 135–146)
WBC # BLD: 6.85 K/UL — SIGNIFICANT CHANGE UP (ref 4.8–10.8)
WBC # FLD AUTO: 6.85 K/UL — SIGNIFICANT CHANGE UP (ref 4.8–10.8)

## 2024-07-02 PROCEDURE — 74328 X-RAY BILE DUCT ENDOSCOPY: CPT | Mod: 26

## 2024-07-02 PROCEDURE — 88312 SPECIAL STAINS GROUP 1: CPT | Mod: 26

## 2024-07-02 PROCEDURE — 43264 ERCP REMOVE DUCT CALCULI: CPT

## 2024-07-02 PROCEDURE — 99232 SBSQ HOSP IP/OBS MODERATE 35: CPT

## 2024-07-02 PROCEDURE — 43262 ENDO CHOLANGIOPANCREATOGRAPH: CPT | Mod: XU

## 2024-07-02 PROCEDURE — 43239 EGD BIOPSY SINGLE/MULTIPLE: CPT | Mod: XU

## 2024-07-02 PROCEDURE — 88305 TISSUE EXAM BY PATHOLOGIST: CPT | Mod: 26

## 2024-07-02 PROCEDURE — 99255 IP/OBS CONSLTJ NEW/EST HI 80: CPT

## 2024-07-02 RX ORDER — VANCOMYCIN HYDROCHLORIDE 50 MG/ML
1250 KIT ORAL EVERY 12 HOURS
Refills: 0 | Status: DISCONTINUED | OUTPATIENT
Start: 2024-07-02 | End: 2024-07-03

## 2024-07-02 RX ORDER — INDOMETHACIN 75 MG/1
100 CAPSULE, EXTENDED RELEASE ORAL ONCE
Refills: 0 | Status: COMPLETED | OUTPATIENT
Start: 2024-07-02 | End: 2024-07-02

## 2024-07-02 RX ORDER — PIPERACILLIN SODIUM AND TAZOBACTAM SODIUM 3; .375 G/15ML; G/15ML
3.38 INJECTION, POWDER, LYOPHILIZED, FOR SOLUTION INTRAVENOUS ONCE
Refills: 0 | Status: COMPLETED | OUTPATIENT
Start: 2024-07-02 | End: 2024-07-02

## 2024-07-02 RX ORDER — PIPERACILLIN SODIUM AND TAZOBACTAM SODIUM 3; .375 G/15ML; G/15ML
3.38 INJECTION, POWDER, LYOPHILIZED, FOR SOLUTION INTRAVENOUS EVERY 8 HOURS
Refills: 0 | Status: DISCONTINUED | OUTPATIENT
Start: 2024-07-03 | End: 2024-07-03

## 2024-07-02 RX ORDER — ONDANSETRON HYDROCHLORIDE 2 MG/ML
4 INJECTION INTRAMUSCULAR; INTRAVENOUS ONCE
Refills: 0 | Status: COMPLETED | OUTPATIENT
Start: 2024-07-02 | End: 2024-07-02

## 2024-07-02 RX ADMIN — Medication 1 APPLICATION(S): at 14:30

## 2024-07-02 RX ADMIN — PIPERACILLIN SODIUM AND TAZOBACTAM SODIUM 200 GRAM(S): 3; .375 INJECTION, POWDER, LYOPHILIZED, FOR SOLUTION INTRAVENOUS at 19:12

## 2024-07-02 RX ADMIN — VANCOMYCIN HYDROCHLORIDE 166.67 MILLIGRAM(S): KIT at 21:44

## 2024-07-02 RX ADMIN — PIPERACILLIN SODIUM AND TAZOBACTAM SODIUM 25 GRAM(S): 3; .375 INJECTION, POWDER, LYOPHILIZED, FOR SOLUTION INTRAVENOUS at 21:44

## 2024-07-02 RX ADMIN — ONDANSETRON HYDROCHLORIDE 4 MILLIGRAM(S): 2 INJECTION INTRAMUSCULAR; INTRAVENOUS at 21:43

## 2024-07-02 RX ADMIN — DEXTROSE MONOHYDRATE AND SODIUM CHLORIDE 50 MILLILITER(S): 5; .3 INJECTION, SOLUTION INTRAVENOUS at 01:01

## 2024-07-02 RX ADMIN — DEXTROSE MONOHYDRATE AND SODIUM CHLORIDE 50 MILLILITER(S): 5; .3 INJECTION, SOLUTION INTRAVENOUS at 21:44

## 2024-07-02 NOTE — CHART NOTE - NSCHARTNOTEFT_GEN_A_CORE
PACU ANESTHESIA ADMISSION NOTE      Procedure:   Post op diagnosis:      ____  Intubated  TV:______       Rate: ______      FiO2: ______    _x___  Patent Airway    _x___  Full return of protective reflexes    _x___  Full recovery from anesthesia / back to baseline status    Vitals  SPO2:-98  HR:-102  RR:-11  B.P:-145/93  TEMP:-98.2    Mental Status:  _x___ Awake   ___x_ Alert   _____ Drowsy   _____ Sedated    Nausea/Vomiting:  _x___  NO       ______Yes,   See Post - Op Orders         Pain Scale (0-10):  __0___    Treatment: _x___ None    __x__ See Post - Op/PCA Orders    Post - Operative Fluids:   ___ Oral   ____x See Post - Op Orders    Plan: Discharge:   ____Home       ___x__Floor     _____Critical Care    _____  Other:_________________    Comments:  Report endorsed to RN in pacu  Vitals stable  No anesthesia issues or complications noted.  Discharge to patient to floor  when criteria met.

## 2024-07-02 NOTE — CONSULT NOTE ADULT - SUBJECTIVE AND OBJECTIVE BOX
GENERAL SURGERY CONSULT NOTE    Patient: MICHAEL LAL , 33y ([birth year])Female   MRN: [id number]  Location: [place] [address] A  Visit: 24 Inpatient  Date: 24 @ 23:32    HPI: 34 yo female w/ PMHx of G6PD deficiency, c-sections x4, s/p laparoscopic cholecystectomy (24) complicated by post-op leak s/p ex lap x2 (24 and May 2024) presenting to the ED for evaluation of abdominal pain. General surgery consulted d/t CT findings of rim enhancing fluid collection in gallbladder fossa. Patient reports that she had a laparoscopic cholecystectomy in [facility] on 2022 which was complicated by a bile leak requiring 2 subsequent exploratory laparotomies, drain was placed after first ex lap. Today patient's 1-year-old son accidentally kicked her in the right upper quadrant while she was changing the diaper and ever since has been feeling pain. Patient also notes that over the last week she noticed some pus from the surgical area site and she's been needing to change the dressing every 2 days. She denies any fevers, chills, chest pain, shortness of breath, nausea, vomiting, dysuria, hematuria.     Surgery consulted for intrahepatic ductal injury. History noted as above. Patient reports no abdominal pain, nausea or vomiting currently. No other complaints at this time. Now s/p ERCP w/ sphincterotomy, cholangiogram revealing stricture in CHD with dilation of left and right intrahepatic bile ducts.       PAST MEDICAL & SURGICAL HISTORY:  Glucose 6 phosphatase deficiency   delivery delivered    Home Medications:        VITALS:  T(F): 96.8 (24 @ 20:00), Max: 98.4 (24 @ 14:14)  HR: 75 (24 @ 20:00) (70 - 96)  BP: 135/95 (24 @ 20:00) (124/79 - 164/97)  RR: 18 (24 @ 20:00) (15 - 18)  SpO2: 99% (24 @ 20:00) (97% - 100%)    PHYSICAL EXAM:  General: NAD, calm   Cardiac: RRR   Respiratory: Chest rise equal bilaterally, no labored breathing  Abdomen: Soft, non-distended, non-tender  Skin: Warm/dry, normal color, no jaundice  Incision/wound: dressings in place, clean, dry and intact    MEDICATIONS  (STANDING):  chlorhexidine 2% Cloths 1 Application(s) Topical <User Schedule>  dextrose 5% + lactated ringers. 1000 milliLiter(s) (50 mL/Hr) IV Continuous <Continuous>  indomethacin Suppository 100 milliGRAM(s) Rectal once  lactobacillus acidophilus 2 Tablet(s) Oral daily  polyethylene glycol 3350 17 Gram(s) Oral daily  senna 2 Tablet(s) Oral at bedtime  vancomycin  IVPB 1250 milliGRAM(s) IV Intermittent every 12 hours    MEDICATIONS  (PRN):  acetaminophen     Tablet .. 650 milliGRAM(s) Oral every 6 hours PRN Mild Pain (1 - 3), Moderate Pain (4 - 6), Severe Pain (7 - 10)      LAB/STUDIES:                        11.7   6.85  )-----------( 468      ( 2024 08:31 )             35.9     07-02    140  |  100  |  3<L>  ----------------------------<  112<H>  3.6   |  26  |  0.5<L>    Ca    9.6      2024 08:31  Mg     1.9     07-01    TPro  7.5  /  Alb  4.2  /  TBili  0.6  /  DBili  x   /  AST  64<H>  /  ALT  142<H>  /  AlkPhos  531<H>  07-02    LIVER FUNCTIONS - ( 2024 08:31 )  Alb: 4.2 g/dL / Pro: 7.5 g/dL / ALK PHOS: 531 U/L / ALT: 142 U/L / AST: 64 U/L / GGT: x           Urinalysis Basic - ( 2024 08:31 )    Color: x / Appearance: x / SG: x / pH: x  Gluc: 112 mg/dL / Ketone: x  / Bili: x / Urobili: x   Blood: x / Protein: x / Nitrite: x   Leuk Esterase: x / RBC: x / WBC x   Sq Epi: x / Non Sq Epi: x / Bacteria: x

## 2024-07-02 NOTE — PROGRESS NOTE ADULT - NS ATTEST RISK PROBLEM GEN_ALL_CORE FT
-Patient has an illness which poses a threat to life or bodily function without treatment.  -I reviewed external records as available  -I recommended ordering relevant tests to diagnose an Infection  -I reviewed the completed testing ( labs, imaging )  -My assessment required an independent historian  -I independently interpreted the most recent imaging ( CXR )  -I discussed my recommendations with the primary team housestaff/Attending  -I assisted in the decision regarding continued need for hospitalization / or escalation of care as needed.  -Patient is on drug therapy that requires intense monitoring or toxicity and adjustment of the Antibiotics based on creatinine clearence

## 2024-07-02 NOTE — CONSULT NOTE ADULT - ATTENDING COMMENTS
will need IR for attempt external internal drain and PTC
Patient seen and examined with surgery team in ED hallway and discussed management plans.  Patient came to Ed after being kicked in abd with increased pain Recent surgery for GB in [place] complicated by intraabdominal collection requiring two subsequent surgeries and washout. Discharged on po antibiotics Here for 3 days in [place] and will be staying here for about 6 weeks. Since surgery there is no loss of appetite or change in bowel function has continued local drainage from sinus area in midline with purulent drainage Ct reviewed small local collection with wound infection elevated liver enzyme with normal bilirubin. Options discussed wound culture and local wound care can be discharge with early outpatient surgical and GI follow up if discharged.

## 2024-07-02 NOTE — PROGRESS NOTE ADULT - SUBJECTIVE AND OBJECTIVE BOX
MICHAEL LAL  33y  Female  ***My note supersedes ALL resident notes that I sign.  My corrections for their notes are in my note.***    I can be reached directly on [address]. My office number is [phone]. My personal cell number is [phone].    INTERVAL EVENTS: Here for f/u of abd fluid farida. Pt doing fine. Pt says pain is controlled. Awaits GI intervention.    T(F): 98.4 (07-02-24 @ 14:14), Max: 98.7 (07-01-24 @ 20:41)  HR: 96 (07-02-24 @ 14:14) (89 - 96)  BP: 129/87 (07-02-24 @ 14:14) (117/74 - 129/87)  RR: 18 (07-02-24 @ 14:14) (18 - 18)  SpO2: 99% (07-02-24 @ 14:14) (99% - 100%)    Gen: NAD  HEENT: PERRL, EOMI, mouth clr, nose clr  Neck: no nodes, no JVD, thyroid nl  lungs: clr  hrt: s1 s2 rrr no murmur  abd: soft, ND, no HS megaly; + tender around surg scar to palp; wound dressing in place - no blood  ext: no edema, no c/c  neuro: aa ox3, cn intact, can move all 4 ext    LABS:                      11.7    (    90.2   6.85  )-----------( ---------      468      ( 02 Jul 2024 08:31 )             35.9    (    16.4     140   (   100   (   112      07-02-24 @ 08:31  ----------------------               3.6   (   26   (   3                             -----                        0.5  Ca  9.6   Mg  --    P   --     LFT  7.5  (  0.6  (  64       07-02-24 @ 08:31  -------------------------  4.2  (  531  (  142    Alb 4.2  T jake 0.6     AST 64    07-02-24 @ 08:31 - markedly better  Alb 4.0  T jake 0.7     AST 58    07-01-24 @ 06:26  Alb 4.3  T jake 1.0         06-30-24 @ 05:53  Alb 4.0  T jake 1.2         06-29-24 @ 07:45  Alb 3.7  T jake 1.6         06-28-24 @ 17:19    Urinalysis Basic - ( 02 Jul 2024 08:31 )    Color: x / Appearance: x / SG: x / pH: x  Gluc: 112 mg/dL / Ketone: x  / Bili: x / Urobili: x   Blood: x / Protein: x / Nitrite: x   Leuk Esterase: x / RBC: x / WBC x   Sq Epi: x / Non Sq Epi: x / Bacteria: x    Culture - Other (collected 06-28-24 @ 04:00)  Source: Wound Wound  Final Report (06-30-24 @ 19:14):    Rare Staphylococcus epidermidis "Susceptibilities not performed"    Culture - Blood (collected 06-28-24 @ 00:20)  Source: .Blood Blood  Preliminary Report (07-02-24 @ 10:00):    No growth at 4 days    Culture - Blood (collected 06-28-24 @ 00:20)  Source: .Blood Blood  Preliminary Report (07-02-24 @ 10:01):    No growth at 4 days    RADIOLOGY & ADDITIONAL TESTS:    MEDICATIONS:  cefTRIAXone   IVPB 2000 milliGRAM(s) IV Intermittent every 24 hours  metroNIDAZOLE    Tablet 500 milliGRAM(s) Oral every 8 hours    acetaminophen     Tablet .. 650 milliGRAM(s) Oral every 6 hours PRN  chlorhexidine 2% Cloths 1 Application(s) Topical <User Schedule>  dextrose 5% + lactated ringers. 1000 milliLiter(s) IV Continuous <Continuous>  indomethacin Suppository 100 milliGRAM(s) Rectal once  lactobacillus acidophilus 2 Tablet(s) Oral daily  polyethylene glycol 3350 17 Gram(s) Oral daily  senna 2 Tablet(s) Oral at bedtime

## 2024-07-02 NOTE — PROGRESS NOTE ADULT - ASSESSMENT
33-year-old woman past medical history of G6PD deficiency, visiting [place] from Replaced by [facility], recent history of complicated cholecystectomy on 4/22/2024 with subsequent return to the OR for bile leak and adhesion release presenting to ED for evaluation of abdominal pain after her 1-year-old son accidentally kicked her in the area of prior surgical intervention 6/27 am. Admitted for further evaluation and management of GB fossa rim enhancing collection with intrahepatic biliary dilatation.    # pt is visiting from [facility] for 6 wks (this is her 1st week); she has 4 children ages 1-8; she has no insurance currently (working on [employer])    # s/p CCY April c/b jake leak and re-op; now w/ GB fossa 2.5 cm rim-enhancing fluid collection suspected abscess vs GB remnant vs biloma vs other; Surgical site infection -> suspect fistulous tract with GB fossa; Intrahepatic biliary ductal dilatation; Transaminitis (improving), including LDH and GGT incr  NO SEPSIS  HD stable, on room air  BCx: neg x2  Wnd Cx: staph epi  CT AP w iv cont: Status post cholecystectomy. In the gallbladder fossa there is a 2.5 cm rim-enhancing fluid collection which may represent an abscess. Intrahepatic biliary   ductal dilatation. Inflammatory changes from the gallbladder fossa extends anteriorly to the abdominal wall. There is diffuse subcutaneous edema and soft tissue swelling of the right abdomen. A fistulous tract cannot be excluded.  Sx: no intervent  IR: no drainage  Adv GI: for EUS/ERCP 7/2 -> NPO; IVFs; HOLD DVT ppx  MRCP: 1.5cm linear hyperint reg c/w cyst vs small laceration; 2.2cm GB fossa fluid farida could be GB remnant or biloma (rather than abscess); mod interahep jake dil w/ tapering at hilum along fluid farida; min upper abd free fluid ant to spleen; post-sx abd wall w/ ext inflam changes;   ID noted  abx: rocephin 2gm iv q12 + flagyl 500mg po q8 (likely oral avelox 400mg po q24 on d/c - f/u w/ ID re duration)  lactobaccilus 2 tab q24  bowel reg: PEG q24 + senna 2 hs  pain: tylenol  dressing change: wash soap/water; dry; abd pad; tape q12  Diet: NPO  IVFs: D5/LR (while NPO)  CBC, CMP q24    # Left-sided hydronephrosis (gone) noted on CT abdomen/pelvis  Cr wnl  No stone reported on CT  Rpt RB US: no hydro  Uro noted: U/A neg; UCx not needed    # hypomagnesemia  replete    # Normocytic anemia of chr dz; could also be related to menses  hapto 287 - no hemolysis  B12, fol, ferr - all OK    # h/o G6PD ?carrier   Got tested after her first child diagnosed with G6PD  avoid sulpha drugs and others that may provoke hemolysis     # DVT prophylaxis: HOLD LMWH for procedure; SCDs while on hold    # GI prophylaxis - none    # Activity: as ciro    Dispo: dressing chng q12; abx; f/u ID; f/u Adv GI; NPO; IVFs; hold dvt ppx; daily labs  eventually, pt will go home w/ wnd care and oral abx - f/u CM/SW - still acute

## 2024-07-02 NOTE — CHART NOTE - NSCHARTNOTEFT_GEN_A_CORE
EGD: Non erosive gastritis (Biopsy), 15 mm subepithelial lesion (SUE) in the duodenal bulb ?(biopsy).    ERCP: Successful canulation of the bile duct, cholangiogram revealed multiple severe filling defects at the level of the common hepatic duct and dilated intrahepatic ducts. Guidewire could not pass beyond the stricture. Contrast extravasation was noted at the level of the cystic duct. Biliary sphincterotomy was performed.       PLAN  Clear liquids diet.   IR Follow up for ?Percutaneous transhepatic cholangiography.   Surgery following (Case was discussed with Dr Forde).  Trend LFTs.   MICU evaluation. Upgrade to higher level of care for close monitoring.  Start Zosyn and Vancomycin.   Serial abdominal exam.   Monitor for post ERCP complications including pancreatitis and bleeding.   Will follow closely. EGD: Normal esophagus, non erosive gastritis (Biopsy), 15-20 mm subepithelial lesion (SUE) in the duodenal bulb (biopsy).    ERCP: Successful biliary sphincterotomy. 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 Multiple attempts at passing the wire into the intrahepatic ducts were unsuccessful as the wire could not traverse the stricture that is likely caused by the extrinsic clip across the CHD. Contrast did not drain from the intrahepatic ducts.    PLAN  Clear liquids diet.  IR aware and will follow up for percutaneous transhepatic cholangiography.   Surgery following (Case was discussed with Dr Forde).   Trend LFTs.   MICU evaluation. Upgrade to higher level of care for close monitoring.  Monitor closely for cholangitis and call IR STAT if patient has any clinical decompensation.   IV Zosyn and Vancomycin.   Serial abdominal exam.   Monitor for post ERCP complications including pancreatitis and bleeding.  NPO after midnight for possible IR intervention tomorrow.    We will follow closely. EGD: Normal esophagus, non erosive gastritis (Biopsy), 15-20 mm subepithelial lesion (SUE) in the duodenal bulb (biopsy).    ERCP: Successful biliary sphincterotomy. 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 Multiple attempts at passing the wire into the intrahepatic ducts were unsuccessful as the wire could not traverse the stricture that is likely caused by the extrinsic clip across the CHD. Contrast did not drain from the intrahepatic ducts.    PLAN  Clear liquids diet.  IR aware and will follow up for percutaneous transhepatic cholangiography.   Surgery following (Case was discussed with Dr Forde).   Trend LFTs.   Get KUB in am.  MICU evaluation. Upgrade to higher level of care for close monitoring.  Monitor closely for cholangitis and call IR STAT if patient has any clinical decompensation.   IV Zosyn and Vancomycin.   Serial abdominal exam.   Monitor for post ERCP complications including pancreatitis and bleeding.  NPO after midnight for possible IR intervention tomorrow.    We will follow closely.

## 2024-07-02 NOTE — ADVANCED PRACTICE NURSE CONSULT - RECOMMEDATIONS
Cleanse wound with Vashe, pat dry.  Apply Vashe soaked packing strips into open ulcerations and cover with dry sterile dressing daily PRN for soiling.   Skin and incontinence care.  Pressure Injury Prevention.  Assess wound daily and inform primary provider of any changes.   Case discussed with primary RN.  Wound/ ostomy specialist to f/u as needed.   Other recommendations per Primary Team.

## 2024-07-02 NOTE — CHART NOTE - NSCHARTNOTEFT_GEN_A_CORE
To whom it may concern:  Ms. Mariya Barraza is here at [facility] under our care. She is visiting the [place] with four of her young children, and her presence in the hospital is making it difficult for her care for her children outside of the hospital. Please allow her  to travel to the [place] so that he can take care of their children while she is in the hospital.  Thank you.  Dr. Triny MD

## 2024-07-02 NOTE — ADVANCED PRACTICE NURSE CONSULT - ASSESSMENT
Reason for Admission: abdominal pain  History of Present Illness:   33-year-old female past medical history of G6PD deficiency, visiting [place] from [facility], recent history of complicated cholecystectomy on 4/22/2024 with subsequent return to the OR for bile leak and adhesion release presenting to ED for evaluation of abdominal pain after her 1-year-old son accidentally kicked her in the area of prior surgical intervention 6/27 am.  Patient states that since her surgery she experienced mild drainage from the surgical site for which she has been using po antibiotics but no fever/ chills, nausea /vomiting ,no change in appetite or any other additional complaints. Of note, surgery was performed in [facility].      Allergies and Intolerances:        Allergies:  	sulfa drugs: Drug Category, Rash    Home Medications:   * Patient Currently Takes Medications as of 02-Oct-2022 08:42 documented in Structured Notes  •?	ibuprofen 600 mg oral tablet: 1 tab(s) orally every 6 hours  •?	acetaminophen 325 mg oral tablet: 3 tab(s) orally every 6 hours   •?	ferrous sulfate 325 mg (65 mg elemental iron) oral tablet: 1 tab(s) orally 2 times a day   •?	Colace 100 mg oral capsule: 1 cap(s) orally once a day (at bedtime)   •?	simethicone 80 mg oral tablet, chewable: 1 tab(s) orally every 4 hours, As Needed      Wound  Type & Location: Horizontal surgical wound from April 2024  Tissue Description: Multiple ulcerations noted on incision line (dehisced) light pink  Wound Exudate: Scant, yellow   Wound Edge: intact  Sandra wound Condition: intact

## 2024-07-02 NOTE — PROGRESS NOTE ADULT - ASSESSMENT
Patient is a 33-year-old female with a PMHx of G6PD deficiency, visiting [place] from [facility], recent history of complicated cholecystectomy on 4/22/2024 with subsequent return to the OR for bile leak and adhesion release presenting to ED for evaluation of abdominal pain after her 1-year-old son accidentally kicked her in the area of prior surgical intervention 6/27.  Patient states that since her surgery she experienced mild drainage from the surgical site for which she has been using oral antibiotics but no fever/ chills, nausea/vomiting ,no change in appetite or any other additional complaints. Of note, surgery was performed in [facility].    #Gallbladder fossa 2.2 cm thick-walled fluid signal structure on MRCP  Sx: No intervention  IR: No drainage  ID: rocephin 2gm iv q12 + flagyl 500mg po q8 (likely oral abx on d/c)  Advanced GI consulted:   - Currently with advanced GI for EUS with drainage of GB fossa collection with also ductal evaluation followed by ERCP with possible stenting   lactobaccilus 2 tab q24  BCX: neg  Wound Cx: neg  bowel reg: PEG q24 + senna 2 hs  pain: tylenol  dressing change: wash soap/water; dry; abd pad; tape q12  Diet: DASH  IVFs: off  CBC, CMP q24    #Left-sided hydronephrosis noted on CT abdomen/pelvis   KB US has resulted.  No hydronephrosis bilaterally.    #Hypomagnesemia - resolved    #Normocytic anemia  Possibly of chronic disease  Could also be related to menses  Not of acute concern    # h/o G6PD ?carrier   Got tested after her first child diagnosed with G6PD  Avoid sulpha drugs and others that may provoke hemolysis    # DVT prophylaxis: Hold    # GI prophylaxis - none    # Activity: as ciro    Dispo: F/u results of GI interventions today. PT will go home w/ wnd care and abx - f/u CM/SW - still acute Patient is a 33-year-old female with a PMHx of G6PD deficiency, visiting [place] from [facility], recent history of complicated cholecystectomy on 4/22/2024 with subsequent return to the OR for bile leak and adhesion release presenting to ED for evaluation of abdominal pain after her 1-year-old son accidentally kicked her in the area of prior surgical intervention 6/27.  Patient states that since her surgery she experienced mild drainage from the surgical site for which she has been using oral antibiotics but no fever/ chills, nausea/vomiting ,no change in appetite or any other additional complaints. Of note, surgery was performed in [facility].    #Gallbladder fossa 2.2 cm thick-walled fluid signal structure on MRCP  Sx: No intervention  IR: No drainage  ID: rocephin 2gm iv q12 + flagyl 500mg po q8 (likely oral abx on d/c)  Advanced GI consulted:   - Currently with advanced GI for EUS with drainage of GB fossa collection with also ductal evaluation followed by ERCP with possible stenting   lactobaccilus 2 tab q24  BCX: neg  Wound Cx: neg  bowel reg: PEG q24 + senna 2 hs  pain: tylenol  Diet: DASH  IVFs: off  CBC, CMP q24  Wound care recs appreciated and order placed:  -Cleanse wound with Vashe, pat dry.  -Apply Vashe soaked packing strips into open ulcerations and cover with dry sterile dressing daily PRN for soiling.     #Left-sided hydronephrosis noted on CT abdomen/pelvis   KB US has resulted.  No hydronephrosis bilaterally.    #Hypomagnesemia - resolved    #Normocytic anemia  Possibly of chronic disease  Could also be related to menses  Not of acute concern    # h/o G6PD ?carrier   Got tested after her first child diagnosed with G6PD  Avoid sulpha drugs and others that may provoke hemolysis    # DVT prophylaxis: Hold    # GI prophylaxis - none    # Activity: as ciro    Dispo: F/u results of GI interventions today. PT will go home w/ wnd care and abx - f/u CM/SW - still acute

## 2024-07-02 NOTE — PROGRESS NOTE ADULT - SUBJECTIVE AND OBJECTIVE BOX
MICHAEL LAL  33y, Female    All available historical data reviewed    OVERNIGHT EVENTS:    feels well and has no new complaints  No fevers   ROS:  General: Denies rigors, nightsweats  HEENT: Denies headache, rhinorrhea, sore throat, eye pain  CV: Denies CP, palpitations  PULM: Denies wheezing, hemoptysis  GI: Denies hematemesis, hematochezia, melena  : Denies discharge, hematuria  MSK: Denies arthralgias, myalgias  SKIN: Denies rash, lesions  NEURO: Denies paresthesias, weakness  PSYCH: Denies depression, anxiety    VITALS:  T(F): 98, Max: 98.7 (07-01-24 @ 20:41)  HR: 95  BP: 124/79  RR: 18Vital Signs Last 24 Hrs  T(C): 36.7 (02 Jul 2024 09:12), Max: 37.1 (01 Jul 2024 20:41)  T(F): 98 (02 Jul 2024 06:16), Max: 98.7 (01 Jul 2024 20:41)  HR: 95 (02 Jul 2024 09:12) (89 - 95)  BP: 124/79 (02 Jul 2024 09:12) (117/74 - 124/79)  BP(mean): 101 (02 Jul 2024 09:12) (101 - 101)  RR: 18 (02 Jul 2024 09:12) (18 - 18)  SpO2: 100% (02 Jul 2024 09:12) (100% - 100%)        TESTS & MEASUREMENTS:                        11.7   6.85  )-----------( 468      ( 02 Jul 2024 08:31 )             35.9     07-02    140  |  100  |  3<L>  ----------------------------<  112<H>  3.6   |  26  |  0.5<L>    Ca    9.6      02 Jul 2024 08:31  Mg     1.9     07-01    TPro  7.5  /  Alb  4.2  /  TBili  0.6  /  DBili  x   /  AST  64<H>  /  ALT  142<H>  /  AlkPhos  531<H>  07-02    LIVER FUNCTIONS - ( 02 Jul 2024 08:31 )  Alb: 4.2 g/dL / Pro: 7.5 g/dL / ALK PHOS: 531 U/L / ALT: 142 U/L / AST: 64 U/L / GGT: x             Urinalysis with Rflx Culture (collected 06-29-24 @ 10:02)    Culture - Other (collected 06-28-24 @ 04:00)  Source: Wound Wound  Final Report (06-30-24 @ 19:14):    Rare Staphylococcus epidermidis "Susceptibilities not performed"    Culture - Blood (collected 06-28-24 @ 00:20)  Source: .Blood Blood  Preliminary Report (07-02-24 @ 10:00):    No growth at 4 days    Culture - Blood (collected 06-28-24 @ 00:20)  Source: .Blood Blood  Preliminary Report (07-02-24 @ 10:01):    No growth at 4 days      Urinalysis Basic - ( 02 Jul 2024 08:31 )    Color: x / Appearance: x / SG: x / pH: x  Gluc: 112 mg/dL / Ketone: x  / Bili: x / Urobili: x   Blood: x / Protein: x / Nitrite: x   Leuk Esterase: x / RBC: x / WBC x   Sq Epi: x / Non Sq Epi: x / Bacteria: x          Social History:  Tobacco Use:  Alcohol Use:  Drug Use:    RADIOLOGY & ADDITIONAL TESTS:  Personal review of radiological diagnostics performed  Echo and EKG results noted when applicable.     MEDICATIONS:  acetaminophen     Tablet .. 650 milliGRAM(s) Oral every 6 hours PRN  cefTRIAXone   IVPB 2000 milliGRAM(s) IV Intermittent every 24 hours  chlorhexidine 2% Cloths 1 Application(s) Topical <User Schedule>  dextrose 5% + lactated ringers. 1000 milliLiter(s) IV Continuous <Continuous>  lactobacillus acidophilus 2 Tablet(s) Oral daily  metroNIDAZOLE    Tablet 500 milliGRAM(s) Oral every 8 hours  polyethylene glycol 3350 17 Gram(s) Oral daily  senna 2 Tablet(s) Oral at bedtime      ANTIBIOTICS:  cefTRIAXone   IVPB 2000 milliGRAM(s) IV Intermittent every 24 hours  metroNIDAZOLE    Tablet 500 milliGRAM(s) Oral every 8 hours

## 2024-07-02 NOTE — PROGRESS NOTE ADULT - SUBJECTIVE AND OBJECTIVE BOX
SUBJECTIVE/OVERNIGHT EVENTS  Today is hospital day 5d. This morning patient was seen and examined at bedside, resting comfortably in bed. No acute or major events overnight.      CODE STATUS: Full    MEDICATIONS  STANDING MEDICATIONS  cefTRIAXone   IVPB 2000 milliGRAM(s) IV Intermittent every 24 hours  chlorhexidine 2% Cloths 1 Application(s) Topical <User Schedule>  dextrose 5% + lactated ringers. 1000 milliLiter(s) IV Continuous <Continuous>  lactobacillus acidophilus 2 Tablet(s) Oral daily  metroNIDAZOLE    Tablet 500 milliGRAM(s) Oral every 8 hours  polyethylene glycol 3350 17 Gram(s) Oral daily  senna 2 Tablet(s) Oral at bedtime    PRN MEDICATIONS  acetaminophen     Tablet .. 650 milliGRAM(s) Oral every 6 hours PRN    VITALS  T(F): 98.4 (07-02-24 @ 14:14), Max: 98.7 (07-01-24 @ 20:41)  HR: 96 (07-02-24 @ 14:14) (89 - 96)  BP: 129/87 (07-02-24 @ 14:14) (117/74 - 129/87)  RR: 18 (07-02-24 @ 14:14) (18 - 18)  SpO2: 99% (07-02-24 @ 14:14) (99% - 100%)    PHYSICAL EXAM  GENERAL  (x  ) NAD, lying in bed comfortably     (  ) obtunded     (  ) lethargic     (  ) somnolent    HEAD  (x  ) Atraumatic     (  ) hematoma     (  ) laceration (specify location:       )     NECK  ( x ) Supple     (  ) neck stiffness     (  ) nuchal rigidity     (  )  no JVD     (  ) JVD present ( -- cm)    HEART  Rate -->  ( x ) normal rate    (  ) bradycardic    (  ) tachycardic  Rhythm -->  (  x) regular    (  ) regularly irregular    (  ) irregularly irregular  Murmurs -->  (x  ) normal s1/s2    (  ) systolic murmur    (  ) diastolic murmur    (  ) continuous murmur     (  ) S3 present    (  ) S4 present    LUNGS  (x  )Unlabored respirations     (  ) tachypnea  (x ) B/L air entry     (  ) decreased breath sounds in:  (location     )    (  ) no adventitious sound     (  ) crackles     (  ) wheezing      (  ) rhonchi      (specify location:       )  (  ) chest wall tenderness (specify location:       )    ABDOMEN  (x  ) Soft     (  ) tense   |   (x  ) nondistended     (  ) distended   |   (  ) +BS     (  ) hypoactive bowel sounds     (  ) hyperactive bowel sounds  (  ) nontender     (  ) RUQ tenderness     (  ) RLQ tenderness     (  ) LLQ tenderness     (  ) epigastric tenderness     (  ) diffuse tenderness  (  ) Splenomegaly      (  ) Hepatomegaly      (  ) Jaundice     (  ) ecchymosis   Midabdominal scar with scant, non-bloody drainage.      EXTREMITIES  ( x ) Normal     (  ) Rash     (  ) ecchymosis     (  ) varicose veins      (  ) pitting edema     (  ) non-pitting edema   (  ) ulceration     (  ) gangrene:     (location:     )    NERVOUS SYSTEM  (x  ) A&Ox3     (  ) confused     (  ) lethargic  CN II-XII:     (  ) Intact     (  ) focal deficits  (Specify:     )   Upper extremities:     (  ) strength X/5     (  ) focal deficit (specify:    )  Lower extremities:     (  ) strength  X/5    (  ) focal deficit (specify:    )    SKIN  (  x) No rashes or lesions     (  ) maculopapular rash     (  ) pustules     (  ) vesicles     (  ) ulcer     (  ) ecchymosis     (specify location:     )      LABS             11.7   6.85  )-----------( 468      ( 07-02-24 @ 08:31 )             35.9     140  |  100  |  3   -------------------------<  112   07-02-24 @ 08:31  3.6  |  26  |  0.5    Ca      9.6     07-02-24 @ 08:31  Mg     1.9     07-01-24 @ 06:26    TPro  7.5  /  Alb  4.2  /  TBili  0.6  /  DBili  x   /  AST  64  /  ALT  142  /  AlkPhos  531  /  GGT  x     07-02-24 @ 08:31        Urinalysis Basic - ( 02 Jul 2024 08:31 )    Color: x / Appearance: x / SG: x / pH: x  Gluc: 112 mg/dL / Ketone: x  / Bili: x / Urobili: x   Blood: x / Protein: x / Nitrite: x   Leuk Esterase: x / RBC: x / WBC x   Sq Epi: x / Non Sq Epi: x / Bacteria: x          IMAGING

## 2024-07-02 NOTE — CONSULT NOTE ADULT - TIME BILLING
DISCUSSED PLAN OF CARE WITH PATIENT AND SURGICAL/IR/GI TEAMS
32 yo with suspected left fullness  no evidence of hydronephrosis on my assessment  can f/u as needed
I have personally seen and examined this patient. I have reviewed all pertinent clinical information and reviewed all relevant imaging ( and noted the impression from the Radiologist ) and diagnostic studies personally. I counseled the patient about the diagnostic testing and treatment plan. I discussed my recommendations with the primary team.

## 2024-07-02 NOTE — CONSULT NOTE ADULT - ASSESSMENT
35 yo female w/ PMHx of G6PD deficiency, c-sections x4, s/p laparoscopic cholecystectomy (4/22/24) complicated by post-op leak s/p ex lap x2 (4/28/24 and May 2024) presenting to the ED for evaluation of abdominal pain. Physical exam, labs, and imaging documented as above.     PLAN:  - GI recs appreciated  - Recommend IR c/s for percutaneous transhepatic cholangiograpy  - Trend LFTs  - Local wound care  - Hemodynamic monitoring  - Surgery to follow    Above plan discussed with Attending Surgeon Dr. Forde, patient, and Primary team  07-02-24 @ 23:32    BLUE SURGERY SPECTRA: [id number]

## 2024-07-02 NOTE — PROGRESS NOTE ADULT - ASSESSMENT
· Assessment	  33-year-old female past medical history of G6PD deficiency, visiting [place] from [facility], recent history of complicated cholecystectomy on 4/22/2024 with subsequent return to the OR for bile leak and adhesion release presenting to ED for evaluation of abdominal pain after her 1-year-old son accidentally kicked her in the area of prior surgical intervention 6/27 am.  Patient states that since her surgery she experienced  mild drainage from the surgical site for which she has been using po antibiotics but no fever/ chills, nausea /vomiting ,no change in appetite or any other additional complaints. Of note, surgery was performed in [facility].        CT abdomen/pelvis with iv contrast:  Status post cholecystectomy. In the gallbladder fossa there is a 2.5 cm   rim-enhancing fluid collection which may represent an abscess. Intrahepatic biliary   ductal dilatation.  Inflammatory changes from the gallbladder fossa extends anteriorly to the   abdominal wall.There is diffuse subcutaneous edema and soft tissue   swelling of the right abdomen. A fistulous tract cannot be excluded.  Left-sided hydronephrosis. An obstructing stone isn't visualized.    < from: MR MRCP w/wo IV Cont (06.29.24 @ 15:43) >  Postcholecystectomy.    Gallbladder fossa 2.2 cm thick-walled fluid signal structure, correlating   with CT. No definite restricted diffusion, raising possibility of   gallbladder remnant, subtotal cholecystectomy, or biloma rather than   abscess.Nuclear medicine HIDA scan could be helpful.    Moderate intrahepatic biliary distention, with central tapering at hilum   along superior aspect of fluid collection, without MR evidence of mass.    Right inferior hepatic tip 1.5 cm linear T2 hyperintense region, may   represent atypical cyst versus small grade II AAST liver laceration.    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  Clinically no peritonitis  Sx : no intervention at this point  6/29 MRCP gallbladder remnant, subtotal cholecystectomy, or biloma rather than   abscess  Abscess CoNS  6/28 BCx NG  Left-sided hydronephrosis with no obstructing distal ureteral stone visualized.    -F/u with GI. Procedure possibly today  -Rocephin 2 gm iv q24h  -Flagyl 500 mg po q8h  -if pt to be discharged post procedure then change to change  po Avelox 400 mg q24h for 3 more weeks

## 2024-07-03 LAB
ALBUMIN SERPL ELPH-MCNC: 4.2 G/DL — SIGNIFICANT CHANGE UP (ref 3.5–5.2)
ALP SERPL-CCNC: 544 U/L — HIGH (ref 30–115)
ALT FLD-CCNC: 129 U/L — HIGH (ref 0–41)
ANION GAP SERPL CALC-SCNC: 11 MMOL/L — SIGNIFICANT CHANGE UP (ref 7–14)
AST SERPL-CCNC: 76 U/L — HIGH (ref 0–41)
BASOPHILS # BLD AUTO: 0.02 K/UL — SIGNIFICANT CHANGE UP (ref 0–0.2)
BASOPHILS NFR BLD AUTO: 0.2 % — SIGNIFICANT CHANGE UP (ref 0–1)
BILIRUB SERPL-MCNC: 0.6 MG/DL — SIGNIFICANT CHANGE UP (ref 0.2–1.2)
BUN SERPL-MCNC: 5 MG/DL — LOW (ref 10–20)
CALCIUM SERPL-MCNC: 9.9 MG/DL — SIGNIFICANT CHANGE UP (ref 8.4–10.5)
CHLORIDE SERPL-SCNC: 99 MMOL/L — SIGNIFICANT CHANGE UP (ref 98–110)
CO2 SERPL-SCNC: 29 MMOL/L — SIGNIFICANT CHANGE UP (ref 17–32)
CREAT SERPL-MCNC: 0.6 MG/DL — LOW (ref 0.7–1.5)
CULTURE RESULTS: SIGNIFICANT CHANGE UP
CULTURE RESULTS: SIGNIFICANT CHANGE UP
EGFR: 122 ML/MIN/1.73M2 — SIGNIFICANT CHANGE UP
EOSINOPHIL # BLD AUTO: 0 K/UL — SIGNIFICANT CHANGE UP (ref 0–0.7)
EOSINOPHIL NFR BLD AUTO: 0 % — SIGNIFICANT CHANGE UP (ref 0–8)
GLUCOSE SERPL-MCNC: 151 MG/DL — HIGH (ref 70–99)
HCT VFR BLD CALC: 36.1 % — LOW (ref 37–47)
HGB BLD-MCNC: 11.6 G/DL — LOW (ref 12–16)
IMM GRANULOCYTES NFR BLD AUTO: 0.3 % — SIGNIFICANT CHANGE UP (ref 0.1–0.3)
LYMPHOCYTES # BLD AUTO: 1.56 K/UL — SIGNIFICANT CHANGE UP (ref 1.2–3.4)
LYMPHOCYTES # BLD AUTO: 15.6 % — LOW (ref 20.5–51.1)
MCHC RBC-ENTMCNC: 29.4 PG — SIGNIFICANT CHANGE UP (ref 27–31)
MCHC RBC-ENTMCNC: 32.1 G/DL — SIGNIFICANT CHANGE UP (ref 32–37)
MCV RBC AUTO: 91.4 FL — SIGNIFICANT CHANGE UP (ref 81–99)
MONOCYTES # BLD AUTO: 0.36 K/UL — SIGNIFICANT CHANGE UP (ref 0.1–0.6)
MONOCYTES NFR BLD AUTO: 3.6 % — SIGNIFICANT CHANGE UP (ref 1.7–9.3)
NEUTROPHILS # BLD AUTO: 8.05 K/UL — HIGH (ref 1.4–6.5)
NEUTROPHILS NFR BLD AUTO: 80.3 % — HIGH (ref 42.2–75.2)
NRBC # BLD: 0 /100 WBCS — SIGNIFICANT CHANGE UP (ref 0–0)
PLATELET # BLD AUTO: 483 K/UL — HIGH (ref 130–400)
PMV BLD: 10.3 FL — SIGNIFICANT CHANGE UP (ref 7.4–10.4)
POTASSIUM SERPL-MCNC: 4.3 MMOL/L — SIGNIFICANT CHANGE UP (ref 3.5–5)
POTASSIUM SERPL-SCNC: 4.3 MMOL/L — SIGNIFICANT CHANGE UP (ref 3.5–5)
PROT SERPL-MCNC: 7.5 G/DL — SIGNIFICANT CHANGE UP (ref 6–8)
RBC # BLD: 3.95 M/UL — LOW (ref 4.2–5.4)
RBC # FLD: 15.9 % — HIGH (ref 11.5–14.5)
SODIUM SERPL-SCNC: 139 MMOL/L — SIGNIFICANT CHANGE UP (ref 135–146)
SPECIMEN SOURCE: SIGNIFICANT CHANGE UP
SPECIMEN SOURCE: SIGNIFICANT CHANGE UP
WBC # BLD: 10.02 K/UL — SIGNIFICANT CHANGE UP (ref 4.8–10.8)
WBC # FLD AUTO: 10.02 K/UL — SIGNIFICANT CHANGE UP (ref 4.8–10.8)

## 2024-07-03 PROCEDURE — 47534 PLMT BILIARY DRAINAGE CATH: CPT

## 2024-07-03 PROCEDURE — 99222 1ST HOSP IP/OBS MODERATE 55: CPT

## 2024-07-03 PROCEDURE — 99233 SBSQ HOSP IP/OBS HIGH 50: CPT

## 2024-07-03 PROCEDURE — 99232 SBSQ HOSP IP/OBS MODERATE 35: CPT

## 2024-07-03 PROCEDURE — 74018 RADEX ABDOMEN 1 VIEW: CPT | Mod: 26

## 2024-07-03 RX ORDER — CEFTRIAXONE SODIUM 500 MG
2000 VIAL (EA) INJECTION EVERY 24 HOURS
Refills: 0 | Status: DISCONTINUED | OUTPATIENT
Start: 2024-07-03 | End: 2024-07-05

## 2024-07-03 RX ORDER — METRONIDAZOLE 500 MG/1
500 TABLET ORAL EVERY 8 HOURS
Refills: 0 | Status: DISCONTINUED | OUTPATIENT
Start: 2024-07-03 | End: 2024-07-08

## 2024-07-03 RX ADMIN — METRONIDAZOLE 500 MILLIGRAM(S): 500 TABLET ORAL at 23:14

## 2024-07-03 RX ADMIN — METRONIDAZOLE 500 MILLIGRAM(S): 500 TABLET ORAL at 23:13

## 2024-07-03 RX ADMIN — VANCOMYCIN HYDROCHLORIDE 166.67 MILLIGRAM(S): KIT at 06:03

## 2024-07-03 RX ADMIN — Medication 100 MILLIGRAM(S): at 12:56

## 2024-07-03 RX ADMIN — DEXTROSE MONOHYDRATE AND SODIUM CHLORIDE 50 MILLILITER(S): 5; .3 INJECTION, SOLUTION INTRAVENOUS at 22:22

## 2024-07-03 RX ADMIN — PIPERACILLIN SODIUM AND TAZOBACTAM SODIUM 25 GRAM(S): 3; .375 INJECTION, POWDER, LYOPHILIZED, FOR SOLUTION INTRAVENOUS at 05:59

## 2024-07-03 RX ADMIN — Medication 1 APPLICATION(S): at 05:58

## 2024-07-03 NOTE — PROGRESS NOTE ADULT - SUBJECTIVE AND OBJECTIVE BOX
GENERAL SURGERY PROGRESS NOTE     Patient: MICHAEL LAL , 33y ([birth year])Female   MRN: [id number]  Location: [place] [address] A  Visit: 06-27-24 Inpatient  Date: 07-03-24 @ 13:56        Admitted :06-27-24 (6d)  LOS: 6d    Procedure/Dx/Injuries: Gallbladder colic         Events of past 24 hours:     Patient seen and examined at bedside. No acute events overnight. Afebrile, VSS.  Plan for percutaneous transhepatic cholangiogram w/ IR today.       Vitals:   T(F): 97.7 (07-03-24 @ 12:20), Max: 98.4 (07-02-24 @ 14:14)  HR: 85 (07-03-24 @ 12:48)  BP: 126/74 (07-03-24 @ 12:48)  RR: 16 (07-03-24 @ 12:48)  SpO2: 100% (07-03-24 @ 12:48)      PHYSICAL EXAM:  General Appearance: NAD  HEENT: Normocephalic, atraumatic  Heart: s1, s2  Lungs: No increased work of breathing or accessory muscle use. Symmetric chest wall rise and fall.   Abdomen:  Soft, nontender, nondistended. small drainage from incision from previous surgery.  MSK/Extremities: Warm & well-perfused.   Skin: Warm, dry. No jaundice.   Incision/wound: dressings in place, clean, dry and intact  >>> <<<>>> <<<>>> <<<>>> <<<>>> <<<>>> <<<>>> <<<>>> <<<>>> <<<>>> <<<   Is & Os:   Diet, NPO  Diet, NPO after Midnight:      NPO Start Date: 01-Jul-2024,   NPO Start Time: 23:59    Fluids:       Bowel Movement: :   Flatus: :   >>> <<<>>> <<<>>> <<<>>> <<<>>> <<<>>> <<<>>> <<<>>> <<<>>> <<<>>> <<<    MEDICATIONS  (STANDING):  cefTRIAXone   IVPB 2000 milliGRAM(s) IV Intermittent every 24 hours  chlorhexidine 2% Cloths 1 Application(s) Topical <User Schedule>  dextrose 5% + lactated ringers. 1000 milliLiter(s) (50 mL/Hr) IV Continuous <Continuous>  indomethacin Suppository 100 milliGRAM(s) Rectal once  lactobacillus acidophilus 2 Tablet(s) Oral daily  metroNIDAZOLE    Tablet 500 milliGRAM(s) Oral every 8 hours  polyethylene glycol 3350 17 Gram(s) Oral daily  senna 2 Tablet(s) Oral at bedtime    MEDICATIONS  (PRN):  acetaminophen     Tablet .. 650 milliGRAM(s) Oral every 6 hours PRN Mild Pain (1 - 3), Moderate Pain (4 - 6), Severe Pain (7 - 10)      DVT PROPHYLAXIS:   GI PROPHYLAXIS:   ANTICOAGULATION:   ANTIBIOTICS:  cefTRIAXone   IVPB 2000 milliGRAM(s)  metroNIDAZOLE    Tablet 500 milliGRAM(s)      >>> <<<>>> <<<>>> <<<>>> <<<>>> <<<>>> <<<>>> <<<>>> <<<>>> <<<>>> <<<        LAB/STUDIES:  Labs:  CAPILLARY BLOOD GLUCOSE                              11.6   10.02 )-----------( 483      ( 03 Jul 2024 06:15 )             36.1       Auto Neutrophil %: 80.3 % (07-03-24 @ 06:15)  Auto Immature Granulocyte %: 0.3 % (07-03-24 @ 06:15)    07-03    139  |  99  |  5<L>  ----------------------------<  151<H>  4.3   |  29  |  0.6<L>      Calcium: 9.9 mg/dL (07-03-24 @ 06:15)      LFTs:             7.5  | 0.6  | 76       ------------------[544     ( 03 Jul 2024 06:15 )  4.2  | x    | 129         Lipase:x      Amylase:x             Coags:            Urinalysis Basic - ( 03 Jul 2024 06:15 )    Color: x / Appearance: x / SG: x / pH: x  Gluc: 151 mg/dL / Ketone: x  / Bili: x / Urobili: x   Blood: x / Protein: x / Nitrite: x   Leuk Esterase: x / RBC: x / WBC x   Sq Epi: x / Non Sq Epi: x / Bacteria: x          ASSESSMENT:  33y F PSHx laparoscopic cholecystectomy (4/2024) complicated by post-op leak s/p ex-lap x2 (4/2024, 5/2024) presented w/ abdominal pain s/p ERCP-cholangiogram revealing a stricture in the CHD at the level of a previously placed surgical clip w/ upstream dilation of the L. & R. intrahepatic bile ducts.  with the above physical exam, labs and imaging.   Patient seen and examined at bedside. NAD.  Tolerating Diet:  Diet, NPO (07-03-24 @ 08:55) [Active]  Diet, NPO after Midnight:      NPO Start Date: 01-Jul-2024,   NPO Start Time: 23:59 (07-01-24 @ 12:55) [Active]        PLAN:  - f/u IR   - Local wound care  - Monitor vitals  - Monitor labs and replete as necessary  - Continue Pain Medications if necessary  - Continue Antibiotics        BLUE TEAM SPECTRA [id number]

## 2024-07-03 NOTE — PROGRESS NOTE ADULT - SUBJECTIVE AND OBJECTIVE BOX
Patient is a 33y old  Female who presents with a chief complaint of abdominal pain (03 Jul 2024 13:55)      OVERNIGHT EVENTS: s/p ERCP, doing well     SUBJECTIVE / INTERVAL HPI: Patient seen and examined at bedside.     VITAL SIGNS:  Vital Signs Last 24 Hrs  T(C): 36.5 (03 Jul 2024 12:48), Max: 36.8 (03 Jul 2024 12:00)  T(F): 97.7 (03 Jul 2024 12:20), Max: 98.3 (03 Jul 2024 12:00)  HR: 85 (03 Jul 2024 12:48) (70 - 91)  BP: 126/74 (03 Jul 2024 12:48) (111/71 - 164/97)  BP(mean): 100 (03 Jul 2024 12:48) (96 - 100)  RR: 16 (03 Jul 2024 12:48) (15 - 20)  SpO2: 100% (03 Jul 2024 12:48) (97% - 100%)    Parameters below as of 03 Jul 2024 12:20  Patient On (Oxygen Delivery Method): room air        PHYSICAL EXAM:    General: WDWN  HEENT: NC/AT; PERRL, clear conjunctiva  Neck: supple  Cardiovascular: +S1/S2; RRR  Respiratory: CTA b/l; no W/R/R  Gastrointestinal: soft, NT/ND; +BSx4  Extremities: WWP; 2+ peripheral pulses; no edema   Neurological: AAOx3; no focal deficits    MEDICATIONS:  MEDICATIONS  (STANDING):  cefTRIAXone   IVPB 2000 milliGRAM(s) IV Intermittent every 24 hours  chlorhexidine 2% Cloths 1 Application(s) Topical <User Schedule>  dextrose 5% + lactated ringers. 1000 milliLiter(s) (50 mL/Hr) IV Continuous <Continuous>  indomethacin Suppository 100 milliGRAM(s) Rectal once  lactobacillus acidophilus 2 Tablet(s) Oral daily  metroNIDAZOLE    Tablet 500 milliGRAM(s) Oral every 8 hours  polyethylene glycol 3350 17 Gram(s) Oral daily  senna 2 Tablet(s) Oral at bedtime    MEDICATIONS  (PRN):  acetaminophen     Tablet .. 650 milliGRAM(s) Oral every 6 hours PRN Mild Pain (1 - 3), Moderate Pain (4 - 6), Severe Pain (7 - 10)      ALLERGIES:  Allergies    sulfa drugs (Rash)    Intolerances        LABS:                        11.6   10.02 )-----------( 483      ( 03 Jul 2024 06:15 )             36.1     07-03    139  |  99  |  5<L>  ----------------------------<  151<H>  4.3   |  29  |  0.6<L>    Ca    9.9      03 Jul 2024 06:15    TPro  7.5  /  Alb  4.2  /  TBili  0.6  /  DBili  x   /  AST  76<H>  /  ALT  129<H>  /  AlkPhos  544<H>  07-03      Urinalysis Basic - ( 03 Jul 2024 06:15 )    Color: x / Appearance: x / SG: x / pH: x  Gluc: 151 mg/dL / Ketone: x  / Bili: x / Urobili: x   Blood: x / Protein: x / Nitrite: x   Leuk Esterase: x / RBC: x / WBC x   Sq Epi: x / Non Sq Epi: x / Bacteria: x      CAPILLARY BLOOD GLUCOSE          RADIOLOGY & ADDITIONAL TESTS: Reviewed.    ASSESSMENT:    PLAN:
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
## 2024-07-03 NOTE — PROGRESS NOTE ADULT - CRITICAL CARE ATTENDING COMMENT
-The patient's injury acutely impairs one or more vital organ systems, and there is a high probability of imminent or life threatening deterioration in the patient's condition. The care of this patient requires complex medical decision making in the field of Infectious Diseases and extensive interpretation of laboratory, microbiological and radiographic data.

## 2024-07-03 NOTE — PRE-OP CHECKLIST - AICD PRESENT
no
no
Clofazimine Counseling:  I discussed with the patient the risks of clofazimine including but not limited to skin and eye pigmentation, liver damage, nausea/vomiting, gastrointestinal bleeding and allergy.

## 2024-07-03 NOTE — CONSULT NOTE ADULT - CONSULT REQUESTED DATE/TIME
28-Jun-2024 13:00
28-Jun-2024 16:47
02-Jul-2024 23:31
03-Jul-2024 09:49
28-Jun-2024 11:34
03-Jul-2024 06:42
27-Jun-2024 23:16
28-Jun-2024 08:47

## 2024-07-03 NOTE — PROGRESS NOTE ADULT - SUBJECTIVE AND OBJECTIVE BOX
MICHAEL LAL  33y, Female    All available historical data reviewed    OVERNIGHT EVENTS:  s/p ERCP  feels well and has no new complaints  No fevers     ROS:  General: Denies rigors, nightsweats  HEENT: Denies headache, rhinorrhea, sore throat, eye pain  CV: Denies CP, palpitations  PULM: Denies wheezing, hemoptysis  GI: Denies hematemesis, hematochezia, melena  : Denies discharge, hematuria  MSK: Denies arthralgias, myalgias  SKIN: Denies rash, lesions  NEURO: Denies paresthesias, weakness  PSYCH: Denies depression, anxiety    VITALS:  T(F): 97.6, Max: 98.4 (07-02-24 @ 14:14)  HR: 91  BP: 119/81  RR: 20Vital Signs Last 24 Hrs  T(C): 36.4 (03 Jul 2024 08:46), Max: 36.9 (02 Jul 2024 14:14)  T(F): 97.6 (03 Jul 2024 08:46), Max: 98.4 (02 Jul 2024 14:14)  HR: 91 (03 Jul 2024 08:46) (70 - 96)  BP: 119/81 (03 Jul 2024 08:46) (111/71 - 164/97)  BP(mean): 96 (03 Jul 2024 08:00) (96 - 101)  RR: 20 (03 Jul 2024 08:46) (15 - 20)  SpO2: 100% (03 Jul 2024 08:46) (97% - 100%)    Parameters below as of 03 Jul 2024 08:00  Patient On (Oxygen Delivery Method): room air        TESTS & MEASUREMENTS:                        11.6   10.02 )-----------( 483      ( 03 Jul 2024 06:15 )             36.1     07-03    139  |  99  |  5<L>  ----------------------------<  151<H>  4.3   |  29  |  0.6<L>    Ca    9.9      03 Jul 2024 06:15    TPro  7.5  /  Alb  4.2  /  TBili  0.6  /  DBili  x   /  AST  76<H>  /  ALT  129<H>  /  AlkPhos  544<H>  07-03    LIVER FUNCTIONS - ( 03 Jul 2024 06:15 )  Alb: 4.2 g/dL / Pro: 7.5 g/dL / ALK PHOS: 544 U/L / ALT: 129 U/L / AST: 76 U/L / GGT: x             Urinalysis with Rflx Culture (collected 06-29-24 @ 10:02)    Culture - Other (collected 06-28-24 @ 04:00)  Source: Wound Wound  Final Report (06-30-24 @ 19:14):    Rare Staphylococcus epidermidis "Susceptibilities not performed"    Culture - Blood (collected 06-28-24 @ 00:20)  Source: .Blood Blood  Preliminary Report (07-02-24 @ 10:00):    No growth at 4 days    Culture - Blood (collected 06-28-24 @ 00:20)  Source: .Blood Blood  Preliminary Report (07-02-24 @ 10:01):    No growth at 4 days      Urinalysis Basic - ( 03 Jul 2024 06:15 )    Color: x / Appearance: x / SG: x / pH: x  Gluc: 151 mg/dL / Ketone: x  / Bili: x / Urobili: x   Blood: x / Protein: x / Nitrite: x   Leuk Esterase: x / RBC: x / WBC x   Sq Epi: x / Non Sq Epi: x / Bacteria: x          Social History:  Tobacco Use:  Alcohol Use:  Drug Use:    RADIOLOGY & ADDITIONAL TESTS:  Personal review of radiological diagnostics performed  Echo and EKG results noted when applicable.     MEDICATIONS:  acetaminophen     Tablet .. 650 milliGRAM(s) Oral every 6 hours PRN  chlorhexidine 2% Cloths 1 Application(s) Topical <User Schedule>  dextrose 5% + lactated ringers. 1000 milliLiter(s) IV Continuous <Continuous>  indomethacin Suppository 100 milliGRAM(s) Rectal once  lactobacillus acidophilus 2 Tablet(s) Oral daily  piperacillin/tazobactam IVPB.. 3.375 Gram(s) IV Intermittent every 8 hours  polyethylene glycol 3350 17 Gram(s) Oral daily  senna 2 Tablet(s) Oral at bedtime      ANTIBIOTICS:  piperacillin/tazobactam IVPB.. 3.375 Gram(s) IV Intermittent every 8 hours
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
## 2024-07-03 NOTE — CHART NOTE - NSCHARTNOTEFT_GEN_A_CORE
PACU ANESTHESIA ADMISSION NOTE      Procedure: PTC, PTBD  Post op diagnosis:      ____  Intubated  TV:______       Rate: ______      FiO2: ______    ____  Patent Airway    _x___  Full return of protective reflexes    ____  Full recovery from anesthesia / back to baseline     Vitals:   T:  97.7         R:18                  BP:  133/81                Sat:   100                P: 101      Mental Status:  ____ Awake   _____ Alert   __x___ Drowsy   _____ Sedated    Nausea/Vomiting:  _x___ NO  ______Yes,   See Post - Op Orders          Pain Scale (0-10):  x_____    Treatment: ____ None    ____ See Post - Op/PCA Orders    Post - Operative Fluids:   ____ Oral   __x__ See Post - Op Orders    Plan: Discharge:   ____Home       _x____Floor     _____Critical Care    _____  Other:_________________    Comments:

## 2024-07-03 NOTE — PRE-ANESTHESIA EVALUATION ADULT - NSRADCARDRESULTSFT_GEN_ALL_CORE
Ventricular Rate 106 BPM    Atrial Rate 106 BPM    P-R Interval 130 ms    QRS Duration 76 ms    Q-T Interval 342 ms    QTC Calculation(Bazett) 454 ms    P Axis 64 degrees    R Axis 58 degrees    T Axis 36 degrees    Diagnosis Line Sinus tachycardia  Possible Left atrial enlargement  Borderline ECG no concerns
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
## 2024-07-03 NOTE — CONSULT NOTE ADULT - CONSULT REASON
s/p ERCP -Cholangiogram revealing a stricture in the CHD at the level of a previously placed surgical clip with upstream dilation of the left and right intrahepatic bile ducts. Evaluate for possible PTC.
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
## 2024-07-03 NOTE — CONSULT NOTE ADULT - ASSESSMENT
IMPRESSION:    Abd pain/ Gallbladder fossa collection/fistula sp cholecystectomy complicated by bile leak  sp ERCP stricture CHD (unable to pass wire)  Transaminitis  HO G6PD def       PLAN:    CNS: Avoid CNS depressant    HEENT:  Oral care    PULMONARY:  HOB @ 45 degrees, on RA, aspiration precaution    CARDIOVASCULAR: stable hemodynamically, avoid overload    GI: GI prophylaxis                                          Feeding per GI/ IR/ sx fup    RENAL:  F/u  lytes.  Correct as needed. accurate I/O    INFECTIOUS DISEASE: abx    HEMATOLOGICAL:  DVT prophylaxis.    ENDOCRINE:  Follow up FS.  Insulin protocol if needed.    AAT    DGTF

## 2024-07-03 NOTE — PROGRESS NOTE ADULT - ASSESSMENT
33-year-old woman past medical history of G6PD deficiency, visiting US from [facility], recent history of complicated cholecystectomy on 4/22/2024 with subsequent return to the OR for bile leak and adhesion release presenting to ED for evaluation of abdominal pain after her 1-year-old son accidentally kicked her in the area of prior surgical intervention 6/27 am. Admitted for further evaluation and management of GB fossa rim enhancing collection with intrahepatic biliary dilatation.    # pt is visiting from [facility] for 6 wks (this is her 1st week); she has 4 children ages 1-8; she has no insurance currently (working on getting MCAid)    # s/p CCY   # GB Fossa Abscess w Cutaneous fistula with Mid abdominal drainage / Common Hepatic Duct due injury s/p (CCY) in [facility]  NO SEPSIS  s/p ERCP showed stricture in the CHD  - IR on board for possible percutaneous drainage today   BCx: neg x2  Wnd Cx: CoNS  Abx Rocephin and flagyl       # Left-sided hydronephrosis on CT scan: seen by Uro: no evidence of hydronephrosis  # hypomagnesemia  replete  # Normocytic anemia of chr dz; could also be related to menses  hapto 287 - no hemolysis  B12, fol, ferr - all OK  # h/o G6PD ?carrier   Got tested after her first child diagnosed with G6PD  avoid sulpha drugs and others that may provoke hemolysis   # DVT prophylaxis: resume after IR intervention   spent 55 mins eval pt and coordinating care, reviewed all labs and images

## 2024-07-03 NOTE — CONSULT NOTE ADULT - PROVIDER SPECIALTY LIST ADULT
Gastroenterology
Intervent Radiology
Surgery
Critical Care
Intervent Radiology
Surgery
Urology
Infectious Disease

## 2024-07-03 NOTE — CONSULT NOTE ADULT - SUBJECTIVE AND OBJECTIVE BOX
INTERVENTIONAL RADIOLOGY CONSULT:     Procedure Requested: PTC    HPI:  33-year-old female past medical history of G6PD deficiency, visiting US from [facility], recent history of complicated cholecystectomy on 2024 with subsequent return to the OR for bile leak and adhesion release presenting to ED for evaluation of abdominal pain after her 1-year-old son accidentally kicked her in the area of prior surgical intervention 6 am.  Patient states that since her surgery she experienced  mild drainage from the surgical site for which she has been using po antibiotics but no fever/ chills, nausea /vomiting ,no change in appetite or any other additional complaints. Of note, surgery was performed in [facility].      In the ED  Vitals: /84, , Temp 99 F, RR 17, Sao2 100% on RA  Labs: Hb 10.5, MCV 89.6, K 3.4, AST/ALT/ALK phos 285/204/782, T.bili 1.1     CT abdomen/pelvis with iv contrast:  Status post cholecystectomy. In the gallbladder fossa there is a 2.5 cm   rim-enhancing fluid collection which may represent an abscess. Intrahepatic biliary   ductal dilatation.      Inflammatory changes from the gallbladder fossa extends anteriorly to the   abdominal wall.There is diffuse subcutaneous edema and soft tissue   swelling of the right abdomen. A fistulous tract cannot be excluded.    Left-sided hydronephrosis. An obstructing stone isn'tvisualized.    cefotetan + 1lit NS + Ketorolac 15mg iv x 1 + morphine 4mg x 1  40mqe K+ po in the ED (2024 02:19)      PAST MEDICAL & SURGICAL HISTORY:  Glucose 6 phosphatase deficiency       delivery delivered          MEDICATIONS  (STANDING):  chlorhexidine 2% Cloths 1 Application(s) Topical <User Schedule>  dextrose 5% + lactated ringers. 1000 milliLiter(s) (50 mL/Hr) IV Continuous <Continuous>  indomethacin Suppository 100 milliGRAM(s) Rectal once  lactobacillus acidophilus 2 Tablet(s) Oral daily  piperacillin/tazobactam IVPB.. 3.375 Gram(s) IV Intermittent every 8 hours  polyethylene glycol 3350 17 Gram(s) Oral daily  senna 2 Tablet(s) Oral at bedtime    MEDICATIONS  (PRN):  acetaminophen     Tablet .. 650 milliGRAM(s) Oral every 6 hours PRN Mild Pain (1 - 3), Moderate Pain (4 - 6), Severe Pain (7 - 10)      Allergies    sulfa drugs (Rash)    Intolerances        Social History:   Smoking: Yes [ ]  No [ ]   ______pk yrs  ETOH  Yes [ ]  No [ ]  Social [ ]  DRUGS:  Yes [ ]  No [ ]  if so what______________    FAMILY HISTORY:  FHx: diabetes mellitus (Mother)        Physical Exam:   Vital Signs Last 24 Hrs  T(C): 36.4 (2024 08:46), Max: 36.9 (2024 14:14)  T(F): 97.6 (2024 08:46), Max: 98.4 (2024 14:14)  HR: 91 (2024 08:46) (70 - 96)  BP: 119/81 (2024 08:46) (111/71 - 164/97)  BP(mean): 96 (2024 08:00) (96 - 101)  RR: 20 (2024 08:46) (15 - 20)  SpO2: 100% (2024 08:46) (97% - 100%)    Parameters below as of 2024 08:00  Patient On (Oxygen Delivery Method): room air        GENERAL: Resting comfortably in bed. NAD.  NEURO: Alert and oriented x3.  CARDIAC: Normal heart rate.  RESPIRATORY: Breathing comfortably on room air.  ABDOMEN: Soft, nontender.  EXTREMITIES: No peripheral edema.      Labs:                         11.6   10.02 )-----------( 483      ( 2024 06:15 )             36.1     07-03    139  |  99  |  5<L>  ----------------------------<  151<H>  4.3   |  29  |  0.6<L>    Ca    9.9      2024 06:15    TPro  7.5  /  Alb  4.2  /  TBili  0.6  /  DBili  x   /  AST  76<H>  /  ALT  129<H>  /  AlkPhos  544<H>  07-03        Pertinent labs:                      11.6   10.02 )-----------( 483      ( 2024 06:15 )             36.1       07-03    139  |  99  |  5<L>  ----------------------------<  151<H>  4.3   |  29  |  0.6<L>    Ca    9.9      2024 06:15    TPro  7.5  /  Alb  4.2  /  TBili  0.6  /  DBili  x   /  AST  76<H>  /  ALT  129<H>  /  AlkPhos  544<H>            Radiology & Additional Studies:     Radiology imaging reviewed.       ASSESSMENT AND PLAN:  33-year-old female past medical history of G6PD deficiency, visiting US from [facility], recent history of complicated cholecystectomy on 2024 with subsequent return to the OR for bile leak and adhesion release presenting to ED for evaluation of abdominal pain. S/p ERCP demonstrating CHD stricture.     Imaging demonstrated Status post cholecystectomy. In the gallbladder fossa there is a 2.5 cm   rim-enhancing fluid collection which may represent an abscess. Intrahepatic biliary   ductal dilatation.    IR consulted for PTC     -Imaging reviewed   -Will plan for intervention today   -Keep patient NPO    Thank you for the courtesy of this consult, please call [phone] with any further questions.

## 2024-07-04 LAB
ALBUMIN SERPL ELPH-MCNC: 3.9 G/DL — SIGNIFICANT CHANGE UP (ref 3.5–5.2)
ALP SERPL-CCNC: 524 U/L — HIGH (ref 30–115)
ALT FLD-CCNC: 96 U/L — HIGH (ref 0–41)
ANION GAP SERPL CALC-SCNC: 13 MMOL/L — SIGNIFICANT CHANGE UP (ref 7–14)
ANION GAP SERPL CALC-SCNC: 16 MMOL/L — HIGH (ref 7–14)
AST SERPL-CCNC: 50 U/L — HIGH (ref 0–41)
BASOPHILS # BLD AUTO: 0.01 K/UL — SIGNIFICANT CHANGE UP (ref 0–0.2)
BASOPHILS NFR BLD AUTO: 0.1 % — SIGNIFICANT CHANGE UP (ref 0–1)
BILIRUB SERPL-MCNC: 0.5 MG/DL — SIGNIFICANT CHANGE UP (ref 0.2–1.2)
BUN SERPL-MCNC: 10 MG/DL — SIGNIFICANT CHANGE UP (ref 10–20)
BUN SERPL-MCNC: 11 MG/DL — SIGNIFICANT CHANGE UP (ref 10–20)
CALCIUM SERPL-MCNC: 9.3 MG/DL — SIGNIFICANT CHANGE UP (ref 8.4–10.4)
CALCIUM SERPL-MCNC: 9.3 MG/DL — SIGNIFICANT CHANGE UP (ref 8.4–10.5)
CHLORIDE SERPL-SCNC: 104 MMOL/L — SIGNIFICANT CHANGE UP (ref 98–110)
CHLORIDE SERPL-SCNC: 104 MMOL/L — SIGNIFICANT CHANGE UP (ref 98–110)
CO2 SERPL-SCNC: 23 MMOL/L — SIGNIFICANT CHANGE UP (ref 17–32)
CO2 SERPL-SCNC: 28 MMOL/L — SIGNIFICANT CHANGE UP (ref 17–32)
CREAT SERPL-MCNC: 1.6 MG/DL — HIGH (ref 0.7–1.5)
CREAT SERPL-MCNC: 2 MG/DL — HIGH (ref 0.7–1.5)
EGFR: 33 ML/MIN/1.73M2 — LOW
EGFR: 44 ML/MIN/1.73M2 — LOW
EOSINOPHIL # BLD AUTO: 0.03 K/UL — SIGNIFICANT CHANGE UP (ref 0–0.7)
EOSINOPHIL NFR BLD AUTO: 0.3 % — SIGNIFICANT CHANGE UP (ref 0–8)
GLUCOSE SERPL-MCNC: 101 MG/DL — HIGH (ref 70–99)
GLUCOSE SERPL-MCNC: 119 MG/DL — HIGH (ref 70–99)
HCT VFR BLD CALC: 33.7 % — LOW (ref 37–47)
HGB BLD-MCNC: 11 G/DL — LOW (ref 12–16)
IMM GRANULOCYTES NFR BLD AUTO: 0.2 % — SIGNIFICANT CHANGE UP (ref 0.1–0.3)
LYMPHOCYTES # BLD AUTO: 1.95 K/UL — SIGNIFICANT CHANGE UP (ref 1.2–3.4)
LYMPHOCYTES # BLD AUTO: 19.2 % — LOW (ref 20.5–51.1)
MAGNESIUM SERPL-MCNC: 1.9 MG/DL — SIGNIFICANT CHANGE UP (ref 1.8–2.4)
MCHC RBC-ENTMCNC: 29.7 PG — SIGNIFICANT CHANGE UP (ref 27–31)
MCHC RBC-ENTMCNC: 32.6 G/DL — SIGNIFICANT CHANGE UP (ref 32–37)
MCV RBC AUTO: 91.1 FL — SIGNIFICANT CHANGE UP (ref 81–99)
MONOCYTES # BLD AUTO: 0.93 K/UL — HIGH (ref 0.1–0.6)
MONOCYTES NFR BLD AUTO: 9.2 % — SIGNIFICANT CHANGE UP (ref 1.7–9.3)
NEUTROPHILS # BLD AUTO: 7.19 K/UL — HIGH (ref 1.4–6.5)
NEUTROPHILS NFR BLD AUTO: 71 % — SIGNIFICANT CHANGE UP (ref 42.2–75.2)
NRBC # BLD: 0 /100 WBCS — SIGNIFICANT CHANGE UP (ref 0–0)
PLATELET # BLD AUTO: 412 K/UL — HIGH (ref 130–400)
PMV BLD: 10 FL — SIGNIFICANT CHANGE UP (ref 7.4–10.4)
POTASSIUM SERPL-MCNC: 3.5 MMOL/L — SIGNIFICANT CHANGE UP (ref 3.5–5)
POTASSIUM SERPL-MCNC: 3.6 MMOL/L — SIGNIFICANT CHANGE UP (ref 3.5–5)
POTASSIUM SERPL-SCNC: 3.5 MMOL/L — SIGNIFICANT CHANGE UP (ref 3.5–5)
POTASSIUM SERPL-SCNC: 3.6 MMOL/L — SIGNIFICANT CHANGE UP (ref 3.5–5)
PROT SERPL-MCNC: 6.9 G/DL — SIGNIFICANT CHANGE UP (ref 6–8)
RBC # BLD: 3.7 M/UL — LOW (ref 4.2–5.4)
RBC # FLD: 16.2 % — HIGH (ref 11.5–14.5)
SODIUM SERPL-SCNC: 143 MMOL/L — SIGNIFICANT CHANGE UP (ref 135–146)
SODIUM SERPL-SCNC: 145 MMOL/L — SIGNIFICANT CHANGE UP (ref 135–146)
WBC # BLD: 10.13 K/UL — SIGNIFICANT CHANGE UP (ref 4.8–10.8)
WBC # FLD AUTO: 10.13 K/UL — SIGNIFICANT CHANGE UP (ref 4.8–10.8)

## 2024-07-04 PROCEDURE — 93010 ELECTROCARDIOGRAM REPORT: CPT

## 2024-07-04 PROCEDURE — 99233 SBSQ HOSP IP/OBS HIGH 50: CPT

## 2024-07-04 RX ORDER — HEPARIN SODIUM 50 [USP'U]/ML
5000 INJECTION, SOLUTION INTRAVENOUS EVERY 8 HOURS
Refills: 0 | Status: DISCONTINUED | OUTPATIENT
Start: 2024-07-04 | End: 2024-07-08

## 2024-07-04 RX ORDER — MORPHINE SULFATE 100 MG/1
4 TABLET, EXTENDED RELEASE ORAL ONCE
Refills: 0 | Status: DISCONTINUED | OUTPATIENT
Start: 2024-07-04 | End: 2024-07-04

## 2024-07-04 RX ORDER — ONDANSETRON HYDROCHLORIDE 2 MG/ML
4 INJECTION INTRAMUSCULAR; INTRAVENOUS EVERY 8 HOURS
Refills: 0 | Status: DISCONTINUED | OUTPATIENT
Start: 2024-07-04 | End: 2024-07-08

## 2024-07-04 RX ORDER — HYDROMORPHONE HCL 0.2 MG/ML
0.5 INJECTION, SOLUTION INTRAVENOUS EVERY 4 HOURS
Refills: 0 | Status: DISCONTINUED | OUTPATIENT
Start: 2024-07-04 | End: 2024-07-08

## 2024-07-04 RX ADMIN — HEPARIN SODIUM 5000 UNIT(S): 50 INJECTION, SOLUTION INTRAVENOUS at 21:38

## 2024-07-04 RX ADMIN — Medication 1 APPLICATION(S): at 05:10

## 2024-07-04 RX ADMIN — ONDANSETRON HYDROCHLORIDE 4 MILLIGRAM(S): 2 INJECTION INTRAMUSCULAR; INTRAVENOUS at 12:51

## 2024-07-04 RX ADMIN — POLYETHYLENE GLYCOL 3350 17 GRAM(S): 1 POWDER ORAL at 11:57

## 2024-07-04 RX ADMIN — Medication 2 TABLET(S): at 21:38

## 2024-07-04 RX ADMIN — METRONIDAZOLE 500 MILLIGRAM(S): 500 TABLET ORAL at 14:28

## 2024-07-04 RX ADMIN — Medication 650 MILLIGRAM(S): at 05:25

## 2024-07-04 RX ADMIN — Medication 100 MILLIGRAM(S): at 11:58

## 2024-07-04 RX ADMIN — DEXTROSE MONOHYDRATE AND SODIUM CHLORIDE 50 MILLILITER(S): 5; .3 INJECTION, SOLUTION INTRAVENOUS at 12:51

## 2024-07-04 RX ADMIN — MORPHINE SULFATE 4 MILLIGRAM(S): 100 TABLET, EXTENDED RELEASE ORAL at 09:33

## 2024-07-04 RX ADMIN — METRONIDAZOLE 500 MILLIGRAM(S): 500 TABLET ORAL at 05:10

## 2024-07-04 RX ADMIN — Medication 2 TABLET(S): at 11:57

## 2024-07-04 RX ADMIN — METRONIDAZOLE 500 MILLIGRAM(S): 500 TABLET ORAL at 21:38

## 2024-07-04 NOTE — PROGRESS NOTE ADULT - SUBJECTIVE AND OBJECTIVE BOX
GENERAL SURGERY PROGRESS NOTE    Patient: MICHAEL LAL , 33y ([birth year])Female   MRN: [id number]  Location: [address]  Visit: 24 Inpatient  Date: 24 @ 02:19    Hospital Day #: 8    Procedure/Dx/Injuries:  Gallbladder colic    Events of past 24 hours:  No acute events overnight. Pt was seen at the bedside. Afebrile    PAST MEDICAL & SURGICAL HISTORY:  Glucose 6 phosphatase deficiency   delivery delivered    Vitals:   T(F): 98.2 (24 @ 19:49), Max: 98.3 (24 @ 12:00)  HR: 76 (24 @ 19:49)  BP: 120/78 (24 @ 19:49)  RR: 18 (24 @ 19:49)  SpO2: 100% (24 @ 19:49)    PHYSICAL EXAM:  General Appearance: NAD,  AAOx3, calm and cooperative  HEENT: Normocephalic, atraumatic  Lungs: No accesory muscle use   Abdomen:  Soft, nontender, nondistended    Diet, NPO  Diet, NPO after Midnight:      NPO Start Date: 2024,   NPO Start Time: 23:59      Fluids:   I & O's:    PHYSICAL EXAM:  General: NAD, AAOx3, calm and cooperative  HEENT: NCAT, SYBIL, EOMI, Trachea ML, Neck supple  Cardiac: RRR S1, S2, no Murmurs, rubs or gallops  Respiratory: CTAB, normal respiratory effort, breath sounds equal BL, no wheeze, rhonchi or crackles  Abdomen: Soft, non-distended, non-tender, no rebound, no guarding. +BS.  Rectal: Good tone, +stool, no blood, no chase-anal masses/lesions, no fistulas, fissures, hemorrhoids  Musculoskeletal: Strength 5/5 BL UE/LE, ROM intact, compartments soft  Neuro: Sensation grossly intact and equal throughout, no focal deficits  Vascular: Pulses 2+ throughout, extremities well perfused  Skin: Warm/dry, normal color, no jaundice  Incision/wound: healing well, dressings in place, clean, dry and intact    MEDICATIONS  (STANDING):  cefTRIAXone   IVPB 2000 milliGRAM(s) IV Intermittent every 24 hours  chlorhexidine 2% Cloths 1 Application(s) Topical <User Schedule>  dextrose 5% + lactated ringers. 1000 milliLiter(s) (50 mL/Hr) IV Continuous <Continuous>  indomethacin Suppository 100 milliGRAM(s) Rectal once  lactobacillus acidophilus 2 Tablet(s) Oral daily  metroNIDAZOLE    Tablet 500 milliGRAM(s) Oral every 8 hours  polyethylene glycol 3350 17 Gram(s) Oral daily  senna 2 Tablet(s) Oral at bedtime    MEDICATIONS  (PRN):  acetaminophen     Tablet .. 650 milliGRAM(s) Oral every 6 hours PRN Mild Pain (1 - 3), Moderate Pain (4 - 6), Severe Pain (7 - 10)      DVT PROPHYLAXIS:   GI PROPHYLAXIS:   ANTICOAGULATION:   ANTIBIOTICS:  cefTRIAXone   IVPB 2000 milliGRAM(s)  metroNIDAZOLE    Tablet 500 milliGRAM(s)            LAB/STUDIES:  Labs:  CAPILLARY BLOOD GLUCOSE                              11.6   10.02 )-----------( 483      ( 2024 06:15 )             36.1       Auto Neutrophil %: 80.3 % (24 @ 06:15)  Auto Immature Granulocyte %: 0.3 % (24 @ 06:15)    07-    139  |  99  |  5<L>  ----------------------------<  151<H>  4.3   |  29  |  0.6<L>      Calcium: 9.9 mg/dL (24 @ 06:15)      LFTs:             7.5  | 0.6  | 76       ------------------[544     ( 2024 06:15 )  4.2  | x    | 129         Lipase:x      Amylase:x             Coags:            Urinalysis Basic - ( 2024 06:15 )    Color: x / Appearance: x / SG: x / pH: x  Gluc: 151 mg/dL / Ketone: x  / Bili: x / Urobili: x   Blood: x / Protein: x / Nitrite: x   Leuk Esterase: x / RBC: x / WBC x   Sq Epi: x / Non Sq Epi: x / Bacteria: x                IMAGING:      ACCESS/ DEVICES:  [ ] Peripheral IV  [ ] Central Venous Line	[ ] R	[ ] L	[ ] IJ	[ ] Fem	[ ] SC	Placed:   [ ] Arterial Line		[ ] R	[ ] L	[ ] Fem	[ ] Rad	[ ] Ax	Placed:   [ ] PICC:					[ ] Mediport  [ ] Urinary Catheter,  Date Placed:   [ ] Chest tube: [ ] Right, [ ] Left  [ ] RON/Rahul Drains      ASSESSMENT:  33y F PSHx laparoscopic cholecystectomy (2024) complicated by post-op leak s/p ex-lap x2 (2024, 2024) presented w/ abdominal pain s/p ERCP-cholangiogram revealing a stricture in the CHD at the level of a previously placed surgical clip w/ upstream dilation of the L. & R. intrahepatic bile ducts.  with the above physical exam, labs and imaging.   Patient seen and examined at bedside. NAD.  Tolerating Diet:  Diet, NPO (24 @ 08:55) [Active]  Diet, NPO after Midnight:      NPO Start Date: 2024,   NPO Start Time: 23:59 (24 @ 12:55) [Active]        PLAN:  - f/u IR   - Local wound care  - Monitor vitals  - Monitor labs and replete as necessary  - Continue Pain Medications if necessary  - Continue [id number] F w/ PMHx of  ****    PLAN:  -  -  -    Lines/Tubes: PIV, Midline, Central Line, A-Line, Chest tubes, Rahul/RON drains, Shen Catheter.    BLUE TEAM SPECTRA: [id number]  GREEN TEAM SPECTRA: 1963  VASCULAR TEAM SPECTRA: [id number]     GENERAL SURGERY PROGRESS NOTE    Patient: MICHAEL LAL , 33y (89)Female   MRN: [id number]  Location: [address]  Visit: 24 Inpatient  Date: 24 @ 02:19    Hospital Day #: 8    Procedure/Dx/Injuries:  Gallbladder colic    Events of past 24 hours:  No acute events overnight. Pt was seen at the bedside. Afebrile    PAST MEDICAL & SURGICAL HISTORY:  Glucose 6 phosphatase deficiency   delivery    Vitals:   T(F): 98.2 (24 @ 19:49), Max: 98.3 (24 @ 12:00)  HR: 76 (24 @ 19:49)  BP: 120/78 (24 @ 19:49)  RR: 18 (24 @ 19:49)  SpO2: 100% (24 @ 19:49)    PHYSICAL EXAM:  General Appearance: NAD,  AAOx3, calm and cooperative  HEENT: Normocephalic, atraumatic  Lungs: No accessory muscle use   Abdomen:  Soft, nontender, nondistended    Diet, NPO  Diet, NPO after Midnight:      NPO Start Date: 2024,   NPO Start Time: 23:59    MEDICATIONS  (STANDING):  cefTRIAXone   IVPB 2000 milliGRAM(s) IV Intermittent every 24 hours  chlorhexidine 2% Cloths 1 Application(s) Topical <User Schedule>  dextrose 5% + lactated ringers. 1000 milliLiter(s) (50 mL/Hr) IV Continuous <Continuous>  indomethacin Suppository 100 milliGRAM(s) Rectal once  lactobacillus acidophilus 2 Tablet(s) Oral daily  metroNIDAZOLE    Tablet 500 milliGRAM(s) Oral every 8 hours  polyethylene glycol 3350 17 Gram(s) Oral daily  senna 2 Tablet(s) Oral at bedtime    MEDICATIONS  (PRN):  acetaminophen     Tablet .. 650 milliGRAM(s) Oral every 6 hours PRN Mild Pain (1 - 3), Moderate Pain (4 - 6), Severe Pain (7 - 10)    DVT PROPHYLAXIS:   GI PROPHYLAXIS:   ANTICOAGULATION:   ANTIBIOTICS:  cefTRIAXone   IVPB 2000 milliGRAM(s)  metroNIDAZOLE    Tablet 500 milliGRAM(s)    LAB/STUDIES:  Labs:                       11.6   10.02 )-----------( 483      ( 2024 06:15 )             36.1       Auto Neutrophil %: 80.3 % (24 @ 06:15)  Auto Immature Granulocyte %: 0.3 % (24 @ 06:15)        139  |  99  |  5<L>  ----------------------------<  151<H>  4.3   |  29  |  0.6<L>      Calcium: 9.9 mg/dL (24 @ 06:15)    LFTs:             7.5  | 0.6  | 76       ------------------[544     ( 2024 06:15 )  4.2  | x    | 129         Urinalysis Basic - ( 2024 06:15 )    Color: x / Appearance: x / SG: x / pH: x  Gluc: 151 mg/dL / Ketone: x  / Bili: x / Urobili: x   Blood: x / Protein: x / Nitrite: x   Leuk Esterase: x / RBC: x / WBC x   Sq Epi: x / Non Sq Epi: x / Bacteria: x    IMAGING:      ACCESS/ DEVICES:  [X ] Peripheral IV

## 2024-07-04 NOTE — PROGRESS NOTE ADULT - ASSESSMENT
ASSESSMENT:  33y F PSHx laparoscopic cholecystectomy (4/2024) complicated by post-op leak s/p ex-lap x2 (4/2024, 5/2024) presented w/ abdominal pain s/p ERCP-cholangiogram revealing a stricture in the CHD at the level of a previously placed surgical clip w/ upstream dilation of the L. & R. intrahepatic bile ducts.    PLAN:  - f/u IR   - Monitor vitals  - Monitor electrolytes replete as necessary  - Continue Pain Medications if necessary  - Local wound care    BLUE TEAM SPECTRA: [id number]

## 2024-07-04 NOTE — PROGRESS NOTE ADULT - SUBJECTIVE AND OBJECTIVE BOX
Patient is a 33y old  Female who presents with a chief complaint of abdominal pain (03 Jul 2024 13:55)      OVERNIGHT EVENTS: s/p ERCP, doing well     SUBJECTIVE / INTERVAL HPI: Patient seen and examined at bedside. Patient reports feeling nauseous today and abdominal pain.      MEDICATIONS:  STANDING MEDICATIONS  cefTRIAXone   IVPB 2000 milliGRAM(s) IV Intermittent every 24 hours, 07-03-24 @ 11:41  chlorhexidine 2% Cloths 1 Application(s) Topical <User Schedule>, 07-02-24 @ 09:00  dextrose 5% + lactated ringers. 1000 milliLiter(s) IV Continuous <Continuous>, 07-02-24 @ 01:00  indomethacin Suppository 100 milliGRAM(s) Rectal once, 07-02-24 @ 16:00  lactobacillus acidophilus 2 Tablet(s) Oral daily, 06-28-24 @ 14:53  metroNIDAZOLE    Tablet 500 milliGRAM(s) Oral every 8 hours, 07-03-24 @ 11:41  polyethylene glycol 3350 17 Gram(s) Oral daily, 06-28-24 @ 09:09  senna 2 Tablet(s) Oral at bedtime, 06-28-24 @ 09:09    PRN MEDICATIONS  acetaminophen     Tablet .. 650 milliGRAM(s) Oral every 6 hours, 06-29-24 @ 11:56 PRN  HYDROmorphone  Injectable 0.5 milliGRAM(s) IV Push every 4 hours, 07-04-24 @ 09:38 PRN  ondansetron Injectable 4 milliGRAM(s) IV Push every 8 hours, 07-04-24 @ 12:24 PRN    Diet, NPO (07-03-24 @ 08:55) [Active]  Diet, NPO after Midnight:      NPO Start Date: 01-Jul-2024,   NPO Start Time: 23:59 (07-01-24 @ 12:55) [Active]          Vital Signs Last 24 Hrs  T(C): 36.8 (04 Jul 2024 12:05), Max: 36.8 (03 Jul 2024 19:49)  T(F): 98.2 (04 Jul 2024 12:05), Max: 98.2 (03 Jul 2024 19:49)  HR: 91 (04 Jul 2024 12:05) (76 - 91)  BP: 119/77 (04 Jul 2024 12:05) (119/77 - 137/76)  RR: 18 (04 Jul 2024 12:05) (18 - 19)  SpO2: 100% (04 Jul 2024 12:05) (99% - 100%)    Parameters below as of 04 Jul 2024 04:47  Patient On (Oxygen Delivery Method): room air      I&Os:  07-03-24 @ 07:01  -  07-04-24 @ 07:00  --------------------------------------------------------  IN: 960 mL / OUT: 600 mL / NET: 360 mL        LABS:                        11.0   10.13 )-----------( 412      ( 04 Jul 2024 07:34 )             33.7     WBC trend: 10.13 <--, 10.02 <--, 6.85 <--, 6.13 <--  Hgb: 11.0 [07-04-24 @ 07:34]<--, 11.6 [07-03-24 @ 06:15]<--, 11.7 [07-02-24 @ 08:31]<--, 11.1 [07-01-24 @ 06:26]<--, 12.5 [06-30-24 @ 05:53]<--, 11.2 [06-29-24 @ 07:45]<--    07-04    143  |  104  |  10  ----------------------------<  101<H>  3.5   |  23  |  1.6<H>    Ca    9.3      04 Jul 2024 07:34  Mg     1.9     07-04    TPro  6.9  /  Alb  3.9  /  TBili  0.5  /  DBili  x   /  AST  50<H>  /  ALT  96<H>  /  AlkPhos  524<H>  07-04    Creatinine trend: 1.6<--, 0.6<--, 0.5<--, 0.5<--, 0.6<--, 0.5<--, 0.5<--  SODIUM TREND: Sodium 143 [07-04 @ 07:34]<--, Sodium 139 [07-03 @ 06:15]<--, Sodium 140 [07-02 @ 08:31]<--, Sodium 138 [07-01 @ 06:26]<--, Sodium 138 [06-30 @ 05:53]<--      POC Glucose:     Urinalysis with Rflx Culture (collected 06-29-24 @ 10:02)    Culture - Other (collected 06-28-24 @ 04:00)  Source: Wound Wound  Final Report (06-30-24 @ 19:14):    Rare Staphylococcus epidermidis "Susceptibilities not performed"      Ferritin: 551 ng/mL (06-28-24 @ 08:03)      Urinalysis Basic - ( 04 Jul 2024 07:34 )    Color: x / Appearance: x / SG: x / pH: x  Gluc: 101 mg/dL / Ketone: x  / Bili: x / Urobili: x   Blood: x / Protein: x / Nitrite: x   Leuk Esterase: x / RBC: x / WBC x   Sq Epi: x / Non Sq Epi: x / Bacteria: x                                              -------------------------------------------------        RADIOLOGY & ADDITIONAL TESTS: Reviewed.    PHYSICAL EXAM:    General: NAD  Cardiovascular: +S1/S2; RRR  Respiratory: CTA b/l; no W/R/R  Gastrointestinal: soft, NT/ND; +BSx4  Extremities: WWP; 2+ peripheral pulses; no edema   Neurological: AAOx3; no focal deficits

## 2024-07-04 NOTE — PROGRESS NOTE ADULT - ASSESSMENT
33-year-old woman past medical history of G6PD deficiency, visiting [place] from [facility], recent history of complicated cholecystectomy on 4/22/2024 with subsequent return to the OR for bile leak and adhesion release presenting to ED for evaluation of abdominal pain after her 1-year-old son accidentally kicked her in the area of prior surgical intervention 6/27 am. Admitted for further evaluation and management of GB fossa rim enhancing collection with intrahepatic biliary dilatation.    # pt is visiting from [facility] for 6 wks (this is her 1st week); she has 4 children ages 1-8; she has no insurance currently (working on getting MCAid)    # s/p CCY   # GB Fossa Abscess w Cutaneous fistula with Mid abdominal drainage / Common Hepatic Duct due injury s/p (CCY) in [facility]  NO SEPSIS  s/p ERCP showed stricture in the CHD  - s/p percutaneous drainage placed by IR  BCx: neg x2  Wnd Cx: Staph epidermidis  Abx Rocephin and flagyl   ID f/up    # FRANCISCA  - repeat BMP at 4 pm  - if cr. persistently elevated -> check UA, urine Na, Cr., check RBUS  - monitor I/O    # Left-sided hydronephrosis on CT scan: seen by Uro: no evidence of hydronephrosis  # hypomagnesemia  replete    # Normocytic anemia of chr dz; could also be related to menses  hapto 287 - no hemolysis  B12, fol, ferr - all OK    # h/o G6PD ?carrier   Got tested after her first child diagnosed with G6PD  avoid sulpha drugs and others that may provoke hemolysis     # DVT prophylaxis: heparin s/q      Total time spent to complete patient's bedside assessment, review medical chart, discuss medical plan of care with covering medical team was more than 50 minutes  with >50% of time spent face to face with patient, discussion with patient/family and/or coordination of care.

## 2024-07-05 LAB
ALBUMIN SERPL ELPH-MCNC: 3.8 G/DL — SIGNIFICANT CHANGE UP (ref 3.5–5.2)
ALP SERPL-CCNC: 472 U/L — HIGH (ref 30–115)
ALT FLD-CCNC: 66 U/L — HIGH (ref 0–41)
ANION GAP SERPL CALC-SCNC: 15 MMOL/L — HIGH (ref 7–14)
APPEARANCE UR: CLEAR — SIGNIFICANT CHANGE UP
AST SERPL-CCNC: 23 U/L — SIGNIFICANT CHANGE UP (ref 0–41)
BASOPHILS # BLD AUTO: 0.02 K/UL — SIGNIFICANT CHANGE UP (ref 0–0.2)
BASOPHILS NFR BLD AUTO: 0.3 % — SIGNIFICANT CHANGE UP (ref 0–1)
BILIRUB SERPL-MCNC: 0.5 MG/DL — SIGNIFICANT CHANGE UP (ref 0.2–1.2)
BILIRUB UR-MCNC: NEGATIVE — SIGNIFICANT CHANGE UP
BUN SERPL-MCNC: 12 MG/DL — SIGNIFICANT CHANGE UP (ref 10–20)
CALCIUM SERPL-MCNC: 9.5 MG/DL — SIGNIFICANT CHANGE UP (ref 8.4–10.5)
CHLORIDE SERPL-SCNC: 104 MMOL/L — SIGNIFICANT CHANGE UP (ref 98–110)
CO2 SERPL-SCNC: 26 MMOL/L — SIGNIFICANT CHANGE UP (ref 17–32)
COLOR SPEC: YELLOW — SIGNIFICANT CHANGE UP
CREAT ?TM UR-MCNC: 125 MG/DL — SIGNIFICANT CHANGE UP
CREAT SERPL-MCNC: 1.9 MG/DL — HIGH (ref 0.7–1.5)
DIFF PNL FLD: NEGATIVE — SIGNIFICANT CHANGE UP
EGFR: 36 ML/MIN/1.73M2 — LOW
EOSINOPHIL # BLD AUTO: 0.08 K/UL — SIGNIFICANT CHANGE UP (ref 0–0.7)
EOSINOPHIL NFR BLD AUTO: 1.1 % — SIGNIFICANT CHANGE UP (ref 0–8)
GLUCOSE SERPL-MCNC: 106 MG/DL — HIGH (ref 70–99)
GLUCOSE UR QL: NEGATIVE MG/DL — SIGNIFICANT CHANGE UP
HCT VFR BLD CALC: 34.6 % — LOW (ref 37–47)
HGB BLD-MCNC: 11.1 G/DL — LOW (ref 12–16)
IMM GRANULOCYTES NFR BLD AUTO: 0.3 % — SIGNIFICANT CHANGE UP (ref 0.1–0.3)
KETONES UR-MCNC: NEGATIVE MG/DL — SIGNIFICANT CHANGE UP
LEUKOCYTE ESTERASE UR-ACNC: ABNORMAL
LYMPHOCYTES # BLD AUTO: 1.33 K/UL — SIGNIFICANT CHANGE UP (ref 1.2–3.4)
LYMPHOCYTES # BLD AUTO: 17.6 % — LOW (ref 20.5–51.1)
MAGNESIUM SERPL-MCNC: 1.9 MG/DL — SIGNIFICANT CHANGE UP (ref 1.8–2.4)
MCHC RBC-ENTMCNC: 29.4 PG — SIGNIFICANT CHANGE UP (ref 27–31)
MCHC RBC-ENTMCNC: 32.1 G/DL — SIGNIFICANT CHANGE UP (ref 32–37)
MCV RBC AUTO: 91.8 FL — SIGNIFICANT CHANGE UP (ref 81–99)
MONOCYTES # BLD AUTO: 0.71 K/UL — HIGH (ref 0.1–0.6)
MONOCYTES NFR BLD AUTO: 9.4 % — HIGH (ref 1.7–9.3)
NEUTROPHILS # BLD AUTO: 5.4 K/UL — SIGNIFICANT CHANGE UP (ref 1.4–6.5)
NEUTROPHILS NFR BLD AUTO: 71.3 % — SIGNIFICANT CHANGE UP (ref 42.2–75.2)
NITRITE UR-MCNC: NEGATIVE — SIGNIFICANT CHANGE UP
NRBC # BLD: 0 /100 WBCS — SIGNIFICANT CHANGE UP (ref 0–0)
OSMOLALITY UR: 329 MOS/KG — SIGNIFICANT CHANGE UP (ref 50–1200)
PH UR: 6 — SIGNIFICANT CHANGE UP (ref 5–8)
PLATELET # BLD AUTO: 464 K/UL — HIGH (ref 130–400)
PMV BLD: 10.3 FL — SIGNIFICANT CHANGE UP (ref 7.4–10.4)
POTASSIUM SERPL-MCNC: 3.8 MMOL/L — SIGNIFICANT CHANGE UP (ref 3.5–5)
POTASSIUM SERPL-SCNC: 3.8 MMOL/L — SIGNIFICANT CHANGE UP (ref 3.5–5)
POTASSIUM UR-SCNC: 26 MMOL/L — SIGNIFICANT CHANGE UP
PROT ?TM UR-MCNC: 52 MG/DLG/24H — SIGNIFICANT CHANGE UP
PROT SERPL-MCNC: 7.1 G/DL — SIGNIFICANT CHANGE UP (ref 6–8)
PROT UR-MCNC: 100 MG/DL
PROT/CREAT UR-RTO: 0.4 RATIO — HIGH (ref 0–0.2)
RBC # BLD: 3.77 M/UL — LOW (ref 4.2–5.4)
RBC # FLD: 16.2 % — HIGH (ref 11.5–14.5)
SODIUM SERPL-SCNC: 145 MMOL/L — SIGNIFICANT CHANGE UP (ref 135–146)
SODIUM UR-SCNC: 69 MMOL/L — SIGNIFICANT CHANGE UP
SP GR SPEC: 1.01 — SIGNIFICANT CHANGE UP (ref 1–1.03)
SURGICAL PATHOLOGY STUDY: SIGNIFICANT CHANGE UP
UROBILINOGEN FLD QL: 0.2 MG/DL — SIGNIFICANT CHANGE UP (ref 0.2–1)
UUN UR-MCNC: 338 MG/DL — SIGNIFICANT CHANGE UP
WBC # BLD: 7.56 K/UL — SIGNIFICANT CHANGE UP (ref 4.8–10.8)
WBC # FLD AUTO: 7.56 K/UL — SIGNIFICANT CHANGE UP (ref 4.8–10.8)

## 2024-07-05 PROCEDURE — 99232 SBSQ HOSP IP/OBS MODERATE 35: CPT

## 2024-07-05 RX ORDER — DEXTROSE MONOHYDRATE AND SODIUM CHLORIDE 5; .3 G/100ML; G/100ML
1000 INJECTION, SOLUTION INTRAVENOUS
Refills: 0 | Status: DISCONTINUED | OUTPATIENT
Start: 2024-07-05 | End: 2024-07-08

## 2024-07-05 RX ORDER — CEFTRIAXONE SODIUM 500 MG
1000 VIAL (EA) INJECTION EVERY 24 HOURS
Refills: 0 | Status: DISCONTINUED | OUTPATIENT
Start: 2024-07-06 | End: 2024-07-08

## 2024-07-05 RX ORDER — SODIUM CHLORIDE 0.9 % (FLUSH) 0.9 %
1000 SYRINGE (ML) INJECTION
Refills: 0 | Status: DISCONTINUED | OUTPATIENT
Start: 2024-07-05 | End: 2024-07-05

## 2024-07-05 RX ADMIN — ONDANSETRON HYDROCHLORIDE 4 MILLIGRAM(S): 2 INJECTION INTRAMUSCULAR; INTRAVENOUS at 20:51

## 2024-07-05 RX ADMIN — METRONIDAZOLE 500 MILLIGRAM(S): 500 TABLET ORAL at 21:22

## 2024-07-05 RX ADMIN — HEPARIN SODIUM 5000 UNIT(S): 50 INJECTION, SOLUTION INTRAVENOUS at 21:21

## 2024-07-05 RX ADMIN — DEXTROSE MONOHYDRATE AND SODIUM CHLORIDE 75 MILLILITER(S): 5; .3 INJECTION, SOLUTION INTRAVENOUS at 20:46

## 2024-07-05 RX ADMIN — ONDANSETRON HYDROCHLORIDE 4 MILLIGRAM(S): 2 INJECTION INTRAMUSCULAR; INTRAVENOUS at 07:59

## 2024-07-05 RX ADMIN — HEPARIN SODIUM 5000 UNIT(S): 50 INJECTION, SOLUTION INTRAVENOUS at 13:16

## 2024-07-05 RX ADMIN — Medication 75 MILLILITER(S): at 08:32

## 2024-07-05 RX ADMIN — HYDROMORPHONE HCL 0.5 MILLIGRAM(S): 0.2 INJECTION, SOLUTION INTRAVENOUS at 02:04

## 2024-07-05 RX ADMIN — Medication 100 MILLIGRAM(S): at 11:59

## 2024-07-05 RX ADMIN — Medication 2 TABLET(S): at 11:58

## 2024-07-05 RX ADMIN — Medication 100 MILLIGRAM(S): at 23:14

## 2024-07-05 RX ADMIN — DEXTROSE MONOHYDRATE AND SODIUM CHLORIDE 50 MILLILITER(S): 5; .3 INJECTION, SOLUTION INTRAVENOUS at 05:14

## 2024-07-05 RX ADMIN — METRONIDAZOLE 500 MILLIGRAM(S): 500 TABLET ORAL at 05:10

## 2024-07-05 RX ADMIN — DEXTROSE MONOHYDRATE AND SODIUM CHLORIDE 75 MILLILITER(S): 5; .3 INJECTION, SOLUTION INTRAVENOUS at 10:06

## 2024-07-05 RX ADMIN — HYDROMORPHONE HCL 0.5 MILLIGRAM(S): 0.2 INJECTION, SOLUTION INTRAVENOUS at 20:46

## 2024-07-05 RX ADMIN — Medication 2 TABLET(S): at 21:21

## 2024-07-05 RX ADMIN — HYDROMORPHONE HCL 0.5 MILLIGRAM(S): 0.2 INJECTION, SOLUTION INTRAVENOUS at 09:20

## 2024-07-05 RX ADMIN — Medication 1 APPLICATION(S): at 05:13

## 2024-07-05 RX ADMIN — HYDROMORPHONE HCL 0.5 MILLIGRAM(S): 0.2 INJECTION, SOLUTION INTRAVENOUS at 08:40

## 2024-07-05 RX ADMIN — METRONIDAZOLE 500 MILLIGRAM(S): 500 TABLET ORAL at 16:28

## 2024-07-05 RX ADMIN — HEPARIN SODIUM 5000 UNIT(S): 50 INJECTION, SOLUTION INTRAVENOUS at 05:09

## 2024-07-05 NOTE — PROGRESS NOTE ADULT - MUSCULOSKELETAL
normal/ROM intact/normal gait/strength 5/5 bilateral upper extremities/strength 5/5 bilateral lower extremities

## 2024-07-05 NOTE — PROGRESS NOTE ADULT - ASSESSMENT
· Assessment	  33-year-old female past medical history of G6PD deficiency, visiting [place] from [facility], recent history of complicated cholecystectomy on 4/22/2024 with subsequent return to the OR for bile leak and adhesion release presenting to ED for evaluation of abdominal pain after her 1-year-old son accidentally kicked her in the area of prior surgical intervention 6/27 am.  Patient states that since her surgery she experienced  mild drainage from the surgical site for which she has been using po antibiotics but no fever/ chills, nausea /vomiting ,no change in appetite or any other additional complaints. Of note, surgery was performed in [facility].        CT abdomen/pelvis with iv contrast:  Status post cholecystectomy. In the gallbladder fossa there is a 2.5 cm   rim-enhancing fluid collection which may represent an abscess. Intrahepatic biliary   ductal dilatation.  Inflammatory changes from the gallbladder fossa extends anteriorly to the   abdominal wall.There is diffuse subcutaneous edema and soft tissue   swelling of the right abdomen. A fistulous tract cannot be excluded.  Left-sided hydronephrosis. An obstructing stone isn't visualized.    < from: MR MRCP w/wo IV Cont (06.29.24 @ 15:43) >  Postcholecystectomy.    Gallbladder fossa 2.2 cm thick-walled fluid signal structure, correlating   with CT. No definite restricted diffusion, raising possibility of   gallbladder remnant, subtotal cholecystectomy, or biloma rather than   abscess.Nuclear medicine HIDA scan could be helpful.    Moderate intrahepatic biliary distention, with central tapering at hilum   along superior aspect of fluid collection, without MR evidence of mass.    Right inferior hepatic tip 1.5 cm linear T2 hyperintense region, may   represent atypical cyst versus small grade II AAST liver laceration.    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  6/29 MRCP gallbladder remnant, subtotal cholecystectomy, or biloma rather than   abscess  7/2 S/P ERCP: Successful biliary sphincterotomy. 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 Multiple attempts at passing the wire into the intrahepatic ducts were unsuccessful as the wire could not traverse the stricture that is likely caused by the extrinsic clip across the CHD. Contrast did not drain from the intrahepatic ducts.  IVR for possible PTC today  Clinically no ongoing peritonitis post ERCP  Abd wall drainage : CoNS  6/28 BCx NG  Left-sided hydronephrosis with no obstructing distal ureteral stone visualized.  HO G6PD def    -Rocephin 2 gm iv q24h  -Flagyl 500 mg po q8h  -if no further procedures planned could change iv to po Avelox 400 mg q24 and po Flagyl 500 mg q8h for 4 weeks starting 7/2    Please do not hesitate to recall ID if any questions arise either through [facility]23 or through [employer]

## 2024-07-05 NOTE — PROGRESS NOTE ADULT - GASTROINTESTINAL
normal/soft/nontender/nondistended/normal active bowel sounds
minimal drainage/soft/nontender/no guarding/no rigidity
minimal draiange/normal/soft/nontender/nondistended/normal active bowel sounds
minimal drainage/soft/nontender/no guarding/no rigidity

## 2024-07-05 NOTE — PROGRESS NOTE ADULT - SUBJECTIVE AND OBJECTIVE BOX
GENERAL SURGERY PROGRESS NOTE    Patient: MICHAEL LAL , 33y (89)Female   MRN: [id number]  Location: [address]  Visit: 24 Inpatient  Date: 24 @ 06:55    Hospital Day #: 9  Post-Op Day #:    Procedure/Dx/Injuries: Gallbladder colic, s/p lap vania (2024 in [facility]), wound infection and biloma, s/p ERCP 7/2    Events of past 24 hours:    PAST MEDICAL & SURGICAL HISTORY:  Glucose 6 phosphatase deficiency   delivery      Vitals:   T(F): 98.2 (24 @ 06:21), Max: 98.2 (24 @ 12:05)  HR: 82 (24 @ 06:21)  BP: 122/83 (24 @ 06:21)  RR: 18 (24 @ 06:21)  SpO2: 100% (24 @ 20:07)      Diet, NPO  Diet, NPO after Midnight:      NPO Start Date: 2024,   NPO Start Time: 23:59      Fluids:     I & O's:    24 @ 07:01  -  24 @ 07:00  --------------------------------------------------------  IN:    dextrose 5% + lactated ringers: 900 mL    Oral Fluid: 60 mL  Total IN: 960 mL    OUT:    Drain (mL): 600 mL  Total OUT: 600 mL    Total NET: 360 mL        Bowel Movement: : [] YES [] NO  Flatus: : [] YES [] NO    PHYSICAL EXAM:  General Appearance: NAD,  AAOx3, calm and cooperative  HEENT: Normocephalic, atraumatic  Lungs: No accessory muscle use   Abdomen:  Soft, nontender, nondistended    MEDICATIONS  (STANDING):  cefTRIAXone   IVPB 2000 milliGRAM(s) IV Intermittent every 24 hours  chlorhexidine 2% Cloths 1 Application(s) Topical <User Schedule>  dextrose 5% + lactated ringers. 1000 milliLiter(s) (50 mL/Hr) IV Continuous <Continuous>  heparin   Injectable 5000 Unit(s) SubCutaneous every 8 hours  indomethacin Suppository 100 milliGRAM(s) Rectal once  lactobacillus acidophilus 2 Tablet(s) Oral daily  metroNIDAZOLE    Tablet 500 milliGRAM(s) Oral every 8 hours  polyethylene glycol 3350 17 Gram(s) Oral daily  senna 2 Tablet(s) Oral at bedtime    MEDICATIONS  (PRN):  acetaminophen     Tablet .. 650 milliGRAM(s) Oral every 6 hours PRN Mild Pain (1 - 3), Moderate Pain (4 - 6), Severe Pain (7 - 10)  HYDROmorphone  Injectable 0.5 milliGRAM(s) IV Push every 4 hours PRN Moderate Pain (4 - 6)  ondansetron Injectable 4 milliGRAM(s) IV Push every 8 hours PRN Nausea and/or Vomiting      DVT PROPHYLAXIS: heparin   Injectable 5000 Unit(s) SubCutaneous every 8 hours    GI PROPHYLAXIS:   ANTICOAGULATION:   ANTIBIOTICS:  cefTRIAXone   IVPB 2000 milliGRAM(s)  metroNIDAZOLE    Tablet 500 milliGRAM(s)            LAB/STUDIES:  Labs:  CAPILLARY BLOOD GLUCOSE                        11.0   10.13 )-----------( 412      ( 2024 07:34 )             33.7       Auto Neutrophil %: 71.0 % (24 @ 07:34)  Auto Immature Granulocyte %: 0.2 % (24 @ 07:34)        145  |  104  |  11  ----------------------------<  119<H>  3.6   |  28  |  2.0<H>      Calcium: 9.3 mg/dL (24 @ 20:00)      LFTs:             6.9  | 0.5  | 50       ------------------[524     ( 2024 07:34 )  3.9  | x    | 96          Lipase:x      Amylase:x               Urinalysis Basic - ( 2024 20:00 )    Color: x / Appearance: x / SG: x / pH: x  Gluc: 119 mg/dL / Ketone: x  / Bili: x / Urobili: x   Blood: x / Protein: x / Nitrite: x   Leuk Esterase: x / RBC: x / WBC x   Sq Epi: x / Non Sq Epi: x / Bacteria: x               GENERAL SURGERY PROGRESS NOTE    Patient: MICHAEL LAL , 33y (89)Female   MRN: [id number]  Location: [address]  Visit: 24 Inpatient  Date: 24 @ 06:55    Hospital Day #: 9  Post-Op Day #:    Procedure/Dx/Injuries: Gallbladder colic, s/p lap vania (2024 in [facility]), wound infection and biloma, s/p ERCP 7/    Events of past 24 hours: some vomiting in AM, given anti-emesis meds, resolved. Pain well-controlled with meds.    PAST MEDICAL & SURGICAL HISTORY:  Glucose 6 phosphatase deficiency   delivery      Vitals:   T(F): 98.2 (24 @ 06:21), Max: 98.2 (24 @ 12:05)  HR: 82 (24 @ 06:21)  BP: 122/83 (24 @ 06:21)  RR: 18 (24 @ 06:21)  SpO2: 100% (24 @ 20:07)      Diet, NPO  Diet, NPO after Midnight:      NPO Start Date: 2024,   NPO Start Time: 23:59      Fluids:     I & O's:    24 @ 07:01  -  24 @ 07:00  --------------------------------------------------------  IN:    dextrose 5% + lactated ringers: 900 mL    Oral Fluid: 60 mL  Total IN: 960 mL    OUT:    Drain (mL): 600 mL  Total OUT: 600 mL    Total NET: 360 mL        Bowel Movement: : [] YES [] NO  Flatus: : [] YES [] NO    PHYSICAL EXAM:  General Appearance: NAD,  AAOx3, calm and cooperative  HEENT: Normocephalic, atraumatic  Lungs: No accessory muscle use   Abdomen:  Soft, nontender, nondistended    MEDICATIONS  (STANDING):  cefTRIAXone   IVPB 2000 milliGRAM(s) IV Intermittent every 24 hours  chlorhexidine 2% Cloths 1 Application(s) Topical <User Schedule>  dextrose 5% + lactated ringers. 1000 milliLiter(s) (50 mL/Hr) IV Continuous <Continuous>  heparin   Injectable 5000 Unit(s) SubCutaneous every 8 hours  indomethacin Suppository 100 milliGRAM(s) Rectal once  lactobacillus acidophilus 2 Tablet(s) Oral daily  metroNIDAZOLE    Tablet 500 milliGRAM(s) Oral every 8 hours  polyethylene glycol 3350 17 Gram(s) Oral daily  senna 2 Tablet(s) Oral at bedtime    MEDICATIONS  (PRN):  acetaminophen     Tablet .. 650 milliGRAM(s) Oral every 6 hours PRN Mild Pain (1 - 3), Moderate Pain (4 - 6), Severe Pain (7 - 10)  HYDROmorphone  Injectable 0.5 milliGRAM(s) IV Push every 4 hours PRN Moderate Pain (4 - 6)  ondansetron Injectable 4 milliGRAM(s) IV Push every 8 hours PRN Nausea and/or Vomiting      DVT PROPHYLAXIS: heparin   Injectable 5000 Unit(s) SubCutaneous every 8 hours    GI PROPHYLAXIS:   ANTICOAGULATION:   ANTIBIOTICS:  cefTRIAXone   IVPB 2000 milliGRAM(s)  metroNIDAZOLE    Tablet 500 milliGRAM(s)            LAB/STUDIES:  Labs:  CAPILLARY BLOOD GLUCOSE                        11.0   10.13 )-----------( 412      ( 2024 07:34 )             33.7       Auto Neutrophil %: 71.0 % (24 @ 07:34)  Auto Immature Granulocyte %: 0.2 % (24 @ 07:34)        145  |  104  |  11  ----------------------------<  119<H>  3.6   |  28  |  2.0<H>      Calcium: 9.3 mg/dL (24 @ 20:00)      LFTs:             6.9  | 0.5  | 50       ------------------[524     ( 2024 07:34 )  3.9  | x    | 96          Lipase:x      Amylase:x               Urinalysis Basic - ( 2024 20:00 )    Color: x / Appearance: x / SG: x / pH: x  Gluc: 119 mg/dL / Ketone: x  / Bili: x / Urobili: x   Blood: x / Protein: x / Nitrite: x   Leuk Esterase: x / RBC: x / WBC x   Sq Epi: x / Non Sq Epi: x / Bacteria: x
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
## 2024-07-05 NOTE — PROGRESS NOTE ADULT - TIME BILLING
1st day w/ pt    care coord    complex chart review    complex care plan    cond a potential risk to life
I have personally seen and examined this patient. I have reviewed all pertinent clinical information and reviewed all relevant imaging ( and noted the impression from the Radiologist ) and diagnostic studies personally. I counseled the patient about the diagnostic testing and treatment plan. I discussed my recommendations with the primary team.
complex plan    care coord    complex image review - MRCP    d/c planning (no insurance)
I have personally seen and examined this patient. I have reviewed all pertinent clinical information and reviewed all relevant imaging ( and noted the impression from the Radiologist ) and diagnostic studies personally. I counseled the patient about the diagnostic testing and treatment plan. I discussed my recommendations with the primary team.

## 2024-07-05 NOTE — PROGRESS NOTE ADULT - ASSESSMENT
ASSESSMENT:  33y F w/ PMHx of  33y F PSHx laparoscopic cholecystectomy (4/2024) complicated by post-op leak s/p ex-lap x2 (4/2024, 5/2024) presented w/ abdominal pain s/p ERCP-cholangiogram revealing a stricture in the CHD at the level of a previously placed surgical clip w/ upstream dilation of the L. & R. intrahepatic bile ducts.    PLAN:  - Monitor vitals  - Monitor electrolytes replete as necessary  - Continue Pain Medications if necessary  - Local wound care        BLUE TEAM SPECTRA: [id number]

## 2024-07-05 NOTE — PROGRESS NOTE ADULT - SUBJECTIVE AND OBJECTIVE BOX
Gastroenterology progress note:     Patient is a 33y old  Female who presents with a chief complaint of abdominal pain (2024 11:03)       Admitted on: 24    We are following the patient for biliary stricture     no overnight events         PAST MEDICAL & SURGICAL HISTORY:  Glucose 6 phosphatase deficiency       delivery delivered          MEDICATIONS  (STANDING):  cefTRIAXone   IVPB 2000 milliGRAM(s) IV Intermittent every 24 hours  chlorhexidine 2% Cloths 1 Application(s) Topical <User Schedule>  heparin   Injectable 5000 Unit(s) SubCutaneous every 8 hours  indomethacin Suppository 100 milliGRAM(s) Rectal once  lactated ringers. 1000 milliLiter(s) (75 mL/Hr) IV Continuous <Continuous>  lactobacillus acidophilus 2 Tablet(s) Oral daily  metroNIDAZOLE    Tablet 500 milliGRAM(s) Oral every 8 hours  polyethylene glycol 3350 17 Gram(s) Oral daily  senna 2 Tablet(s) Oral at bedtime    MEDICATIONS  (PRN):  acetaminophen     Tablet .. 650 milliGRAM(s) Oral every 6 hours PRN Mild Pain (1 - 3), Moderate Pain (4 - 6), Severe Pain (7 - 10)  HYDROmorphone  Injectable 0.5 milliGRAM(s) IV Push every 4 hours PRN Moderate Pain (4 - 6)  ondansetron Injectable 4 milliGRAM(s) IV Push every 8 hours PRN Nausea and/or Vomiting      Allergies  sulfa drugs (Rash)      Review of Systems:   Cardiovascular:  No Chest Pain, No Palpitations  Respiratory:  No Cough, No Dyspnea  Gastrointestinal:  As described in HPI  Skin:  No Skin Lesions, No Jaundice  Neuro:  No Syncope, No Dizziness    Physical Examination:  T(C): 36.8 (24 @ 06:21), Max: 36.8 (24 @ 06:21)  HR: 82 (24 @ 06:21) (82 - 84)  BP: 122/83 (24 @ 06:21) (113/80 - 122/83)  RR: 18 (24 @ 06:21) (18 - 18)  SpO2: 100% (24 @ 20:07) (100% - 100%)      24 @ 07:01  -  24 @ 07:00  --------------------------------------------------------  IN: 0 mL / OUT: 500 mL / NET: -500 mL    24 @ 07:01  -  24 @ 12:14  --------------------------------------------------------  IN: 0 mL / OUT: 400 mL / NET: -400 mL        GENERAL: AAOx3, no acute distress.  HEAD:  Atraumatic, Normocephalic  EYES: conjunctiva and sclera clear  NECK: Supple, no JVD or thyromegaly  CHEST/LUNG: Clear to auscultation bilaterally; No wheeze, rhonchi, or rales  HEART: Regular rate and rhythm; normal S1, S2, No murmurs.  ABDOMEN: Soft, nontender, nondistended  NEUROLOGY: No asterixis or tremor.   SKIN: Intact, no jaundice     Data:                        11.1   7.56  )-----------( 464      ( 2024 08:08 )             34.6     Hgb trend:  11.1  24 @ 08:08  11.0  24 @ 07:34  11.6  24 @ 06:15        07-05    145  |  104  |  12  ----------------------------<  106<H>  3.8   |  26  |  1.9<H>    Ca    9.5      2024 08:08  Mg     1.9         TPro  7.1  /  Alb  3.8  /  TBili  0.5  /  DBili  x   /  AST  23  /  ALT  66<H>  /  AlkPhos  472<H>      Liver panel trend:  TBili 0.5   /   AST 23   /   ALT 66   /   AlkP 472   /   Tptn 7.1   /   Alb 3.8    /   DBili --        TBili 0.5   /   AST 50   /   ALT 96   /   AlkP 524   /   Tptn 6.9   /   Alb 3.9    /   DBili --        TBili 0.6   /   AST 76   /      /   AlkP 544   /   Tptn 7.5   /   Alb 4.2    /   DBili --        TBili 0.6   /   AST 64   /      /   AlkP 531   /   Tptn 7.5   /   Alb 4.2    /   DBili --        TBili 0.7   /   AST 58   /      /   AlkP 652   /   Tptn 7.3   /   Alb 4.0    /   DBili --        TBili 1.0   /      /      /   AlkP 855   /   Tptn 8.2   /   Alb 4.3    /   DBili --        TBili 1.2   /      /      /   AlkP 920   /   Tptn 7.4   /   Alb 4.0    /   DBili --        TBili 1.6   /      /      /   AlkP 945   /   Tptn 7.2   /   Alb 3.7    /   DBili --        TBili 1.7   /      /      /   AlkP 953   /   Tptn 7.9   /   Alb 4.2    /   DBili 0.7        TBili 1.1   /      /      /   AlkP 782   /   Tptn 7.2   /   Alb 3.9    /   DBili --                   Radiology:

## 2024-07-05 NOTE — PROGRESS NOTE ADULT - SUBJECTIVE AND OBJECTIVE BOX
SUBJECTIVE/OVERNIGHT EVENTS  Today is hospital day 8d. This morning patient was seen and examined at bedside, resting comfortably in bed. Patient reported vomiting twice this morning, and felt thirsty and tired. Advised her to request for more zofran PRN (currently q8), as she has only been requesting it 1-2 times a day. No acute or major events overnight.      MEDICATIONS  STANDING MEDICATIONS  cefTRIAXone   IVPB 2000 milliGRAM(s) IV Intermittent every 24 hours  chlorhexidine 2% Cloths 1 Application(s) Topical <User Schedule>  heparin   Injectable 5000 Unit(s) SubCutaneous every 8 hours  indomethacin Suppository 100 milliGRAM(s) Rectal once  lactated ringers. 1000 milliLiter(s) IV Continuous <Continuous>  lactobacillus acidophilus 2 Tablet(s) Oral daily  metroNIDAZOLE    Tablet 500 milliGRAM(s) Oral every 8 hours  polyethylene glycol 3350 17 Gram(s) Oral daily  senna 2 Tablet(s) Oral at bedtime    PRN MEDICATIONS  acetaminophen     Tablet .. 650 milliGRAM(s) Oral every 6 hours PRN  HYDROmorphone  Injectable 0.5 milliGRAM(s) IV Push every 4 hours PRN  ondansetron Injectable 4 milliGRAM(s) IV Push every 8 hours PRN    VITALS  T(F): 98.2 (07-05-24 @ 06:21), Max: 98.2 (07-04-24 @ 12:05)  HR: 82 (07-05-24 @ 06:21) (82 - 91)  BP: 122/83 (07-05-24 @ 06:21) (113/80 - 122/83)  RR: 18 (07-05-24 @ 06:21) (18 - 18)  SpO2: 100% (07-04-24 @ 20:07) (100% - 100%)    PHYSICAL EXAM  GENERAL  ( x ) NAD, lying in bed comfortably     (  ) obtunded     (  ) lethargic     (  ) somnolent    HEAD  ( x ) Atraumatic     (  ) hematoma     (  ) laceration (specify location:       )     NECK  ( x ) Supple     (  ) neck stiffness     (  ) nuchal rigidity     (  )  no JVD     (  ) JVD present ( -- cm)    HEART  Rate -->  ( x ) normal rate    (  ) bradycardic    (  ) tachycardic  Rhythm -->  ( x ) regular    (  ) regularly irregular    (  ) irregularly irregular  Murmurs -->  ( x ) normal s1/s2    (  ) systolic murmur    (  ) diastolic murmur    (  ) continuous murmur     (  ) S3 present    (  ) S4 present    LUNGS  ( x )Unlabored respirations     (  ) tachypnea  ( x ) B/L air entry     (  ) decreased breath sounds in:  (location     )    ( x ) no adventitious sound     (  ) crackles     (  ) wheezing      (  ) rhonchi      (specify location:       )  (  ) chest wall tenderness (specify location:       )    ABDOMEN  ( x ) Soft     (  ) tense   |   (  ) nondistended     (  ) distended   |   (  ) +BS     (  ) hypoactive bowel sounds     (  ) hyperactive bowel sounds  ( x ) nontender     (  ) RUQ tenderness     (  ) RLQ tenderness     (  ) LLQ tenderness     (  ) epigastric tenderness     (  ) diffuse tenderness  (  ) Splenomegaly      (  ) Hepatomegaly      (  ) Jaundice     (  ) ecchymosis     EXTREMITIES  ( x ) Normal     (  ) Rash     (  ) ecchymosis     (  ) varicose veins      (  ) pitting edema     (  ) non-pitting edema   (  ) ulceration     (  ) gangrene:     (location:     )    NERVOUS SYSTEM  ( x ) A&Ox3     (  ) confused     (  ) lethargic  CN II-XII:     (  ) Intact     (  ) focal deficits  (Specify:     )   Upper extremities:     ( x ) strength 5/5     (  ) focal deficit (specify:    )  Lower extremities:     ( x ) strength  5/5    (  ) focal deficit (specify:    )    SKIN  ( x ) No rashes or lesions     (  ) maculopapular rash     (  ) pustules     (  ) vesicles     (  ) ulcer     (  ) ecchymosis     (specify location:     )    (  ) Indwelling Shen Catheter   Date insterted:    Reason (  ) Critical illness     (  ) urinary retention    (  ) Accurate Ins/Outs Monitoring     (  ) CMO patient    (  ) Central Line  Date inserted:  Location: (  ) Right IJ   (  ) Left IJ   (  ) Right Fem   (  ) Left Fem    (  ) SPC  (  ) pigtail  (  ) PEG tube  (  ) colostomy  (  ) jejunostomy  (  ) U-Dall    LABS             11.1   7.56  )-----------( 464      ( 07-05-24 @ 08:08 )             34.6     145  |  104  |  12  -------------------------<  106   07-05-24 @ 08:08  3.8  |  26  |  1.9    Ca      9.5     07-05-24 @ 08:08  Mg     1.9     07-05-24 @ 08:08    TPro  7.1  /  Alb  3.8  /  TBili  0.5  /  DBili  x   /  AST  23  /  ALT  66  /  AlkPhos  472  /  GGT  x     07-05-24 @ 08:08        Urinalysis Basic - ( 05 Jul 2024 08:08 )    Color: x / Appearance: x / SG: x / pH: x  Gluc: 106 mg/dL / Ketone: x  / Bili: x / Urobili: x   Blood: x / Protein: x / Nitrite: x   Leuk Esterase: x / RBC: x / WBC x   Sq Epi: x / Non Sq Epi: x / Bacteria: x          IMAGING SUBJECTIVE/OVERNIGHT EVENTS  Today is hospital day 8d. This morning patient was seen and examined at bedside, resting comfortably in bed. Patient reported vomiting twice this morning, and felt thirsty and tired. Advised her to request for more zofran PRN (currently q8), as she has only been requesting it 1-2 times a day. No acute or major events overnight.      MEDICATIONS  STANDING MEDICATIONS  cefTRIAXone   IVPB 2000 milliGRAM(s) IV Intermittent every 24 hours  chlorhexidine 2% Cloths 1 Application(s) Topical <User Schedule>  heparin   Injectable 5000 Unit(s) SubCutaneous every 8 hours  indomethacin Suppository 100 milliGRAM(s) Rectal once  lactated ringers. 1000 milliLiter(s) IV Continuous <Continuous>  lactobacillus acidophilus 2 Tablet(s) Oral daily  metroNIDAZOLE    Tablet 500 milliGRAM(s) Oral every 8 hours  polyethylene glycol 3350 17 Gram(s) Oral daily  senna 2 Tablet(s) Oral at bedtime    PRN MEDICATIONS  acetaminophen     Tablet .. 650 milliGRAM(s) Oral every 6 hours PRN  HYDROmorphone  Injectable 0.5 milliGRAM(s) IV Push every 4 hours PRN  ondansetron Injectable 4 milliGRAM(s) IV Push every 8 hours PRN    VITALS  T(F): 98.2 (07-05-24 @ 06:21), Max: 98.2 (07-04-24 @ 12:05)  HR: 82 (07-05-24 @ 06:21) (82 - 91)  BP: 122/83 (07-05-24 @ 06:21) (113/80 - 122/83)  RR: 18 (07-05-24 @ 06:21) (18 - 18)  SpO2: 100% (07-04-24 @ 20:07) (100% - 100%)    PHYSICAL EXAM  GENERAL  ( x ) NAD, lying in bed comfortably     (  ) obtunded     (  ) lethargic     (  ) somnolent    HEAD  ( x ) Atraumatic     (  ) hematoma     (  ) laceration (specify location:       )     NECK  ( x ) Supple     (  ) neck stiffness     (  ) nuchal rigidity     (  )  no JVD     (  ) JVD present ( -- cm)    HEART  Rate -->  ( x ) normal rate    (  ) bradycardic    (  ) tachycardic  Rhythm -->  ( x ) regular    (  ) regularly irregular    (  ) irregularly irregular  Murmurs -->  ( x ) normal s1/s2    (  ) systolic murmur    (  ) diastolic murmur    (  ) continuous murmur     (  ) S3 present    (  ) S4 present    LUNGS  ( x )Unlabored respirations     (  ) tachypnea  ( x ) B/L air entry     (  ) decreased breath sounds in:  (location     )    ( x ) no adventitious sound     (  ) crackles     (  ) wheezing      (  ) rhonchi      (specify location:       )  (  ) chest wall tenderness (specify location:       )    ABDOMEN  ( x ) Soft     (  ) tense   |   (  ) nondistended     (  ) distended   |   (  ) +BS     (  ) hypoactive bowel sounds     (  ) hyperactive bowel sounds  ( x ) nontender     (  ) RUQ tenderness     (  ) RLQ tenderness     (  ) LLQ tenderness     (  ) epigastric tenderness     (  ) diffuse tenderness  (  ) Splenomegaly      (  ) Hepatomegaly      (  ) Jaundice     (  ) ecchymosis     EXTREMITIES  ( x ) Normal     (  ) Rash     (  ) ecchymosis     (  ) varicose veins      (  ) pitting edema     (  ) non-pitting edema   (  ) ulceration     (  ) gangrene:     (location:     )    NERVOUS SYSTEM  ( x ) A&Ox3     (  ) confused     (  ) lethargic  CN II-XII:     (  ) Intact     (  ) focal deficits  (Specify:     )   Upper extremities:     ( x ) strength 5/5     (  ) focal deficit (specify:    )  Lower extremities:     ( x ) strength  5/5    (  ) focal deficit (specify:    )    SKIN  ( x ) No rashes or lesions     (  ) maculopapular rash     (  ) pustules     (  ) vesicles     (  ) ulcer     (  ) ecchymosis     (specify location:     )    (  ) Indwelling Shen Catheter   Date insterted:    Reason (  ) Critical illness     (  ) urinary retention    (  ) Accurate Ins/Outs Monitoring     (  ) CMO patient    (  ) Central Line  Date inserted:  Location: (  ) Right IJ   (  ) Left IJ   (  ) Right Fem   (  ) Left Fem    (  ) SPC  (  ) pigtail  (  ) PEG tube  (  ) colostomy  (  ) jejunostomy  (  ) U-Dall    LABS             11.1   7.56  )-----------( 464      ( 07-05-24 @ 08:08 )             34.6     145  |  104  |  12  -------------------------<  106   07-05-24 @ 08:08  3.8  |  26  |  1.9    Ca      9.5     07-05-24 @ 08:08  Mg     1.9     07-05-24 @ 08:08    TPro  7.1  /  Alb  3.8  /  TBili  0.5  /  DBili  x   /  AST  23  /  ALT  66  /  AlkPhos  472  /  GGT  x     07-05-24 @ 08:08    Creatinine Trend: 1.9<--, 2.0<--, 1.6<--, 0.6<--, 0.5<--, 0.5<--    Urinalysis Basic - ( 05 Jul 2024 08:08 )    Color: x / Appearance: x / SG: x / pH: x  Gluc: 106 mg/dL / Ketone: x  / Bili: x / Urobili: x   Blood: x / Protein: x / Nitrite: x   Leuk Esterase: x / RBC: x / WBC x   Sq Epi: x / Non Sq Epi: x / Bacteria: x          IMAGING

## 2024-07-05 NOTE — PROGRESS NOTE ADULT - SUBJECTIVE AND OBJECTIVE BOX
MICHAEL LAL  33y, Female    All available historical data reviewed    OVERNIGHT EVENTS:  feels well and has no new complaints  No fevers     ROS:  General: Denies rigors, nightsweats  HEENT: Denies headache, rhinorrhea, sore throat, eye pain  CV: Denies CP, palpitations  PULM: Denies wheezing, hemoptysis  GI: Denies hematemesis, hematochezia, melena  : Denies discharge, hematuria  MSK: Denies arthralgias, myalgias  SKIN: Denies rash, lesions  NEURO: Denies paresthesias, weakness  PSYCH: Denies depression, anxiety    VITALS:  T(F): 98.2, Max: 98.2 (07-04-24 @ 12:05)  HR: 82  BP: 122/83  RR: 18Vital Signs Last 24 Hrs  T(C): 36.8 (05 Jul 2024 06:21), Max: 36.8 (04 Jul 2024 12:05)  T(F): 98.2 (05 Jul 2024 06:21), Max: 98.2 (04 Jul 2024 12:05)  HR: 82 (05 Jul 2024 06:21) (82 - 91)  BP: 122/83 (05 Jul 2024 06:21) (113/80 - 122/83)  BP(mean): --  RR: 18 (05 Jul 2024 06:21) (18 - 18)  SpO2: 100% (04 Jul 2024 20:07) (100% - 100%)        TESTS & MEASUREMENTS:                        11.1   7.56  )-----------( 464      ( 05 Jul 2024 08:08 )             34.6     07-05    145  |  104  |  12  ----------------------------<  106<H>  3.8   |  26  |  1.9<H>    Ca    9.5      05 Jul 2024 08:08  Mg     1.9     07-05    TPro  7.1  /  Alb  3.8  /  TBili  0.5  /  DBili  x   /  AST  23  /  ALT  66<H>  /  AlkPhos  472<H>  07-05    LIVER FUNCTIONS - ( 05 Jul 2024 08:08 )  Alb: 3.8 g/dL / Pro: 7.1 g/dL / ALK PHOS: 472 U/L / ALT: 66 U/L / AST: 23 U/L / GGT: x             Urinalysis with Rflx Culture (collected 06-29-24 @ 10:02)      Urinalysis Basic - ( 05 Jul 2024 08:08 )    Color: x / Appearance: x / SG: x / pH: x  Gluc: 106 mg/dL / Ketone: x  / Bili: x / Urobili: x   Blood: x / Protein: x / Nitrite: x   Leuk Esterase: x / RBC: x / WBC x   Sq Epi: x / Non Sq Epi: x / Bacteria: x          Social History:  Tobacco Use:  Alcohol Use:  Drug Use:    RADIOLOGY & ADDITIONAL TESTS:  Personal review of radiological diagnostics performed  Echo and EKG results noted when applicable.     MEDICATIONS:  acetaminophen     Tablet .. 650 milliGRAM(s) Oral every 6 hours PRN  cefTRIAXone   IVPB 2000 milliGRAM(s) IV Intermittent every 24 hours  chlorhexidine 2% Cloths 1 Application(s) Topical <User Schedule>  heparin   Injectable 5000 Unit(s) SubCutaneous every 8 hours  HYDROmorphone  Injectable 0.5 milliGRAM(s) IV Push every 4 hours PRN  indomethacin Suppository 100 milliGRAM(s) Rectal once  lactated ringers. 1000 milliLiter(s) IV Continuous <Continuous>  lactobacillus acidophilus 2 Tablet(s) Oral daily  metroNIDAZOLE    Tablet 500 milliGRAM(s) Oral every 8 hours  ondansetron Injectable 4 milliGRAM(s) IV Push every 8 hours PRN  polyethylene glycol 3350 17 Gram(s) Oral daily  senna 2 Tablet(s) Oral at bedtime      ANTIBIOTICS:  cefTRIAXone   IVPB 2000 milliGRAM(s) IV Intermittent every 24 hours  metroNIDAZOLE    Tablet 500 milliGRAM(s) Oral every 8 hours

## 2024-07-06 LAB
ALBUMIN SERPL ELPH-MCNC: 3.8 G/DL — SIGNIFICANT CHANGE UP (ref 3.5–5.2)
ALP SERPL-CCNC: 409 U/L — HIGH (ref 30–115)
ALT FLD-CCNC: 49 U/L — HIGH (ref 0–41)
ANION GAP SERPL CALC-SCNC: 14 MMOL/L — SIGNIFICANT CHANGE UP (ref 7–14)
AST SERPL-CCNC: 19 U/L — SIGNIFICANT CHANGE UP (ref 0–41)
BASOPHILS # BLD AUTO: 0.02 K/UL — SIGNIFICANT CHANGE UP (ref 0–0.2)
BASOPHILS NFR BLD AUTO: 0.3 % — SIGNIFICANT CHANGE UP (ref 0–1)
BILIRUB SERPL-MCNC: 0.5 MG/DL — SIGNIFICANT CHANGE UP (ref 0.2–1.2)
BUN SERPL-MCNC: 9 MG/DL — LOW (ref 10–20)
CALCIUM SERPL-MCNC: 9.3 MG/DL — SIGNIFICANT CHANGE UP (ref 8.4–10.5)
CHLORIDE SERPL-SCNC: 101 MMOL/L — SIGNIFICANT CHANGE UP (ref 98–110)
CO2 SERPL-SCNC: 28 MMOL/L — SIGNIFICANT CHANGE UP (ref 17–32)
CREAT SERPL-MCNC: 1.6 MG/DL — HIGH (ref 0.7–1.5)
EGFR: 44 ML/MIN/1.73M2 — LOW
EOSINOPHIL # BLD AUTO: 0.1 K/UL — SIGNIFICANT CHANGE UP (ref 0–0.7)
EOSINOPHIL NFR BLD AUTO: 1.5 % — SIGNIFICANT CHANGE UP (ref 0–8)
GLUCOSE SERPL-MCNC: 99 MG/DL — SIGNIFICANT CHANGE UP (ref 70–99)
HCT VFR BLD CALC: 35.7 % — LOW (ref 37–47)
HGB BLD-MCNC: 11.5 G/DL — LOW (ref 12–16)
IMM GRANULOCYTES NFR BLD AUTO: 0.1 % — SIGNIFICANT CHANGE UP (ref 0.1–0.3)
LYMPHOCYTES # BLD AUTO: 1.36 K/UL — SIGNIFICANT CHANGE UP (ref 1.2–3.4)
LYMPHOCYTES # BLD AUTO: 19.8 % — LOW (ref 20.5–51.1)
MAGNESIUM SERPL-MCNC: 1.8 MG/DL — SIGNIFICANT CHANGE UP (ref 1.8–2.4)
MCHC RBC-ENTMCNC: 29.5 PG — SIGNIFICANT CHANGE UP (ref 27–31)
MCHC RBC-ENTMCNC: 32.2 G/DL — SIGNIFICANT CHANGE UP (ref 32–37)
MCV RBC AUTO: 91.5 FL — SIGNIFICANT CHANGE UP (ref 81–99)
MONOCYTES # BLD AUTO: 0.64 K/UL — HIGH (ref 0.1–0.6)
MONOCYTES NFR BLD AUTO: 9.3 % — SIGNIFICANT CHANGE UP (ref 1.7–9.3)
NEUTROPHILS # BLD AUTO: 4.74 K/UL — SIGNIFICANT CHANGE UP (ref 1.4–6.5)
NEUTROPHILS NFR BLD AUTO: 69 % — SIGNIFICANT CHANGE UP (ref 42.2–75.2)
NRBC # BLD: 0 /100 WBCS — SIGNIFICANT CHANGE UP (ref 0–0)
PLATELET # BLD AUTO: 422 K/UL — HIGH (ref 130–400)
PMV BLD: 10.7 FL — HIGH (ref 7.4–10.4)
POTASSIUM SERPL-MCNC: 3.5 MMOL/L — SIGNIFICANT CHANGE UP (ref 3.5–5)
POTASSIUM SERPL-SCNC: 3.5 MMOL/L — SIGNIFICANT CHANGE UP (ref 3.5–5)
PROT SERPL-MCNC: 7 G/DL — SIGNIFICANT CHANGE UP (ref 6–8)
RBC # BLD: 3.9 M/UL — LOW (ref 4.2–5.4)
RBC # FLD: 15.9 % — HIGH (ref 11.5–14.5)
SODIUM SERPL-SCNC: 143 MMOL/L — SIGNIFICANT CHANGE UP (ref 135–146)
WBC # BLD: 6.87 K/UL — SIGNIFICANT CHANGE UP (ref 4.8–10.8)
WBC # FLD AUTO: 6.87 K/UL — SIGNIFICANT CHANGE UP (ref 4.8–10.8)

## 2024-07-06 PROCEDURE — 76770 US EXAM ABDO BACK WALL COMP: CPT | Mod: 26

## 2024-07-06 PROCEDURE — 99232 SBSQ HOSP IP/OBS MODERATE 35: CPT

## 2024-07-06 RX ADMIN — Medication 1 APPLICATION(S): at 05:45

## 2024-07-06 RX ADMIN — METRONIDAZOLE 500 MILLIGRAM(S): 500 TABLET ORAL at 22:54

## 2024-07-06 RX ADMIN — Medication 2 TABLET(S): at 11:46

## 2024-07-06 RX ADMIN — HEPARIN SODIUM 5000 UNIT(S): 50 INJECTION, SOLUTION INTRAVENOUS at 14:51

## 2024-07-06 RX ADMIN — HEPARIN SODIUM 5000 UNIT(S): 50 INJECTION, SOLUTION INTRAVENOUS at 05:45

## 2024-07-06 RX ADMIN — Medication 2 TABLET(S): at 22:54

## 2024-07-06 RX ADMIN — HYDROMORPHONE HCL 0.5 MILLIGRAM(S): 0.2 INJECTION, SOLUTION INTRAVENOUS at 14:35

## 2024-07-06 RX ADMIN — POLYETHYLENE GLYCOL 3350 17 GRAM(S): 1 POWDER ORAL at 11:46

## 2024-07-06 RX ADMIN — METRONIDAZOLE 500 MILLIGRAM(S): 500 TABLET ORAL at 05:45

## 2024-07-06 RX ADMIN — METRONIDAZOLE 500 MILLIGRAM(S): 500 TABLET ORAL at 14:52

## 2024-07-06 RX ADMIN — HEPARIN SODIUM 5000 UNIT(S): 50 INJECTION, SOLUTION INTRAVENOUS at 22:54

## 2024-07-06 RX ADMIN — ONDANSETRON HYDROCHLORIDE 4 MILLIGRAM(S): 2 INJECTION INTRAMUSCULAR; INTRAVENOUS at 14:35

## 2024-07-06 RX ADMIN — HYDROMORPHONE HCL 0.5 MILLIGRAM(S): 0.2 INJECTION, SOLUTION INTRAVENOUS at 15:35

## 2024-07-06 NOTE — PROGRESS NOTE ADULT - SUBJECTIVE AND OBJECTIVE BOX
SUBJECTIVE/OVERNIGHT EVENTS  This morning patient was seen and examined at bedside, resting comfortably in bed. Patient reported vomiting twice this morning, and felt thirsty and tired. Advised her to request for more zofran PRN (currently q8), as she has only been requesting it 1-2 times a day. No acute or major events overnight.    Allergies    sulfa drugs (Rash)    Intolerances      MEDICATIONS:  STANDING MEDICATIONS  cefTRIAXone   IVPB 1000 milliGRAM(s) IV Intermittent every 24 hours, 07-06-24 @ 00:00  chlorhexidine 2% Cloths 1 Application(s) Topical <User Schedule>, 07-02-24 @ 09:00  heparin   Injectable 5000 Unit(s) SubCutaneous every 8 hours, 07-04-24 @ 14:59  indomethacin Suppository 100 milliGRAM(s) Rectal once, 07-02-24 @ 16:00  lactated ringers. 1000 milliLiter(s) IV Continuous <Continuous>, 07-05-24 @ 09:44  lactobacillus acidophilus 2 Tablet(s) Oral daily, 06-28-24 @ 14:53  metroNIDAZOLE    Tablet 500 milliGRAM(s) Oral every 8 hours, 07-03-24 @ 11:41  polyethylene glycol 3350 17 Gram(s) Oral daily, 06-28-24 @ 09:09  senna 2 Tablet(s) Oral at bedtime, 06-28-24 @ 09:09    PRN MEDICATIONS  acetaminophen     Tablet .. 650 milliGRAM(s) Oral every 6 hours, 06-29-24 @ 11:56 PRN  HYDROmorphone  Injectable 0.5 milliGRAM(s) IV Push every 4 hours, 07-04-24 @ 09:38 PRN  ondansetron Injectable 4 milliGRAM(s) IV Push every 8 hours, 07-04-24 @ 12:24 PRN    Diet, Clear Liquid (07-05-24 @ 08:09) [Active]  Diet, NPO after Midnight:      NPO Start Date: 01-Jul-2024,   NPO Start Time: 23:59 (07-01-24 @ 12:55) [Active]        Vital Signs Last 24 Hrs  T(C): 36.9 (06 Jul 2024 14:41), Max: 37.3 (05 Jul 2024 20:06)  T(F): 98.4 (06 Jul 2024 14:41), Max: 99.1 (05 Jul 2024 20:06)  HR: 97 (06 Jul 2024 14:41) (85 - 99)  BP: 130/87 (06 Jul 2024 05:18) (130/87 - 139/88)  RR: 18 (06 Jul 2024 14:41) (18 - 18)  SpO2: 99% (06 Jul 2024 14:41) (99% - 99%)    Parameters below as of 06 Jul 2024 14:41  Patient On (Oxygen Delivery Method): room air      I&Os:  07-05-24 @ 07:01  -  07-06-24 @ 07:00  --------------------------------------------------------  IN: 1910 mL / OUT: 1875 mL / NET: 35 mL    07-06-24 @ 07:01  -  07-06-24 @ 14:51  --------------------------------------------------------  IN: 0 mL / OUT: 285 mL / NET: -285 mL        LABS:                        11.5   6.87  )-----------( 422      ( 06 Jul 2024 07:49 )             35.7     WBC trend: 6.87 <--, 7.56 <--, 10.13 <--, 10.02 <--  Hgb: 11.5 [07-06-24 @ 07:49]<--, 11.1 [07-05-24 @ 08:08]<--, 11.0 [07-04-24 @ 07:34]<--, 11.6 [07-03-24 @ 06:15]<--, 11.7 [07-02-24 @ 08:31]<--, 11.1 [07-01-24 @ 06:26]<--    07-06    143  |  101  |  9<L>  ----------------------------<  99  3.5   |  28  |  1.6<H>    Ca    9.3      06 Jul 2024 07:49  Mg     1.8     07-06    TPro  7.0  /  Alb  3.8  /  TBili  0.5  /  DBili  x   /  AST  19  /  ALT  49<H>  /  AlkPhos  409<H>  07-06    Creatinine trend: 1.6<--, 1.9<--, 2.0<--, 1.6<--, 0.6<--, 0.5<--, 0.5<--  SODIUM TREND: Sodium 143 [07-06 @ 07:49]<--, Sodium 145 [07-05 @ 08:08]<--, Sodium 145 [07-04 @ 20:00]<--, Sodium 143 [07-04 @ 07:34]<--, Sodium 139 [07-03 @ 06:15]<--, Sodium 140 [07-02 @ 08:31]<--      POC Glucose:     Ferritin: 551 ng/mL (06-28-24 @ 08:03)    Urinalysis Basic - ( 06 Jul 2024 07:49 )    Color: x / Appearance: x / SG: x / pH: x  Gluc: 99 mg/dL / Ketone: x  / Bili: x / Urobili: x   Blood: x / Protein: x / Nitrite: x   Leuk Esterase: x / RBC: x / WBC x   Sq Epi: x / Non Sq Epi: x / Bacteria: x                                              -------------------------------------------------              IMAGING      PHYSICAL EXAM  GENERAL  ( x ) NAD, lying in bed comfortably     (  ) obtunded     (  ) lethargic     (  ) somnolent    HEAD  ( x ) Atraumatic     (  ) hematoma     (  ) laceration (specify location:       )     NECK  ( x ) Supple     (  ) neck stiffness     (  ) nuchal rigidity     (  )  no JVD     (  ) JVD present ( -- cm)    HEART  Rate -->  ( x ) normal rate    (  ) bradycardic    (  ) tachycardic  Rhythm -->  ( x ) regular    (  ) regularly irregular    (  ) irregularly irregular  Murmurs -->  ( x ) normal s1/s2    (  ) systolic murmur    (  ) diastolic murmur    (  ) continuous murmur     (  ) S3 present    (  ) S4 present    LUNGS  ( x )Unlabored respirations     (  ) tachypnea  ( x ) B/L air entry     (  ) decreased breath sounds in:  (location     )    ( x ) no adventitious sound     (  ) crackles     (  ) wheezing      (  ) rhonchi      (specify location:       )  (  ) chest wall tenderness (specify location:       )    ABDOMEN  ( x ) Soft     (  ) tense   |   (  ) nondistended     (  ) distended   |   (  ) +BS     (  ) hypoactive bowel sounds     (  ) hyperactive bowel sounds  ( x ) nontender     (  ) RUQ tenderness     (  ) RLQ tenderness     (  ) LLQ tenderness     (  ) epigastric tenderness     (  ) diffuse tenderness  (  ) Splenomegaly      (  ) Hepatomegaly      (  ) Jaundice     (  ) ecchymosis     EXTREMITIES  ( x ) Normal     (  ) Rash     (  ) ecchymosis     (  ) varicose veins      (  ) pitting edema     (  ) non-pitting edema   (  ) ulceration     (  ) gangrene:     (location:     )    NERVOUS SYSTEM  ( x ) A&Ox3     (  ) confused     (  ) lethargic  CN II-XII:     (  ) Intact     (  ) focal deficits  (Specify:     )   Upper extremities:     ( x ) strength 5/5     (  ) focal deficit (specify:    )  Lower extremities:     ( x ) strength  5/5    (  ) focal deficit (specify:    )    SKIN  ( x ) No rashes or lesions     (  ) maculopapular rash     (  ) pustules     (  ) vesicles     (  ) ulcer     (  ) ecchymosis     (specify location:     )
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
## 2024-07-06 NOTE — CHART NOTE - NSCHARTNOTEFT_GEN_A_CORE
Patient to be DC soon.  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 Forde and GI.

## 2024-07-07 LAB
ALBUMIN SERPL ELPH-MCNC: 3.9 G/DL — SIGNIFICANT CHANGE UP (ref 3.5–5.2)
ALP SERPL-CCNC: 446 U/L — HIGH (ref 30–115)
ALT FLD-CCNC: 41 U/L — SIGNIFICANT CHANGE UP (ref 0–41)
ANION GAP SERPL CALC-SCNC: 15 MMOL/L — HIGH (ref 7–14)
AST SERPL-CCNC: 20 U/L — SIGNIFICANT CHANGE UP (ref 0–41)
BASOPHILS # BLD AUTO: 0.02 K/UL — SIGNIFICANT CHANGE UP (ref 0–0.2)
BASOPHILS NFR BLD AUTO: 0.3 % — SIGNIFICANT CHANGE UP (ref 0–1)
BILIRUB SERPL-MCNC: 0.6 MG/DL — SIGNIFICANT CHANGE UP (ref 0.2–1.2)
BUN SERPL-MCNC: 9 MG/DL — LOW (ref 10–20)
CALCIUM SERPL-MCNC: 9.7 MG/DL — SIGNIFICANT CHANGE UP (ref 8.4–10.4)
CHLORIDE SERPL-SCNC: 99 MMOL/L — SIGNIFICANT CHANGE UP (ref 98–110)
CO2 SERPL-SCNC: 26 MMOL/L — SIGNIFICANT CHANGE UP (ref 17–32)
CREAT SERPL-MCNC: 1.4 MG/DL — SIGNIFICANT CHANGE UP (ref 0.7–1.5)
EGFR: 51 ML/MIN/1.73M2 — LOW
EOSINOPHIL # BLD AUTO: 0.14 K/UL — SIGNIFICANT CHANGE UP (ref 0–0.7)
EOSINOPHIL NFR BLD AUTO: 1.8 % — SIGNIFICANT CHANGE UP (ref 0–8)
GLUCOSE SERPL-MCNC: 106 MG/DL — HIGH (ref 70–99)
HCT VFR BLD CALC: 36.6 % — LOW (ref 37–47)
HGB BLD-MCNC: 12.3 G/DL — SIGNIFICANT CHANGE UP (ref 12–16)
IMM GRANULOCYTES NFR BLD AUTO: 0.3 % — SIGNIFICANT CHANGE UP (ref 0.1–0.3)
LYMPHOCYTES # BLD AUTO: 2.32 K/UL — SIGNIFICANT CHANGE UP (ref 1.2–3.4)
LYMPHOCYTES # BLD AUTO: 30 % — SIGNIFICANT CHANGE UP (ref 20.5–51.1)
MAGNESIUM SERPL-MCNC: 1.9 MG/DL — SIGNIFICANT CHANGE UP (ref 1.8–2.4)
MCHC RBC-ENTMCNC: 30 PG — SIGNIFICANT CHANGE UP (ref 27–31)
MCHC RBC-ENTMCNC: 33.6 G/DL — SIGNIFICANT CHANGE UP (ref 32–37)
MCV RBC AUTO: 89.3 FL — SIGNIFICANT CHANGE UP (ref 81–99)
MONOCYTES # BLD AUTO: 0.69 K/UL — HIGH (ref 0.1–0.6)
MONOCYTES NFR BLD AUTO: 8.9 % — SIGNIFICANT CHANGE UP (ref 1.7–9.3)
NEUTROPHILS # BLD AUTO: 4.54 K/UL — SIGNIFICANT CHANGE UP (ref 1.4–6.5)
NEUTROPHILS NFR BLD AUTO: 58.7 % — SIGNIFICANT CHANGE UP (ref 42.2–75.2)
NRBC # BLD: 0 /100 WBCS — SIGNIFICANT CHANGE UP (ref 0–0)
PLATELET # BLD AUTO: 471 K/UL — HIGH (ref 130–400)
PMV BLD: 9.8 FL — SIGNIFICANT CHANGE UP (ref 7.4–10.4)
POTASSIUM SERPL-MCNC: 3.7 MMOL/L — SIGNIFICANT CHANGE UP (ref 3.5–5)
POTASSIUM SERPL-SCNC: 3.7 MMOL/L — SIGNIFICANT CHANGE UP (ref 3.5–5)
PROT SERPL-MCNC: 7.3 G/DL — SIGNIFICANT CHANGE UP (ref 6–8)
RBC # BLD: 4.1 M/UL — LOW (ref 4.2–5.4)
RBC # FLD: 15.5 % — HIGH (ref 11.5–14.5)
SODIUM SERPL-SCNC: 140 MMOL/L — SIGNIFICANT CHANGE UP (ref 135–146)
WBC # BLD: 7.73 K/UL — SIGNIFICANT CHANGE UP (ref 4.8–10.8)
WBC # FLD AUTO: 7.73 K/UL — SIGNIFICANT CHANGE UP (ref 4.8–10.8)

## 2024-07-07 PROCEDURE — 99232 SBSQ HOSP IP/OBS MODERATE 35: CPT

## 2024-07-07 RX ADMIN — HEPARIN SODIUM 5000 UNIT(S): 50 INJECTION, SOLUTION INTRAVENOUS at 05:04

## 2024-07-07 RX ADMIN — Medication 1 APPLICATION(S): at 13:00

## 2024-07-07 RX ADMIN — METRONIDAZOLE 500 MILLIGRAM(S): 500 TABLET ORAL at 05:05

## 2024-07-07 RX ADMIN — Medication 2 TABLET(S): at 21:41

## 2024-07-07 RX ADMIN — Medication 100 MILLIGRAM(S): at 22:43

## 2024-07-07 RX ADMIN — Medication 100 MILLIGRAM(S): at 00:13

## 2024-07-07 RX ADMIN — HYDROMORPHONE HCL 0.5 MILLIGRAM(S): 0.2 INJECTION, SOLUTION INTRAVENOUS at 03:18

## 2024-07-07 RX ADMIN — METRONIDAZOLE 500 MILLIGRAM(S): 500 TABLET ORAL at 21:41

## 2024-07-07 RX ADMIN — Medication 2 TABLET(S): at 13:03

## 2024-07-07 RX ADMIN — HYDROMORPHONE HCL 0.5 MILLIGRAM(S): 0.2 INJECTION, SOLUTION INTRAVENOUS at 04:42

## 2024-07-07 RX ADMIN — HEPARIN SODIUM 5000 UNIT(S): 50 INJECTION, SOLUTION INTRAVENOUS at 21:41

## 2024-07-07 RX ADMIN — Medication 1 APPLICATION(S): at 05:05

## 2024-07-07 RX ADMIN — POLYETHYLENE GLYCOL 3350 17 GRAM(S): 1 POWDER ORAL at 13:02

## 2024-07-07 RX ADMIN — ONDANSETRON HYDROCHLORIDE 4 MILLIGRAM(S): 2 INJECTION INTRAMUSCULAR; INTRAVENOUS at 14:34

## 2024-07-07 RX ADMIN — ONDANSETRON HYDROCHLORIDE 4 MILLIGRAM(S): 2 INJECTION INTRAMUSCULAR; INTRAVENOUS at 03:14

## 2024-07-07 RX ADMIN — HEPARIN SODIUM 5000 UNIT(S): 50 INJECTION, SOLUTION INTRAVENOUS at 13:03

## 2024-07-07 RX ADMIN — METRONIDAZOLE 500 MILLIGRAM(S): 500 TABLET ORAL at 13:03

## 2024-07-07 NOTE — PROGRESS NOTE ADULT - SUBJECTIVE AND OBJECTIVE BOX
SUBJECTIVE/OVERNIGHT EVENTS  This morning patient was seen and examined at bedside, resting comfortably in bed. Patient reported vomiting twice this morning, and felt thirsty and tired. Advised her to request for more zofran PRN (currently q8), as she has only been requesting it 1-2 times a day. No acute or major events overnight.    Allergies    sulfa drugs (Rash)    Intolerances    MEDICATIONS:  STANDING MEDICATIONS  cefTRIAXone   IVPB 1000 milliGRAM(s) IV Intermittent every 24 hours, 07-06-24 @ 00:00  chlorhexidine 2% Cloths 1 Application(s) Topical daily, 07-06-24 @ 22:55  chlorhexidine 2% Cloths 1 Application(s) Topical <User Schedule>, 07-02-24 @ 09:00  heparin   Injectable 5000 Unit(s) SubCutaneous every 8 hours, 07-04-24 @ 14:59  indomethacin Suppository 100 milliGRAM(s) Rectal once, 07-02-24 @ 16:00  lactated ringers. 1000 milliLiter(s) IV Continuous <Continuous>, 07-05-24 @ 09:44  lactobacillus acidophilus 2 Tablet(s) Oral daily, 06-28-24 @ 14:53  metroNIDAZOLE    Tablet 500 milliGRAM(s) Oral every 8 hours, 07-03-24 @ 11:41  polyethylene glycol 3350 17 Gram(s) Oral daily, 06-28-24 @ 09:09  senna 2 Tablet(s) Oral at bedtime, 06-28-24 @ 09:09    PRN MEDICATIONS  acetaminophen     Tablet .. 650 milliGRAM(s) Oral every 6 hours, 06-29-24 @ 11:56 PRN  HYDROmorphone  Injectable 0.5 milliGRAM(s) IV Push every 4 hours, 07-04-24 @ 09:38 PRN  ondansetron Injectable 4 milliGRAM(s) IV Push every 8 hours, 07-04-24 @ 12:24 PRN    Diet, DASH/TLC:   Sodium & Cholesterol Restricted  Low Fat (LOWFAT) (07-06-24 @ 15:27) [Active]  Diet, NPO after Midnight:      NPO Start Date: 01-Jul-2024,   NPO Start Time: 23:59 (07-01-24 @ 12:55) [Active]          Vital Signs Last 24 Hrs  T(C): 36.9 (07 Jul 2024 13:19), Max: 36.9 (06 Jul 2024 14:41)  T(F): 98.5 (07 Jul 2024 13:19), Max: 98.5 (07 Jul 2024 13:19)  HR: 95 (07 Jul 2024 13:19) (80 - 97)  BP: 141/90 (07 Jul 2024 13:19) (120/78 - 150/92)  BP(mean): 109 (06 Jul 2024 17:46) (109 - 109)  RR: 18 (07 Jul 2024 13:19) (18 - 18)  SpO2: 98% (07 Jul 2024 13:19) (98% - 100%)    Parameters below as of 07 Jul 2024 13:19  Patient On (Oxygen Delivery Method): room air      I&Os:  07-06-24 @ 07:01  -  07-07-24 @ 07:00  --------------------------------------------------------  IN: 0 mL / OUT: 1035 mL / NET: -1035 mL        LABS:                        12.3   7.73  )-----------( 471      ( 07 Jul 2024 08:22 )             36.6     WBC trend: 7.73 <--, 6.87 <--, 7.56 <--, 10.13 <--  Hgb: 12.3 [07-07-24 @ 08:22]<--, 11.5 [07-06-24 @ 07:49]<--, 11.1 [07-05-24 @ 08:08]<--, 11.0 [07-04-24 @ 07:34]<--, 11.6 [07-03-24 @ 06:15]<--, 11.7 [07-02-24 @ 08:31]<--    07-07    140  |  99  |  9<L>  ----------------------------<  106<H>  3.7   |  26  |  1.4    Ca    9.7      07 Jul 2024 08:22  Mg     1.9     07-07    TPro  7.3  /  Alb  3.9  /  TBili  0.6  /  DBili  x   /  AST  20  /  ALT  41  /  AlkPhos  446<H>  07-07    Creatinine trend: 1.4<--, 1.6<--, 1.9<--, 2.0<--, 1.6<--, 0.6<--, 0.5<--  SODIUM TREND: Sodium 140 [07-07 @ 08:22]<--, Sodium 143 [07-06 @ 07:49]<--, Sodium 145 [07-05 @ 08:08]<--, Sodium 145 [07-04 @ 20:00]<--, Sodium 143 [07-04 @ 07:34]<--, Sodium 139 [07-03 @ 06:15]<--      POC Glucose:       Ferritin: 551 ng/mL (06-28-24 @ 08:03)      Urinalysis Basic - ( 07 Jul 2024 08:22 )    Color: x / Appearance: x / SG: x / pH: x  Gluc: 106 mg/dL / Ketone: x  / Bili: x / Urobili: x   Blood: x / Protein: x / Nitrite: x   Leuk Esterase: x / RBC: x / WBC x   Sq Epi: x / Non Sq Epi: x / Bacteria: x                                              -------------------------------------------------  IMAGING      PHYSICAL EXAM  GENERAL  ( x ) NAD, lying in bed comfortably     (  ) obtunded     (  ) lethargic     (  ) somnolent    HEAD  ( x ) Atraumatic     (  ) hematoma     (  ) laceration (specify location:       )     NECK  ( x ) Supple     (  ) neck stiffness     (  ) nuchal rigidity     (  )  no JVD     (  ) JVD present ( -- cm)    HEART  Rate -->  ( x ) normal rate    (  ) bradycardic    (  ) tachycardic  Rhythm -->  ( x ) regular    (  ) regularly irregular    (  ) irregularly irregular  Murmurs -->  ( x ) normal s1/s2    (  ) systolic murmur    (  ) diastolic murmur    (  ) continuous murmur     (  ) S3 present    (  ) S4 present    LUNGS  ( x )Unlabored respirations     (  ) tachypnea  ( x ) B/L air entry     (  ) decreased breath sounds in:  (location     )    ( x ) no adventitious sound     (  ) crackles     (  ) wheezing      (  ) rhonchi      (specify location:       )  (  ) chest wall tenderness (specify location:       )    ABDOMEN  ( x ) Soft     (  ) tense   |   (  ) nondistended     (  ) distended   |   (  ) +BS     (  ) hypoactive bowel sounds     (  ) hyperactive bowel sounds  ( x ) nontender     (  ) RUQ tenderness     (  ) RLQ tenderness     (  ) LLQ tenderness     (  ) epigastric tenderness     (  ) diffuse tenderness  (  ) Splenomegaly      (  ) Hepatomegaly      (  ) Jaundice     (  ) ecchymosis   Right side drain in place with greenish collection.    EXTREMITIES  ( x ) Normal     (  ) Rash     (  ) ecchymosis     (  ) varicose veins      (  ) pitting edema     (  ) non-pitting edema   (  ) ulceration     (  ) gangrene:     (location:     )    NERVOUS SYSTEM  ( x ) A&Ox3     (  ) confused     (  ) lethargic  CN II-XII:     (  ) Intact     (  ) focal deficits  (Specify:     )   Upper extremities:     ( x ) strength 5/5     (  ) focal deficit (specify:    )  Lower extremities:     ( x ) strength  5/5    (  ) focal deficit (specify:    )    SKIN  ( x ) No rashes or lesions     (  ) maculopapular rash     (  ) pustules     (  ) vesicles     (  ) ulcer     (  ) ecchymosis     (specify location:     )
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
## 2024-07-08 ENCOUNTER — TRANSCRIPTION ENCOUNTER (OUTPATIENT)
Age: 34
End: 2024-07-08

## 2024-07-08 VITALS
SYSTOLIC BLOOD PRESSURE: 139 MMHG | HEART RATE: 95 BPM | OXYGEN SATURATION: 100 % | DIASTOLIC BLOOD PRESSURE: 98 MMHG | RESPIRATION RATE: 18 BRPM | TEMPERATURE: 98 F

## 2024-07-08 LAB
ALBUMIN SERPL ELPH-MCNC: 4 G/DL — SIGNIFICANT CHANGE UP (ref 3.5–5.2)
ALP SERPL-CCNC: 362 U/L — HIGH (ref 30–115)
ALT FLD-CCNC: 34 U/L — SIGNIFICANT CHANGE UP (ref 0–41)
ANION GAP SERPL CALC-SCNC: 14 MMOL/L — SIGNIFICANT CHANGE UP (ref 7–14)
AST SERPL-CCNC: 21 U/L — SIGNIFICANT CHANGE UP (ref 0–41)
BASOPHILS # BLD AUTO: 0.02 K/UL — SIGNIFICANT CHANGE UP (ref 0–0.2)
BASOPHILS NFR BLD AUTO: 0.3 % — SIGNIFICANT CHANGE UP (ref 0–1)
BILIRUB SERPL-MCNC: 0.6 MG/DL — SIGNIFICANT CHANGE UP (ref 0.2–1.2)
BUN SERPL-MCNC: 8 MG/DL — LOW (ref 10–20)
CALCIUM SERPL-MCNC: 9.4 MG/DL — SIGNIFICANT CHANGE UP (ref 8.4–10.5)
CHLORIDE SERPL-SCNC: 100 MMOL/L — SIGNIFICANT CHANGE UP (ref 98–110)
CO2 SERPL-SCNC: 26 MMOL/L — SIGNIFICANT CHANGE UP (ref 17–32)
CREAT SERPL-MCNC: 1.3 MG/DL — SIGNIFICANT CHANGE UP (ref 0.7–1.5)
EGFR: 56 ML/MIN/1.73M2 — LOW
EOSINOPHIL # BLD AUTO: 0.14 K/UL — SIGNIFICANT CHANGE UP (ref 0–0.7)
EOSINOPHIL NFR BLD AUTO: 1.8 % — SIGNIFICANT CHANGE UP (ref 0–8)
GLUCOSE SERPL-MCNC: 94 MG/DL — SIGNIFICANT CHANGE UP (ref 70–99)
HCT VFR BLD CALC: 36 % — LOW (ref 37–47)
HGB BLD-MCNC: 11.9 G/DL — LOW (ref 12–16)
IMM GRANULOCYTES NFR BLD AUTO: 0.3 % — SIGNIFICANT CHANGE UP (ref 0.1–0.3)
LYMPHOCYTES # BLD AUTO: 1.94 K/UL — SIGNIFICANT CHANGE UP (ref 1.2–3.4)
LYMPHOCYTES # BLD AUTO: 25.6 % — SIGNIFICANT CHANGE UP (ref 20.5–51.1)
MAGNESIUM SERPL-MCNC: 1.9 MG/DL — SIGNIFICANT CHANGE UP (ref 1.8–2.4)
MCHC RBC-ENTMCNC: 29.4 PG — SIGNIFICANT CHANGE UP (ref 27–31)
MCHC RBC-ENTMCNC: 33.1 G/DL — SIGNIFICANT CHANGE UP (ref 32–37)
MCV RBC AUTO: 88.9 FL — SIGNIFICANT CHANGE UP (ref 81–99)
MONOCYTES # BLD AUTO: 0.65 K/UL — HIGH (ref 0.1–0.6)
MONOCYTES NFR BLD AUTO: 8.6 % — SIGNIFICANT CHANGE UP (ref 1.7–9.3)
NEUTROPHILS # BLD AUTO: 4.81 K/UL — SIGNIFICANT CHANGE UP (ref 1.4–6.5)
NEUTROPHILS NFR BLD AUTO: 63.4 % — SIGNIFICANT CHANGE UP (ref 42.2–75.2)
NRBC # BLD: 0 /100 WBCS — SIGNIFICANT CHANGE UP (ref 0–0)
PLATELET # BLD AUTO: 428 K/UL — HIGH (ref 130–400)
PMV BLD: 10.4 FL — SIGNIFICANT CHANGE UP (ref 7.4–10.4)
POTASSIUM SERPL-MCNC: 3.6 MMOL/L — SIGNIFICANT CHANGE UP (ref 3.5–5)
POTASSIUM SERPL-SCNC: 3.6 MMOL/L — SIGNIFICANT CHANGE UP (ref 3.5–5)
PROT SERPL-MCNC: 7.4 G/DL — SIGNIFICANT CHANGE UP (ref 6–8)
RBC # BLD: 4.05 M/UL — LOW (ref 4.2–5.4)
RBC # FLD: 15.1 % — HIGH (ref 11.5–14.5)
SODIUM SERPL-SCNC: 140 MMOL/L — SIGNIFICANT CHANGE UP (ref 135–146)
WBC # BLD: 7.58 K/UL — SIGNIFICANT CHANGE UP (ref 4.8–10.8)
WBC # FLD AUTO: 7.58 K/UL — SIGNIFICANT CHANGE UP (ref 4.8–10.8)

## 2024-07-08 PROCEDURE — 99239 HOSP IP/OBS DSCHRG MGMT >30: CPT

## 2024-07-08 RX ORDER — OXYCODONE HYDROCHLORIDE 100 MG/5ML
2.5 SOLUTION ORAL EVERY 6 HOURS
Refills: 0 | Status: DISCONTINUED | OUTPATIENT
Start: 2024-07-08 | End: 2024-07-08

## 2024-07-08 RX ORDER — POLYETHYLENE GLYCOL 3350 1 G/G
17 POWDER ORAL
Qty: 0 | Refills: 0 | DISCHARGE
Start: 2024-07-08

## 2024-07-08 RX ORDER — SENNOSIDES 8.6 MG
2 TABLET ORAL
Qty: 40 | Refills: 0
Start: 2024-07-08 | End: 2024-07-27

## 2024-07-08 RX ORDER — OXYCODONE HYDROCHLORIDE 100 MG/5ML
1 SOLUTION ORAL
Qty: 0 | Refills: 0 | DISCHARGE
Start: 2024-07-08

## 2024-07-08 RX ORDER — NALOXONE HYDROCHLORIDE 1 MG/ML
1 INJECTION PARENTERAL
Qty: 1 | Refills: 0
Start: 2024-07-08 | End: 2024-07-11

## 2024-07-08 RX ORDER — ACETAMINOPHEN 325 MG
2 TABLET ORAL
Qty: 0 | Refills: 0 | DISCHARGE
Start: 2024-07-08

## 2024-07-08 RX ORDER — LACTULOSE 10 G/15ML
15 SOLUTION ORAL EVERY 12 HOURS
Refills: 0 | Status: DISCONTINUED | OUTPATIENT
Start: 2024-07-08 | End: 2024-07-08

## 2024-07-08 RX ORDER — METRONIDAZOLE 500 MG/1
1 TABLET ORAL
Qty: 66 | Refills: 0
Start: 2024-07-08 | End: 2024-07-29

## 2024-07-08 RX ORDER — OXYCODONE HYDROCHLORIDE 100 MG/5ML
0.5 SOLUTION ORAL
Qty: 8 | Refills: 0
Start: 2024-07-08 | End: 2024-07-11

## 2024-07-08 RX ORDER — LEVOFLOXACIN 25 MG/ML
1 INJECTION INTRAVENOUS
Qty: 22 | Refills: 0
Start: 2024-07-08 | End: 2024-07-29

## 2024-07-08 RX ADMIN — METRONIDAZOLE 500 MILLIGRAM(S): 500 TABLET ORAL at 14:11

## 2024-07-08 RX ADMIN — POLYETHYLENE GLYCOL 3350 17 GRAM(S): 1 POWDER ORAL at 11:58

## 2024-07-08 RX ADMIN — Medication 1 APPLICATION(S): at 12:12

## 2024-07-08 RX ADMIN — HEPARIN SODIUM 5000 UNIT(S): 50 INJECTION, SOLUTION INTRAVENOUS at 14:11

## 2024-07-08 RX ADMIN — ONDANSETRON HYDROCHLORIDE 4 MILLIGRAM(S): 2 INJECTION INTRAMUSCULAR; INTRAVENOUS at 16:31

## 2024-07-08 RX ADMIN — Medication 1 APPLICATION(S): at 05:30

## 2024-07-08 RX ADMIN — HEPARIN SODIUM 5000 UNIT(S): 50 INJECTION, SOLUTION INTRAVENOUS at 05:21

## 2024-07-08 RX ADMIN — METRONIDAZOLE 500 MILLIGRAM(S): 500 TABLET ORAL at 05:23

## 2024-07-08 RX ADMIN — Medication 2 TABLET(S): at 12:00

## 2024-07-08 NOTE — DISCHARGE NOTE PROVIDER - CARE PROVIDER_API CALL
Paz Forde  Complex General Surgical Oncology  [address], [address] [place] [address]  Phone: [phone]  Fax: [phone]  Follow Up Time:     John Barragan  Gastroenterology  [address] [place] [address]  Phone: [phone]  Fax: [phone]  Follow Up Time:    Paz Forde  Complex General Surgical Oncology  [address] [place] [address]  Phone: [phone]  Fax: [phone]  Follow Up Time:     John Barragan  Gastroenterology  [address] [place] [address]  Phone: [phone]  Fax: [phone]  Follow Up Time:     Jaiden North  Vascular/Intervent Radiology  [address] [place] [address]  Phone: [phone]  Fax: [phone]  Follow Up Time:

## 2024-07-08 NOTE — PROGRESS NOTE ADULT - PROVIDER SPECIALTY LIST ADULT
Gastroenterology
Internal Medicine
Internal Medicine
Surgery
Gastroenterology
Gastroenterology
Hospitalist
Infectious Disease
Internal Medicine
Internal Medicine
Surgery
Internal Medicine
Infectious Disease
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
## 2024-07-08 NOTE — DISCHARGE NOTE PROVIDER - ATTENDING DISCHARGE PHYSICAL EXAMINATION:
VITALS:  Vital Signs Last 24 Hrs  T(C): 36.7 (08 Jul 2024 12:34), Max: 36.9 (07 Jul 2024 20:26)  T(F): 98 (08 Jul 2024 12:34), Max: 98.4 (07 Jul 2024 20:26)  HR: 95 (08 Jul 2024 12:34) (90 - 95)  BP: 139/98 (08 Jul 2024 12:34) (127/82 - 146/94)  BP(mean): --  RR: 18 (08 Jul 2024 12:34) (18 - 18)  SpO2: 100% (08 Jul 2024 12:34) (99% - 100%)    Parameters below as of 08 Jul 2024 12:34  Patient On (Oxygen Delivery Method): room air        PHYSICAL EXAM:  GENERAL: NAD  CHEST/LUNG: CTAB; No wheeze  HEART: RRR; S1/S2  ABDOMEN: Right side abdominal drain with dark greenish collection noted; BS present  EXTREMITIES:  No cyanosis, or edema  NEUROLOGY: AAOx3  SKIN: No rashes or lesions on visible areas

## 2024-07-08 NOTE — DISCHARGE NOTE NURSING/CASE MANAGEMENT/SOCIAL WORK - NSDCPEFALRISK_GEN_ALL_CORE
For information on Fall & Injury Prevention, visit: [email or web address] OR  [email or web address] OR  https://www.cdc.gov/steadi/patient.html

## 2024-07-08 NOTE — DISCHARGE NOTE PROVIDER - PROVIDER TOKENS
PROVIDER:[TOKEN:[[id number]]],PROVIDER:[TOKEN:[[id number]]] PROVIDER:[TOKEN:[[id number]]],PROVIDER:[TOKEN:[[id number]]],PROVIDER:[TOKEN:[[id number]]]

## 2024-07-08 NOTE — DISCHARGE NOTE PROVIDER - CARE PROVIDERS DIRECT ADDRESSES
,[email or web address],DirectAddress_Unknown ,[email or web address],DirectAddress_Unknown,[email or web address]

## 2024-07-08 NOTE — PROGRESS NOTE ADULT - SUBJECTIVE AND OBJECTIVE BOX
SUBJECTIVE/OVERNIGHT EVENTS  Today is hospital day 11d. This morning patient was seen and examined at bedside, resting comfortably in bed. Reports that she still feels nauseous in the morning and vomits after being given her oral medications. Patient reports that she feels well otherwise, doesn't have much appetite and is tolerating her diet but hasn't had a BM in 4-5 days. No acute or major events overnight.    MEDICATIONS  STANDING MEDICATIONS  cefTRIAXone   IVPB 1000 milliGRAM(s) IV Intermittent every 24 hours  chlorhexidine 2% Cloths 1 Application(s) Topical daily  chlorhexidine 2% Cloths 1 Application(s) Topical <User Schedule>  heparin   Injectable 5000 Unit(s) SubCutaneous every 8 hours  indomethacin Suppository 100 milliGRAM(s) Rectal once  lactated ringers. 1000 milliLiter(s) IV Continuous <Continuous>  lactobacillus acidophilus 2 Tablet(s) Oral daily  metroNIDAZOLE    Tablet 500 milliGRAM(s) Oral every 8 hours  polyethylene glycol 3350 17 Gram(s) Oral daily  senna 2 Tablet(s) Oral at bedtime    PRN MEDICATIONS  acetaminophen     Tablet .. 650 milliGRAM(s) Oral every 6 hours PRN  ondansetron Injectable 4 milliGRAM(s) IV Push every 8 hours PRN  oxyCODONE    IR 2.5 milliGRAM(s) Oral every 6 hours PRN    VITALS  T(F): 98.2 (07-08-24 @ 05:00), Max: 98.5 (07-07-24 @ 13:19)  HR: 90 (07-08-24 @ 05:00) (90 - 95)  BP: 127/82 (07-08-24 @ 05:00) (127/82 - 146/94)  RR: 18 (07-08-24 @ 05:00) (18 - 18)  SpO2: 99% (07-07-24 @ 20:26) (98% - 99%)    PHYSICAL EXAM  GENERAL  ( x ) NAD, lying in bed comfortably     (  ) obtunded     (  ) lethargic     (  ) somnolent    HEAD  ( x ) Atraumatic     (  ) hematoma     (  ) laceration (specify location:       )     NECK  ( x ) Supple     (  ) neck stiffness     (  ) nuchal rigidity     (  )  no JVD     (  ) JVD present ( -- cm)    HEART  Rate -->  ( x ) normal rate    (  ) bradycardic    (  ) tachycardic  Rhythm -->  ( x ) regular    (  ) regularly irregular    (  ) irregularly irregular  Murmurs -->  ( x ) normal s1/s2    (  ) systolic murmur    (  ) diastolic murmur    (  ) continuous murmur     (  ) S3 present    (  ) S4 present    LUNGS  ( x )Unlabored respirations     (  ) tachypnea  ( x ) B/L air entry     (  ) decreased breath sounds in:  (location     )    ( x ) no adventitious sound     (  ) crackles     (  ) wheezing      (  ) rhonchi      (specify location:       )  (  ) chest wall tenderness (specify location:       )    ABDOMEN  ( x ) Soft     (  ) tense   |   (  ) nondistended     (  ) distended   |   (  ) +BS     (  ) hypoactive bowel sounds     (  ) hyperactive bowel sounds  ( x ) nontender     (  ) RUQ tenderness     (  ) RLQ tenderness     (  ) LLQ tenderness     (  ) epigastric tenderness     (  ) diffuse tenderness  (  ) Splenomegaly      (  ) Hepatomegaly      (  ) Jaundice     (  ) ecchymosis     EXTREMITIES  ( x ) Normal     (  ) Rash     (  ) ecchymosis     (  ) varicose veins      (  ) pitting edema     (  ) non-pitting edema   (  ) ulceration     (  ) gangrene:     (location:     )    NERVOUS SYSTEM  ( x ) A&Ox3     (  ) confused     (  ) lethargic  CN II-XII:     (  ) Intact     (  ) focal deficits  (Specify:     )   Upper extremities:     (  ) strength X/5     (  ) focal deficit (specify:    )  Lower extremities:     (  ) strength  X/5    (  ) focal deficit (specify:    )    SKIN  ( x ) No rashes or lesions     (  ) maculopapular rash     (  ) pustules     (  ) vesicles     (  ) ulcer     (  ) ecchymosis     (specify location:     )    (  ) Indwelling Shen Catheter   Date insterted:    Reason (  ) Critical illness     (  ) urinary retention    (  ) Accurate Ins/Outs Monitoring     (  ) CMO patient    (  ) Central Line  Date inserted:  Location: (  ) Right IJ   (  ) Left IJ   (  ) Right Fem   (  ) Left Fem    (  ) SPC  (  ) pigtail  (  ) PEG tube  (  ) colostomy  (  ) jejunostomy  (  ) U-Dall    LABS             12.3   7.73  )-----------( 471      ( 07-07-24 @ 08:22 )             36.6     140  |  99  |  9   -------------------------<  106   07-07-24 @ 08:22  3.7  |  26  |  1.4    Ca      9.7     07-07-24 @ 08:22  Mg     1.9     07-07-24 @ 08:22    TPro  7.3  /  Alb  3.9  /  TBili  0.6  /  DBili  x   /  AST  20  /  ALT  41  /  AlkPhos  446  /  GGT  x     07-07-24 @ 08:22        Urinalysis Basic - ( 07 Jul 2024 08:22 )    Color: x / Appearance: x / SG: x / pH: x  Gluc: 106 mg/dL / Ketone: x  / Bili: x / Urobili: x   Blood: x / Protein: x / Nitrite: x   Leuk Esterase: x / RBC: x / WBC x   Sq Epi: x / Non Sq Epi: x / Bacteria: x          IMAGING

## 2024-07-08 NOTE — DISCHARGE NOTE PROVIDER - NSDCCPCAREPLAN_GEN_ALL_CORE_FT
PRINCIPAL DISCHARGE DIAGNOSIS  Diagnosis: Gallbladder colic  Assessment and Plan of Treatment: you came in for abdominal pain. we found an infection in the liver for which we treated you with antibiotics and placed a drain. please follow up with GI and surgery so they can manage the drain and guide you. follow up with dr yuen on thursday 7/11 and make sure to hydrate yourself with electrolyte rich fluids like gatorade. please obtain a lab test called complete metabolic panel the day before seeing dr yuen     PRINCIPAL DISCHARGE DIAGNOSIS  Diagnosis: Gallbladder colic  Assessment and Plan of Treatment: you came in for abdominal pain. we found an infection in the liver for which we treated you with antibiotics and placed a drain. please follow up with GI and surgery so they can manage the drain and guide you. follow up with dr yuen on thursday 7/11 and make sure to hydrate yourself with electrolyte rich fluids like gatorade. please obtain a lab test called complete metabolic panel the day before seeing dr yuen  Prescription for out-patient CMp given.      SECONDARY DISCHARGE DIAGNOSES  Diagnosis: Abdominal infection  Assessment and Plan of Treatment: you came in for abdominal pain. we found an infection in the liver for which we treated you with antibiotics and placed a drain. please follow up with GI and surgery so they can manage the drain and guide you. follow up with dr yuen on thursday 7/11 and make sure to hydrate yourself with electrolyte rich fluids like gatorade. please obtain a lab test called complete metabolic panel the day before seeing dr yuen  Prescription for out-patient CM[facility] given.

## 2024-07-08 NOTE — DISCHARGE NOTE PROVIDER - NSDCFUSCHEDAPPT_GEN_ALL_CORE_FT
Paz Forde  [facility] Physician [facility]  GENSUR [address]  Scheduled Appointment: 07/11/2024

## 2024-07-08 NOTE — DISCHARGE NOTE NURSING/CASE MANAGEMENT/SOCIAL WORK - PATIENT PORTAL LINK FT
You can access the FollowMyHealth Patient Portal offered by [facility] by registering at the following website: [email or web address]. By joining [facility]’s FollowMyHealth portal, you will also be able to view your health information using other applications (apps) compatible with our system.

## 2024-07-08 NOTE — DISCHARGE NOTE PROVIDER - NSDCMRMEDTOKEN_GEN_ALL_CORE_FT
acetaminophen 325 mg oral tablet: 2 tab(s) orally every 6 hours As needed Mild Pain (1 - 3), Moderate Pain (4 - 6), Severe Pain (7 - 10)  Avelox 400 mg oral tablet: 1 tab(s) orally once a day stop taking july 30  lactobacillus acidophilus oral capsule: 2 tab(s) orally once a day  metroNIDAZOLE 500 mg oral tablet: 1 tab(s) orally every 8 hours stop taking july 30  oxyCODONE: 1 tab(s) orally every 6 hours as needed for  moderate pain  polyethylene glycol 3350 oral powder for reconstitution: 17 gram(s) orally once a day over the counter  senna leaf extract oral tablet: 2 tab(s) orally once a day (at bedtime)   acetaminophen 325 mg oral tablet: 2 tab(s) orally every 6 hours As needed Mild Pain (1 - 3), Moderate Pain (4 - 6), Severe Pain (7 - 10)  lactobacillus acidophilus oral capsule: 2 tab(s) orally once a day  levoFLOXacin 500 mg oral tablet: 1 tab(s) orally once a day stop taking july 30  metroNIDAZOLE 500 mg oral tablet: 1 tab(s) orally every 8 hours stop taking july 30  naloxone 4 mg/0.1 mL nasal spray: 1 spray(s) intranasally once x 4 days as needed for  opioid overdose  Oxaydo 5 mg oral tablet: 0.5 tab(s) orally every 6 hours x 4 days as needed for  moderate pain MDD 2 tablets  polyethylene glycol 3350 oral powder for reconstitution: 17 gram(s) orally once a day over the counter  senna leaf extract oral tablet: 2 tab(s) orally once a day (at bedtime)

## 2024-07-09 DIAGNOSIS — R11.0 NAUSEA: ICD-10-CM

## 2024-07-09 RX ORDER — ONDANSETRON 4 MG/1
4 TABLET ORAL EVERY 8 HOURS
Qty: 9 | Refills: 0 | Status: ACTIVE | COMMUNITY
Start: 2024-07-09 | End: 1900-01-01

## 2024-07-10 ENCOUNTER — LABORATORY RESULT (OUTPATIENT)
Age: 34
End: 2024-07-10

## 2024-07-11 ENCOUNTER — APPOINTMENT (OUTPATIENT)
Dept: SURGERY | Facility: CLINIC | Age: 34
End: 2024-07-11
Payer: COMMERCIAL

## 2024-07-11 VITALS
HEIGHT: 69 IN | WEIGHT: 167 LBS | SYSTOLIC BLOOD PRESSURE: 104 MMHG | TEMPERATURE: 97.1 F | OXYGEN SATURATION: 98 % | BODY MASS INDEX: 24.73 KG/M2 | DIASTOLIC BLOOD PRESSURE: 78 MMHG | HEART RATE: 62 BPM

## 2024-07-11 DIAGNOSIS — S36.13XA INJURY OF BILE DUCT, INITIAL ENCOUNTER: ICD-10-CM

## 2024-07-11 LAB
HGB BLD-MCNC: 12.9 G/DL
MCHC RBC-ENTMCNC: 29 PG
MCV RBC AUTO: 89.4 FL
PLATELET # BLD AUTO: 447 K/UL
PMV BLD AUTO: 0 /100 WBCS
PMV BLD: 11 FL
RBC # BLD: 4.45 M/UL
RBC # FLD: 15.1 %
WBC # FLD AUTO: 8.43 K/UL

## 2024-07-11 PROCEDURE — 99215 OFFICE O/P EST HI 40 MIN: CPT

## 2024-07-11 PROCEDURE — 99417 PROLNG OP E/M EACH 15 MIN: CPT

## 2024-07-16 ENCOUNTER — INPATIENT (INPATIENT)
Facility: HOSPITAL | Age: 34
LOS: 2 days | Discharge: HOME | DRG: 284 | End: 2024-07-19
Attending: SURGERY | Admitting: SURGERY
Payer: MEDICAID

## 2024-07-16 VITALS
HEIGHT: 67 IN | SYSTOLIC BLOOD PRESSURE: 110 MMHG | RESPIRATION RATE: 20 BRPM | DIASTOLIC BLOOD PRESSURE: 77 MMHG | HEART RATE: 71 BPM | TEMPERATURE: 98 F | WEIGHT: 175.93 LBS | OXYGEN SATURATION: 100 %

## 2024-07-16 DIAGNOSIS — Z90.49 ACQUIRED ABSENCE OF OTHER SPECIFIED PARTS OF DIGESTIVE TRACT: Chronic | ICD-10-CM

## 2024-07-16 DIAGNOSIS — K82.9 DISEASE OF GALLBLADDER, UNSPECIFIED: ICD-10-CM

## 2024-07-16 LAB
ALBUMIN SERPL ELPH-MCNC: 4.4 G/DL — SIGNIFICANT CHANGE UP (ref 3.5–5.2)
ALP SERPL-CCNC: 515 U/L — HIGH (ref 30–115)
ALT FLD-CCNC: 26 U/L — SIGNIFICANT CHANGE UP (ref 0–41)
ANION GAP SERPL CALC-SCNC: 14 MMOL/L — SIGNIFICANT CHANGE UP (ref 7–14)
AST SERPL-CCNC: 38 U/L — SIGNIFICANT CHANGE UP (ref 0–41)
BASOPHILS # BLD AUTO: 0.03 K/UL — SIGNIFICANT CHANGE UP (ref 0–0.2)
BASOPHILS NFR BLD AUTO: 0.3 % — SIGNIFICANT CHANGE UP (ref 0–1)
BILIRUB DIRECT SERPL-MCNC: <0.2 MG/DL — SIGNIFICANT CHANGE UP (ref 0–0.3)
BILIRUB INDIRECT FLD-MCNC: >0.5 MG/DL — SIGNIFICANT CHANGE UP (ref 0.2–1.2)
BILIRUB SERPL-MCNC: 0.7 MG/DL — SIGNIFICANT CHANGE UP (ref 0.2–1.2)
BUN SERPL-MCNC: 7 MG/DL — LOW (ref 10–20)
CALCIUM SERPL-MCNC: 9.8 MG/DL — SIGNIFICANT CHANGE UP (ref 8.4–10.4)
CHLORIDE SERPL-SCNC: 95 MMOL/L — LOW (ref 98–110)
CO2 SERPL-SCNC: 26 MMOL/L — SIGNIFICANT CHANGE UP (ref 17–32)
CREAT SERPL-MCNC: 0.9 MG/DL — SIGNIFICANT CHANGE UP (ref 0.7–1.5)
EGFR: 87 ML/MIN/1.73M2 — SIGNIFICANT CHANGE UP
EOSINOPHIL # BLD AUTO: 0.15 K/UL — SIGNIFICANT CHANGE UP (ref 0–0.7)
EOSINOPHIL NFR BLD AUTO: 1.6 % — SIGNIFICANT CHANGE UP (ref 0–8)
GLUCOSE SERPL-MCNC: 106 MG/DL — HIGH (ref 70–99)
HCG SERPL-ACNC: <1 MIU/ML — SIGNIFICANT CHANGE UP
HCT VFR BLD CALC: 35 % — LOW (ref 37–47)
HGB BLD-MCNC: 12 G/DL — SIGNIFICANT CHANGE UP (ref 12–16)
IMM GRANULOCYTES NFR BLD AUTO: 0.2 % — SIGNIFICANT CHANGE UP (ref 0.1–0.3)
LYMPHOCYTES # BLD AUTO: 2.18 K/UL — SIGNIFICANT CHANGE UP (ref 1.2–3.4)
LYMPHOCYTES # BLD AUTO: 22.7 % — SIGNIFICANT CHANGE UP (ref 20.5–51.1)
MAGNESIUM SERPL-MCNC: 2.1 MG/DL — SIGNIFICANT CHANGE UP (ref 1.8–2.4)
MCHC RBC-ENTMCNC: 29.6 PG — SIGNIFICANT CHANGE UP (ref 27–31)
MCHC RBC-ENTMCNC: 34.3 G/DL — SIGNIFICANT CHANGE UP (ref 32–37)
MCV RBC AUTO: 86.2 FL — SIGNIFICANT CHANGE UP (ref 81–99)
MONOCYTES # BLD AUTO: 0.9 K/UL — HIGH (ref 0.1–0.6)
MONOCYTES NFR BLD AUTO: 9.4 % — HIGH (ref 1.7–9.3)
NEUTROPHILS # BLD AUTO: 6.32 K/UL — SIGNIFICANT CHANGE UP (ref 1.4–6.5)
NEUTROPHILS NFR BLD AUTO: 65.8 % — SIGNIFICANT CHANGE UP (ref 42.2–75.2)
NRBC # BLD: 0 /100 WBCS — SIGNIFICANT CHANGE UP (ref 0–0)
PLATELET # BLD AUTO: 419 K/UL — HIGH (ref 130–400)
PMV BLD: 11.3 FL — HIGH (ref 7.4–10.4)
POTASSIUM SERPL-MCNC: 4.6 MMOL/L — SIGNIFICANT CHANGE UP (ref 3.5–5)
POTASSIUM SERPL-SCNC: 4.6 MMOL/L — SIGNIFICANT CHANGE UP (ref 3.5–5)
PROT SERPL-MCNC: 8.3 G/DL — HIGH (ref 6–8)
RBC # BLD: 4.06 M/UL — LOW (ref 4.2–5.4)
RBC # FLD: 14.8 % — HIGH (ref 11.5–14.5)
SODIUM SERPL-SCNC: 135 MMOL/L — SIGNIFICANT CHANGE UP (ref 135–146)
WBC # BLD: 9.6 K/UL — SIGNIFICANT CHANGE UP (ref 4.8–10.8)
WBC # FLD AUTO: 9.6 K/UL — SIGNIFICANT CHANGE UP (ref 4.8–10.8)

## 2024-07-16 PROCEDURE — 83735 ASSAY OF MAGNESIUM: CPT

## 2024-07-16 PROCEDURE — 85027 COMPLETE CBC AUTOMATED: CPT

## 2024-07-16 PROCEDURE — 85730 THROMBOPLASTIN TIME PARTIAL: CPT

## 2024-07-16 PROCEDURE — 84100 ASSAY OF PHOSPHORUS: CPT

## 2024-07-16 PROCEDURE — 74177 CT ABD & PELVIS W/CONTRAST: CPT | Mod: 26,MC

## 2024-07-16 PROCEDURE — 85025 COMPLETE CBC W/AUTO DIFF WBC: CPT

## 2024-07-16 PROCEDURE — 80048 BASIC METABOLIC PNL TOTAL CA: CPT

## 2024-07-16 PROCEDURE — 36415 COLL VENOUS BLD VENIPUNCTURE: CPT

## 2024-07-16 PROCEDURE — 86901 BLOOD TYPING SEROLOGIC RH(D): CPT

## 2024-07-16 PROCEDURE — 99235 HOSP IP/OBS SAME DATE MOD 70: CPT

## 2024-07-16 PROCEDURE — 85610 PROTHROMBIN TIME: CPT

## 2024-07-16 PROCEDURE — 80076 HEPATIC FUNCTION PANEL: CPT

## 2024-07-16 PROCEDURE — 99285 EMERGENCY DEPT VISIT HI MDM: CPT

## 2024-07-16 PROCEDURE — 86900 BLOOD TYPING SEROLOGIC ABO: CPT

## 2024-07-16 PROCEDURE — 86850 RBC ANTIBODY SCREEN: CPT

## 2024-07-16 PROCEDURE — 47531 INJECTION FOR CHOLANGIOGRAM: CPT

## 2024-07-16 RX ORDER — METHOCARBAMOL 500 MG
2 TABLET ORAL
Qty: 42 | Refills: 0
Start: 2024-07-16 | End: 2024-07-22

## 2024-07-16 RX ORDER — DEXTROSE MONOHYDRATE AND SODIUM CHLORIDE 5; .3 G/100ML; G/100ML
1000 INJECTION, SOLUTION INTRAVENOUS
Refills: 0 | Status: DISCONTINUED | OUTPATIENT
Start: 2024-07-16 | End: 2024-07-19

## 2024-07-16 RX ORDER — ACETAMINOPHEN 325 MG
650 TABLET ORAL EVERY 6 HOURS
Refills: 0 | Status: DISCONTINUED | OUTPATIENT
Start: 2024-07-16 | End: 2024-07-19

## 2024-07-16 RX ORDER — ONDANSETRON HYDROCHLORIDE 2 MG/ML
4 INJECTION INTRAMUSCULAR; INTRAVENOUS ONCE
Refills: 0 | Status: COMPLETED | OUTPATIENT
Start: 2024-07-16 | End: 2024-07-16

## 2024-07-16 RX ORDER — DEXTROSE MONOHYDRATE AND SODIUM CHLORIDE 5; .3 G/100ML; G/100ML
1000 INJECTION, SOLUTION INTRAVENOUS ONCE
Refills: 0 | Status: COMPLETED | OUTPATIENT
Start: 2024-07-16 | End: 2024-07-16

## 2024-07-16 RX ORDER — ACETAMINOPHEN 325 MG
650 TABLET ORAL ONCE
Refills: 0 | Status: COMPLETED | OUTPATIENT
Start: 2024-07-16 | End: 2024-07-16

## 2024-07-16 RX ORDER — ONDANSETRON HYDROCHLORIDE 2 MG/ML
4 INJECTION INTRAMUSCULAR; INTRAVENOUS EVERY 8 HOURS
Refills: 0 | Status: DISCONTINUED | OUTPATIENT
Start: 2024-07-16 | End: 2024-07-17

## 2024-07-16 RX ORDER — ENOXAPARIN SODIUM 100 MG/ML
40 INJECTION SUBCUTANEOUS ONCE
Refills: 0 | Status: COMPLETED | OUTPATIENT
Start: 2024-07-16 | End: 2024-07-16

## 2024-07-16 RX ADMIN — ONDANSETRON HYDROCHLORIDE 4 MILLIGRAM(S): 2 INJECTION INTRAMUSCULAR; INTRAVENOUS at 11:21

## 2024-07-16 RX ADMIN — DEXTROSE MONOHYDRATE AND SODIUM CHLORIDE 1000 MILLILITER(S): 5; .3 INJECTION, SOLUTION INTRAVENOUS at 11:22

## 2024-07-16 RX ADMIN — Medication 650 MILLIGRAM(S): at 12:50

## 2024-07-16 RX ADMIN — ENOXAPARIN SODIUM 40 MILLIGRAM(S): 100 INJECTION SUBCUTANEOUS at 19:51

## 2024-07-16 NOTE — ED PROVIDER NOTE - NSFOLLOWUPINSTRUCTIONS_ED_ALL_ED_FT
You were seen in the Emergency Department for back pain. You had an MRI which shows a herniated disc in the lumbar spine. We recommend treatment with pain control, limiting exertional activity and follow up with our neurosurgery team. If you experience any worsening pain/paralysis, urinary or stool incontinence, persistent numbness or tingling in the groin/lower extremities please come back to the ED.

## 2024-07-16 NOTE — ED PROVIDER NOTE - ATTENDING CONTRIBUTION TO CARE
33 y.o. F, PMH of laparoscopic CCY c/b bile duct injury and adhesion s/p release, common hepatic duct stricture and s/p percutaneous transhepatic cholangiography and stent placement, G6PD,  p/w vomiting and abdominal pain. Patient follows with Dr. Forde. She was discharged last Monday. Since her discharge she has been having intermittent nausea and NBNB vomiting that improves with zofran and is able to tolerate a diet. She saw Dr. Forde in clinic last Thursday. She has been having consistent bilious output ~200cc/d from her catheter. This morning she had an episode of bilious vomiting and worsening RUQ abdominal pain prompting her to come to the ED. She denies any fever, chest pain, shortness of breath, rash, diarrhea, constipation or changes in stool frequency/consistency or purulent discharge from her catheter site. She denies eating any /reheated food, no new food introduced into diet. Reports last eating spicy noodles last night but states spicy foods usually help with her nausea. On exam, pt in NAD, AAOx3, head NC/AT, CN II-XII intact, PEERL, EOMi, neck (-) midline tenderness, lungs CTA B/L, CV S1S2 regular, abdomen soft/(+)RUQ pain, (-) distension/ND/(+)BS/(+) right percutaneous catheter in place- draining appropriately, ext (-) edema, motor 5/5x4, sensation intact, ambulating with steady gait. Will do labs, CT, sx consult and reevaluate.

## 2024-07-16 NOTE — ED PROVIDER NOTE - CARE PROVIDER_API CALL
Hilda Montaño  Neurosurgery  [address] [place] [address], [address]  [place] [address]  Phone: [phone]  Fax: [phone]  Follow Up Time: 1-3 Days

## 2024-07-16 NOTE — ED PROVIDER NOTE - CLINICAL SUMMARY MEDICAL DECISION MAKING FREE TEXT BOX
32 yo female w/ PMHx of G6PD deficiency, c-sections x4, s/p laparoscopic cholecystectomy (4/22/24) complicated by post-op leak s/p ex lap x2 (4/28/24 and May 2024) now s/p PTC drain placed by IR just over one week ago presenting to the ED for worsening RUQ abdominal pain and bilious emesis x1. The patient was recently here and was d/c on Monday 7/8. She has since had clear emesis every morning (normal for her) but this AM had one episode of bilious emesis. She has also noticed decreased output from her PTC drain today compared to previous days (usually 300cc per day, this AM was only 100-150cc). At the same time she says her RUQ abdominal pain is worse today.  She has a hx of lap vania in [facility] in April 2022 c/b bile leak s/p exlap x 2 with placement of a drain after the first.   She has not had a return of appetite this week, denies and fevers or chills, says she has had normal bowel function over this time period.  She presented to the ED with these symptoms, she was afebrile with hemodynamically stable. Labs grossly unremarkable except Alk Phos of 500. A CT scan with IV contrast was performed which showed the PTC drain enterring the right hepatic duct and traversing the biliary tree into the duodenum. General surgery consulted.  ADMIT.

## 2024-07-16 NOTE — H&P ADULT - HISTORY OF PRESENT ILLNESS
GENERAL SURGERY CONSULT NOTE    Patient: MICHAEL LAL , 33y (89)Female   MRN: [id number]  Location: [facility] ED  Visit: 24 Emergency  Date: 24 @ 17:56    HPI:    HPI: 32 yo female w/ PMHx of G6PD deficiency, c-sections x4, s/p laparoscopic cholecystectomy (24) complicated by post-op leak s/p ex lap x2 (24 and May 2024) now s/p PTC drain placed by IR just over one week ago presenting to the ED for worsening RUQ abdominal pain and bilious emesis x1. The patient was recently here and was d/c on . She has since had clear emesis every morning (normal for her) but this AM had one episode of bilious emesis. She has also noticed decreased output from her PTC drain today compared to previous days (usually 300cc per day, this AM was only 100-150cc). At the same time she says her RUQ abdominal pain is worse today.  She has a hx of lap vania in [place] in 2022 c/b bile leak s/p exlap x 2 with placement of a drain after the first.   She has not had a return of appetite this week, denies and fevers or chills, says she has had normal bowel function over this time period.   She presented to the ED with these symptoms, she was afebrile with hemodynamically stable. Labs grossly unremarkable except Alk Phos of 500. A CT scan with IV contrast was performed which showed the PTC drain enterring the right hepatic duct and traversing the biliary tree into the duodenum. General surgery consulted for evaluation and management of this patient's bilious emesis and abdominal pain.     PAST MEDICAL & SURGICAL HISTORY:  Glucose 6 phosphatase deficiency       delivery delivered      S/P cholecystectomy  s/p exlap x 2  PTC drain        Home Medications:  acetaminophen 325 mg oral tablet: 2 tab(s) orally every 6 hours As needed Mild Pain (1 - 3), Moderate Pain (4 - 6), Severe Pain (7 - 10) (2024 12:04)  polyethylene glycol 3350 oral powder for reconstitution: 17 gram(s) orally once a day over the counter (2024 12:04)            MEDICATIONS  (STANDING):  enoxaparin Injectable 40 milliGRAM(s) SubCutaneous Once    MEDICATIONS  (PRN):

## 2024-07-16 NOTE — ED PROVIDER NOTE - NS ED ROS FT
CONSTITUTIONAL: generalized weakness, no fevers or chills   EYES/ENT:  No visual changes;  No vertigo or throat pain   NECK:  No pain or stiffness  RESPIRATORY:  No cough, wheezing, hemoptysis; No shortness of breath  CARDIOVASCULAR:  No chest pain or palpitations  GASTROINTESTINAL:  RUQ abdominal pain, no distension. +ve nausea and bilious vomiting, No hematemesis; No diarrhea or constipation. No melena or hematochezia.  GENITOURINARY:  No dysuria, frequency or hematuria  MUSCULOSKELETAL:  FROM all extremities, normal strength, No calf tenderness  NEUROLOGICAL:  No numbness or weakness  SKIN:  No itching, rashes

## 2024-07-16 NOTE — ED PROVIDER NOTE - PHYSICAL EXAMINATION
GEN: No acute distress, laying on stretcher, comfortable, ill appearing, weak   PULM: Clear to auscultation bilaterally, No wheezing, rales, rhonchi, no respiratory distress  CVS: Regular rate and rhythm, S1-S2, no murmurs, heaves, thrills  ABD: Soft, non-distended, no guarding, BS +, RUQ tenderness to superficial palpation, R percutaneous catheter in place, draining appropriate output with no purulence, swelling or erythema at insertion site. Midline incision scar. 2cm laparoscope incision site c/d/i   EXT: No edema/cyanosis, extremities warm/dry, peripheral pulses palpable   NEURO: A&Ox3, no focal deficits, follows commands, answers inappropriately

## 2024-07-16 NOTE — H&P ADULT - ATTENDING COMMENTS
iatrogenic bile duct injury, will plan for tube study and advanced GI eval for possible plastic stenting of left ductal system

## 2024-07-16 NOTE — H&P ADULT - ASSESSMENT
ASSESSMENT:  32 yo female w/ PMHx of G6PD deficiency, c-sections x4, s/p laparoscopic cholecystectomy (4/22/24) complicated by post-op leak s/p ex lap x2 (4/28/24 and May 2024) now s/p PTC drain by IR 1 week ago presenting to the ED for evaluation of abdominal pain and 1 episode of bilious emesis. Physical exam, labs, and imaging documented as above.         PLAN:  - admit to surgical services, to Dr. Forde  - consult to advanced GI for possible ERCP with stenting  - consult to IR for evaluation of the drain to ensure proper positioning and that it is not obstructed/as cause of her bilious emesis this AM  - ok for regular (low fat) diet  - DVT prophylaxis  - Q4 vitals, Q4 I/O and Q4 record drain output  - trend LFTs      Above plan discussed with Attending Surgeon Dr. Forde, patient, patient family, and Primary team  07-16-24 @ 17:56    Bill Horne MD  PGY2 General Surgery  CONSULT SPECTRA: [id number]

## 2024-07-16 NOTE — ED ADULT NURSE NOTE - NSFALLUNIVINTERV_ED_ALL_ED
Bed/Stretcher in lowest position, wheels locked, appropriate side rails in place/Call bell, personal items and telephone in reach/Instruct patient to call for assistance before getting out of bed/chair/stretcher/Non-slip footwear applied when patient is off stretcher/[place] to call system/Physically safe environment - no spills, clutter or unnecessary equipment/Purposeful proactive rounding/Room/bathroom lighting operational, light cord in reach

## 2024-07-16 NOTE — H&P ADULT - NSHPLABSRESULTS_GEN_ALL_CORE
LAB/STUDIES:                        12.0   9.60  )-----------( 419      ( 16 Jul 2024 12:39 )             35.0     07-16    135  |  95<L>  |  7<L>  ----------------------------<  106<H>  4.6   |  26  |  0.9    Ca    9.8      16 Jul 2024 12:39  Mg     2.1     07-16    TPro  8.3<H>  /  Alb  4.4  /  TBili  0.7  /  DBili  <0.2  /  AST  38  /  ALT  26  /  AlkPhos  515<H>  07-16      LIVER FUNCTIONS - ( 16 Jul 2024 12:39 )  Alb: 4.4 g/dL / Pro: 8.3 g/dL / ALK PHOS: 515 U/L / ALT: 26 U/L / AST: 38 U/L / GGT: x           Urinalysis Basic - ( 16 Jul 2024 12:39 )    Color: x / Appearance: x / SG: x / pH: x  Gluc: 106 mg/dL / Ketone: x  / Bili: x / Urobili: x   Blood: x / Protein: x / Nitrite: x   Leuk Esterase: x / RBC: x / WBC x   Sq Epi: x / Non Sq Epi: x / Bacteria: x                  IMAGING:  < from: CT Abdomen and Pelvis w/ IV Cont (07.16.24 @ 16:32) >  IMPRESSION:    Previously noted gallbladder fossa collection is no longer visualized.    Internal/external catheter biliary drainage is likely terminating within   the duodenum. Persistent intrahepatic biliary duct dilatation most   prominent on the left.    Additional findings are detailed in the body of the report.          --- End of Report ---      < end of copied text >

## 2024-07-16 NOTE — H&P ADULT - NSHPPHYSICALEXAM_GEN_ALL_CORE
VITALS:  T(F): 99 (07-16-24 @ 17:16), Max: 99 (07-16-24 @ 17:16)  HR: 103 (07-16-24 @ 17:16) (71 - 103)  BP: 111/77 (07-16-24 @ 17:16) (110/77 - 111/77)  RR: 18 (07-16-24 @ 17:16) (18 - 20)  SpO2: 100% (07-16-24 @ 17:16) (100% - 100%)    PHYSICAL EXAM:  General: NAD, calm, cooperative, in no acute distress  Cardiac: RRR on radial exam  Respiratory: Chest rise equal bilaterally, no labored breathing on room air  Abdomen: Soft, non-distended, moderately tender to palpation in RUQ. She has an evidence of skin/wound opening in the right side of her anterior abdominal wall without evidence of purulence/drainage. She has a RUQ IR drain in place with bilious output  Skin: Warm/dry, normal color, no jaundice

## 2024-07-16 NOTE — ED PROVIDER NOTE - OBJECTIVE STATEMENT
33y F pmh laparoscopic CCY cb bile duct injury and adhesion s/p release, common hepatic duct stricture and s/p percutaneous transhepatic cholangiography and stent placement, G6PD,  pw vomiting and abdominal pain. Patient follows with surgery and Dr. Forde. She was discharged last Monday. Since her discharge she has been having intermittent nausea and NBNB vomiting that improves with zofran and is able to tolerate a diet. She saw Dr. Forde in clinic last Thursday. She has been having consistent bilious output ~200cc/d from her catheter. This morning she had an episode of bilious vomiting and worsening RUQ abdominal pain prompting her to come to the ED. She denies any fever, chest pain, shortness of breath, rash, diarrhea, constipation or changes in stool frequency/consistency or purulent discharge from her catheter site. She denies eating any /reheated food, no new food introduced into diet. Reports last eating spicy noodles last night but states spicy foods usually help with her nausea.

## 2024-07-16 NOTE — ED PROVIDER NOTE - PATIENT PORTAL LINK FT
You can access the FollowMyHealth Patient Portal offered by [facility] by registering at the following website: [email or web address]. By joining [facility]’s FollowMyHealth portal, you will also be able to view your health information using other applications (apps) compatible with our system.

## 2024-07-17 LAB
ALBUMIN SERPL ELPH-MCNC: 3.8 G/DL — SIGNIFICANT CHANGE UP (ref 3.5–5.2)
ALBUMIN SERPL ELPH-MCNC: 3.8 G/DL — SIGNIFICANT CHANGE UP (ref 3.5–5.2)
ALBUMIN SERPL ELPH-MCNC: 4 G/DL — SIGNIFICANT CHANGE UP (ref 3.5–5.2)
ALP SERPL-CCNC: 396 U/L — HIGH (ref 30–115)
ALP SERPL-CCNC: 432 U/L — HIGH (ref 30–115)
ALP SERPL-CCNC: 443 U/L — HIGH (ref 30–115)
ALT FLD-CCNC: 17 U/L — SIGNIFICANT CHANGE UP (ref 0–41)
ALT FLD-CCNC: 19 U/L — SIGNIFICANT CHANGE UP (ref 0–41)
ALT FLD-CCNC: 19 U/L — SIGNIFICANT CHANGE UP (ref 0–41)
ANION GAP SERPL CALC-SCNC: 11 MMOL/L — SIGNIFICANT CHANGE UP (ref 7–14)
ANION GAP SERPL CALC-SCNC: 12 MMOL/L — SIGNIFICANT CHANGE UP (ref 7–14)
ANION GAP SERPL CALC-SCNC: 13 MMOL/L — SIGNIFICANT CHANGE UP (ref 7–14)
APTT BLD: 32.3 SEC — SIGNIFICANT CHANGE UP (ref 27–39.2)
APTT BLD: 36.9 SEC — SIGNIFICANT CHANGE UP (ref 27–39.2)
AST SERPL-CCNC: 19 U/L — SIGNIFICANT CHANGE UP (ref 0–41)
AST SERPL-CCNC: 23 U/L — SIGNIFICANT CHANGE UP (ref 0–41)
AST SERPL-CCNC: 32 U/L — SIGNIFICANT CHANGE UP (ref 0–41)
BASOPHILS # BLD AUTO: 0.02 K/UL — SIGNIFICANT CHANGE UP (ref 0–0.2)
BASOPHILS NFR BLD AUTO: 0.2 % — SIGNIFICANT CHANGE UP (ref 0–1)
BILIRUB DIRECT SERPL-MCNC: 0.2 MG/DL — SIGNIFICANT CHANGE UP (ref 0–0.3)
BILIRUB INDIRECT FLD-MCNC: 0.4 MG/DL — SIGNIFICANT CHANGE UP (ref 0.2–1.2)
BILIRUB SERPL-MCNC: 0.6 MG/DL — SIGNIFICANT CHANGE UP (ref 0.2–1.2)
BLD GP AB SCN SERPL QL: SIGNIFICANT CHANGE UP
BUN SERPL-MCNC: 5 MG/DL — LOW (ref 10–20)
BUN SERPL-MCNC: 5 MG/DL — LOW (ref 10–20)
BUN SERPL-MCNC: 6 MG/DL — LOW (ref 10–20)
CALCIUM SERPL-MCNC: 8.7 MG/DL — SIGNIFICANT CHANGE UP (ref 8.4–10.5)
CALCIUM SERPL-MCNC: 8.9 MG/DL — SIGNIFICANT CHANGE UP (ref 8.4–10.5)
CALCIUM SERPL-MCNC: 9.1 MG/DL — SIGNIFICANT CHANGE UP (ref 8.4–10.5)
CHLORIDE SERPL-SCNC: 101 MMOL/L — SIGNIFICANT CHANGE UP (ref 98–110)
CHLORIDE SERPL-SCNC: 97 MMOL/L — LOW (ref 98–110)
CHLORIDE SERPL-SCNC: 98 MMOL/L — SIGNIFICANT CHANGE UP (ref 98–110)
CO2 SERPL-SCNC: 24 MMOL/L — SIGNIFICANT CHANGE UP (ref 17–32)
CO2 SERPL-SCNC: 25 MMOL/L — SIGNIFICANT CHANGE UP (ref 17–32)
CO2 SERPL-SCNC: 26 MMOL/L — SIGNIFICANT CHANGE UP (ref 17–32)
CREAT SERPL-MCNC: 0.7 MG/DL — SIGNIFICANT CHANGE UP (ref 0.7–1.5)
CREAT SERPL-MCNC: 0.7 MG/DL — SIGNIFICANT CHANGE UP (ref 0.7–1.5)
CREAT SERPL-MCNC: 0.8 MG/DL — SIGNIFICANT CHANGE UP (ref 0.7–1.5)
EGFR: 100 ML/MIN/1.73M2 — SIGNIFICANT CHANGE UP
EGFR: 118 ML/MIN/1.73M2 — SIGNIFICANT CHANGE UP
EGFR: 118 ML/MIN/1.73M2 — SIGNIFICANT CHANGE UP
EOSINOPHIL # BLD AUTO: 0.17 K/UL — SIGNIFICANT CHANGE UP (ref 0–0.7)
EOSINOPHIL NFR BLD AUTO: 1.9 % — SIGNIFICANT CHANGE UP (ref 0–8)
GLUCOSE SERPL-MCNC: 104 MG/DL — HIGH (ref 70–99)
GLUCOSE SERPL-MCNC: 91 MG/DL — SIGNIFICANT CHANGE UP (ref 70–99)
GLUCOSE SERPL-MCNC: 92 MG/DL — SIGNIFICANT CHANGE UP (ref 70–99)
HCT VFR BLD CALC: 32.6 % — LOW (ref 37–47)
HCT VFR BLD CALC: 35.6 % — LOW (ref 37–47)
HGB BLD-MCNC: 10.6 G/DL — LOW (ref 12–16)
HGB BLD-MCNC: 11.7 G/DL — LOW (ref 12–16)
IMM GRANULOCYTES NFR BLD AUTO: 0.3 % — SIGNIFICANT CHANGE UP (ref 0.1–0.3)
INR BLD: 1.24 RATIO — SIGNIFICANT CHANGE UP (ref 0.65–1.3)
INR BLD: 1.3 RATIO — SIGNIFICANT CHANGE UP (ref 0.65–1.3)
LYMPHOCYTES # BLD AUTO: 1.81 K/UL — SIGNIFICANT CHANGE UP (ref 1.2–3.4)
LYMPHOCYTES # BLD AUTO: 19.8 % — LOW (ref 20.5–51.1)
MAGNESIUM SERPL-MCNC: 1.7 MG/DL — LOW (ref 1.8–2.4)
MAGNESIUM SERPL-MCNC: 1.7 MG/DL — LOW (ref 1.8–2.4)
MCHC RBC-ENTMCNC: 28.8 PG — SIGNIFICANT CHANGE UP (ref 27–31)
MCHC RBC-ENTMCNC: 29.1 PG — SIGNIFICANT CHANGE UP (ref 27–31)
MCHC RBC-ENTMCNC: 32.5 G/DL — SIGNIFICANT CHANGE UP (ref 32–37)
MCHC RBC-ENTMCNC: 32.9 G/DL — SIGNIFICANT CHANGE UP (ref 32–37)
MCV RBC AUTO: 88.6 FL — SIGNIFICANT CHANGE UP (ref 81–99)
MCV RBC AUTO: 88.6 FL — SIGNIFICANT CHANGE UP (ref 81–99)
MONOCYTES # BLD AUTO: 0.71 K/UL — HIGH (ref 0.1–0.6)
MONOCYTES NFR BLD AUTO: 7.8 % — SIGNIFICANT CHANGE UP (ref 1.7–9.3)
NEUTROPHILS # BLD AUTO: 6.4 K/UL — SIGNIFICANT CHANGE UP (ref 1.4–6.5)
NEUTROPHILS NFR BLD AUTO: 70 % — SIGNIFICANT CHANGE UP (ref 42.2–75.2)
NRBC # BLD: 0 /100 WBCS — SIGNIFICANT CHANGE UP (ref 0–0)
NRBC # BLD: 0 /100 WBCS — SIGNIFICANT CHANGE UP (ref 0–0)
PHOSPHATE SERPL-MCNC: 2.8 MG/DL — SIGNIFICANT CHANGE UP (ref 2.1–4.9)
PHOSPHATE SERPL-MCNC: 3.2 MG/DL — SIGNIFICANT CHANGE UP (ref 2.1–4.9)
PLATELET # BLD AUTO: 368 K/UL — SIGNIFICANT CHANGE UP (ref 130–400)
PLATELET # BLD AUTO: 396 K/UL — SIGNIFICANT CHANGE UP (ref 130–400)
PMV BLD: 10.9 FL — HIGH (ref 7.4–10.4)
PMV BLD: 10.9 FL — HIGH (ref 7.4–10.4)
POTASSIUM SERPL-MCNC: 3.4 MMOL/L — LOW (ref 3.5–5)
POTASSIUM SERPL-MCNC: 3.4 MMOL/L — LOW (ref 3.5–5)
POTASSIUM SERPL-MCNC: 3.7 MMOL/L — SIGNIFICANT CHANGE UP (ref 3.5–5)
POTASSIUM SERPL-SCNC: 3.4 MMOL/L — LOW (ref 3.5–5)
POTASSIUM SERPL-SCNC: 3.4 MMOL/L — LOW (ref 3.5–5)
POTASSIUM SERPL-SCNC: 3.7 MMOL/L — SIGNIFICANT CHANGE UP (ref 3.5–5)
PROT SERPL-MCNC: 6.9 G/DL — SIGNIFICANT CHANGE UP (ref 6–8)
PROT SERPL-MCNC: 7.1 G/DL — SIGNIFICANT CHANGE UP (ref 6–8)
PROT SERPL-MCNC: 7.6 G/DL — SIGNIFICANT CHANGE UP (ref 6–8)
PROTHROM AB SERPL-ACNC: 14.2 SEC — HIGH (ref 9.95–12.87)
PROTHROM AB SERPL-ACNC: 14.9 SEC — HIGH (ref 9.95–12.87)
RBC # BLD: 3.68 M/UL — LOW (ref 4.2–5.4)
RBC # BLD: 4.02 M/UL — LOW (ref 4.2–5.4)
RBC # FLD: 14.8 % — HIGH (ref 11.5–14.5)
RBC # FLD: 15.2 % — HIGH (ref 11.5–14.5)
SODIUM SERPL-SCNC: 134 MMOL/L — LOW (ref 135–146)
SODIUM SERPL-SCNC: 135 MMOL/L — SIGNIFICANT CHANGE UP (ref 135–146)
SODIUM SERPL-SCNC: 138 MMOL/L — SIGNIFICANT CHANGE UP (ref 135–146)
WBC # BLD: 9.14 K/UL — SIGNIFICANT CHANGE UP (ref 4.8–10.8)
WBC # BLD: 9.95 K/UL — SIGNIFICANT CHANGE UP (ref 4.8–10.8)
WBC # FLD AUTO: 9.14 K/UL — SIGNIFICANT CHANGE UP (ref 4.8–10.8)
WBC # FLD AUTO: 9.95 K/UL — SIGNIFICANT CHANGE UP (ref 4.8–10.8)

## 2024-07-17 PROCEDURE — 99232 SBSQ HOSP IP/OBS MODERATE 35: CPT

## 2024-07-17 PROCEDURE — 99223 1ST HOSP IP/OBS HIGH 75: CPT

## 2024-07-17 RX ORDER — MAGNESIUM SULFATE 100 %
1 POWDER (GRAM) MISCELLANEOUS ONCE
Refills: 0 | Status: COMPLETED | OUTPATIENT
Start: 2024-07-17 | End: 2024-07-18

## 2024-07-17 RX ORDER — POTASSIUM CHLORIDE 600 MG/1
20 TABLET, FILM COATED, EXTENDED RELEASE ORAL ONCE
Refills: 0 | Status: COMPLETED | OUTPATIENT
Start: 2024-07-17 | End: 2024-07-17

## 2024-07-17 RX ORDER — LEVOFLOXACIN 25 MG/ML
500 INJECTION INTRAVENOUS EVERY 24 HOURS
Refills: 0 | Status: DISCONTINUED | OUTPATIENT
Start: 2024-07-17 | End: 2024-07-17

## 2024-07-17 RX ORDER — ONDANSETRON HYDROCHLORIDE 2 MG/ML
4 INJECTION INTRAMUSCULAR; INTRAVENOUS EVERY 4 HOURS
Refills: 0 | Status: DISCONTINUED | OUTPATIENT
Start: 2024-07-17 | End: 2024-07-19

## 2024-07-17 RX ORDER — ENOXAPARIN SODIUM 100 MG/ML
40 INJECTION SUBCUTANEOUS EVERY 24 HOURS
Refills: 0 | Status: DISCONTINUED | OUTPATIENT
Start: 2024-07-17 | End: 2024-07-17

## 2024-07-17 RX ORDER — POTASSIUM CHLORIDE 600 MG/1
10 TABLET, FILM COATED, EXTENDED RELEASE ORAL
Refills: 0 | Status: DISCONTINUED | OUTPATIENT
Start: 2024-07-17 | End: 2024-07-17

## 2024-07-17 RX ORDER — AMOXICILLIN/POTASSIUM CLAV 250-125 MG
1 TABLET ORAL EVERY 12 HOURS
Refills: 0 | Status: DISCONTINUED | OUTPATIENT
Start: 2024-07-17 | End: 2024-07-17

## 2024-07-17 RX ORDER — PANTOPRAZOLE SODIUM 40 MG/10ML
40 INJECTION, POWDER, FOR SOLUTION INTRAVENOUS EVERY 24 HOURS
Refills: 0 | Status: DISCONTINUED | OUTPATIENT
Start: 2024-07-17 | End: 2024-07-18

## 2024-07-17 RX ORDER — POTASSIUM CHLORIDE 600 MG/1
20 TABLET, FILM COATED, EXTENDED RELEASE ORAL ONCE
Refills: 0 | Status: DISCONTINUED | OUTPATIENT
Start: 2024-07-17 | End: 2024-07-17

## 2024-07-17 RX ORDER — POTASSIUM PHOSPHATE, MONOBASIC POTASSIUM PHOSPHATE, DIBASIC INJECTION, 236; 224 MG/ML; MG/ML
15 SOLUTION, CONCENTRATE INTRAVENOUS ONCE
Refills: 0 | Status: COMPLETED | OUTPATIENT
Start: 2024-07-17 | End: 2024-07-18

## 2024-07-17 RX ORDER — AMOXICILLIN/POTASSIUM CLAV 250-125 MG
1 TABLET ORAL
Refills: 0 | Status: DISCONTINUED | OUTPATIENT
Start: 2024-07-17 | End: 2024-07-19

## 2024-07-17 RX ORDER — METRONIDAZOLE 500 MG/1
500 TABLET ORAL EVERY 8 HOURS
Refills: 0 | Status: DISCONTINUED | OUTPATIENT
Start: 2024-07-17 | End: 2024-07-17

## 2024-07-17 RX ORDER — MAGNESIUM SULFATE 100 %
2 POWDER (GRAM) MISCELLANEOUS ONCE
Refills: 0 | Status: COMPLETED | OUTPATIENT
Start: 2024-07-17 | End: 2024-07-17

## 2024-07-17 RX ADMIN — DEXTROSE MONOHYDRATE AND SODIUM CHLORIDE 120 MILLILITER(S): 5; .3 INJECTION, SOLUTION INTRAVENOUS at 22:01

## 2024-07-17 RX ADMIN — Medication 1 TABLET(S): at 18:47

## 2024-07-17 RX ADMIN — DEXTROSE MONOHYDRATE AND SODIUM CHLORIDE 120 MILLILITER(S): 5; .3 INJECTION, SOLUTION INTRAVENOUS at 01:00

## 2024-07-17 RX ADMIN — Medication 650 MILLIGRAM(S): at 18:47

## 2024-07-17 RX ADMIN — Medication 650 MILLIGRAM(S): at 11:04

## 2024-07-17 RX ADMIN — Medication 25 GRAM(S): at 08:37

## 2024-07-17 RX ADMIN — PANTOPRAZOLE SODIUM 40 MILLIGRAM(S): 40 INJECTION, POWDER, FOR SOLUTION INTRAVENOUS at 05:45

## 2024-07-17 RX ADMIN — POTASSIUM CHLORIDE 50 MILLIEQUIVALENT(S): 600 TABLET, FILM COATED, EXTENDED RELEASE ORAL at 08:36

## 2024-07-17 RX ADMIN — Medication 650 MILLIGRAM(S): at 00:01

## 2024-07-17 NOTE — CONSULT NOTE ADULT - CONSULT REASON
post PTC drain placement patient with bilious emesis and increased RUQ pain concern for PTC drain in improper position

## 2024-07-17 NOTE — CONSULT NOTE ADULT - ATTENDING COMMENTS
Agree with the above.  Patient feeling better today.  She is status post IR guided PTBD drainage of her right intrahepatic ducts with internal and external drain in place.    She has no signs or symptoms of cholangitis or biliary obstruction at this time.    Ultimate plan was to treat her CHD stricture with bilateral intrahepatic bile duct drainage.  We offered the patient repeat ERCP and possible EUS guided rendezvous with stent placement into the left intrahepatic ducts but she is reluctant as she will be going to [facility] after her vacation and would like to have definitive treatment of her condition.  She expressed her preference of getting surgical therapy to avoid multiple future endoscopic intervention.  She was made aware that this will require a multidisciplinary discussion with surgery and IR.    She is scheduled for possible IR intervention tomorrow.  Keep n.p.o. after midnight.  We will follow closely.

## 2024-07-17 NOTE — CONSULT NOTE ADULT - SUBJECTIVE AND OBJECTIVE BOX
Gastroenterology Consultation:    Patient is a 33y old  Female who presents with a chief complaint of Abdominal pain, bilious emesis (2024 08:07)        Admitted on: 24      HPI:  GENERAL SURGERY CONSULT NOTE    Patient: MICHAEL LAL , 33y (89)Female   MRN: [id number]  Location: [facility] ED  Visit: 24 Emergency  Date: 24 @ 17:56    HPI:    HPI: 34 yo female w/ PMHx of G6PD deficiency, c-sections x4, s/p laparoscopic cholecystectomy (24) complicated by post-op leak s/p ex lap x2 (24 and May 2024) now s/p PTC drain placed by IR just over one week ago presenting to the ED for abdominal discomfort and bilious emesis x1. The patient was recently d/c on . She has since had clear emesis every morning (normal for her) which usually resolves on its own but this AM had one episode of bilious emesis. She has not had any further episodes of emesis.    She has also noticed decreased output from her PTC drain today compared to previous days (usually 300cc per day, this AM was only 100-150cc).  She has a hx of lap vania in [place] in 2024 c/b bile leak s/p exlap x 2. She underwent an ERCP with cholangiogram revealing tight narrowing of the CBD likely an area of clipping during the previous CCY. The wire was not able to pass through this area. The Rt and Lt system were dilated. After discussion with HPB and IR. IR placed an external internal drain in the right system which was internalized. She was eventually discharged home and was doing overall well since she developed her symptoms today.    She denies severe abdominal painal, fevers or chills, overt bleeding.    PAST MEDICAL & SURGICAL HISTORY:  Glucose 6 phosphatase deficiency       delivery delivered      S/P cholecystectomy  s/p exlap x 2  PTC drain    Home Medications:  acetaminophen 325 mg oral tablet: 2 tab(s) orally every 6 hours As needed Mild Pain (1 - 3), Moderate Pain (4 - 6), Severe Pain (7 - 10) (2024 12:04)  polyethylene glycol 3350 oral powder for reconstitution: 17 gram(s) orally once a day over the counter (2024 12:04)        MEDICATIONS  (STANDING):  enoxaparin Injectable 40 milliGRAM(s) SubCutaneous Once    MEDICATIONS  (PRN):     (2024 17:55)    Prior ERCP: 24    PAST MEDICAL & SURGICAL HISTORY:  Glucose 6 phosphatase deficiency       delivery delivered      S/P cholecystectomy            FAMILY HISTORY:  FHx: diabetes mellitus (Mother)        Social History:  Tobacco:  Alcohol:  Drugs:    Home Medications:  acetaminophen 325 mg oral tablet: 2 tab(s) orally every 6 hours As needed Mild Pain (1 - 3), Moderate Pain (4 - 6), Severe Pain (7 - 10) (2024 12:04)  polyethylene glycol 3350 oral powder for reconstitution: 17 gram(s) orally once a day over the counter (2024 12:04)        MEDICATIONS  (STANDING):  acetaminophen     Tablet .. 650 milliGRAM(s) Oral every 6 hours  amoxicillin  875 milliGRAM(s)/clavulanate 1 Tablet(s) Oral two times a day  enoxaparin Injectable 40 milliGRAM(s) SubCutaneous every 24 hours  lactated ringers. 1000 milliLiter(s) (120 mL/Hr) IV Continuous <Continuous>  pantoprazole  Injectable 40 milliGRAM(s) IV Push every 24 hours  potassium chloride  20 mEq/100 mL IVPB 20 milliEquivalent(s) IV Intermittent once    MEDICATIONS  (PRN):  ondansetron Injectable 4 milliGRAM(s) IV Push every 4 hours PRN Nausea and/or Vomiting      Allergies  sulfa drugs (Rash)      Review of Systems:   Constitutional:  No Fever, No Chills  ENT/Mouth:  No Hearing Changes,  No Difficulty Swallowing  Eyes:  No Eye Pain, No Vision Changes  Cardiovascular:  No Chest Pain, No Palpitations  Respiratory:  No Cough, No Dyspnea  Gastrointestinal:  As described in HPI  Musculoskeletal:  No Joint Swelling, No Back Pain  Skin:  No Skin Lesions, No Jaundice  Neuro:  No Syncope, No Dizziness  Heme/Lymph:  No Bruising, No Bleeding.    Physical Examination:  T(C): 36.9 (24 @ 13:41), Max: 37.2 (24 @ 17:16)  HR: 96 (24 @ 13:41) (85 - 103)  BP: 113/75 (24 @ 13:41) (101/66 - 132/63)  RR: 18 (24 @ 13:41) (18 - 18)  SpO2: 100% (24 @ 07:58) (97% - 100%)          GENERAL: AAOx3, no acute distress.  HEAD:  Atraumatic, Normocephalic  EYES: conjunctiva and sclera clear  NECK: Supple, no JVD or thyromegaly  CHEST/LUNG: Clear to auscultation bilaterally; No wheeze, rhonchi, or rales  HEART: Regular rate and rhythm; normal S1, S2, No murmurs.  ABDOMEN: Soft, nontender, nondistended; Bowel sounds present. IR drain noted.  NEUROLOGY: No asterixis or tremor.   SKIN: Intact, no jaundice        Data:                        11.7   9.95  )-----------( 396      ( 2024 23:44 )             35.6     Hgb Trend:  11.7  24 @ 23:44  12.0  24 @ 12:39      -    135  |  98  |  5<L>  ----------------------------<  92  3.7   |  26  |  0.7    Ca    8.9      2024 11:21  Phos  3.2     07-  Mg     1.7     -    TPro  7.1  /  Alb  3.8  /  TBili  0.6  /  DBili  0.2  /  AST  19  /  ALT  17  /  AlkPhos  396<H>      Liver panel trend:  TBili 0.6   /   AST 19   /   ALT 17   /   AlkP 396   /   Tptn 7.1   /   Alb 3.8    /   DBili 0.2        TBili 0.6   /   AST 23   /   ALT 19   /   AlkP 432   /   Tptn 7.6   /   Alb 4.0    /   DBili 0.2      -  TBili 0.7   /   AST 38   /   ALT 26   /   AlkP 515   /   Tptn 8.3   /   Alb 4.4    /   DBili <0.2      -  TBili 0.6   /   AST 21   /   ALT 34   /   AlkP 362   /   Tptn 7.4   /   Alb 4.0    /   DBili --            PT/INR - ( 2024 23:44 )   PT: 14.90 sec;   INR: 1.30 ratio         PTT - ( 2024 23:44 )  PTT:36.9 sec        Radiology:  CT Abdomen and Pelvis w/ IV Cont:   ACC: [id number] EXAM:  CT ABDOMEN AND PELVIS IC   ORDERED BY: GLENIS METZ     PROCEDURE DATE:  2024          INTERPRETATION:  Clinical Indication: History of bile duct injury and   adhesions post release and common hepatic duct stricture post stent   placement. Presenting with persistent biliary drainage from catheter and   episode of bilious vomiting.    Technique: Multidetector-row CT images of the abdomen and pelvis were   obtained from the xiphoid through the symphysis pubis following the   administration of intravenous contrast. No oral contrast was   administered. Residual oral contrast is noted on the CT. Coronal and   sagittal reconstructions were performed.    Contrast: 95 cc Omnipaque 350 non-ionic intravenous contrast.    Comparison: CT abdomen and pelvis 2024.    Findings:    01. LIVER: Normal morphology.  No focal lesion.  02. SPLEEN: Normal.  03. PANCREAS: Normal.  04. GALLBLADDER/BILIARY TREE: Status post cholecystectomy. Percutaneous   external/internal biliary catheter likely terminating within the   duodenum. Essentially stable intrahepatic biliary duct dilatation is   noted most prominent on the left.  05. ADRENALS: Normal.  06. KIDNEYS: Symmetric enhancement.  No hydronephrosis or definite renal   stone. Excreted contrast is noted within the collecting system obscuring   evaluation of small ureteral stones.  07. LYMPHADENOPATHY/RETROPERITONEUM: No lymphadenopathy.  08. BOWEL: No bowel obstruction.  09. PELVIC VISCERA: Uterine calcification may represent a fibroid.   Excreted contrast is noted within an otherwise unremarkable urinary   bladder.  10. PELVIC LYMPH NODES: No lymphadenopathy.  11. VASCULATURE: No abdominal aortic aneurysm.  12. PERITONEUM/ABDOMINAL WALL: Essentially stable trace free fluid in the   right upper quadrant versus stranding. Trace free pelvic fluid.   Postsurgical changes of the anterior abdomen. No drainable collection.  13. SKELETAL: No aggressive lesion.  14. LUNG BASES: No pleural effusion.    IMPRESSION:    Previously noted gallbladder fossa collection is no longer visualized.    Internal/external catheter biliary drainage is likely terminating within   the duodenum. Persistent intrahepatic biliary duct dilatation most   prominent on the left.    Additional findings are detailed in the body of the report.          --- End of Report ---            DAGO HELM MD; Attending Radiologist  This document has been electronically signed. 2024  5:44PM (24 @ 16:32)

## 2024-07-17 NOTE — PROGRESS NOTE ADULT - ASSESSMENT
ASSESSMENT:  32 yo female w/ PMHx of G6PD deficiency, c-sections x4, s/p laparoscopic cholecystectomy (4/22/24) complicated by post-op leak s/p ex lap x2 (4/28/24 and May 2024) now s/p PTC drain by IR 1 week ago presenting to the ED for evaluation of abdominal pain and 1 episode of bilious emesis.     PLAN:  - GI consult for possible ERCP with stenting   - IR consult for irrigation of her drain  - Augmentin  - NPO, IVF  - Trend LFTs  - PPI  - DVT ppx    BLUE TEAM SPECTRA: [id number]

## 2024-07-17 NOTE — CONSULT NOTE ADULT - ASSESSMENT
34 yo female w/ PMHx of G6PD deficiency, c-sections x4, s/p laparoscopic cholecystectomy (4/22/24) complicated by post-op leak s/p ex lap x2 (4/28/24 and May 2024) with attempted ERCP but the wire was not able to pass beyond the mid CBD at the area of probable injury/clips from previous surgery who underwent PTC drain placed by IR is here with one episode of bilious emesis.    Recommendations:    -IR evaluation of PTBD  -Advanced diet as tolerated.  -Given her normal bili and resolution of symptoms, there is no urgent indication of inpatient procedure.  -From a GI standpoint, we will plan for an OP ERCP to internalize the drains using the existing IR stent as guide.  -If she has any signs of cholangitis or worsening liver enzymes, please inform the Advanced GI team.      The patient was discussed with Dr. Karimi

## 2024-07-17 NOTE — CONSULT NOTE ADULT - SUBJECTIVE AND OBJECTIVE BOX
INTERVENTIONAL RADIOLOGY CONSULT:     Procedure Requested: PTC evaluation      HPI:    HPI: 32 yo female w/ PMHx of G6PD deficiency, c-sections x4, s/p laparoscopic cholecystectomy (24) complicated by post-op leak s/p ex lap x2 (24 and May 2024) now s/p PTC drain placed by IR just over one week ago presenting to the ED for worsening RUQ abdominal pain and bilious emesis x1. The patient was recently here and was d/c on . She has since had clear emesis every morning (normal for her) but this AM had one episode of bilious emesis. She has also noticed decreased output from her PTC drain today compared to previous days (usually 300cc per day, this AM was only 100-150cc). At the same time she says her RUQ abdominal pain is worse today.  She has a hx of lap vania in [facility] in 2022 c/b bile leak s/p exlap x 2 with placement of a drain after the first.   She has not had a return of appetite this week, denies and fevers or chills, says she has had normal bowel function over this time period.   She presented to the ED with these symptoms, she was afebrile with hemodynamically stable. Labs grossly unremarkable except Alk Phos of 500. A CT scan with IV contrast was performed which showed the PTC drain enterring the right hepatic duct and traversing the biliary tree into the duodenum. General surgery consulted for evaluation and management of this patient's bilious emesis and abdominal pain.     PAST MEDICAL & SURGICAL HISTORY:  Glucose 6 phosphatase deficiency       delivery delivered      S/P cholecystectomy  s/p exlap x 2  PTC drain        Home Medications:  acetaminophen 325 mg oral tablet: 2 tab(s) orally every 6 hours As needed Mild Pain (1 - 3), Moderate Pain (4 - 6), Severe Pain (7 - 10) (2024 12:04)  polyethylene glycol 3350 oral powder for reconstitution: 17 gram(s) orally once a day over the counter (2024 12:04)            MEDICATIONS  (STANDING):  enoxaparin Injectable 40 milliGRAM(s) SubCutaneous Once    MEDICATIONS  (PRN):             (2024 17:55)      PAST MEDICAL & SURGICAL HISTORY:  Glucose 6 phosphatase deficiency       delivery delivered      S/P cholecystectomy          MEDICATIONS  (STANDING):  acetaminophen     Tablet .. 650 milliGRAM(s) Oral every 6 hours  amoxicillin  875 milliGRAM(s)/clavulanate 1 Tablet(s) Oral two times a day  enoxaparin Injectable 40 milliGRAM(s) SubCutaneous every 24 hours  lactated ringers. 1000 milliLiter(s) (120 mL/Hr) IV Continuous <Continuous>  pantoprazole  Injectable 40 milliGRAM(s) IV Push every 24 hours  potassium chloride  20 mEq/100 mL IVPB 20 milliEquivalent(s) IV Intermittent once    MEDICATIONS  (PRN):  ondansetron Injectable 4 milliGRAM(s) IV Push every 4 hours PRN Nausea and/or Vomiting      Allergies    sulfa drugs (Rash)    Intolerances        Social History:   Smoking: Yes [ ]  No [ ]   ______pk yrs  ETOH  Yes [ ]  No [ ]  Social [ ]  DRUGS:  Yes [ ]  No [ ]  if so what______________    FAMILY HISTORY:  FHx: diabetes mellitus (Mother)        Physical Exam:   Vital Signs Last 24 Hrs  T(C): 36.9 (2024 13:41), Max: 37.2 (2024 17:16)  T(F): 98.4 (2024 13:41), Max: 99 (2024 17:16)  HR: 96 (2024 13:41) (85 - 103)  BP: 113/75 (2024 13:41) (101/66 - 132/63)  BP(mean): --  RR: 18 (2024 13:41) (18 - 18)  SpO2: 100% (2024 07:58) (97% - 100%)    Parameters below as of 2024 07:58  Patient On (Oxygen Delivery Method): room air        GENERAL: Resting comfortably in bed. NAD.  NEURO: Alert and oriented x3.  CARDIAC: Normal heart rate.  RESPIRATORY: Breathing comfortably on room air.  ABDOMEN: Soft, nontender.  EXTREMITIES: No peripheral edema.      Labs:                         11.7   9.95  )-----------( 396      ( 2024 23:44 )             35.6     07-17    135  |  98  |  5<L>  ----------------------------<  92  3.7   |  26  |  0.7    Ca    8.9      2024 11:21  Phos  3.2     07-16  Mg     1.7     07-16    TPro  7.1  /  Alb  3.8  /  TBili  0.6  /  DBili  0.2  /  AST  19  /  ALT  17  /  AlkPhos  396<H>  07-17    PT/INR - ( 2024 23:44 )   PT: 14.90 sec;   INR: 1.30 ratio         PTT - ( 2024 23:44 )  PTT:36.9 sec    Pertinent labs:                      11.7   9.95  )-----------( 396      ( 2024 23:44 )             35.6       07-17    135  |  98  |  5<L>  ----------------------------<  92  3.7   |  26  |  0.7    Ca    8.9      2024 11:21  Phos  3.2     07-16  Mg     1.7     07-16    TPro  7.1  /  Alb  3.8  /  TBili  0.6  /  DBili  0.2  /  AST  19  /  ALT  17  /  AlkPhos  396<H>  07-17      PT/INR - ( 2024 23:44 )   PT: 14.90 sec;   INR: 1.30 ratio         PTT - ( 2024 23:44 )  PTT:36.9 sec    Radiology & Additional Studies:     Radiology imaging reviewed.       ASSESSMENT/ PLAN:    32 yo female w/ PMHx of G6PD deficiency, c-sections x4, s/p laparoscopic cholecystectomy (24) complicated by post-op leak s/p ex lap x2 (24 and May 2024) now s/p PTC drain placed by IR just over one week ago presenting to the ED for worsening RUQ abdominal pain and bilious emesis x1.     Imaging demonstrated percutaneous external/internal biliary catheter likely terminating within the duodenum. Essentailly stable intrahepatic biliary duct dilatation.     IR consulted for PTC evaluation     -Imaging reviewed   -Flushes and drains appropriately   - patient tentatively scheduled for IR procedure on  pending the following:  - keep NPO after midnight (day before procedure)  - repeat CBC, CMP, PT/INR, (target INR: <1.5)  - please correct the following prior to procedure: (any abnl labs, PLT, INR, Na, K)  - hold antiplatelet / anticoagulation lovenox  prior to procedure  - will plan to obtain consent from (patient vs HCP) at time of procedure    To contact Interventional Radiology with any questions, concerns or issues please utilize the following:  M-F 7am-5pm: message [email or web address] on teams,  consult spectra: [phone]  After hours/weekends/holidays: [phone]

## 2024-07-17 NOTE — CHART NOTE - NSCHARTNOTEFT_GEN_A_CORE
Upstream dilation of the left and right intrahepatic bile ducts/Common bile duct stricture/Cystic structure adjacent and connected to the proximal common bile duct secondary to the extrinsic clip.

## 2024-07-17 NOTE — CHART NOTE - NSCHARTNOTESELECT_GEN_ALL_CORE
Event Note
Event Note
Transfer Note
Transfer Note
CDI query response/Event Note
ERCP/Event Note
Note requested by patient for  to travel to take care of her children/Event Note
Transfer Note

## 2024-07-17 NOTE — CDI QUERY NOTE - NSCDIOTHERTXTBX_GEN_ALL_CORE_HH
Clinical documentation and/or evidence of the patient’s presentation, evaluation, and medical management, as evidenced below, may support a cause and effect link that is not documented in the medical record.  In order to ensure accurate coding and accuracy of the clinical record, the documentation in this patient’s medical record requires additional clarification.  If you think the supporting documentation and/or clinical evidence supports a cause and effect link, please include more specific documentation associated with these findings in your progress note and/or discharge summary.    Please clarify if there is a relationship between the upstream dilation of the left and right intrahepatic bile ducts/common bile duct stricture/cystic structure adjacent and connected to the proximal common bile duct and the extrinsic clip, such as:    • Upstream dilation of the left and right intrahepatic bile ducts/Common bile duct stricture/Cystic structure adjacent and connected to the proximal common bile duct secondary to the extrinsic clip    • Upstream dilation of the left and right intrahepatic bile ducts/Common bile duct stricture/Cystic structure adjacent and connected to the proximal common bile duct unrelated to the extrinsic clip     • Other (specify)    Supporting documentation and/or clinical evidence:     7/2 ERCP findings: Multiple attempts at passing the wire into the intrahepatic ducts were unsuccessful as the wire could not traverse the stricture likely caused by extrinsic clip across the CHD. 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     7/3 IR PROCEDURE NON PICC PROCEDURE: Patient now with biliary ductal dilatation, and failed ERCP to better delineate anatomy. Percutaneous transhepatic cholangiography and stent placement; FINDINGS: 1. Common bile duct stenosis adjacent to surgical clip with left greater   than right intrahepatic biliary ductal dilatation. Connection of the bile duct system to the cystic structure seen on MRI adjacent to the common bile duct. 2. Successful internal external biliary drainage catheter placement. IMPRESSION: Successful internal external biliary drainage catheter placement. Common bile duct stricture. Cystic structure adjacent and connected to the proximal common bile duct. This either represents cystic duct remnant and gallbladder stump versus a choledochal cyst, type II.(I put a photo below)     7/5 Progress Note Adult-Gastroenterology Fellow- A/P: Patient had Endoscopic intervention 7/2 with Successful biliary sphincterotomy. 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 Multiple attempts at passing the wire into the intrahepatic ducts were unsuccessful as the wire could not traverse the stricture that is likely caused by the extrinsic clip across the CHD.     7/8  Discharge Note Provider- Spoke with IR regarding drain yesterday --> as per IR attending note "As per PTC catheter, management of underlying stricture of the proximal CBD (and choledochal cyst/GB remnant) will be as per Dr. Forde and GI." - pt will f/u with Dr Forde in his clinic for management of drain    Thank you,  Patricia Briceno, RN  [phone]

## 2024-07-18 ENCOUNTER — APPOINTMENT (OUTPATIENT)
Dept: SURGERY | Facility: CLINIC | Age: 34
End: 2024-07-18

## 2024-07-18 DIAGNOSIS — N13.30 UNSPECIFIED HYDRONEPHROSIS: ICD-10-CM

## 2024-07-18 DIAGNOSIS — D64.9 ANEMIA, UNSPECIFIED: ICD-10-CM

## 2024-07-18 DIAGNOSIS — K83.8 OTHER SPECIFIED DISEASES OF BILIARY TRACT: ICD-10-CM

## 2024-07-18 DIAGNOSIS — K75.0 ABSCESS OF LIVER: ICD-10-CM

## 2024-07-18 DIAGNOSIS — T81.31XA DISRUPTION OF EXTERNAL OPERATION (SURGICAL) WOUND, NOT ELSEWHERE CLASSIFIED, INITIAL ENCOUNTER: ICD-10-CM

## 2024-07-18 DIAGNOSIS — T85.858A STENOSIS DUE TO OTHER INTERNAL PROSTHETIC DEVICES, IMPLANTS AND GRAFTS, INITIAL ENCOUNTER: ICD-10-CM

## 2024-07-18 DIAGNOSIS — M25.18 FISTULA, OTHER SPECIFIED SITE: ICD-10-CM

## 2024-07-18 DIAGNOSIS — N17.9 ACUTE KIDNEY FAILURE, UNSPECIFIED: ICD-10-CM

## 2024-07-18 DIAGNOSIS — K83.1 OBSTRUCTION OF BILE DUCT: ICD-10-CM

## 2024-07-18 DIAGNOSIS — K29.70 GASTRITIS, UNSPECIFIED, WITHOUT BLEEDING: ICD-10-CM

## 2024-07-18 DIAGNOSIS — E83.42 HYPOMAGNESEMIA: ICD-10-CM

## 2024-07-18 DIAGNOSIS — K83.5 BILIARY CYST: ICD-10-CM

## 2024-07-18 DIAGNOSIS — T81.40XA INFECTION FOLLOWING A PROCEDURE, UNSPECIFIED, INITIAL ENCOUNTER: ICD-10-CM
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
## 2024-07-18 DIAGNOSIS — D55.0 ANEMIA DUE TO GLUCOSE-6-PHOSPHATE DEHYDROGENASE [G6PD] DEFICIENCY: ICD-10-CM

## 2024-07-18 PROCEDURE — 47531 INJECTION FOR CHOLANGIOGRAM: CPT

## 2024-07-18 PROCEDURE — 99232 SBSQ HOSP IP/OBS MODERATE 35: CPT

## 2024-07-18 RX ORDER — PANTOPRAZOLE SODIUM 40 MG/10ML
40 INJECTION, POWDER, FOR SOLUTION INTRAVENOUS
Refills: 0 | Status: DISCONTINUED | OUTPATIENT
Start: 2024-07-18 | End: 2024-07-19

## 2024-07-18 RX ADMIN — Medication 650 MILLIGRAM(S): at 18:19

## 2024-07-18 RX ADMIN — Medication 650 MILLIGRAM(S): at 05:35

## 2024-07-18 RX ADMIN — DEXTROSE MONOHYDRATE AND SODIUM CHLORIDE 120 MILLILITER(S): 5; .3 INJECTION, SOLUTION INTRAVENOUS at 06:17

## 2024-07-18 RX ADMIN — Medication 650 MILLIGRAM(S): at 11:32

## 2024-07-18 RX ADMIN — Medication 1 TABLET(S): at 05:05

## 2024-07-18 RX ADMIN — POTASSIUM PHOSPHATE, MONOBASIC POTASSIUM PHOSPHATE, DIBASIC INJECTION, 63.75 MILLIMOLE(S): 236; 224 SOLUTION, CONCENTRATE INTRAVENOUS at 09:57

## 2024-07-18 RX ADMIN — PANTOPRAZOLE SODIUM 40 MILLIGRAM(S): 40 INJECTION, POWDER, FOR SOLUTION INTRAVENOUS at 05:04

## 2024-07-18 RX ADMIN — Medication 1 TABLET(S): at 18:18

## 2024-07-18 RX ADMIN — Medication 100 GRAM(S): at 05:04

## 2024-07-18 RX ADMIN — Medication 650 MILLIGRAM(S): at 05:05

## 2024-07-18 NOTE — PROVIDER CONTACT NOTE (OTHER) - SITUATION
Pt requires second IV access for potassium phosphate. 3 RNs attempted IV access. Pt states current IV was placed with US

## 2024-07-18 NOTE — PROGRESS NOTE ADULT - ASSESSMENT
ASSESSMENT:  33y F w/ PMHx of PMHx of G6PD deficiency, c-sections x4, s/p laparoscopic cholecystectomy (4/22/24) complicated by post-op leak s/p ex lap x2 (4/28/24 and May 2024) now s/p PTC drain by IR 1 week ago presenting to the ED for evaluation of abdominal pain and 1 episode of bilious emesis.     PLAN:  - IR procedure to check drain placement  - GI consult for possible ERCP with stenting --f/u after IR proceddure  - Augmentin  - NPO, IVF  - Trend LFTs  - PPI  - Lovenox d/colette for IR procedure, restart afterwards    BLUE TEAM SPECTRA: [id number]

## 2024-07-18 NOTE — CHART NOTE - NSCHARTNOTEFT_GEN_A_CORE
We will follow up on the IR evaluation of the drain. We had an extensive discussion with the patient who stated that she wants to discuss surgical options with Dr. Forde rather than undergo multiple ERCPs.  No procedure from a GI standpoint planned today.  Rest of the care and diet as per surgery team. Please keep her NPO from midnight for possible intervention if an ERCP is planned tomorrow.   [facility] will continue to follow along.

## 2024-07-19 ENCOUNTER — TRANSCRIPTION ENCOUNTER (OUTPATIENT)
Age: 34
End: 2024-07-19

## 2024-07-19 VITALS — HEART RATE: 100 BPM

## 2024-07-19 LAB
ALBUMIN SERPL ELPH-MCNC: 3.6 G/DL — SIGNIFICANT CHANGE UP (ref 3.5–5.2)
ALP SERPL-CCNC: 462 U/L — HIGH (ref 30–115)
ALT FLD-CCNC: 13 U/L — SIGNIFICANT CHANGE UP (ref 0–41)
ANION GAP SERPL CALC-SCNC: 12 MMOL/L — SIGNIFICANT CHANGE UP (ref 7–14)
AST SERPL-CCNC: 15 U/L — SIGNIFICANT CHANGE UP (ref 0–41)
BILIRUB DIRECT SERPL-MCNC: 0.3 MG/DL — SIGNIFICANT CHANGE UP (ref 0–0.3)
BILIRUB INDIRECT FLD-MCNC: 0.5 MG/DL — SIGNIFICANT CHANGE UP (ref 0.2–1.2)
BILIRUB SERPL-MCNC: 0.8 MG/DL — SIGNIFICANT CHANGE UP (ref 0.2–1.2)
BUN SERPL-MCNC: 4 MG/DL — LOW (ref 10–20)
CALCIUM SERPL-MCNC: 8.5 MG/DL — SIGNIFICANT CHANGE UP (ref 8.4–10.5)
CHLORIDE SERPL-SCNC: 97 MMOL/L — LOW (ref 98–110)
CO2 SERPL-SCNC: 26 MMOL/L — SIGNIFICANT CHANGE UP (ref 17–32)
CREAT SERPL-MCNC: 0.7 MG/DL — SIGNIFICANT CHANGE UP (ref 0.7–1.5)
EGFR: 118 ML/MIN/1.73M2 — SIGNIFICANT CHANGE UP
GLUCOSE SERPL-MCNC: 96 MG/DL — SIGNIFICANT CHANGE UP (ref 70–99)
HCT VFR BLD CALC: 32.5 % — LOW (ref 37–47)
HGB BLD-MCNC: 10.7 G/DL — LOW (ref 12–16)
MAGNESIUM SERPL-MCNC: 2.3 MG/DL — SIGNIFICANT CHANGE UP (ref 1.8–2.4)
MCHC RBC-ENTMCNC: 29.2 PG — SIGNIFICANT CHANGE UP (ref 27–31)
MCHC RBC-ENTMCNC: 32.9 G/DL — SIGNIFICANT CHANGE UP (ref 32–37)
MCV RBC AUTO: 88.8 FL — SIGNIFICANT CHANGE UP (ref 81–99)
NRBC # BLD: 0 /100 WBCS — SIGNIFICANT CHANGE UP (ref 0–0)
PHOSPHATE SERPL-MCNC: 3.3 MG/DL — SIGNIFICANT CHANGE UP (ref 2.1–4.9)
PLATELET # BLD AUTO: 373 K/UL — SIGNIFICANT CHANGE UP (ref 130–400)
PMV BLD: 10.6 FL — HIGH (ref 7.4–10.4)
POTASSIUM SERPL-MCNC: 3.4 MMOL/L — LOW (ref 3.5–5)
POTASSIUM SERPL-SCNC: 3.4 MMOL/L — LOW (ref 3.5–5)
PROT SERPL-MCNC: 6.7 G/DL — SIGNIFICANT CHANGE UP (ref 6–8)
RBC # BLD: 3.66 M/UL — LOW (ref 4.2–5.4)
RBC # FLD: 15.1 % — HIGH (ref 11.5–14.5)
SODIUM SERPL-SCNC: 135 MMOL/L — SIGNIFICANT CHANGE UP (ref 135–146)
WBC # BLD: 10.33 K/UL — SIGNIFICANT CHANGE UP (ref 4.8–10.8)
WBC # FLD AUTO: 10.33 K/UL — SIGNIFICANT CHANGE UP (ref 4.8–10.8)

## 2024-07-19 PROCEDURE — 99232 SBSQ HOSP IP/OBS MODERATE 35: CPT

## 2024-07-19 RX ORDER — MAGNESIUM SULFATE 100 %
2 POWDER (GRAM) MISCELLANEOUS ONCE
Refills: 0 | Status: COMPLETED | OUTPATIENT
Start: 2024-07-19 | End: 2024-07-19

## 2024-07-19 RX ORDER — AMOXICILLIN/POTASSIUM CLAV 250-125 MG
1 TABLET ORAL
Qty: 14 | Refills: 0
Start: 2024-07-19 | End: 2024-07-25

## 2024-07-19 RX ORDER — POTASSIUM CHLORIDE 600 MG/1
20 TABLET, FILM COATED, EXTENDED RELEASE ORAL
Refills: 0 | Status: COMPLETED | OUTPATIENT
Start: 2024-07-19 | End: 2024-07-19

## 2024-07-19 RX ORDER — POTASSIUM CHLORIDE 600 MG/1
20 TABLET, FILM COATED, EXTENDED RELEASE ORAL
Refills: 0 | Status: DISCONTINUED | OUTPATIENT
Start: 2024-07-19 | End: 2024-07-19

## 2024-07-19 RX ORDER — ENOXAPARIN SODIUM 100 MG/ML
40 INJECTION SUBCUTANEOUS EVERY 24 HOURS
Refills: 0 | Status: DISCONTINUED | OUTPATIENT
Start: 2024-07-19 | End: 2024-07-19

## 2024-07-19 RX ORDER — AMOXICILLIN/POTASSIUM CLAV 250-125 MG
1 TABLET ORAL
Qty: 0 | Refills: 0 | DISCHARGE
Start: 2024-07-19

## 2024-07-19 RX ADMIN — POTASSIUM CHLORIDE 20 MILLIEQUIVALENT(S): 600 TABLET, FILM COATED, EXTENDED RELEASE ORAL at 15:12

## 2024-07-19 RX ADMIN — POTASSIUM CHLORIDE 50 MILLIEQUIVALENT(S): 600 TABLET, FILM COATED, EXTENDED RELEASE ORAL at 03:45

## 2024-07-19 RX ADMIN — ENOXAPARIN SODIUM 40 MILLIGRAM(S): 100 INJECTION SUBCUTANEOUS at 05:04

## 2024-07-19 RX ADMIN — POTASSIUM CHLORIDE 20 MILLIEQUIVALENT(S): 600 TABLET, FILM COATED, EXTENDED RELEASE ORAL at 11:38

## 2024-07-19 RX ADMIN — Medication 650 MILLIGRAM(S): at 11:38

## 2024-07-19 RX ADMIN — Medication 650 MILLIGRAM(S): at 00:05

## 2024-07-19 RX ADMIN — Medication 1 TABLET(S): at 05:04

## 2024-07-19 RX ADMIN — Medication 12.5 GRAM(S): at 03:45

## 2024-07-19 RX ADMIN — PANTOPRAZOLE SODIUM 40 MILLIGRAM(S): 40 INJECTION, POWDER, FOR SOLUTION INTRAVENOUS at 05:04

## 2024-07-19 RX ADMIN — Medication 650 MILLIGRAM(S): at 00:36

## 2024-07-19 RX ADMIN — ONDANSETRON HYDROCHLORIDE 4 MILLIGRAM(S): 2 INJECTION INTRAMUSCULAR; INTRAVENOUS at 06:03

## 2024-07-19 NOTE — DISCHARGE NOTE PROVIDER - CARE PROVIDER_API CALL
Paz Forde  Complex General Surgical Oncology  [address] [facility] [address] [facility] [place] [address]  Phone: [phone]  Fax: [phone]  Follow Up Time:

## 2024-07-19 NOTE — PROGRESS NOTE ADULT - REASON FOR ADMISSION
Abdominal pain, bilious emesis

## 2024-07-19 NOTE — PROGRESS NOTE ADULT - SUBJECTIVE AND OBJECTIVE BOX
GENERAL SURGERY PROGRESS NOTE    Patient: MICHAEL LAL , 33y (89)Female   MRN: [id number]  Location: [address]  Visit: 24 Inpatient  Date: 24 @ 09:51    Hospital Day #: 3  Post-Op Day #:    Procedure/Dx/Injuries: Abdominal pain, bilious emesis    Events of past 24 hours: Patient seen and examined at bedside. No acute events overnight. RON drain in place with bilious output. Previous incision sites healing well. She is going to IR today to check for proper drain placement. NPO since midnight, pre-op labs done    PAST MEDICAL & SURGICAL HISTORY:  Glucose 6 phosphatase deficiency       delivery delivered      S/P cholecystectomy          Vitals:   T(F): 98.8 (24 @ 09:36), Max: 99 (24 @ 20:00)  HR: 95 (24 @ 09:38)  BP: 108/76 (24 @ 09:36)  RR: 18 (24 @ 09:36)  SpO2: 100% (24 @ 09:36)      Diet, NPO after Midnight:      NPO Start Date: 2024,   NPO Start Time: 23:59  Diet, Regular:   Low Fat (LOWFAT)      Fluids:     I & O's:    24 @ 07:01  -  24 @ 07:00  --------------------------------------------------------  IN:    Other (mL): 25 mL  Total IN: 25 mL    OUT:  Total OUT: 0 mL    Total NET: 25 mL        Bowel Movement: : [] YES [x] NO  Flatus: : [x] YES [] NO    PHYSICAL EXAM:  GENERAL: No acute distress, well-developed  HEAD:  Atraumatic, Normocephalic  EYES: EOMI, PERRLA, conjunctiva and sclera clear  NECK: Supple, no lymphadenopathy, no JVD  CHEST/LUNG: CTAB; No wheezes, rales, or rhonchi  HEART: Regular rate and rhythm. Normal S1/S2.   ABDOMEN: Soft, mildly tender around drain site, non-distended; RON drain in place, draining 25 cc bilious fluid  EXTREMITIES:  2+ peripheral pulses b/l, No clubbing, cyanosis, or edema  NEUROLOGY: A&O x 3, no focal deficits  SKIN: No rashes or lesions    MEDICATIONS  (STANDING):  acetaminophen     Tablet .. 650 milliGRAM(s) Oral every 6 hours  amoxicillin  875 milliGRAM(s)/clavulanate 1 Tablet(s) Oral two times a day  lactated ringers. 1000 milliLiter(s) (120 mL/Hr) IV Continuous <Continuous>  pantoprazole    Tablet 40 milliGRAM(s) Oral before breakfast    MEDICATIONS  (PRN):  ondansetron Injectable 4 milliGRAM(s) IV Push every 4 hours PRN Nausea and/or Vomiting      DVT PROPHYLAXIS:   GI PROPHYLAXIS: pantoprazole    Tablet 40 milliGRAM(s) Oral before breakfast    ANTICOAGULATION:   ANTIBIOTICS:  amoxicillin  875 milliGRAM(s)/clavulanate 1 Tablet(s)            LAB/STUDIES:  Labs:  CAPILLARY BLOOD GLUCOSE                              10.6   9.14  )-----------( 368      ( 2024 22:24 )             32.6       Auto Neutrophil %: 70.0 % (24 @ 22:24)  Auto Immature Granulocyte %: 0.3 % (24 @ 22:24)        138  |  101  |  5<L>  ----------------------------<  91  3.4<L>   |  25  |  0.7      Calcium: 8.7 mg/dL (24 @ 22:24)      LFTs:             6.9  | 0.6  | 32       ------------------[443     ( 2024 22:24 )  3.8  | 0.2  | 19          Lipase:x      Amylase:x             Coags:     14.20  ----< 1.24    ( 2024 22:24 )     32.3         
PROCEDURE: Fluoroscopic evaluation of percutaneous internal/external biliary drain. Images saved to PACS.    CLINICAL INDICATION: Patient with recent placement of a internal/external biliary drain and with one episode of bilious emesis 2 days ago. Patient was able to tolerate food yesterday.    TECHNIQUE:  Informed consent was obtained. The patient was placed supine on the angiography table.    Initial  fluoroscopic image demonstrates an internal/external biliary drain in expected radiographic position. There is no evidence of kink or fracture.    Digital subtraction cholangiogram was obtained after the injection of contrast through the existing internal/external biliary drain which demonstrated filling of the hepatic ducts, common bile duct, and small bowel. The previously seen choledochal cyst is significantly reduced in size. No evidence of leak.    IMPRESSION:  Fluoroscopic evaluation of existing internal/external biliary drain demonstrates good position with cholangiogram demonstrating no evidence of obstruction or leak. Previously seen choledochocyst is significantly reduced in size.
GENERAL SURGERY PROGRESS NOTE    Patient: MICHAEL LAL , 33y (89)Female   MRN: [id number]  Location: [facility] ED Hold 031 A  Visit: 24 Inpatient  Date: 24 @ 08:07    Hospital Day #: 2    Events of past 24 hours: Patient seen and examined at bedside. No acute events overnight. RON drain in place with bilious output. Previous incision sites healing well.     PAST MEDICAL & SURGICAL HISTORY:  Glucose 6 phosphatase deficiency       delivery delivered      S/P cholecystectomy          Vitals:   T(F): 98 (24 @ 07:58), Max: 99 (24 @ 17:16)  HR: 92 (24 @ 07:58)  BP: 101/66 (24 @ 07:58)  RR: 18 (24 @ 07:58)  SpO2: 100% (24 @ 07:58)      Diet, NPO:   Except Medications      Fluids: lactated ringers.: Solution, 1000 milliLiter(s) infuse at 120 mL/Hr      I & O's:    PHYSICAL EXAM:  GENERAL: NAD, well-appearing  CHEST/LUNG: Normal chest rise and fall  HEART: RRR  ABDOMEN: Soft, Nontender, Nondistended; RON drain in place. Previous incision sites c/d/i  EXTREMITIES:  No clubbing, cyanosis, or edema      MEDICATIONS  (STANDING):  acetaminophen     Tablet .. 650 milliGRAM(s) Oral every 6 hours  enoxaparin Injectable 40 milliGRAM(s) SubCutaneous every 24 hours  lactated ringers. 1000 milliLiter(s) (120 mL/Hr) IV Continuous <Continuous>  levoFLOXacin  Tablet 500 milliGRAM(s) Oral every 24 hours  magnesium sulfate  IVPB 2 Gram(s) IV Intermittent once  metroNIDAZOLE    Tablet 500 milliGRAM(s) Oral every 8 hours  pantoprazole  Injectable 40 milliGRAM(s) IV Push every 24 hours  potassium chloride  20 mEq/100 mL IVPB 20 milliEquivalent(s) IV Intermittent once  potassium chloride  20 mEq/100 mL IVPB 20 milliEquivalent(s) IV Intermittent once    MEDICATIONS  (PRN):  ondansetron Injectable 4 milliGRAM(s) IV Push every 4 hours PRN Nausea and/or Vomiting      DVT PROPHYLAXIS: enoxaparin Injectable 40 milliGRAM(s) SubCutaneous every 24 hours    GI PROPHYLAXIS: pantoprazole  Injectable 40 milliGRAM(s) IV Push every 24 hours    ANTICOAGULATION:   ANTIBIOTICS:  levoFLOXacin  Tablet 500 milliGRAM(s)  metroNIDAZOLE    Tablet 500 milliGRAM(s)            LAB/STUDIES:  Labs:  CAPILLARY BLOOD GLUCOSE                              11.7   9.95  )-----------( 396      ( 2024 23:44 )             35.6       Auto Neutrophil %: 65.8 % (24 @ 12:39)  Auto Immature Granulocyte %: 0.2 % (24 @ 12:39)        134<L>  |  97<L>  |  6<L>  ----------------------------<  104<H>  3.4<L>   |  24  |  0.8      Calcium: 9.1 mg/dL (24 @ 23:44)      LFTs:             7.6  | 0.6  | 23       ------------------[432     ( 2024 23:44 )  4.0  | 0.2  | 19          Lipase:x      Amylase:x             Coags:     14.90  ----< 1.30    ( 2024 23:44 )     36.9                Urinalysis Basic - ( 2024 23:44 )    Color: x / Appearance: x / SG: x / pH: x  Gluc: 104 mg/dL / Ketone: x  / Bili: x / Urobili: x   Blood: x / Protein: x / Nitrite: x   Leuk Esterase: x / RBC: x / WBC x   Sq Epi: x / Non Sq Epi: x / Bacteria: x        IMAGING:  < from: CT Abdomen and Pelvis w/ IV Cont (24 @ 16:32) >  IMPRESSION:    Previously noted gallbladder fossa collection is no longer visualized.    Internal/external catheter biliary drainage is likely terminating within   the duodenum. Persistent intrahepatic biliary duct dilatation most   prominent on the left.    Additional findings are detailed in the body of the report.                  
GENERAL SURGERY PROGRESS NOTE    Patient: MICHAEL LAL , 33y (89)Female   MRN: [id number]  Location: [place] [address] A  Visit: 24 Inpatient  Date: 24 @ 02:09    Hospital Day #: 4  Post-Op Day #:    Procedure/Dx/Injuries: Abdominal pain, bilious emesis    Events of past 24 hours: Patient seen and examined at bedside. No acute events overnight.. IR procedure today confirmed proper drain placement. Patient decided she does not want subsequent stenting with ERCP and wants to follow up outpatient with Dr. Forde to schedule surgery. Drain output increased to 200 cc, serosanguinous. Repleted K and Mg overnight    PAST MEDICAL & SURGICAL HISTORY:  Glucose 6 phosphatase deficiency   delivery delivered  S/P cholecystectomy      Vitals:   T(F): 99.3 (24 @ 00:51), Max: 100.4 (24 @ 20:28)  HR: 93 (24 @ 01:30)  BP: 98/59 (24 @ 00:51)  RR: 18 (24 @ 01:30)  SpO2: 99% (24 @ 01:30)      Diet, NPO after Midnight:      NPO Start Date: 2024,   NPO Start Time: 23:59  Diet, Regular:   Low Fat (LOWFAT)      Fluids:     I & O's:    24 @ 07:01  -  24 @ 07:00  --------------------------------------------------------  IN:    Other (mL): 25 mL  Total IN: 25 mL    OUT:  Total OUT: 0 mL    Total NET: 25 mL        PHYSICAL EXAM:  GENERAL: No acute distress, well-developed  HEAD:  Atraumatic, Normocephalic  EYES: EOMI, PERRLA, conjunctiva and sclera clear  NECK: Supple, no lymphadenopathy, no JVD  CHEST/LUNG: CTAB; No wheezes, rales, or rhonchi  HEART: Regular rate and rhythm. Normal S1/S2.   ABDOMEN: Soft, mildly tender around drain site, non-distended; RON drain in place, draining 200 cc serosanguinous fluid  EXTREMITIES:  2+ peripheral pulses b/l, No clubbing, cyanosis, or edema  NEUROLOGY: A&O x 3, no focal deficits  SKIN: No rashes or lesions    MEDICATIONS  (STANDING):  acetaminophen     Tablet .. 650 milliGRAM(s) Oral every 6 hours  amoxicillin  875 milliGRAM(s)/clavulanate 1 Tablet(s) Oral two times a day  enoxaparin Injectable 40 milliGRAM(s) SubCutaneous every 24 hours  lactated ringers. 1000 milliLiter(s) (120 mL/Hr) IV Continuous <Continuous>  magnesium sulfate  IVPB 2 Gram(s) IV Intermittent once  pantoprazole    Tablet 40 milliGRAM(s) Oral before breakfast  potassium chloride  20 mEq/100 mL IVPB 20 milliEquivalent(s) IV Intermittent every 2 hours    MEDICATIONS  (PRN):  ondansetron Injectable 4 milliGRAM(s) IV Push every 4 hours PRN Nausea and/or Vomiting      DVT PROPHYLAXIS: enoxaparin Injectable 40 milliGRAM(s) SubCutaneous every 24 hours    GI PROPHYLAXIS: pantoprazole    Tablet 40 milliGRAM(s) Oral before breakfast    ANTICOAGULATION:   ANTIBIOTICS:  amoxicillin  875 milliGRAM(s)/clavulanate 1 Tablet(s)            LAB/STUDIES:  Labs:  CAPILLARY BLOOD GLUCOSE                              10.6   9.14  )-----------( 368      ( 2024 22:24 )             32.6         07-17    138  |  101  |  5<L>  ----------------------------<  91  3.4<L>   |  25  |  0.7          LFTs:             6.9  | 0.6  | 32       ------------------[443     ( 2024 22:24 )  3.8  | 0.2  | 19          Lipase:x      Amylase:x             Coags:     14.20  ----< 1.24    ( 2024 22:24 )     32.3                Urinalysis Basic - ( 2024 22:24 )    Color: x / Appearance: x / SG: x / pH: x  Gluc: 91 mg/dL / Ketone: x  / Bili: x / Urobili: x   Blood: x / Protein: x / Nitrite: x   Leuk Esterase: x / RBC: x / WBC x   Sq Epi: x / Non Sq Epi: x / Bacteria: x                IMAGING:  Fluoroscopic evaluation of existing internal/external biliary drain demonstrates good position with cholangiogram demonstrating no evidence of obstruction or leak. Previously seen choledochocyst is significantly reduced in size.

## 2024-07-19 NOTE — DISCHARGE NOTE PROVIDER - NSDCCPCAREPLAN_GEN_ALL_CORE_FT
PRINCIPAL DISCHARGE DIAGNOSIS  Diagnosis: Gall bladder disease  Assessment and Plan of Treatment: SURGERY DISCHARGE INSTRUCTIONS  FOLLOW-UP - with  on 7/23. Call the office to make an appointment or if you have any questions/concerns.  DIET - low fat regular.   ACTIVITY- No heavy lifting for 4-6 wks over 10-20 lbs. Walking is encouraged. No running or swimming. No driving while taking pain medication.  WOUNDCARE - Some drainage from your incisions or drain sites is normal. If you have drainage from an open incision or drain site- cover loosely with sterile gauze and tape and change daily.   DRAINS - Write down the total amount from drain daily and bring this to your appointment.  PAIN MEDS - Take prescription pain meds as instructed but only if needed as they can be addicting. Take over the counter extra strength tylenol 650mg and/or ibuprofen 400mg with food every 6 hours for pain instead of prescription pain meds if you do not need stronger pain control. Do not take tylenol in addition to your prescription pain med if your prescription pain med already has tylenol in it. No more than 4g of tylenol in 24hrs or 1g in 4 hrs. No mixing alcohol with prescription pain meds. No driving or operating machinery while taking prescription pain meds. Drink plenty of water and increase your fiber intake (unless your diet restricts fiber) while taking prescription pain meds as these can cause constipation and abdominal straining. If you do not have a bowel movement in 3 days take an over the counter stool softener of your choice daily. If you still do not have a bowel movement the following 2 days call your primary care physician.  ANTIBIOTICS- Take antibiotics as directed if prescribed.   OTHER MEDS - If you have any questions about your other regular home medications please call your primary care physician or the physician who prescribed those medications to you.   If you develop fever, dizziness, chest pain, trouble breathing, nausea, vomiting, increasing abdominal pain, redness/pain/discharge from incisions. Please call the office.

## 2024-07-19 NOTE — PROGRESS NOTE ADULT - ASSESSMENT
ASSESSMENT:  33y F w/ PMHx of  G6PD deficiency, c-sections x4, s/p laparoscopic cholecystectomy (4/22/24) complicated by post-op leak s/p ex lap x2 (4/28/24 and May 2024) now s/p PTC drain by IR 1 week ago presenting to the ED for evaluation of abdominal pain and 1 episode of bilious emesis.     PLAN:  -Patient decided she does not want subsequent stenting with ERCP and wants to follow up outpatient with Dr. Forde to schedule surgery  - Augmentin  - regular low fat diet  - Trend LFTs  - PPI  - Lovenox restarted  - f/u K and Mg values after repletion  - likely d/c today    BLUE TEAM SPECTRA: [id number]   ASSESSMENT:  33y F w/ PMHx of  G6PD deficiency, c-sections x4, s/p laparoscopic cholecystectomy (4/22/24) complicated by post-op leak s/p ex lap x2 (4/28/24 and May 2024) now s/p PTC drain by IR 1 week ago presenting to the ED for evaluation of abdominal pain and 1 episode of bilious emesis.     PLAN:  -Patient decided she does not want subsequent stenting with ERCP and wants to follow up outpatient with Dr. Forde to schedule surgery on Tuesday.  - Augmentin  - regular low fat diet  - Trend LFTs  - PPI  - Lovenox restarted  - f/u K and Mg values after repletion  - likely d/c today    BLUE TEAM SPECTRA: [id number]

## 2024-07-19 NOTE — DISCHARGE NOTE PROVIDER - NSDCMRMEDTOKEN_GEN_ALL_CORE_FT
acetaminophen 325 mg oral tablet: 2 tab(s) orally every 6 hours As needed Mild Pain (1 - 3), Moderate Pain (4 - 6), Severe Pain (7 - 10)  lactobacillus acidophilus oral capsule: 2 tab(s) orally once a day  levoFLOXacin 500 mg oral tablet: 1 tab(s) orally once a day stop taking july 30  metroNIDAZOLE 500 mg oral tablet: 1 tab(s) orally every 8 hours stop taking july 30  naloxone 4 mg/0.1 mL nasal spray: 1 spray(s) intranasally once x 4 days as needed for  opioid overdose  polyethylene glycol 3350 oral powder for reconstitution: 17 gram(s) orally once a day over the counter  senna leaf extract oral tablet: 2 tab(s) orally once a day (at bedtime)   acetaminophen 325 mg oral tablet: 2 tab(s) orally every 6 hours As needed Mild Pain (1 - 3), Moderate Pain (4 - 6), Severe Pain (7 - 10)  amoxicillin-clavulanate 875 mg-125 mg oral tablet: 1 tab(s) orally 2 times a day  amoxicillin-clavulanate 875 mg-125 mg oral tablet: 1 tab(s) orally 2 times a day  lactobacillus acidophilus oral capsule: 2 tab(s) orally once a day  naloxone 4 mg/0.1 mL nasal spray: 1 spray(s) intranasally once x 4 days as needed for  opioid overdose  polyethylene glycol 3350 oral powder for reconstitution: 17 gram(s) orally once a day over the counter  senna leaf extract oral tablet: 2 tab(s) orally once a day (at bedtime)   acetaminophen 325 mg oral tablet: 2 tab(s) orally every 6 hours As needed Mild Pain (1 - 3), Moderate Pain (4 - 6), Severe Pain (7 - 10)  amoxicillin-clavulanate 875 mg-125 mg oral tablet: 1 tab(s) orally 2 times a day  amoxicillin-clavulanate 875 mg-125 mg oral tablet: 1 tab(s) orally 2 times a day  amoxicillin-clavulanate 875 mg-125 mg oral tablet: 1 tab(s) orally 2 times a day  lactobacillus acidophilus oral capsule: 2 tab(s) orally once a day  naloxone 4 mg/0.1 mL nasal spray: 1 spray(s) intranasally once x 4 days as needed for  opioid overdose  polyethylene glycol 3350 oral powder for reconstitution: 17 gram(s) orally once a day over the counter  senna leaf extract oral tablet: 2 tab(s) orally once a day (at bedtime)

## 2024-07-19 NOTE — DISCHARGE NOTE NURSING/CASE MANAGEMENT/SOCIAL WORK - PATIENT PORTAL LINK FT
You can access the FollowMyHealth Patient Portal offered by [facility] by registering at the following website: [email or web address]. By joining [facility]’s FollowMyHealth portal, you will also be able to view your health information using other applications (apps) compatible with our system.

## 2024-07-19 NOTE — DISCHARGE NOTE PROVIDER - HOSPITAL COURSE
HPI: 34 yo female w/ PMHx of G6PD deficiency, c-sections x4, s/p laparoscopic cholecystectomy (4/22/24) complicated by post-op leak s/p ex lap x2 (4/28/24 and May 2024) now s/p PTC drain placed by IR just over one week ago presenting to the ED for worsening RUQ abdominal pain and bilious emesis x1. The patient was recently here and was d/c on Monday 7/8. She has since had clear emesis every morning (normal for her) but this AM had one episode of bilious emesis. She has also noticed decreased output from her PTC drain today compared to previous days (usually 300cc per day, this AM was only 100-150cc). At the same time she says her RUQ abdominal pain is worse today.  She has a hx of lap vania in [facility] in April 2022 c/b bile leak s/p exlap x 2 with placement of a drain after the first.   She has not had a return of appetite this week, denies and fevers or chills, says she has had normal bowel function over this time period.   She presented to the ED with these symptoms, she was afebrile with hemodynamically stable. Labs grossly unremarkable except Alk Phos of 500. A CT scan with IV contrast was performed which showed the PTC drain enterring the right hepatic duct and traversing the biliary tree into the duodenum. General surgery consulted for evaluation and management of this patient's bilious emesis and abdominal pain.     CT Abdomen:   IMPRESSION:  Previously noted gallbladder fossa collection is no longer visualized.  Internal/external catheter biliary drainage is likely terminating within the duodenum. Persistent intrahepatic biliary duct dilatation most prominent on the left.    7/18: IR fluoroscopic evaluation of percutaneous internal/external biliary drain.     IMPRESSION:  Fluoroscopic evaluation of existing internal/external biliary drain demonstrates good position with cholangiogram demonstrating no evidence of obstruction or leak. Previously seen choledochocyst is significantly reduced in size.    7/18: Patient declined for ERCP evaluation and possible stent placement. She would like to discuss surgical intervention with Dr. Forde.     Patient was evaluated and found to be stable, making her safe for discharge. Discharge was discussed with Dr. Forde and was approved.

## 2024-07-19 NOTE — DISCHARGE NOTE NURSING/CASE MANAGEMENT/SOCIAL WORK - NSDCPEFALRISK_GEN_ALL_CORE
For information on Fall & Injury Prevention, visit: [email or web address] OR  [email or web address] OR  https://www.cdc.gov/steadi/patient.html

## 2024-07-20 RX ORDER — AMOXICILLIN/POTASSIUM CLAV 250-125 MG
1 TABLET ORAL
Qty: 14 | Refills: 0
Start: 2024-07-20 | End: 2024-07-26

## 2024-07-22 ENCOUNTER — NON-APPOINTMENT (OUTPATIENT)
Age: 34
End: 2024-07-22

## 2024-07-23 ENCOUNTER — APPOINTMENT (OUTPATIENT)
Dept: SURGERY | Facility: CLINIC | Age: 34
End: 2024-07-23
Payer: COMMERCIAL

## 2024-07-23 VITALS
SYSTOLIC BLOOD PRESSURE: 118 MMHG | DIASTOLIC BLOOD PRESSURE: 80 MMHG | BODY MASS INDEX: 24.73 KG/M2 | HEIGHT: 69 IN | HEART RATE: 116 BPM | WEIGHT: 167 LBS | OXYGEN SATURATION: 99 %

## 2024-07-23 DIAGNOSIS — S36.13XA INJURY OF BILE DUCT, INITIAL ENCOUNTER: ICD-10-CM

## 2024-07-23 PROCEDURE — 99215 OFFICE O/P EST HI 40 MIN: CPT

## 2024-07-23 PROCEDURE — 99417 PROLNG OP E/M EACH 15 MIN: CPT

## 2024-07-23 NOTE — HISTORY OF PRESENT ILLNESS
[de-identified] : MICHAEL LAL  is a pleasant 33 year old woman  who came in 07/11/2024 for bile duct injury . She has Dr Bran have PCP as Her PCP.   she [place] with two postoperative laparotomy to address adhesion/bile duct injury, absent operative details  she had ERCP with narrowing of CHD, she had PTC with internal external drain via right biliary duct system into duodenum  she still not feeling well and had nonbilious vomiting in her visit today, her drain bag has about 200 bile daily

## 2024-07-23 NOTE — ASSESSMENT
[FreeTextEntry1] : MICHAEL LAL  is a pleasant 33 year old woman  who came in 07/11/2024 for bile duct injury . She has Dr Bran have PCP as Her PCP.   she had [place]ole[place] in [place] with two postoperative laparotomy to address adhesion/bile duct injury, absent operative details  she had ERCP with narrowing of CHD, she had PTC with internal external drain via right biliary duct system into duodenum  she still not feeling well and had nonbilious vomiting in her visit today, her drain bag has about 200 bile daily  07/11/2024 : will send for tube study to check location of tip of her PTD, she will need to see advanced GI to attempt left and right plastic stent, repeat LFTS  07/23/2024 :still suffering discomfort, GI eval and concern of need to repeat procedure and re-stenting, patient declined GI ERCP and attempt stenting her stricture, will proceed with robotic RY hepaticojejunostomy   the above plan of care with discussed in details to the patient and all questions were answered to patient satisfaction. patient instructed to follow up with the referring physician and patient primary care provider   A total of 60 minutes was spent on this visit, obtaining h/p,  reviewing previous notes/imaging, counseling the patient on coming procedure and f/u , ordering tests (below), and documenting the findings in the note.

## 2024-07-25 DIAGNOSIS — Z90.49 ACQUIRED ABSENCE OF OTHER SPECIFIED PARTS OF DIGESTIVE TRACT: ICD-10-CM

## 2024-07-25 DIAGNOSIS — K83.5 BILIARY CYST: ICD-10-CM

## 2024-07-25 DIAGNOSIS — Z88.2 ALLERGY STATUS TO SULFONAMIDES: ICD-10-CM

## 2024-07-25 DIAGNOSIS — E74.01 VON GIERKE DISEASE: ICD-10-CM

## 2024-07-25 DIAGNOSIS — R11.14 BILIOUS VOMITING: ICD-10-CM

## 2024-07-25 DIAGNOSIS — E83.42 HYPOMAGNESEMIA: ICD-10-CM

## 2024-07-25 DIAGNOSIS — K83.8 OTHER SPECIFIED DISEASES OF BILIARY TRACT: ICD-10-CM

## 2024-08-01 ENCOUNTER — NON-APPOINTMENT (OUTPATIENT)
Age: 34
End: 2024-08-01

## 2024-08-01 ENCOUNTER — OUTPATIENT (OUTPATIENT)
Dept: OUTPATIENT SERVICES | Facility: HOSPITAL | Age: 34
LOS: 1 days | End: 2024-08-01
Payer: COMMERCIAL

## 2024-08-01 VITALS
RESPIRATION RATE: 16 BRPM | WEIGHT: 175.93 LBS | HEART RATE: 100 BPM | TEMPERATURE: 99 F | OXYGEN SATURATION: 98 % | DIASTOLIC BLOOD PRESSURE: 60 MMHG | SYSTOLIC BLOOD PRESSURE: 96 MMHG | HEIGHT: 67 IN

## 2024-08-01 DIAGNOSIS — Z90.49 ACQUIRED ABSENCE OF OTHER SPECIFIED PARTS OF DIGESTIVE TRACT: Chronic | ICD-10-CM

## 2024-08-01 DIAGNOSIS — K83.1 OBSTRUCTION OF BILE DUCT: ICD-10-CM

## 2024-08-01 DIAGNOSIS — Z01.818 ENCOUNTER FOR OTHER PREPROCEDURAL EXAMINATION: ICD-10-CM

## 2024-08-01 DIAGNOSIS — Z98.890 OTHER SPECIFIED POSTPROCEDURAL STATES: Chronic | ICD-10-CM

## 2024-08-01 LAB
A1C WITH ESTIMATED AVERAGE GLUCOSE RESULT: 6.3 % — HIGH (ref 4–5.6)
ALBUMIN SERPL ELPH-MCNC: 4.5 G/DL — SIGNIFICANT CHANGE UP (ref 3.5–5.2)
ALP SERPL-CCNC: 462 U/L — HIGH (ref 30–115)
ALT FLD-CCNC: 55 U/L — HIGH (ref 0–41)
ANION GAP SERPL CALC-SCNC: 15 MMOL/L — HIGH (ref 7–14)
APPEARANCE UR: CLEAR — SIGNIFICANT CHANGE UP
APTT BLD: 34.9 SEC — SIGNIFICANT CHANGE UP (ref 27–39.2)
AST SERPL-CCNC: 36 U/L — SIGNIFICANT CHANGE UP (ref 0–41)
BASOPHILS # BLD AUTO: 0.01 K/UL — SIGNIFICANT CHANGE UP (ref 0–0.2)
BASOPHILS NFR BLD AUTO: 0.2 % — SIGNIFICANT CHANGE UP (ref 0–1)
BILIRUB SERPL-MCNC: 0.5 MG/DL — SIGNIFICANT CHANGE UP (ref 0.2–1.2)
BILIRUB UR-MCNC: NEGATIVE — SIGNIFICANT CHANGE UP
BUN SERPL-MCNC: 8 MG/DL — LOW (ref 10–20)
CALCIUM SERPL-MCNC: 9.4 MG/DL — SIGNIFICANT CHANGE UP (ref 8.4–10.5)
CHLORIDE SERPL-SCNC: 103 MMOL/L — SIGNIFICANT CHANGE UP (ref 98–110)
CO2 SERPL-SCNC: 23 MMOL/L — SIGNIFICANT CHANGE UP (ref 17–32)
COLOR SPEC: YELLOW — SIGNIFICANT CHANGE UP
CREAT SERPL-MCNC: 0.7 MG/DL — SIGNIFICANT CHANGE UP (ref 0.7–1.5)
DIFF PNL FLD: ABNORMAL
EGFR: 118 ML/MIN/1.73M2 — SIGNIFICANT CHANGE UP
EOSINOPHIL # BLD AUTO: 0.15 K/UL — SIGNIFICANT CHANGE UP (ref 0–0.7)
EOSINOPHIL NFR BLD AUTO: 2.3 % — SIGNIFICANT CHANGE UP (ref 0–8)
ESTIMATED AVERAGE GLUCOSE: 134 MG/DL — HIGH (ref 68–114)
GLUCOSE SERPL-MCNC: 107 MG/DL — HIGH (ref 70–99)
GLUCOSE UR QL: NEGATIVE MG/DL — SIGNIFICANT CHANGE UP
HCT VFR BLD CALC: 36 % — LOW (ref 37–47)
HGB BLD-MCNC: 11.5 G/DL — LOW (ref 12–16)
IMM GRANULOCYTES NFR BLD AUTO: 0.2 % — SIGNIFICANT CHANGE UP (ref 0.1–0.3)
INR BLD: 1.03 RATIO — SIGNIFICANT CHANGE UP (ref 0.65–1.3)
KETONES UR-MCNC: NEGATIVE MG/DL — SIGNIFICANT CHANGE UP
LEUKOCYTE ESTERASE UR-ACNC: NEGATIVE — SIGNIFICANT CHANGE UP
LYMPHOCYTES # BLD AUTO: 2.05 K/UL — SIGNIFICANT CHANGE UP (ref 1.2–3.4)
LYMPHOCYTES # BLD AUTO: 31.8 % — SIGNIFICANT CHANGE UP (ref 20.5–51.1)
MCHC RBC-ENTMCNC: 28.9 PG — SIGNIFICANT CHANGE UP (ref 27–31)
MCHC RBC-ENTMCNC: 31.9 G/DL — LOW (ref 32–37)
MCV RBC AUTO: 90.5 FL — SIGNIFICANT CHANGE UP (ref 81–99)
MONOCYTES # BLD AUTO: 0.42 K/UL — SIGNIFICANT CHANGE UP (ref 0.1–0.6)
MONOCYTES NFR BLD AUTO: 6.5 % — SIGNIFICANT CHANGE UP (ref 1.7–9.3)
MRSA PCR RESULT.: NEGATIVE — SIGNIFICANT CHANGE UP
NEUTROPHILS # BLD AUTO: 3.8 K/UL — SIGNIFICANT CHANGE UP (ref 1.4–6.5)
NEUTROPHILS NFR BLD AUTO: 59 % — SIGNIFICANT CHANGE UP (ref 42.2–75.2)
NITRITE UR-MCNC: NEGATIVE — SIGNIFICANT CHANGE UP
NRBC # BLD: 0 /100 WBCS — SIGNIFICANT CHANGE UP (ref 0–0)
PH UR: 6 — SIGNIFICANT CHANGE UP (ref 5–8)
PLATELET # BLD AUTO: 475 K/UL — HIGH (ref 130–400)
PMV BLD: 10.9 FL — HIGH (ref 7.4–10.4)
POTASSIUM SERPL-MCNC: 4.4 MMOL/L — SIGNIFICANT CHANGE UP (ref 3.5–5)
POTASSIUM SERPL-SCNC: 4.4 MMOL/L — SIGNIFICANT CHANGE UP (ref 3.5–5)
PROT SERPL-MCNC: 8.4 G/DL — HIGH (ref 6–8)
PROT UR-MCNC: SIGNIFICANT CHANGE UP MG/DL
PROTHROM AB SERPL-ACNC: 11.8 SEC — SIGNIFICANT CHANGE UP (ref 9.95–12.87)
RBC # BLD: 3.98 M/UL — LOW (ref 4.2–5.4)
RBC # FLD: 14.8 % — HIGH (ref 11.5–14.5)
SODIUM SERPL-SCNC: 141 MMOL/L — SIGNIFICANT CHANGE UP (ref 135–146)
SP GR SPEC: 1.02 — SIGNIFICANT CHANGE UP (ref 1–1.03)
UROBILINOGEN FLD QL: 0.2 MG/DL — SIGNIFICANT CHANGE UP (ref 0.2–1)
WBC # BLD: 6.44 K/UL — SIGNIFICANT CHANGE UP (ref 4.8–10.8)
WBC # FLD AUTO: 6.44 K/UL — SIGNIFICANT CHANGE UP (ref 4.8–10.8)

## 2024-08-01 PROCEDURE — 99214 OFFICE O/P EST MOD 30 MIN: CPT | Mod: 25

## 2024-08-01 PROCEDURE — 85025 COMPLETE CBC W/AUTO DIFF WBC: CPT

## 2024-08-01 PROCEDURE — 36415 COLL VENOUS BLD VENIPUNCTURE: CPT

## 2024-08-01 PROCEDURE — 81001 URINALYSIS AUTO W/SCOPE: CPT

## 2024-08-01 PROCEDURE — 80053 COMPREHEN METABOLIC PANEL: CPT

## 2024-08-01 PROCEDURE — 86901 BLOOD TYPING SEROLOGIC RH(D): CPT

## 2024-08-01 PROCEDURE — 85730 THROMBOPLASTIN TIME PARTIAL: CPT

## 2024-08-01 PROCEDURE — 85610 PROTHROMBIN TIME: CPT

## 2024-08-01 PROCEDURE — 86850 RBC ANTIBODY SCREEN: CPT

## 2024-08-01 PROCEDURE — 86900 BLOOD TYPING SEROLOGIC ABO: CPT

## 2024-08-01 PROCEDURE — 83036 HEMOGLOBIN GLYCOSYLATED A1C: CPT

## 2024-08-01 PROCEDURE — 87641 MR-STAPH DNA AMP PROBE: CPT

## 2024-08-01 PROCEDURE — 87640 STAPH A DNA AMP PROBE: CPT

## 2024-08-01 NOTE — H&P PST ADULT - NSICDXPASTSURGICALHX_GEN_ALL_CORE_FT
PAST SURGICAL HISTORY:   delivery delivered     History of exploratory laparotomy     S/P cholecystectomy
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
## 2024-08-01 NOTE — H&P PST ADULT - BLOOD TRANSFUSION, PREVIOUS, PROFILE
[FreeTextEntry1] : Ms. Agosto is a 73F h/o GERD, pancreatic cysts, HLD, OA who returns for f/u for nausea.\par \par She reports 6-8 weeks of a burning sensation in her epigastric area with associated nausea, occasional heartburn that is relieved by eating.  No vomiting, normal brown BM daily, no black stool or rectal bleeding. No dysphagia or odynophagia, has tried famotidine with mild improvement, continues to take her daily PPI.\par \par Prior history:\par \par She underwent an EGD/upper EUS with FNA/colonoscopy by myself on 1/15/20 showing:\par Impression:\par 1. 16 mm neck/body of pancreas cyst with main PD communication s/p FNA x 1 - suspect branch duct IPMN\par 2. Gastritis\par 3. Internal hemorrhoids\par \par Cytology and molecular analysis for her pancreatic cyst showed atypical cells without evidence of malignancy, negative GNAS mutation, CEA 1315, amylase 13, biologic behavior deemed "statistically indolent."\par \par Her gastric biopsied revealed focal intestinal metaplasia, negative for dysplasia.\par \par Prior history:\jonnathan Had previously undergone multiple procedures with Dr. Burnette for her pancreatic cysts, full records unavailable, however pathology indicates she had at least 10 upper endoscopic procedures between 4/11 and 7/18 with Dr. Burnette, at least 8 with EUS sampling. No abnormal pathology from her pancreatic cysts to indicate dangerous findings.\par \par MRI abd for surveillance 3/2/17 showing:\par IMPRESSION: Multifocal nonenhancing scattered pancreatic cysts most likely represent IPMN. Follow-up is suggested with MRI MRCP in 6-12 months time.\par \par Interval history:\par She developed soft brown loose stool with mucus on 4/30, no overt or watery diarrhea, this was associated with abdominal bloating and cramping.  She saw a very small amount of "pinkish" blood separate from her stool.  Her loose stool resolved within 48 hours, however she is still complaining of cramping and bloating.  No overt abdominal pain, N/V, fevers, chills, cough, SOB, chest pain/tightness, jaundice, icterus, weight change. No sick contacts.\par \par Was previously taking pantoprazole which she stopped, only takes this as needed for GERD now. yes

## 2024-08-02 DIAGNOSIS — K83.1 OBSTRUCTION OF BILE DUCT: ICD-10-CM

## 2024-08-02 DIAGNOSIS — Z01.818 ENCOUNTER FOR OTHER PREPROCEDURAL EXAMINATION: ICD-10-CM

## 2024-08-07 ENCOUNTER — NON-APPOINTMENT (OUTPATIENT)
Age: 34
End: 2024-08-07

## 2024-08-09 ENCOUNTER — APPOINTMENT (OUTPATIENT)
Dept: INTERNAL MEDICINE | Facility: CLINIC | Age: 34
End: 2024-08-09

## 2024-08-09 ENCOUNTER — OUTPATIENT (OUTPATIENT)
Dept: OUTPATIENT SERVICES | Facility: HOSPITAL | Age: 34
LOS: 1 days | End: 2024-08-09
Payer: COMMERCIAL

## 2024-08-09 DIAGNOSIS — Z98.890 OTHER SPECIFIED POSTPROCEDURAL STATES: Chronic | ICD-10-CM

## 2024-08-09 DIAGNOSIS — Z00.00 ENCOUNTER FOR GENERAL ADULT MEDICAL EXAMINATION WITHOUT ABNORMAL FINDINGS: ICD-10-CM

## 2024-08-09 DIAGNOSIS — Z90.49 ACQUIRED ABSENCE OF OTHER SPECIFIED PARTS OF DIGESTIVE TRACT: Chronic | ICD-10-CM

## 2024-08-09 PROCEDURE — 99204 OFFICE O/P NEW MOD 45 MIN: CPT

## 2024-08-09 NOTE — ASSESSMENT
[FreeTextEntry1] : 33 y/o F w/ no PMHx presents for pre-op evaluation for robotic RY hepaticojejunostomy. Pt had laparoscopic cholecystectomy in [place] with two postoperative laparotomies to address adhesion/bile duct injury. Pt recently had an ERCP showing narrowing of CHD, had PTC with internal external drain. Currently pt complains of RUQ abd pain. Denies fever, N/V/D, constipation,  sxs, SOB, chest pain. Pt is currently not on any medications.  #biliary adhesion #bile duct injury - denies N/V/D, no jaundice or abd distention on exam  - s/p laparoscopic cholecystectomy  - s/p two postoperative laparotomies  - robotic RY hepaticojejunostomy scheduled for 8/14   #HCM - blood test reviewed by attending, pt is medically cleared for surg on 8/14 - RTC in 3 months w/ labs

## 2024-08-09 NOTE — PHYSICAL EXAM
[Normal] : no posterior cervical lymphadenopathy and no anterior cervical lymphadenopathy [de-identified] : RUQ abd pain, PTC

## 2024-08-09 NOTE — HISTORY OF PRESENT ILLNESS
[FreeTextEntry1] : pre-op eval  [de-identified] : 33 y/o F w/ no PMHx presents for pre-op evaluation for robotic RY hepaticojejunostomy. Pt had laparoscopic cholecystectomy in [place] with two postoperative laparotomies to address adhesion/bile duct injury. Pt recently had an ERCP showing narrowing of CHD, had PTC with internal external drain. Currently pt complains of RUQ abd pain. Denies fever, N/V/D, constipation,  sxs, SOB, chest pain. Pt is currently not on any medications.

## 2024-08-09 NOTE — REVIEW OF SYSTEMS
[Abdominal Pain] : abdominal pain [Headache] : headache [Negative] : Integumentary [FreeTextEntry7] : rRUQ abd pain

## 2024-08-12 RX ORDER — METRONIDAZOLE 500 MG/1
500 TABLET ORAL
Qty: 6 | Refills: 0 | Status: ACTIVE | COMMUNITY
Start: 2024-08-12 | End: 1900-01-01

## 2024-08-12 RX ORDER — NEOMYCIN SULFATE 500 MG/1
500 TABLET ORAL
Qty: 6 | Refills: 0 | Status: ACTIVE | COMMUNITY
Start: 2024-08-12 | End: 1900-01-01

## 2024-08-13 NOTE — ASU PATIENT PROFILE, ADULT - TRANSFUSION PREMEDICATION REQUIRED
MD notified of  Persistent low BP after bolus. Orders received for another bolus of 500cc. BP still at 84/58 after 2nd bolus. MD aware. MD came to evaluate patient who is asymptomatic.  Per MD recheck BP at 0230 and AM draw lactic acid. Will continue to monitor.   none

## 2024-08-14 ENCOUNTER — APPOINTMENT (OUTPATIENT)
Dept: SURGERY | Facility: HOSPITAL | Age: 34
End: 2024-08-14

## 2024-08-14 ENCOUNTER — INPATIENT (INPATIENT)
Facility: HOSPITAL | Age: 34
LOS: 5 days | Discharge: HOME HEALTH | DRG: 260 | End: 2024-08-20
Attending: SURGERY | Admitting: SURGERY
Payer: COMMERCIAL

## 2024-08-14 ENCOUNTER — RESULT REVIEW (OUTPATIENT)
Age: 34
End: 2024-08-14

## 2024-08-14 VITALS
TEMPERATURE: 98 F | OXYGEN SATURATION: 100 % | SYSTOLIC BLOOD PRESSURE: 99 MMHG | DIASTOLIC BLOOD PRESSURE: 64 MMHG | HEART RATE: 116 BPM | RESPIRATION RATE: 16 BRPM | WEIGHT: 162.92 LBS | HEIGHT: 67 IN

## 2024-08-14 DIAGNOSIS — Z98.890 OTHER SPECIFIED POSTPROCEDURAL STATES: Chronic | ICD-10-CM

## 2024-08-14 DIAGNOSIS — Z90.49 ACQUIRED ABSENCE OF OTHER SPECIFIED PARTS OF DIGESTIVE TRACT: Chronic | ICD-10-CM

## 2024-08-14 DIAGNOSIS — K83.1 OBSTRUCTION OF BILE DUCT: ICD-10-CM

## 2024-08-14 LAB
ALBUMIN SERPL ELPH-MCNC: 3.5 G/DL — SIGNIFICANT CHANGE UP (ref 3.5–5.2)
ALP SERPL-CCNC: 230 U/L — HIGH (ref 30–115)
ALT FLD-CCNC: 67 U/L — HIGH (ref 0–41)
ANION GAP SERPL CALC-SCNC: 11 MMOL/L — SIGNIFICANT CHANGE UP (ref 7–14)
APTT BLD: 36.1 SEC — SIGNIFICANT CHANGE UP (ref 27–39.2)
AST SERPL-CCNC: 82 U/L — HIGH (ref 0–41)
BASOPHILS # BLD AUTO: 0.01 K/UL — SIGNIFICANT CHANGE UP (ref 0–0.2)
BASOPHILS NFR BLD AUTO: 0.1 % — SIGNIFICANT CHANGE UP (ref 0–1)
BILIRUB DIRECT SERPL-MCNC: 1 MG/DL — HIGH (ref 0–0.3)
BILIRUB INDIRECT FLD-MCNC: 0.6 MG/DL — SIGNIFICANT CHANGE UP (ref 0.2–1.2)
BILIRUB SERPL-MCNC: 1.6 MG/DL — HIGH (ref 0.2–1.2)
BUN SERPL-MCNC: 4 MG/DL — LOW (ref 10–20)
CALCIUM SERPL-MCNC: 7.7 MG/DL — LOW (ref 8.4–10.4)
CHLORIDE SERPL-SCNC: 106 MMOL/L — SIGNIFICANT CHANGE UP (ref 98–110)
CO2 SERPL-SCNC: 21 MMOL/L — SIGNIFICANT CHANGE UP (ref 17–32)
CREAT SERPL-MCNC: 0.6 MG/DL — LOW (ref 0.7–1.5)
EGFR: 121 ML/MIN/1.73M2 — SIGNIFICANT CHANGE UP
EOSINOPHIL # BLD AUTO: 0 K/UL — SIGNIFICANT CHANGE UP (ref 0–0.7)
EOSINOPHIL NFR BLD AUTO: 0 % — SIGNIFICANT CHANGE UP (ref 0–8)
GLUCOSE SERPL-MCNC: 147 MG/DL — HIGH (ref 70–99)
HCT VFR BLD CALC: 31.6 % — LOW (ref 37–47)
HGB BLD-MCNC: 10.3 G/DL — LOW (ref 12–16)
IMM GRANULOCYTES NFR BLD AUTO: 0.3 % — SIGNIFICANT CHANGE UP (ref 0.1–0.3)
INR BLD: 1.27 RATIO — SIGNIFICANT CHANGE UP (ref 0.65–1.3)
INR BLD: 1.29 RATIO — SIGNIFICANT CHANGE UP (ref 0.65–1.3)
LACTATE SERPL-SCNC: 1.6 MMOL/L — SIGNIFICANT CHANGE UP (ref 0.7–2)
LYMPHOCYTES # BLD AUTO: 1.25 K/UL — SIGNIFICANT CHANGE UP (ref 1.2–3.4)
LYMPHOCYTES # BLD AUTO: 11.5 % — LOW (ref 20.5–51.1)
MAGNESIUM SERPL-MCNC: 1.4 MG/DL — LOW (ref 1.8–2.4)
MCHC RBC-ENTMCNC: 29.2 PG — SIGNIFICANT CHANGE UP (ref 27–31)
MCHC RBC-ENTMCNC: 32.6 G/DL — SIGNIFICANT CHANGE UP (ref 32–37)
MCV RBC AUTO: 89.5 FL — SIGNIFICANT CHANGE UP (ref 81–99)
MONOCYTES # BLD AUTO: 0.85 K/UL — HIGH (ref 0.1–0.6)
MONOCYTES NFR BLD AUTO: 7.8 % — SIGNIFICANT CHANGE UP (ref 1.7–9.3)
NEUTROPHILS # BLD AUTO: 8.69 K/UL — HIGH (ref 1.4–6.5)
NEUTROPHILS NFR BLD AUTO: 80.3 % — HIGH (ref 42.2–75.2)
NRBC # BLD: 0 /100 WBCS — SIGNIFICANT CHANGE UP (ref 0–0)
PHOSPHATE SERPL-MCNC: 3.8 MG/DL — SIGNIFICANT CHANGE UP (ref 2.1–4.9)
PLATELET # BLD AUTO: 354 K/UL — SIGNIFICANT CHANGE UP (ref 130–400)
PMV BLD: 10.3 FL — SIGNIFICANT CHANGE UP (ref 7.4–10.4)
POTASSIUM SERPL-MCNC: 3.9 MMOL/L — SIGNIFICANT CHANGE UP (ref 3.5–5)
POTASSIUM SERPL-SCNC: 3.9 MMOL/L — SIGNIFICANT CHANGE UP (ref 3.5–5)
PROT SERPL-MCNC: 5.6 G/DL — LOW (ref 6–8)
PROTHROM AB SERPL-ACNC: 14.5 SEC — HIGH (ref 9.95–12.87)
PROTHROM AB SERPL-ACNC: 14.7 SEC — HIGH (ref 9.95–12.87)
RBC # BLD: 3.53 M/UL — LOW (ref 4.2–5.4)
RBC # FLD: 15.3 % — HIGH (ref 11.5–14.5)
SODIUM SERPL-SCNC: 138 MMOL/L — SIGNIFICANT CHANGE UP (ref 135–146)
WBC # BLD: 10.83 K/UL — HIGH (ref 4.8–10.8)
WBC # FLD AUTO: 10.83 K/UL — HIGH (ref 4.8–10.8)

## 2024-08-14 PROCEDURE — C9290: CPT

## 2024-08-14 PROCEDURE — 88307 TISSUE EXAM BY PATHOLOGIST: CPT

## 2024-08-14 PROCEDURE — C1751: CPT

## 2024-08-14 PROCEDURE — 88342 IMHCHEM/IMCYTCHM 1ST ANTB: CPT | Mod: 26

## 2024-08-14 PROCEDURE — 88304 TISSUE EXAM BY PATHOLOGIST: CPT | Mod: 26

## 2024-08-14 PROCEDURE — 93005 ELECTROCARDIOGRAM TRACING: CPT

## 2024-08-14 PROCEDURE — 83735 ASSAY OF MAGNESIUM: CPT

## 2024-08-14 PROCEDURE — 80048 BASIC METABOLIC PNL TOTAL CA: CPT

## 2024-08-14 PROCEDURE — 47700 EXPLORATION OF BILE DUCTS: CPT | Mod: 22

## 2024-08-14 PROCEDURE — 47780 FUSE BILE DUCTS AND BOWEL: CPT | Mod: 22

## 2024-08-14 PROCEDURE — 85610 PROTHROMBIN TIME: CPT

## 2024-08-14 PROCEDURE — 84100 ASSAY OF PHOSPHORUS: CPT

## 2024-08-14 PROCEDURE — 85025 COMPLETE CBC W/AUTO DIFF WBC: CPT

## 2024-08-14 PROCEDURE — 49591 RPR AA HRN 1ST < 3 CM RDC: CPT

## 2024-08-14 PROCEDURE — L8699: CPT

## 2024-08-14 PROCEDURE — 74018 RADEX ABDOMEN 1 VIEW: CPT | Mod: 26

## 2024-08-14 PROCEDURE — 83605 ASSAY OF LACTIC ACID: CPT

## 2024-08-14 PROCEDURE — 86901 BLOOD TYPING SEROLOGIC RH(D): CPT

## 2024-08-14 PROCEDURE — 86850 RBC ANTIBODY SCREEN: CPT

## 2024-08-14 PROCEDURE — C1889: CPT

## 2024-08-14 PROCEDURE — C9399: CPT

## 2024-08-14 PROCEDURE — 82962 GLUCOSE BLOOD TEST: CPT

## 2024-08-14 PROCEDURE — 88341 IMHCHEM/IMCYTCHM EA ADD ANTB: CPT | Mod: 26

## 2024-08-14 PROCEDURE — 88360 TUMOR IMMUNOHISTOCHEM/MANUAL: CPT

## 2024-08-14 PROCEDURE — 88341 IMHCHEM/IMCYTCHM EA ADD ANTB: CPT

## 2024-08-14 PROCEDURE — 88342 IMHCHEM/IMCYTCHM 1ST ANTB: CPT

## 2024-08-14 PROCEDURE — 85730 THROMBOPLASTIN TIME PARTIAL: CPT

## 2024-08-14 PROCEDURE — 44955 APPENDECTOMY ADD-ON: CPT

## 2024-08-14 PROCEDURE — 84484 ASSAY OF TROPONIN QUANT: CPT

## 2024-08-14 PROCEDURE — 97161 PT EVAL LOW COMPLEX 20 MIN: CPT | Mod: GP

## 2024-08-14 PROCEDURE — 47532 INJECTION FOR CHOLANGIOGRAM: CPT | Mod: 59

## 2024-08-14 PROCEDURE — 36430 TRANSFUSION BLD/BLD COMPNT: CPT

## 2024-08-14 PROCEDURE — 86900 BLOOD TYPING SEROLOGIC ABO: CPT

## 2024-08-14 PROCEDURE — 94010 BREATHING CAPACITY TEST: CPT

## 2024-08-14 PROCEDURE — 80076 HEPATIC FUNCTION PANEL: CPT

## 2024-08-14 PROCEDURE — 93010 ELECTROCARDIOGRAM REPORT: CPT

## 2024-08-14 PROCEDURE — 71045 X-RAY EXAM CHEST 1 VIEW: CPT | Mod: 26

## 2024-08-14 PROCEDURE — P9045: CPT

## 2024-08-14 PROCEDURE — 88307 TISSUE EXAM BY PATHOLOGIST: CPT | Mod: 26

## 2024-08-14 PROCEDURE — P9016: CPT

## 2024-08-14 PROCEDURE — 85027 COMPLETE CBC AUTOMATED: CPT

## 2024-08-14 PROCEDURE — 82553 CREATINE MB FRACTION: CPT

## 2024-08-14 PROCEDURE — C2617: CPT

## 2024-08-14 PROCEDURE — C1769: CPT

## 2024-08-14 PROCEDURE — 36415 COLL VENOUS BLD VENIPUNCTURE: CPT

## 2024-08-14 PROCEDURE — 47300 SURGERY FOR LIVER LESION: CPT

## 2024-08-14 PROCEDURE — 86923 COMPATIBILITY TEST ELECTRIC: CPT

## 2024-08-14 PROCEDURE — 74018 RADEX ABDOMEN 1 VIEW: CPT

## 2024-08-14 PROCEDURE — 88304 TISSUE EXAM BY PATHOLOGIST: CPT

## 2024-08-14 PROCEDURE — 71045 X-RAY EXAM CHEST 1 VIEW: CPT

## 2024-08-14 RX ORDER — ACETAMINOPHEN 325 MG/1
1000 TABLET ORAL ONCE
Refills: 0 | Status: COMPLETED | OUTPATIENT
Start: 2024-08-15 | End: 2024-08-15

## 2024-08-14 RX ORDER — METHOCARBAMOL 750 MG/1
500 TABLET, FILM COATED ORAL ONCE
Refills: 0 | Status: COMPLETED | OUTPATIENT
Start: 2024-08-14 | End: 2024-08-14

## 2024-08-14 RX ORDER — HYDROMORPHONE HYDROCHLORIDE 2 MG/1
0.5 TABLET ORAL
Refills: 0 | Status: DISCONTINUED | OUTPATIENT
Start: 2024-08-14 | End: 2024-08-14

## 2024-08-14 RX ORDER — PIPERACILLIN SODIUM AND TAZOBACTAM SODIUM 3; .375 G/15ML; G/15ML
3.38 INJECTION, POWDER, FOR SOLUTION INTRAVENOUS EVERY 6 HOURS
Refills: 0 | Status: DISCONTINUED | OUTPATIENT
Start: 2024-08-14 | End: 2024-08-20

## 2024-08-14 RX ORDER — KETOROLAC TROMETHAMINE 30 MG/ML
15 INJECTION, SOLUTION INTRAMUSCULAR EVERY 6 HOURS
Refills: 0 | Status: DISCONTINUED | OUTPATIENT
Start: 2024-08-14 | End: 2024-08-17

## 2024-08-14 RX ORDER — ENOXAPARIN SODIUM 100 MG/ML
40 INJECTION SUBCUTANEOUS EVERY 24 HOURS
Refills: 0 | Status: DISCONTINUED | OUTPATIENT
Start: 2024-08-15 | End: 2024-08-20

## 2024-08-14 RX ORDER — HYDROMORPHONE HYDROCHLORIDE 2 MG/1
0.25 TABLET ORAL EVERY 4 HOURS
Refills: 0 | Status: DISCONTINUED | OUTPATIENT
Start: 2024-08-14 | End: 2024-08-16

## 2024-08-14 RX ORDER — HYDROMORPHONE HYDROCHLORIDE 2 MG/1
0.5 TABLET ORAL EVERY 4 HOURS
Refills: 0 | Status: DISCONTINUED | OUTPATIENT
Start: 2024-08-14 | End: 2024-08-16

## 2024-08-14 RX ORDER — ACETAMINOPHEN 325 MG/1
1000 TABLET ORAL ONCE
Refills: 0 | Status: COMPLETED | OUTPATIENT
Start: 2024-08-15 | End: 2024-08-14

## 2024-08-14 RX ORDER — PIPERACILLIN SODIUM AND TAZOBACTAM SODIUM 3; .375 G/15ML; G/15ML
3.38 INJECTION, POWDER, FOR SOLUTION INTRAVENOUS ONCE
Refills: 0 | Status: COMPLETED | OUTPATIENT
Start: 2024-08-14 | End: 2024-08-16

## 2024-08-14 RX ADMIN — HYDROMORPHONE HYDROCHLORIDE 0.5 MILLIGRAM(S): 2 TABLET ORAL at 21:00

## 2024-08-14 RX ADMIN — KETOROLAC TROMETHAMINE 15 MILLIGRAM(S): 30 INJECTION, SOLUTION INTRAMUSCULAR at 23:31

## 2024-08-14 RX ADMIN — Medication 100 MILLILITER(S): at 20:47

## 2024-08-14 RX ADMIN — Medication 25 GRAM(S): at 22:20

## 2024-08-14 RX ADMIN — ACETAMINOPHEN 400 MILLIGRAM(S): 325 TABLET ORAL at 23:31

## 2024-08-14 RX ADMIN — METHOCARBAMOL 500 MILLIGRAM(S): 750 TABLET, FILM COATED ORAL at 22:19

## 2024-08-14 RX ADMIN — Medication 25 GRAM(S): at 23:32

## 2024-08-14 RX ADMIN — PIPERACILLIN SODIUM AND TAZOBACTAM SODIUM 25 GRAM(S): 3; .375 INJECTION, POWDER, FOR SOLUTION INTRAVENOUS at 23:32

## 2024-08-14 RX ADMIN — HYDROMORPHONE HYDROCHLORIDE 0.5 MILLIGRAM(S): 2 TABLET ORAL at 20:43

## 2024-08-14 NOTE — CONSULT NOTE ADULT - SUBJECTIVE AND OBJECTIVE BOX
MICHAEL LAL   [id number]   [birth year]    34yF  ============================================================   DATE OF INITIAL SICU/SDU CONSULT: 24    INDICATION FOR SICU CONSULT:  hemodynamic monitoring s/p hepaticojejunostomy requiring pressors intraoperatively      SICU COURSE EVENTS :   - admitted to SICU service     24HOUR EVENTS  -Admission under SICU service    [X] A ten-point review of systems was negative except as expressed in note.  [X] History was obtained from patient. If unable to participate in their care, history obtained from review of the chart and collateral sources of information.  ============================================================      HPI   HPI: 33yo F with a pmhx of G6PD deficiency,  section x4, s/p laparoscopic cholecystectomy in [facility] (24) complicated by post-operative leak requiring exploratory laparotomy x 2 (24 and May 2024), s/p PTC drain placement by IR now presents s/p laparoscopic converted to open Enrrique-en-y hepaticojejunostomy. Patient was medically cleared by ASHLIE Gonzalez. Intraoperatively, patient required up to 0.6 Phenylephrine and received 1u pRBCs; case was converted to open with extensive lysis of adhesions and E5 injury of CHD. Post-operatively patient no longer on pressors and was extubated prior to PACU. SICU consulted for hemodynamic monitoring.         OR Stats for 24    OR Time : 10.5h  IV Fluids: 10L  EBL: 600mL  Blood Products: 1u pRBC  UOP: 750mL   Findings - Extensive inflammation and abscess pockets within abdominal wall and liver adhesions to anterior abd wall as well as peritoneal adhesions preventing laparoscopic approach. Converted to open.  Inflammation and adhesions at guille hepatis. 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 Enrrique-en-y hepaticojejunostomy.    PAST MEDICAL & SURGICAL HISTORY  Glucose 6 phosphatase deficiency  H/O  section x4  Gestational diabetes      ACTIVE MEDICATIONS    HYDROmorphone  Injectable 0.5 milliGRAM(s) IV Push every 10 minutes PRN Moderate Pain (4 - 6)  ketorolac   Injectable 15 milliGRAM(s) IV Push every 6 hours  lactated ringers. 1000 milliLiter(s) IV Continuous <Continuous>  piperacillin/tazobactam IVPB.. 3.375 Gram(s) IV Intermittent once  piperacillin/tazobactam IVPB.. 3.375 Gram(s) IV Intermittent every 6 hours    ALLERGIES  Allergies  celemin&amp; celepid given in [facility] caused Rigors, was able to tolerate albumin (Other)  sulfa drugs (Rash)        VITAL SIGNS (Last 24Hours)  ICU Vital Signs Last 24 Hrs  T(C): 36.6 (14 Aug 2024 07:20), Max: 36.6 (14 Aug 2024 06:00)  T(F): 97.9 (14 Aug 2024 06:00), Max: 97.9 (14 Aug 2024 06:00)  HR: 116 (14 Aug 2024 07:20) (116 - 116)  BP: 99/64 (14 Aug 2024 07:20) (99/64 - 99/64)  RR: 16 (14 Aug 2024 07:20) (16 - 16)  SpO2: 100% (14 Aug 2024 07:20) (100% - 100%)      PHYSICAL EXAM   General:  A&Ox3, no focal deficits  RESPIRATORY: Normal expansion/effort. B/l breath sounds.  CARDIOVASCULAR: S1 S2. Sinus tachycardia.  GASTROINTESTINAL: Abdomen soft, non-tender, non-distended. Right and left hepatic duct stents draining into ostomy. Rahul drain in place to left abdomen.  MUSCULOSKELETAL: Extremities warm and dry  DERM:No skin breakdown   : Shen catheter in place.     ----------------------------------------------------------------------------------------------------------  Tubes/Lines/Drains    [x] Peripheral IV    [x] Urinary Catheter Shen  [ ]Present on Admission          Insertion Date:                                   [ ] Central Venous Line       [ ]IJ             [ ]Femoral     [ ]Subclavian   [ ]Left  [ ]Right      Insertion Date:          [ ] Arterial Line 	                [ ]Radial    [ ]Femoral     [ ]Axillary         [ ]Left  [ ]Right      Insertion Date:            MICHAEL LAL   [id number]   91    34yF  ============================================================   DATE OF INITIAL SICU/SDU CONSULT: 24    INDICATION FOR SICU CONSULT:  hemodynamic monitoring s/p hepaticojejunostomy requiring pressors intraoperatively      SICU COURSE EVENTS :   - admitted to SICU service     24HOUR EVENTS  -Admission under SICU service    [X] A ten-point review of systems was negative except as expressed in note.  [X] History was obtained from patient. If unable to participate in their care, history obtained from review of the chart and collateral sources of information.  ============================================================      HPI   HPI: 33yo F with a pmhx of G6PD deficiency,  section x4, s/p laparoscopic cholecystectomy in [facility] (24) complicated by post-operative leak requiring exploratory laparotomy x 2 (24 and May 2024), s/p PTC drain placement by IR now presents s/p laparoscopic converted to open Enrrique-en-y hepaticojejunostomy. Patient was medically cleared by ASHLIE Gonzalez. Intraoperatively, patient required up to 0.6 Phenylephrine and received 1u pRBCs; case was converted to open with extensive lysis of adhesions and E5 injury of CHD. Post-operatively patient no longer on pressors and was extubated prior to PACU. SICU consulted for hemodynamic monitoring.         OR Stats for 24    OR Time : 10.5h  IV Fluids: 10L  EBL: 600mL  Blood Products: 1u pRBC  UOP: 750mL   Findings - Extensive inflammation and abscess pockets within abdominal wall and liver adhesions to anterior abd wall as well as peritoneal adhesions preventing laparoscopic approach. Converted to open.  Inflammation and adhesions at guille hepatis. 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 Enrrique-en-y hepaticojejunostomy.    PAST MEDICAL & SURGICAL HISTORY  Glucose 6 phosphatase deficiency  H/O  section x4  Gestational diabetes      ACTIVE MEDICATIONS    HYDROmorphone  Injectable 0.5 milliGRAM(s) IV Push every 10 minutes PRN Moderate Pain (4 - 6)  ketorolac   Injectable 15 milliGRAM(s) IV Push every 6 hours  lactated ringers. 1000 milliLiter(s) IV Continuous <Continuous>  piperacillin/tazobactam IVPB.. 3.375 Gram(s) IV Intermittent once  piperacillin/tazobactam IVPB.. 3.375 Gram(s) IV Intermittent every 6 hours    ALLERGIES  Allergies  celemin&amp; celepid given in [facility] caused Rigors, was able to tolerate albumin (Other)  sulfa drugs (Rash)        VITAL SIGNS (Last 24Hours)  ICU Vital Signs Last 24 Hrs  T(C): 36.6 (14 Aug 2024 07:20), Max: 36.6 (14 Aug 2024 06:00)  T(F): 97.9 (14 Aug 2024 06:00), Max: 97.9 (14 Aug 2024 06:00)  HR: 116 (14 Aug 2024 07:20) (116 - 116)  BP: 99/64 (14 Aug 2024 07:20) (99/64 - 99/64)  RR: 16 (14 Aug 2024 07:20) (16 - 16)  SpO2: 100% (14 Aug 2024 07:20) (100% - 100%)      PHYSICAL EXAM   General:  A&Ox3, no focal deficits  RESPIRATORY: Normal expansion/effort. B/l breath sounds.  CARDIOVASCULAR: S1 S2. Sinus tachycardia.  GASTROINTESTINAL: Abdomen soft, non-tender, non-distended. Right and left hepatic duct stents draining into ostomy. Rahul drain in place to left abdomen.  MUSCULOSKELETAL: Extremities warm and dry  DERM: No skin breakdown   : Shen catheter in place.     ----------------------------------------------------------------------------------------------------------  Tubes/Lines/Drains    [x] Peripheral IV    [x] Urinary Catheter Shen  [ ]Present on Admission          Insertion Date:                                   [ ] Central Venous Line       [ ]IJ             [ ]Femoral     [ ]Subclavian   [ ]Left  [ ]Right      Insertion Date:          [ ] Arterial Line 	                [ ]Radial    [ ]Femoral     [ ]Axillary         [ ]Left  [ ]Right      Insertion Date:

## 2024-08-14 NOTE — CONSULT NOTE ADULT - ATTENDING COMMENTS
33 y/o female with CBD Injury.  S/P Ex. Lap. Extensive REBEKAH.  Hepaticotomy, with hepaticojejunostomy.  Acute postoperative pain.  Atelectasis.  At risk for hemodynamic instability.  G6PD Deficiency.    PLAN:  - pain control - on Tylenol, Toradol; Dilaudid for severe pain  - encourage incentive spirometer  - use O2 with NC as needed  - keep MAP>65; on ivf; give 500 ml iv bolus   - NPO, NGT to suction  - follow serum electrolytes and UOP  - ID - on Zosyn  - DVT prophylaxis  Admit to SDU.

## 2024-08-14 NOTE — CHART NOTE - NSCHARTNOTEFT_GEN_A_CORE
PACU ANESTHESIA ADMISSION NOTE      Procedure: Diagnostic laparoscopy with laparotomy    Lysis of peritoneal adhesions    Hepaticotomy, with hepaticojejunostomy      Post op diagnosis:  Bile duct injury        ____  Intubated  TV:______       Rate: ______      FiO2: ______    __x__  Patent Airway    __x__  Full return of protective reflexes    __x__  Full recovery from anesthesia / back to baseline     Vitals:   T:   37        R: 15                 BP:  149/86                Sat:   100                P: 116      Mental Status:  __x__ Awake   _____ Alert   _____ Drowsy   _____ Sedated    Nausea/Vomiting:  x____ NO  ______Yes,   See Post - Op Orders          Pain Scale (0-10):  _3____    Treatment: ____ None    ____ See Post - Op/PCA Orders    Post - Operative Fluids:   ____ Oral   __x__ See Post - Op Orders    Plan: Discharge:   ____Home       _____Floor     ___x__Critical Care    _____  Other:_________________    Comments:

## 2024-08-14 NOTE — BRIEF OPERATIVE NOTE - NSICDXBRIEFPROCEDURE_GEN_ALL_CORE_FT
PROCEDURES:  Diagnostic laparoscopy with laparotomy 14-Aug-2024 19:58:49  Jaime Drummond  Lysis of peritoneal adhesions 14-Aug-2024 19:59:21  Jaime Drummond  Hepaticotomy, with hepaticojejunostomy 14-Aug-2024 20:00:24  Jaime Drummond

## 2024-08-14 NOTE — BRIEF OPERATIVE NOTE - OPERATION/FINDINGS
Extensive inflammation and abscess pockets within abdominal wall and liver adhesions to anterior abd wall as well as peritoneal adhesions preventing laparoscopic approach. Converted to open.  Inflammation and adhesions at guille hepatis. 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 kayla en y hepaticojejunostomy

## 2024-08-14 NOTE — CONSULT NOTE ADULT - ASSESSMENT
Assessment & Plan    34y Female POD# 0 s/p  laparoscopic converted to open Enrrique-en-y hepaticojejunostomy      NEURO:  #Acute pain  - IV tylenol  - Toradol 15mg q6   - Dilaudid 0.5mg PRN    RESP:   #Oxygenation    -wean off NC to RA as tolerated  #Activity    -increase as tolerated    - encourage use of incentive spirometer    CARDS:   #Hypotensive intraoperatively- resolved  - Phenylephrine gtt @ 0.6    GI/NUTR:   #Diet,   NPO Except Medications    -aspiration precautions, HOB 30  #GI Prophylaxis    -not indicated  #Bowel regimen    -senna/miralax    -last bowel movement    #s/p laparoscopic converted to open Enrrique-en-y hepaticojejunostomy  - pain control  - hemodynamic monitoring  - NPO    /RENAL:   #urine output in crtically ill    Labs:   BUN/Cr- 4/0.6  -->    Electrolytes-(08-14 @ 20:32)Na 138 // K 3.9 // Mg 1.4 // Phos 3.8   #maintain euvolemia  - LR @ 100mL/hr               HEME/ONC:   #DVT prophylaxis   - lovenox 40mg daily  Labs: Hb/Hct:  10.3/31.6  (08-14 @ 20:32)  -->                      Plts:  354  -->                 PTT/INR:  36.1/1.29  --->,  --/1.27  --->       ID:  #monitor for leukocytosis/fever  WBC- 10.83  --->>  Temp trend- 24hrs T(F): 97.9 (08-14 @ 20:50), Max: 98.1 (08-14 @ 19:50)  Current antibiotics-piperacillin/tazobactam IVPB.. 3.375 once  piperacillin/tazobactam IVPB.. 3.375 every 6 hours      ENDO:    -finger stick glucose q6 while NPO    -Glucose goal 140-180    MSK:     Activity - Ambulate as Tolerated:     Time/Priority:  Routine (08-14-24 @ 20:15)      SKIN:  #DTI screening    -wound assessment pending transfer     HOME Medications:      LINES/DRAINS:  PIV, Shen    ADVANCED DIRECTIVES:  Full Code    HCP/Emergency Contact-    INDICATION FOR SICU/SDU: Obstruction of bile duct             DISPO:   Case discussed with attending   Assessment & Plan    34y Female POD# 0 s/p  laparoscopic converted to open Enrrique-en-y hepaticojejunostomy      NEURO:  #Acute pain  - IV tylenol  - Toradol 15mg q6   - Dilaudid 0.5mg PRN    RESP:   #Oxygenation    -wean off NC to RA as tolerated  #Activity    -increase as tolerated    - encourage use of incentive spirometer    CARDS:   #Hypotensive intraoperatively- resolved  - Phenylephrine gtt @ 0.6    GI/NUTR:   #Diet,   NPO Except Medications    -aspiration precautions, HOB 30  #GI Prophylaxis    -not indicated  #Bowel regimen    -senna/miralax    -last bowel movement    #s/p laparoscopic converted to open Enrrique-en-y hepaticojejunostomy  - pain control  - hemodynamic monitoring  - NPO    /RENAL:   #urine output in crtically ill    Labs:   BUN/Cr- 4/0.6  -->    Electrolytes-(08-14 @ 20:32)Na 138 // K 3.9 // Mg 1.4 // Phos 3.8   #maintain euvolemia  - LR @ 100mL/hr      HEME/ONC:   #DVT prophylaxis   - lovenox 40mg daily  Labs: Hb/Hct:  10.3/31.6  (08-14 @ 20:32)  -->                      Plts:  354  -->                 PTT/INR:  36.1/1.29  --->,  --/1.27  --->       ID:  #monitor for leukocytosis/fever  WBC- 10.83  --->>  Temp trend- 24hrs T(F): 97.9 (08-14 @ 20:50), Max: 98.1 (08-14 @ 19:50)  Current antibiotics-piperacillin/tazobactam IVPB.. 3.375 once  piperacillin/tazobactam IVPB.. 3.375 every 6 hours      ENDO:    -finger stick glucose q6 while NPO    -Glucose goal 140-180    MSK:     Activity - Ambulate as Tolerated:     Time/Priority:  Routine (08-14-24 @ 20:15)      SKIN:  #DTI screening    -wound assessment pending transfer to SDU        LINES/DRAINS:  Ac CASILLAS    ADVANCED DIRECTIVES:  Full Code    HCP/Emergency Contact- Ayden Ivan [phone]    INDICATION FOR SICU/SDU: Obstruction of bile duct      DISPO:  SDU  Case discussed with attending Dr. Angulo Assessment & Plan    34y Female POD# 0 s/p open Enrrique-en-y hepaticojejunostomy.      NEURO:  #Acute pain  - IV Tylenol  - Toradol 15mg q6   - Dilaudid 0.5mg PRN    RESP:   #Oxygenation    -wean off NC to RA as tolerated  #Activity    -increase as tolerated    -encourage use of incentive spirometer    CARDS:   #Hypotensive intraoperatively- resolved  - Phenylephrine gtt @ 0.6    GI/NUTR:   #Diet,   NPO Except Medications    -aspiration precautions, HOB 30  #GI Prophylaxis    -not indicated  #Bowel regimen    -last bowel movement unknown  #s/p laparoscopic converted to open Enrrique-en-y hepaticojejunostomy  - pain control  - hemodynamic monitoring  - NPO    /RENAL:   #urine output in crtically ill    Labs:   BUN/Cr- 4/0.6  -->    Electrolytes-(08-14 @ 20:32)Na 138 // K 3.9 // Mg 1.4 // Phos 3.8   #maintain euvolemia  - LR @ 100mL/hr      HEME/ONC:   #DVT prophylaxis   - lovenox 40mg daily  Labs: Hb/Hct:  10.3/31.6  (08-14 @ 20:32)  -->                      Plts:  354  -->                 PTT/INR:  36.1/1.29  --->,  --/1.27  --->       ID:  #monitor for leukocytosis/fever  WBC- 10.83  --->>  Temp trend- 24hrs T(F): 97.9 (08-14 @ 20:50), Max: 98.1 (08-14 @ 19:50)  Current antibiotics-piperacillin/tazobactam IVPB.. 3.375 once  piperacillin/tazobactam IVPB.. 3.375 every 6 hours    ENDO:    -finger stick glucose q6 while NPO    -Glucose goal 140-180    MSK:     Activity - Ambulate as Tolerated:     Time/Priority:  Routine (08-14-24 @ 20:15)    SKIN:  #DTI screening    -wound assessment pending transfer to SDU    LINES/DRAINS:  PIV, Shen    ADVANCED DIRECTIVES:  Full Code    HCP/Emergency Contact- Ayden Kubi [phone]    INDICATION FOR SICU/SDU: hemodynamic monitoring s/p hepaticojejunostomy requiring pressors intraop      DISPO:  SDU  Case discussed with attending Dr. Angulo

## 2024-08-15 LAB
ANION GAP SERPL CALC-SCNC: 14 MMOL/L — SIGNIFICANT CHANGE UP (ref 7–14)
ANION GAP SERPL CALC-SCNC: 9 MMOL/L — SIGNIFICANT CHANGE UP (ref 7–14)
BASOPHILS # BLD AUTO: 0.02 K/UL — SIGNIFICANT CHANGE UP (ref 0–0.2)
BASOPHILS NFR BLD AUTO: 0.2 % — SIGNIFICANT CHANGE UP (ref 0–1)
BLD GP AB SCN SERPL QL: SIGNIFICANT CHANGE UP
BUN SERPL-MCNC: 4 MG/DL — LOW (ref 10–20)
BUN SERPL-MCNC: 4 MG/DL — LOW (ref 10–20)
CALCIUM SERPL-MCNC: 7.8 MG/DL — LOW (ref 8.4–10.4)
CALCIUM SERPL-MCNC: 8 MG/DL — LOW (ref 8.4–10.4)
CHLORIDE SERPL-SCNC: 102 MMOL/L — SIGNIFICANT CHANGE UP (ref 98–110)
CHLORIDE SERPL-SCNC: 102 MMOL/L — SIGNIFICANT CHANGE UP (ref 98–110)
CK MB CFR SERPL CALC: 2.1 NG/ML — SIGNIFICANT CHANGE UP (ref 0.6–6.3)
CO2 SERPL-SCNC: 23 MMOL/L — SIGNIFICANT CHANGE UP (ref 17–32)
CO2 SERPL-SCNC: 26 MMOL/L — SIGNIFICANT CHANGE UP (ref 17–32)
CREAT SERPL-MCNC: 0.5 MG/DL — LOW (ref 0.7–1.5)
CREAT SERPL-MCNC: 0.6 MG/DL — LOW (ref 0.7–1.5)
EGFR: 121 ML/MIN/1.73M2 — SIGNIFICANT CHANGE UP
EGFR: 127 ML/MIN/1.73M2 — SIGNIFICANT CHANGE UP
EOSINOPHIL # BLD AUTO: 0.04 K/UL — SIGNIFICANT CHANGE UP (ref 0–0.7)
EOSINOPHIL NFR BLD AUTO: 0.3 % — SIGNIFICANT CHANGE UP (ref 0–8)
GLUCOSE SERPL-MCNC: 109 MG/DL — HIGH (ref 70–99)
GLUCOSE SERPL-MCNC: 111 MG/DL — HIGH (ref 70–99)
HCT VFR BLD CALC: 29.4 % — LOW (ref 37–47)
HCT VFR BLD CALC: 35.2 % — LOW (ref 37–47)
HGB BLD-MCNC: 11.5 G/DL — LOW (ref 12–16)
HGB BLD-MCNC: 9.6 G/DL — LOW (ref 12–16)
IMM GRANULOCYTES NFR BLD AUTO: 0.3 % — SIGNIFICANT CHANGE UP (ref 0.1–0.3)
LYMPHOCYTES # BLD AUTO: 1.27 K/UL — SIGNIFICANT CHANGE UP (ref 1.2–3.4)
LYMPHOCYTES # BLD AUTO: 10.3 % — LOW (ref 20.5–51.1)
MAGNESIUM SERPL-MCNC: 1.9 MG/DL — SIGNIFICANT CHANGE UP (ref 1.8–2.4)
MAGNESIUM SERPL-MCNC: 2 MG/DL — SIGNIFICANT CHANGE UP (ref 1.8–2.4)
MCHC RBC-ENTMCNC: 29.3 PG — SIGNIFICANT CHANGE UP (ref 27–31)
MCHC RBC-ENTMCNC: 29.6 PG — SIGNIFICANT CHANGE UP (ref 27–31)
MCHC RBC-ENTMCNC: 32.7 G/DL — SIGNIFICANT CHANGE UP (ref 32–37)
MCHC RBC-ENTMCNC: 32.7 G/DL — SIGNIFICANT CHANGE UP (ref 32–37)
MCV RBC AUTO: 89.6 FL — SIGNIFICANT CHANGE UP (ref 81–99)
MCV RBC AUTO: 90.7 FL — SIGNIFICANT CHANGE UP (ref 81–99)
MONOCYTES # BLD AUTO: 0.72 K/UL — HIGH (ref 0.1–0.6)
MONOCYTES NFR BLD AUTO: 5.8 % — SIGNIFICANT CHANGE UP (ref 1.7–9.3)
NEUTROPHILS # BLD AUTO: 10.29 K/UL — HIGH (ref 1.4–6.5)
NEUTROPHILS NFR BLD AUTO: 83.1 % — HIGH (ref 42.2–75.2)
NRBC # BLD: 0 /100 WBCS — SIGNIFICANT CHANGE UP (ref 0–0)
NRBC # BLD: 0 /100 WBCS — SIGNIFICANT CHANGE UP (ref 0–0)
PHOSPHATE SERPL-MCNC: 2.8 MG/DL — SIGNIFICANT CHANGE UP (ref 2.1–4.9)
PHOSPHATE SERPL-MCNC: 3.5 MG/DL — SIGNIFICANT CHANGE UP (ref 2.1–4.9)
PLATELET # BLD AUTO: 327 K/UL — SIGNIFICANT CHANGE UP (ref 130–400)
PLATELET # BLD AUTO: 363 K/UL — SIGNIFICANT CHANGE UP (ref 130–400)
PMV BLD: 10.3 FL — SIGNIFICANT CHANGE UP (ref 7.4–10.4)
PMV BLD: 10.5 FL — HIGH (ref 7.4–10.4)
POTASSIUM SERPL-MCNC: 3.6 MMOL/L — SIGNIFICANT CHANGE UP (ref 3.5–5)
POTASSIUM SERPL-MCNC: 3.7 MMOL/L — SIGNIFICANT CHANGE UP (ref 3.5–5)
POTASSIUM SERPL-SCNC: 3.6 MMOL/L — SIGNIFICANT CHANGE UP (ref 3.5–5)
POTASSIUM SERPL-SCNC: 3.7 MMOL/L — SIGNIFICANT CHANGE UP (ref 3.5–5)
RBC # BLD: 3.28 M/UL — LOW (ref 4.2–5.4)
RBC # BLD: 3.88 M/UL — LOW (ref 4.2–5.4)
RBC # FLD: 15.9 % — HIGH (ref 11.5–14.5)
RBC # FLD: 16.1 % — HIGH (ref 11.5–14.5)
SODIUM SERPL-SCNC: 137 MMOL/L — SIGNIFICANT CHANGE UP (ref 135–146)
SODIUM SERPL-SCNC: 139 MMOL/L — SIGNIFICANT CHANGE UP (ref 135–146)
TROPONIN T, HIGH SENSITIVITY RESULT: 10 NG/L — SIGNIFICANT CHANGE UP (ref 6–13)
WBC # BLD: 12.31 K/UL — HIGH (ref 4.8–10.8)
WBC # BLD: 12.38 K/UL — HIGH (ref 4.8–10.8)
WBC # FLD AUTO: 12.31 K/UL — HIGH (ref 4.8–10.8)
WBC # FLD AUTO: 12.38 K/UL — HIGH (ref 4.8–10.8)

## 2024-08-15 PROCEDURE — 99223 1ST HOSP IP/OBS HIGH 75: CPT | Mod: 24,25

## 2024-08-15 PROCEDURE — 74018 RADEX ABDOMEN 1 VIEW: CPT | Mod: 26

## 2024-08-15 RX ORDER — ONDANSETRON 2 MG/ML
4 INJECTION, SOLUTION INTRAMUSCULAR; INTRAVENOUS ONCE
Refills: 0 | Status: COMPLETED | OUTPATIENT
Start: 2024-08-15 | End: 2024-08-15

## 2024-08-15 RX ORDER — ACETAMINOPHEN 325 MG/1
1000 TABLET ORAL ONCE
Refills: 0 | Status: COMPLETED | OUTPATIENT
Start: 2024-08-15 | End: 2024-08-15

## 2024-08-15 RX ORDER — POTASSIUM CHLORIDE 10 MEQ
20 TABLET, EXT RELEASE, PARTICLES/CRYSTALS ORAL
Refills: 0 | Status: COMPLETED | OUTPATIENT
Start: 2024-08-15 | End: 2024-08-15

## 2024-08-15 RX ORDER — CHLORHEXIDINE GLUCONATE 40 MG/ML
1 SOLUTION TOPICAL
Refills: 0 | Status: DISCONTINUED | OUTPATIENT
Start: 2024-08-15 | End: 2024-08-20

## 2024-08-15 RX ORDER — CALCIUM GLUCONATE 61(648) MG
1 TABLET ORAL ONCE
Refills: 0 | Status: COMPLETED | OUTPATIENT
Start: 2024-08-15 | End: 2024-08-15

## 2024-08-15 RX ADMIN — ENOXAPARIN SODIUM 40 MILLIGRAM(S): 100 INJECTION SUBCUTANEOUS at 05:25

## 2024-08-15 RX ADMIN — HYDROMORPHONE HYDROCHLORIDE 0.5 MILLIGRAM(S): 2 TABLET ORAL at 20:38

## 2024-08-15 RX ADMIN — PIPERACILLIN SODIUM AND TAZOBACTAM SODIUM 25 GRAM(S): 3; .375 INJECTION, POWDER, FOR SOLUTION INTRAVENOUS at 11:10

## 2024-08-15 RX ADMIN — HYDROMORPHONE HYDROCHLORIDE 0.25 MILLIGRAM(S): 2 TABLET ORAL at 03:08

## 2024-08-15 RX ADMIN — Medication 50 MILLIEQUIVALENT(S): at 23:11

## 2024-08-15 RX ADMIN — Medication 100 GRAM(S): at 17:14

## 2024-08-15 RX ADMIN — ACETAMINOPHEN 1000 MILLIGRAM(S): 325 TABLET ORAL at 00:15

## 2024-08-15 RX ADMIN — ACETAMINOPHEN 1000 MILLIGRAM(S): 325 TABLET ORAL at 12:00

## 2024-08-15 RX ADMIN — KETOROLAC TROMETHAMINE 15 MILLIGRAM(S): 30 INJECTION, SOLUTION INTRAMUSCULAR at 00:15

## 2024-08-15 RX ADMIN — KETOROLAC TROMETHAMINE 15 MILLIGRAM(S): 30 INJECTION, SOLUTION INTRAMUSCULAR at 17:40

## 2024-08-15 RX ADMIN — PIPERACILLIN SODIUM AND TAZOBACTAM SODIUM 25 GRAM(S): 3; .375 INJECTION, POWDER, FOR SOLUTION INTRAVENOUS at 23:06

## 2024-08-15 RX ADMIN — Medication 1500 MILLILITER(S): at 17:14

## 2024-08-15 RX ADMIN — HYDROMORPHONE HYDROCHLORIDE 0.5 MILLIGRAM(S): 2 TABLET ORAL at 07:08

## 2024-08-15 RX ADMIN — Medication 1000 MILLILITER(S): at 04:30

## 2024-08-15 RX ADMIN — ACETAMINOPHEN 400 MILLIGRAM(S): 325 TABLET ORAL at 05:23

## 2024-08-15 RX ADMIN — HYDROMORPHONE HYDROCHLORIDE 0.5 MILLIGRAM(S): 2 TABLET ORAL at 07:30

## 2024-08-15 RX ADMIN — KETOROLAC TROMETHAMINE 15 MILLIGRAM(S): 30 INJECTION, SOLUTION INTRAMUSCULAR at 05:30

## 2024-08-15 RX ADMIN — HYDROMORPHONE HYDROCHLORIDE 0.5 MILLIGRAM(S): 2 TABLET ORAL at 15:02

## 2024-08-15 RX ADMIN — HYDROMORPHONE HYDROCHLORIDE 0.5 MILLIGRAM(S): 2 TABLET ORAL at 19:52

## 2024-08-15 RX ADMIN — ACETAMINOPHEN 1000 MILLIGRAM(S): 325 TABLET ORAL at 23:23

## 2024-08-15 RX ADMIN — KETOROLAC TROMETHAMINE 15 MILLIGRAM(S): 30 INJECTION, SOLUTION INTRAMUSCULAR at 23:09

## 2024-08-15 RX ADMIN — KETOROLAC TROMETHAMINE 15 MILLIGRAM(S): 30 INJECTION, SOLUTION INTRAMUSCULAR at 11:11

## 2024-08-15 RX ADMIN — Medication 100 GRAM(S): at 05:23

## 2024-08-15 RX ADMIN — ACETAMINOPHEN 400 MILLIGRAM(S): 325 TABLET ORAL at 23:23

## 2024-08-15 RX ADMIN — Medication 100 MILLILITER(S): at 05:22

## 2024-08-15 RX ADMIN — Medication 50 MILLIEQUIVALENT(S): at 19:52

## 2024-08-15 RX ADMIN — KETOROLAC TROMETHAMINE 15 MILLIGRAM(S): 30 INJECTION, SOLUTION INTRAMUSCULAR at 12:00

## 2024-08-15 RX ADMIN — HYDROMORPHONE HYDROCHLORIDE 0.25 MILLIGRAM(S): 2 TABLET ORAL at 03:25

## 2024-08-15 RX ADMIN — HYDROMORPHONE HYDROCHLORIDE 0.5 MILLIGRAM(S): 2 TABLET ORAL at 14:32

## 2024-08-15 RX ADMIN — Medication 50 MILLIEQUIVALENT(S): at 17:15

## 2024-08-15 RX ADMIN — KETOROLAC TROMETHAMINE 15 MILLIGRAM(S): 30 INJECTION, SOLUTION INTRAMUSCULAR at 23:23

## 2024-08-15 RX ADMIN — KETOROLAC TROMETHAMINE 15 MILLIGRAM(S): 30 INJECTION, SOLUTION INTRAMUSCULAR at 05:24

## 2024-08-15 RX ADMIN — Medication 50 MILLIEQUIVALENT(S): at 22:20

## 2024-08-15 RX ADMIN — ACETAMINOPHEN 400 MILLIGRAM(S): 325 TABLET ORAL at 11:11

## 2024-08-15 RX ADMIN — PIPERACILLIN SODIUM AND TAZOBACTAM SODIUM 25 GRAM(S): 3; .375 INJECTION, POWDER, FOR SOLUTION INTRAVENOUS at 05:23

## 2024-08-15 RX ADMIN — ONDANSETRON 4 MILLIGRAM(S): 2 INJECTION, SOLUTION INTRAMUSCULAR; INTRAVENOUS at 17:15

## 2024-08-15 RX ADMIN — PIPERACILLIN SODIUM AND TAZOBACTAM SODIUM 25 GRAM(S): 3; .375 INJECTION, POWDER, FOR SOLUTION INTRAVENOUS at 17:14

## 2024-08-15 RX ADMIN — ACETAMINOPHEN 1000 MILLIGRAM(S): 325 TABLET ORAL at 05:30

## 2024-08-15 RX ADMIN — KETOROLAC TROMETHAMINE 15 MILLIGRAM(S): 30 INJECTION, SOLUTION INTRAMUSCULAR at 17:15

## 2024-08-15 NOTE — PROGRESS NOTE ADULT - SUBJECTIVE AND OBJECTIVE BOX
MICHAEL LAL   [id number]   [birth year]    34yF  ============================================================   DATE OF INITIAL SICU/SDU CONSULT: 08-14-24    INDICATION FOR SICU CONSULT: hemodynamic monitoring s/p hepaticojejunostomy requiring pressors intraoperatively      SICU COURSE EVENTS :  08-14 - admitted to SICU service     24HOUR EVENTS  -Admission under SICU service    [X] A ten-point review of systems was negative except as expressed in note.  [X] History was obtained from patient. If unable to participate in their care, history obtained from review of the chart and collateral sources of information.  ============================================================   =================================================== MICHAEL LAL   [id number]   [birth year]    34yF  ============================================================   DATE OF INITIAL SDU CONSULT: 08-14-24    INDICATION FOR SICU CONSULT: hemodynamic monitoring s/p hepaticojejunostomy requiring pressors intraoperatively      SICU COURSE EVENTS :  08-14 - admitted to SICU service     24HOUR EVENTS  8/14  NIGHT  - admit to SDU  - Mg 1.4 --> 2g Mg x 2 doses, will reassess  - Pt tachycardic to 115, /90 --> APAP 1g, toradol 15mg  - HR persistent to low 110s sinus tachycardia while asleep. SBP 130s, abd soft, appropriately tender. Dressings stained with miminal serosanguinous output, RON drain output serosanguinous. Hepatic stent with minimal output. Blue team aware    [X] A ten-point review of systems was negative except as expressed in note.  [X] History was obtained from patient. If unable to participate in their care, history obtained from review of the chart and collateral sources of information.  ============================================================   =================================================== MICHAEL LAL   [id number]   [birth year]    34yF  ============================================================   DATE OF INITIAL SDU CONSULT: 08-14-24    INDICATION FOR SICU CONSULT: hemodynamic monitoring s/p hepaticojejunostomy requiring pressors intraoperatively      SICU COURSE EVENTS :  08-14 - admitted to SICU service     24HOUR EVENTS  8/14  NIGHT  - admit to SDU  - Mg 1.4 --> 2g Mg x 2 doses, will reassess  - Pt tachycardic to 115, /90 --> APAP 1g, toradol 15mg  - HR persistent to low 110s sinus tachycardia while asleep. SBP 130s, abd soft, appropriately tender. Dressings stained with miminal serosanguinous output, RON drain output serosanguinous. Hepatic stent with minimal output. Blue team aware    [X] A ten-point review of systems was negative except as expressed in note.  [X] History was obtained from patient. If unable to participate in their care, history obtained from review of the chart and collateral sources of information.  ============================================================   ===================================================    Daily     Daily     Diet, NPO:   Except Medications     Special Instructions for Nursing:  Except Medications (08-14-24 @ 20:15)      CURRENT MEDS:  Neurologic Medications  acetaminophen   IVPB .. 1000 milliGRAM(s) IV Intermittent once  HYDROmorphone  Injectable 0.25 milliGRAM(s) IV Push every 4 hours PRN Moderate Pain (4 - 6)  HYDROmorphone  Injectable 0.5 milliGRAM(s) IV Push every 4 hours PRN Severe Pain (7 - 10)  ketorolac   Injectable 15 milliGRAM(s) IV Push every 6 hours    Respiratory Medications    Cardiovascular Medications    Gastrointestinal Medications  lactated ringers. 1000 milliLiter(s) IV Continuous <Continuous>    Genitourinary Medications    Hematologic/Oncologic Medications  enoxaparin Injectable 40 milliGRAM(s) SubCutaneous every 24 hours    Antimicrobial/Immunologic Medications  piperacillin/tazobactam IVPB.. 3.375 Gram(s) IV Intermittent every 6 hours  piperacillin/tazobactam IVPB.. 3.375 Gram(s) IV Intermittent once    Endocrine/Metabolic Medications    Topical/Other Medications      ICU Vital Signs Last 24 Hrs  T(C): 36.7 (14 Aug 2024 23:00), Max: 36.7 (14 Aug 2024 19:50)  T(F): 98 (14 Aug 2024 23:00), Max: 98.1 (14 Aug 2024 19:50)  HR: 103 (15 Aug 2024 06:00) (103 - 119)  BP: 118/76 (15 Aug 2024 06:00) (114/73 - 148/98)  BP(mean): 91 (15 Aug 2024 06:00) (87 - 114)  ABP: 133/74 (14 Aug 2024 21:20) (133/74 - 155/93)  ABP(mean): 97 (14 Aug 2024 21:20) (97 - 116)  RR: 22 (15 Aug 2024 06:00) (15 - 24)  SpO2: 98% (15 Aug 2024 06:00) (97% - 100%)    O2 Parameters below as of 15 Aug 2024 04:00  Patient On (Oxygen Delivery Method): room air            Adult Advanced Hemodynamics Last 24 Hrs  CVP(mm Hg): --  CVP(cm H2O): --  CO: --  CI: --  PA: --  PA(mean): --  PCWP: --  SVR: --  SVRI: --  PVR: --  PVRI: --          I&O's Summary    14 Aug 2024 07:01  -  15 Aug 2024 07:00  --------------------------------------------------------  IN: 2050 mL / OUT: 1830 mL / NET: 220 mL      I&O's Detail    14 Aug 2024 07:01  -  15 Aug 2024 07:00  --------------------------------------------------------  IN:    IV PiggyBack: 450 mL    Lactated Ringers: 1100 mL    Lactated Ringers Bolus: 500 mL  Total IN: 2050 mL    OUT:    Bulb (mL): 185 mL    Drain (mL): 0 mL    Indwelling Catheter - Urethral (mL): 1335 mL    Nasogastric/Oral tube (mL): 310 mL  Total OUT: 1830 mL    Total NET: 220 mL          PHYSICAL EXAM:    General/Neuro  GCS:   15  = E 4  / V 5  / M 6     Deficits:  alert & oriented x 3, no focal deficits, moving all bilateral extremities    Lungs:  clear to auscultation with lung sounds present bilaterally, Normal expansion/effort. Pulling 1L on IS.    Cardiovascular : S1, S2.  Regular rate and rhythm.   Cardiac Rhythm: Normal Sinus Rhythm    GI: Abdomen soft, some tenderness to palpation, Non-distended. Subcostal incision covered with surgical dressing (lightly stained with serosanguinous fluid). Right hepatic stents (x2) with ostomy appliance over it (some bilious fluid present), left drain (serosanguinous fluid present). NG tube in place (non-bilious/non-bloody output).    Extremities: Extremities warm and dry.    Derm: Good skin turgor, no skin breakdown.      :       Shen catheter in place.      CXR:     LABS:  CAPILLARY BLOOD GLUCOSE      POCT Blood Glucose.: 116 mg/dL (15 Aug 2024 06:26)  POCT Blood Glucose.: 145 mg/dL (14 Aug 2024 21:41)                          10.3   10.83 )-----------( 354      ( 14 Aug 2024 20:32 )             31.6       08-14    138  |  106  |  4<L>  ----------------------------<  147<H>  3.9   |  21  |  0.6<L>    Ca    7.7<L>      14 Aug 2024 20:32  Phos  3.8     08-14  Mg     1.4     08-14    TPro  5.6<L>  /  Alb  3.5  /  TBili  1.6<H>  /  DBili  1.0<H>  /  AST  82<H>  /  ALT  67<H>  /  AlkPhos  230<H>  08-14      PT/INR - ( 14 Aug 2024 20:32 )   PT: 14.50 sec;   INR: 1.27 ratio         PTT - ( 14 Aug 2024 08:15 )  PTT:36.1 sec      Urinalysis Basic - ( 14 Aug 2024 20:32 )    Color: x / Appearance: x / SG: x / pH: x  Gluc: 147 mg/dL / Ketone: x  / Bili: x / Urobili: x   Blood: x / Protein: x / Nitrite: x   Leuk Esterase: x / RBC: x / WBC x   Sq Epi: x / Non Sq Epi: x / Bacteria: x

## 2024-08-15 NOTE — PROGRESS NOTE ADULT - ASSESSMENT
ASSESSMENT:  34y F w/ PMHx of G6PD deficiency,  section x4, s/p laparoscopic cholecystectomy in [place] (24) complicated by post-operative leak requiring exploratory laparotomy x 2 (24 and May 2024), s/p PTC drain placement by IR now presents s/p laparoscopic converted to open Enrrique-en-y hepaticojejunostomy. Patient was medically cleared by ASHLIE Gonzalez. Intraoperatively, patient required up to 0.6 Phenylephrine and received 1u pRBCs; case was converted to open with extensive lysis of adhesions and E5 injury of CHD. Post-operatively patient no longer on pressors and was extubated prior to PACU. Admitted to SICU for hemodynamic monitoring.     PLAN:  - Monitor vitals  - Monitor post-op labs and replete as necessary  - Monitor for bowel function  - Continue Pain Medications if necessary  - Continue Antibiotics if necessary  - Encourage ambulation as tolerated  - Monitor urine output, remove Shen on AM rounds if UOP sufficient  - Monitor RON output  - Monitor NGT output  - Monitor ostomy output  - DVT and GI Prophylaxis  - Monitor wound and dressing for changes, redress as needed.        BLUE TEAM SPECTRA: [id number] ASSESSMENT:  34y F w/ PMHx of G6PD deficiency,  section x4, s/p laparoscopic cholecystectomy in [place] (24) complicated by post-operative leak requiring exploratory laparotomy x 2 (24 and May 2024), s/p PTC drain placement by IR now presents s/p laparoscopic converted to open Enrrique-en-y hepaticojejunostomy. Patient was medically cleared by ASHLIE Gonzalez. Intraoperatively, patient required up to 0.6 Phenylephrine and received 1u pRBCs; case was converted to open with extensive lysis of adhesions and E5 injury of CHD. Post-operatively patient no longer on pressors and was extubated prior to PACU. Admitted to SICU for hemodynamic monitoring.     PLAN:  - AM KUB reviewed  - c/w IVF and NGT for now  - Monitor vitals  - Monitor post-op labs and replete as necessary  - Monitor for bowel function  - Continue Pain Medications if necessary  - Continue Antibiotics if necessary  - Encourage ambulation as tolerated  - Monitor urine output  - Monitor RON output  - Monitor NGT output  - Monitor ostomy output  - DVT and GI Prophylaxis  - Monitor wound and dressing for changes, redress as needed.        BLUE TEAM SPECTRA: [id number]

## 2024-08-15 NOTE — CHART NOTE - NSCHARTNOTEFT_GEN_A_CORE
Post Operative Note  Patient: MICHAEL LAL 34y (1990) Female   MRN: [id number]  Location: [address]  Visit: 08-14-24 Inpatient  Date: 08-15-24 @ 03:19    Procedure: Bile duct injury     S/P Diagnostic laparoscopy with laparotomy    Lysis of peritoneal adhesions    Hepaticotomy, with hepaticojejunostomy        Subjective:   Nausea: no Vomiting: no Ambulating: no Flatus: no  Pain Assessment: Patient is complaining of moderate pain that is appropriate for post-operative course.     Objective:  Vitals: T(F): 98 (08-14-24 @ 23:00), Max: 98.1 (08-14-24 @ 19:50)  HR: 109 (08-15-24 @ 02:00)  BP: 124/82 (08-15-24 @ 02:00) (99/64 - 148/98)  RR: 20 (08-15-24 @ 02:00)  SpO2: 98% (08-15-24 @ 02:00)  Vent Settings:     In:   08-14-24 @ 07:01  -  08-15-24 @ 03:19  --------------------------------------------------------  IN: 550 mL      IV Fluids: lactated ringers. 1000 milliLiter(s) (100 mL/Hr) IV Continuous <Continuous>      Out:   08-14-24 @ 07:01  -  08-15-24 @ 03:19  --------------------------------------------------------  OUT: 750 mL      EBL:     Voided Urine:   08-14-24 @ 07:01  -  08-15-24 @ 03:19  --------------------------------------------------------  OUT: 750 mL      Shen Catheter: yes no   Drains:   RON:   08-14-24 @ 07:01  -  08-15-24 @ 03:19  --------------------------------------------------------  OUT: 115 mL     ,   Chest Tube:      NG Tube:   08-14-24 @ 07:01  -  08-15-24 @ 03:19  --------------------------------------------------------  OUT: 60 mL        Physical Examination:  General Appearance: NAD, drowsy, A+O*3  HEENT: EOMI, sclera non-icteric.  Heart: RRR  Lungs: CTABL  Abdomen:  Soft, moderately tender, appropriate for post-op course, nondistended. No rigidity, guarding, or rebound tenderness. RON drain in place, 115 cc serosanguinous. NGT in place, 60 cc output  MSK/Extremities: Warm & well-perfused. Peripheral pulses intact.  Skin: Warm, dry. No jaundice.   Incisions/Wounds: Dressings in place, clean, dry and intact, no signs of infection/active bleeding/drainage    Medications: [Standing]  acetaminophen   IVPB .. 1000 milliGRAM(s) IV Intermittent once  acetaminophen   IVPB .. 1000 milliGRAM(s) IV Intermittent once  enoxaparin Injectable 40 milliGRAM(s) SubCutaneous every 24 hours  HYDROmorphone  Injectable 0.25 milliGRAM(s) IV Push every 4 hours PRN  HYDROmorphone  Injectable 0.5 milliGRAM(s) IV Push every 4 hours PRN  ketorolac   Injectable 15 milliGRAM(s) IV Push every 6 hours  lactated ringers. 1000 milliLiter(s) IV Continuous <Continuous>  piperacillin/tazobactam IVPB.. 3.375 Gram(s) IV Intermittent once  piperacillin/tazobactam IVPB.. 3.375 Gram(s) IV Intermittent every 6 hours    Medications: [PRN]  acetaminophen   IVPB .. 1000 milliGRAM(s) IV Intermittent once  acetaminophen   IVPB .. 1000 milliGRAM(s) IV Intermittent once  enoxaparin Injectable 40 milliGRAM(s) SubCutaneous every 24 hours  HYDROmorphone  Injectable 0.25 milliGRAM(s) IV Push every 4 hours PRN  HYDROmorphone  Injectable 0.5 milliGRAM(s) IV Push every 4 hours PRN  ketorolac   Injectable 15 milliGRAM(s) IV Push every 6 hours  lactated ringers. 1000 milliLiter(s) IV Continuous <Continuous>  piperacillin/tazobactam IVPB.. 3.375 Gram(s) IV Intermittent every 6 hours  piperacillin/tazobactam IVPB.. 3.375 Gram(s) IV Intermittent once      DVT PROPHYLAXIS: enoxaparin Injectable 40 milliGRAM(s) SubCutaneous every 24 hours    GI PROPHYLAXIS:   ANTICOAGULATION:   ANTIBIOTICS:  piperacillin/tazobactam IVPB.. 3.375 Gram(s)  piperacillin/tazobactam IVPB.. 3.375 Gram(s)        Labs:                        10.3   10.83 )-----------( 354      ( 14 Aug 2024 20:32 )             31.6     08-14    138  |  106  |  4<L>  ----------------------------<  147<H>  3.9   |  21  |  0.6<L>    Ca    7.7<L>      14 Aug 2024 20:32  Phos  3.8     08-14  Mg     1.4     08-14    TPro  5.6<L>  /  Alb  3.5  /  TBili  1.6<H>  /  DBili  1.0<H>  /  AST  82<H>  /  ALT  67<H>  /  AlkPhos  230<H>  08-14    PT/INR - ( 14 Aug 2024 20:32 )   PT: 14.50 sec;   INR: 1.27 ratio         PTT - ( 14 Aug 2024 08:15 )  PTT:36.1 sec        Imaging:  No post-op imaging studies    Assessment:  34yF s/p laparoscopic converted to open Enrrique-en-y hepaticojejunostomy.     Plan:  - Monitor vitals  - Monitor post-op labs and replete as necessary  - Monitor for bowel function  - Continue Pain Medications if necessary  - Continue Antibiotics if necessary  - Encourage ambulation as tolerated  - Monitor urine output and trial of void once Shen removed  - Monitor RON output  - Monitor NGT output  - Monitor ostomy output  - DVT and GI Prophylaxis  - Monitor wound and dressing for changes, redress as needed.      Date/Time: 08-15-24 @ 03:19

## 2024-08-15 NOTE — PROGRESS NOTE ADULT - ASSESSMENT
Assessment & Plan  34y Female POD# 0 s/p open Enrrique-en-y hepaticojejunostomy.    NEURO:  #Acute pain  - IV Tylenol 1g q6h  - Toradol 15mg q6h  - Dilaudid 0.25/0.5mg PRN    RESP:   #Oxygenation    -wean off NC to RA as tolerated  #Activity    -increase as tolerated    -encourage use of incentive spirometer    CARDS:   #Hypotensive intraoperatively- resolved  - Phenylephrine gtt @ 0.6    GI/NUTR:   #Diet,   NPO Except Medications    -aspiration precautions, HOB 30  #GI Prophylaxis    -not indicated  #Bowel regimen    -last bowel movement unknown  #s/p laparoscopic converted to open Enrrique-en-y hepaticojejunostomy  - pain control  - hemodynamic monitoring  - NPO    /RENAL:   #urine output in crtically ill    Labs:   BUN/Cr- 4/0.6  -->    Electrolytes-(08-14 @ 20:32)Na 138 // K 3.9 // Mg 1.4 // Phos 3.8   #maintain euvolemia  - LR @ 100mL/hr      HEME/ONC:   #DVT prophylaxis   - lovenox 40mg daily  Labs: Hb/Hct:  10.3/31.6  (08-14 @ 20:32)  -->                      Plts:  354  -->                 PTT/INR:  36.1/1.29  --->,  --/1.27  --->       ID:  #monitor for leukocytosis/fever  WBC- 10.83  --->>  Temp trend- 24hrs T(F): 97.9 (08-14 @ 20:50), Max: 98.1 (08-14 @ 19:50)  Current antibiotics-piperacillin/tazobactam IVPB.. 3.375 once  piperacillin/tazobactam IVPB.. 3.375 every 6 hours    ENDO:    -finger stick glucose q6 while NPO    -Glucose goal 140-180    MSK:     Activity - Ambulate as Tolerated:     Time/Priority:  Routine (08-14-24 @ 20:15)    SKIN:  #DTI screening    -wound assessment pending transfer to SDU    LINES/DRAINS:  Ac CASILLAS    ADVANCED DIRECTIVES:  Full Code    HCP/Emergency Contact- Ayden Love [phone]    INDICATION FOR SDU: hemodynamic monitoring s/p hepaticojejunostomy requiring pressors intraop    DISPO:  SDU  Case discussed with attending Dr. Angulo Assessment & Plan  34y Female POD# 0 s/p open Enrrique-en-y hepaticojejunostomy.    NEURO:  #Acute pain  - IV Tylenol 1g q6h  - Toradol 15mg q6h  - Dilaudid 0.25/0.5mg PRN    RESP:   #Oxygenation    -wean off NC to RA as tolerated  #Activity    -increase as tolerated    -encourage use of incentive spirometer    CARDS:   #Hypotensive intraoperatively- resolved  - Phenylephrine gtt @ 0.6    GI/NUTR:   #Diet,   NPO Except Medications    -aspiration precautions, HOB 30  #GI Prophylaxis    -not indicated  #Bowel regimen    -last bowel movement unknown  #s/p laparoscopic converted to open Enrrique-en-y hepaticojejunostomy  - pain control  - hemodynamic monitoring  - NPO    /RENAL:   #urine output in crtically ill    Labs:   BUN/Cr- 4/0.6  -->    Electrolytes-(08-14 @ 20:32)Na 138 // K 3.9 // Mg 1.4 // Phos 3.8   #maintain euvolemia  - LR @ 100mL/hr      HEME/ONC:   #DVT prophylaxis   - lovenox 40mg daily  Labs: Hb/Hct:  10.3/31.6  (08-14 @ 20:32)  -->                      Plts:  354  -->                 PTT/INR:  36.1/1.29  --->,  --/1.27  --->       ID:  #monitor for leukocytosis/fever  WBC- 10.83  --->>  Temp trend- 24hrs T(F): 97.9 (08-14 @ 20:50), Max: 98.1 (08-14 @ 19:50)  Current antibiotics-piperacillin/tazobactam IVPB.. 3.375 once  piperacillin/tazobactam IVPB.. 3.375 every 6 hours    ENDO:    -finger stick glucose q6 while NPO    -Glucose goal 140-180    MSK:     Activity - Ambulate as Tolerated:     Time/Priority:  Routine (08-14-24 @ 20:15)    SKIN:  #DTI screening    -wound assessment pending transfer to SDU    LINES/DRAINS:  Ac CASILLAS    ADVANCED DIRECTIVES:  Full Code    HCP/Emergency Contact- Ayden Love [phone]    INDICATION FOR SDU: hemodynamic monitoring s/p hepaticojejunostomy requiring pressors intraop    DISPO:  SDU

## 2024-08-15 NOTE — PROGRESS NOTE ADULT - SUBJECTIVE AND OBJECTIVE BOX
GENERAL SURGERY PROGRESS NOTE    Patient: MICHAEL LAL , 34y ([birth year])Female   MRN: [id number]  Location: [place] [address] [address]  Visit: 24 Inpatient  Date: 08-15-24 @ 03:24    Hospital Day #: 2  Post-Op Day #: 1    Procedure/Dx/Injuries: s/p diagnostic laparoscopy with laparotomy, lysis of peritoneal adhesions, hepaticotomy, with hepaticojejunostomy    Events of past 24 hours: s/p diagnostic laparoscopy with laparotomy, lysis of peritoneal adhesions, hepaticotomy, with hepaticojejunostomy. Multiple hepatic and subcutaneous abscesses, many adhesions, r+l  hepatic duct stent placed, both going through the skin and empty into an ostomy bag. Required 1upRBCs and 0.6 phenylephrine intraoperatively. Shen, NGT, RON and ostomy in place. Admitted to SICU from PACU. Was tachycardic up to 122, EKG showed sinus tachycardia. Pain well-controlled on standing Tylenol and Toradol.         PAST MEDICAL & SURGICAL HISTORY:  Glucose 6 phosphatase deficiency      Gestational diabetes       delivery delivered      S/P cholecystectomy      History of exploratory laparotomy          Vitals:   T(F): 98 (24 @ 23:00), Max: 98.1 (24 @ 19:50)  HR: 109 (08-15-24 @ 02:00)  BP: 124/82 (08-15-24 @ 02:00)  RR: 20 (08-15-24 @ 02:00)  SpO2: 98% (08-15-24 @ 02:00)      Diet, NPO:   Except Medications     Special Instructions for Nursing:  Except Medications      Fluids: lactated ringers.: Solution, 1000 milliLiter(s) infuse at 100 mL/Hr      I & O's:      Bowel Movement: : [] YES [x] NO  Flatus: : [] YES [x] NO    PHYSICAL EXAM:  General: NAD, AAOx3, calm and cooperative, drowsy  HEENT: NCAT, SYBIL, EOMI, Trachea ML, Neck supple  Cardiac: RRR  Respiratory: normal respiratory effort  Abdomen: Soft, non-distended, moderately tender to palpation around incisions, no rebound, no guarding. t RON drain in place, 115 cc serosanguinous. NGT in place, 60 cc output. Minimal light brown liquid output in ostomy bag  Neuro: Sensation grossly intact and equal throughout, no focal deficits  Vascular: Pulses 2+ throughout, extremities well perfused  Skin: Warm/dry, normal color, no jaundice  Incision/wound: healing well, dressings in place, clean, dry and intact    MEDICATIONS  (STANDING):  acetaminophen   IVPB .. 1000 milliGRAM(s) IV Intermittent once  acetaminophen   IVPB .. 1000 milliGRAM(s) IV Intermittent once  enoxaparin Injectable 40 milliGRAM(s) SubCutaneous every 24 hours  ketorolac   Injectable 15 milliGRAM(s) IV Push every 6 hours  lactated ringers. 1000 milliLiter(s) (100 mL/Hr) IV Continuous <Continuous>  piperacillin/tazobactam IVPB.. 3.375 Gram(s) IV Intermittent every 6 hours  piperacillin/tazobactam IVPB.. 3.375 Gram(s) IV Intermittent once    MEDICATIONS  (PRN):  HYDROmorphone  Injectable 0.25 milliGRAM(s) IV Push every 4 hours PRN Moderate Pain (4 - 6)  HYDROmorphone  Injectable 0.5 milliGRAM(s) IV Push every 4 hours PRN Severe Pain (7 - 10)      DVT PROPHYLAXIS: enoxaparin Injectable 40 milliGRAM(s) SubCutaneous every 24 hours    GI PROPHYLAXIS:   ANTICOAGULATION:   ANTIBIOTICS:  piperacillin/tazobactam IVPB.. 3.375 Gram(s)  piperacillin/tazobactam IVPB.. 3.375 Gram(s)            LAB/STUDIES:  Labs:  CAPILLARY BLOOD GLUCOSE      POCT Blood Glucose.: 145 mg/dL (14 Aug 2024 21:41)                          10.3   10.83 )-----------( 354      ( 14 Aug 2024 20:32 )             31.6       Auto Neutrophil %: 80.3 % (24 @ 20:32)  Auto Immature Granulocyte %: 0.3 % (24 @ 20:32)        138  |  106  |  4<L>  ----------------------------<  147<H>  3.9   |  21  |  0.6<L>      Calcium: 7.7 mg/dL (24 @ 20:32)      LFTs:             5.6  | 1.6  | 82       ------------------[230     ( 14 Aug 2024 20:32 )  3.5  | 1.0  | 67          Lipase:x      Amylase:x         Lactate, Blood: 1.6 mmol/L (24 @ 20:32)      Coags:     14.50  ----< 1.27    ( 14 Aug 2024 20:32 )     x                   Urinalysis Basic - ( 14 Aug 2024 20:32 )    Color: x / Appearance: x / SG: x / pH: x  Gluc: 147 mg/dL / Ketone: x  / Bili: x / Urobili: x   Blood: x / Protein: x / Nitrite: x   Leuk Esterase: x / RBC: x / WBC x   Sq Epi: x / Non Sq Epi: x / Bacteria: x

## 2024-08-16 LAB
ALBUMIN SERPL ELPH-MCNC: 2.8 G/DL — LOW (ref 3.5–5.2)
ALBUMIN SERPL ELPH-MCNC: 3 G/DL — LOW (ref 3.5–5.2)
ALP SERPL-CCNC: 192 U/L — HIGH (ref 30–115)
ALP SERPL-CCNC: 211 U/L — HIGH (ref 30–115)
ALT FLD-CCNC: 36 U/L — SIGNIFICANT CHANGE UP (ref 0–41)
ALT FLD-CCNC: 38 U/L — SIGNIFICANT CHANGE UP (ref 0–41)
ANION GAP SERPL CALC-SCNC: 10 MMOL/L — SIGNIFICANT CHANGE UP (ref 7–14)
AST SERPL-CCNC: 21 U/L — SIGNIFICANT CHANGE UP (ref 0–41)
AST SERPL-CCNC: 26 U/L — SIGNIFICANT CHANGE UP (ref 0–41)
BASOPHILS # BLD AUTO: 0.01 K/UL — SIGNIFICANT CHANGE UP (ref 0–0.2)
BASOPHILS NFR BLD AUTO: 0.1 % — SIGNIFICANT CHANGE UP (ref 0–1)
BILIRUB DIRECT SERPL-MCNC: 0.3 MG/DL — SIGNIFICANT CHANGE UP (ref 0–0.3)
BILIRUB DIRECT SERPL-MCNC: 0.4 MG/DL — HIGH (ref 0–0.3)
BILIRUB INDIRECT FLD-MCNC: 0.6 MG/DL — SIGNIFICANT CHANGE UP (ref 0.2–1.2)
BILIRUB INDIRECT FLD-MCNC: 0.6 MG/DL — SIGNIFICANT CHANGE UP (ref 0.2–1.2)
BILIRUB SERPL-MCNC: 0.9 MG/DL — SIGNIFICANT CHANGE UP (ref 0.2–1.2)
BILIRUB SERPL-MCNC: 1 MG/DL — SIGNIFICANT CHANGE UP (ref 0.2–1.2)
BUN SERPL-MCNC: 7 MG/DL — LOW (ref 10–20)
CALCIUM SERPL-MCNC: 8.1 MG/DL — LOW (ref 8.4–10.5)
CHLORIDE SERPL-SCNC: 100 MMOL/L — SIGNIFICANT CHANGE UP (ref 98–110)
CO2 SERPL-SCNC: 26 MMOL/L — SIGNIFICANT CHANGE UP (ref 17–32)
CREAT SERPL-MCNC: 0.5 MG/DL — LOW (ref 0.7–1.5)
EGFR: 127 ML/MIN/1.73M2 — SIGNIFICANT CHANGE UP
EOSINOPHIL # BLD AUTO: 0.44 K/UL — SIGNIFICANT CHANGE UP (ref 0–0.7)
EOSINOPHIL NFR BLD AUTO: 3.4 % — SIGNIFICANT CHANGE UP (ref 0–8)
GLUCOSE SERPL-MCNC: 79 MG/DL — SIGNIFICANT CHANGE UP (ref 70–99)
HCT VFR BLD CALC: 28.5 % — LOW (ref 37–47)
HCT VFR BLD CALC: 28.7 % — LOW (ref 37–47)
HGB BLD-MCNC: 9.2 G/DL — LOW (ref 12–16)
HGB BLD-MCNC: 9.2 G/DL — LOW (ref 12–16)
IMM GRANULOCYTES NFR BLD AUTO: 0.3 % — SIGNIFICANT CHANGE UP (ref 0.1–0.3)
LYMPHOCYTES # BLD AUTO: 16.2 % — LOW (ref 20.5–51.1)
LYMPHOCYTES # BLD AUTO: 2.12 K/UL — SIGNIFICANT CHANGE UP (ref 1.2–3.4)
MAGNESIUM SERPL-MCNC: 1.8 MG/DL — SIGNIFICANT CHANGE UP (ref 1.8–2.4)
MCHC RBC-ENTMCNC: 29.4 PG — SIGNIFICANT CHANGE UP (ref 27–31)
MCHC RBC-ENTMCNC: 30 PG — SIGNIFICANT CHANGE UP (ref 27–31)
MCHC RBC-ENTMCNC: 32.1 G/DL — SIGNIFICANT CHANGE UP (ref 32–37)
MCHC RBC-ENTMCNC: 32.3 G/DL — SIGNIFICANT CHANGE UP (ref 32–37)
MCV RBC AUTO: 91.7 FL — SIGNIFICANT CHANGE UP (ref 81–99)
MCV RBC AUTO: 92.8 FL — SIGNIFICANT CHANGE UP (ref 81–99)
MONOCYTES # BLD AUTO: 0.82 K/UL — HIGH (ref 0.1–0.6)
MONOCYTES NFR BLD AUTO: 6.3 % — SIGNIFICANT CHANGE UP (ref 1.7–9.3)
NEUTROPHILS # BLD AUTO: 9.68 K/UL — HIGH (ref 1.4–6.5)
NEUTROPHILS NFR BLD AUTO: 73.7 % — SIGNIFICANT CHANGE UP (ref 42.2–75.2)
NRBC # BLD: 0 /100 WBCS — SIGNIFICANT CHANGE UP (ref 0–0)
NRBC # BLD: 0 /100 WBCS — SIGNIFICANT CHANGE UP (ref 0–0)
PHOSPHATE SERPL-MCNC: 2.5 MG/DL — SIGNIFICANT CHANGE UP (ref 2.1–4.9)
PLATELET # BLD AUTO: 315 K/UL — SIGNIFICANT CHANGE UP (ref 130–400)
PLATELET # BLD AUTO: 318 K/UL — SIGNIFICANT CHANGE UP (ref 130–400)
PMV BLD: 10.4 FL — SIGNIFICANT CHANGE UP (ref 7.4–10.4)
PMV BLD: 10.5 FL — HIGH (ref 7.4–10.4)
POTASSIUM SERPL-MCNC: 3.5 MMOL/L — SIGNIFICANT CHANGE UP (ref 3.5–5)
POTASSIUM SERPL-SCNC: 3.5 MMOL/L — SIGNIFICANT CHANGE UP (ref 3.5–5)
PROT SERPL-MCNC: 5 G/DL — LOW (ref 6–8)
PROT SERPL-MCNC: 5.1 G/DL — LOW (ref 6–8)
RBC # BLD: 3.07 M/UL — LOW (ref 4.2–5.4)
RBC # BLD: 3.13 M/UL — LOW (ref 4.2–5.4)
RBC # FLD: 15.7 % — HIGH (ref 11.5–14.5)
RBC # FLD: 15.9 % — HIGH (ref 11.5–14.5)
SODIUM SERPL-SCNC: 136 MMOL/L — SIGNIFICANT CHANGE UP (ref 135–146)
WBC # BLD: 10.96 K/UL — HIGH (ref 4.8–10.8)
WBC # BLD: 13.11 K/UL — HIGH (ref 4.8–10.8)
WBC # FLD AUTO: 10.96 K/UL — HIGH (ref 4.8–10.8)
WBC # FLD AUTO: 13.11 K/UL — HIGH (ref 4.8–10.8)

## 2024-08-16 PROCEDURE — 99233 SBSQ HOSP IP/OBS HIGH 50: CPT | Mod: 24,25

## 2024-08-16 RX ORDER — ALBUMIN (HUMAN) 5 G/20ML
100 SOLUTION INTRAVENOUS ONCE
Refills: 0 | Status: DISCONTINUED | OUTPATIENT
Start: 2024-08-16 | End: 2024-08-16

## 2024-08-16 RX ORDER — POTASSIUM CHLORIDE 10 MEQ
40 TABLET, EXT RELEASE, PARTICLES/CRYSTALS ORAL ONCE
Refills: 0 | Status: COMPLETED | OUTPATIENT
Start: 2024-08-16 | End: 2024-08-16

## 2024-08-16 RX ORDER — ACETAMINOPHEN 325 MG/1
650 TABLET ORAL EVERY 6 HOURS
Refills: 0 | Status: DISCONTINUED | OUTPATIENT
Start: 2024-08-16 | End: 2024-08-20

## 2024-08-16 RX ORDER — ONDANSETRON 2 MG/ML
4 INJECTION, SOLUTION INTRAMUSCULAR; INTRAVENOUS ONCE
Refills: 0 | Status: COMPLETED | OUTPATIENT
Start: 2024-08-16 | End: 2024-08-16

## 2024-08-16 RX ORDER — OXYCODONE HYDROCHLORIDE 5 MG/1
5 TABLET ORAL EVERY 6 HOURS
Refills: 0 | Status: DISCONTINUED | OUTPATIENT
Start: 2024-08-16 | End: 2024-08-20

## 2024-08-16 RX ORDER — TRAMADOL HYDROCHLORIDE 200 MG/1
25 TABLET, EXTENDED RELEASE ORAL EVERY 8 HOURS
Refills: 0 | Status: DISCONTINUED | OUTPATIENT
Start: 2024-08-16 | End: 2024-08-18

## 2024-08-16 RX ORDER — POTASSIUM PHOSPHATE 236; 224 MG/ML; MG/ML
15 INJECTION, SOLUTION INTRAVENOUS ONCE
Refills: 0 | Status: COMPLETED | OUTPATIENT
Start: 2024-08-16 | End: 2024-08-16

## 2024-08-16 RX ORDER — PANTOPRAZOLE SODIUM 40 MG
40 TABLET, DELAYED RELEASE (ENTERIC COATED) ORAL
Refills: 0 | Status: DISCONTINUED | OUTPATIENT
Start: 2024-08-16 | End: 2024-08-20

## 2024-08-16 RX ORDER — HYDROMORPHONE HYDROCHLORIDE 2 MG/1
0.25 TABLET ORAL EVERY 4 HOURS
Refills: 0 | Status: DISCONTINUED | OUTPATIENT
Start: 2024-08-16 | End: 2024-08-18

## 2024-08-16 RX ORDER — ACETAMINOPHEN 325 MG/1
1000 TABLET ORAL ONCE
Refills: 0 | Status: COMPLETED | OUTPATIENT
Start: 2024-08-16 | End: 2024-08-16

## 2024-08-16 RX ADMIN — ACETAMINOPHEN 650 MILLIGRAM(S): 325 TABLET ORAL at 17:47

## 2024-08-16 RX ADMIN — PIPERACILLIN SODIUM AND TAZOBACTAM SODIUM 25 GRAM(S): 3; .375 INJECTION, POWDER, FOR SOLUTION INTRAVENOUS at 23:10

## 2024-08-16 RX ADMIN — HYDROMORPHONE HYDROCHLORIDE 0.25 MILLIGRAM(S): 2 TABLET ORAL at 21:27

## 2024-08-16 RX ADMIN — TRAMADOL HYDROCHLORIDE 25 MILLIGRAM(S): 200 TABLET, EXTENDED RELEASE ORAL at 21:50

## 2024-08-16 RX ADMIN — KETOROLAC TROMETHAMINE 15 MILLIGRAM(S): 30 INJECTION, SOLUTION INTRAMUSCULAR at 12:24

## 2024-08-16 RX ADMIN — TRAMADOL HYDROCHLORIDE 25 MILLIGRAM(S): 200 TABLET, EXTENDED RELEASE ORAL at 21:19

## 2024-08-16 RX ADMIN — Medication 40 MILLIEQUIVALENT(S): at 23:10

## 2024-08-16 RX ADMIN — ONDANSETRON 4 MILLIGRAM(S): 2 INJECTION, SOLUTION INTRAMUSCULAR; INTRAVENOUS at 21:19

## 2024-08-16 RX ADMIN — Medication 100 MILLILITER(S): at 15:13

## 2024-08-16 RX ADMIN — PIPERACILLIN SODIUM AND TAZOBACTAM SODIUM 200 GRAM(S): 3; .375 INJECTION, POWDER, FOR SOLUTION INTRAVENOUS at 17:47

## 2024-08-16 RX ADMIN — ACETAMINOPHEN 400 MILLIGRAM(S): 325 TABLET ORAL at 08:20

## 2024-08-16 RX ADMIN — PIPERACILLIN SODIUM AND TAZOBACTAM SODIUM 25 GRAM(S): 3; .375 INJECTION, POWDER, FOR SOLUTION INTRAVENOUS at 05:30

## 2024-08-16 RX ADMIN — KETOROLAC TROMETHAMINE 15 MILLIGRAM(S): 30 INJECTION, SOLUTION INTRAMUSCULAR at 06:12

## 2024-08-16 RX ADMIN — KETOROLAC TROMETHAMINE 15 MILLIGRAM(S): 30 INJECTION, SOLUTION INTRAMUSCULAR at 05:30

## 2024-08-16 RX ADMIN — POTASSIUM PHOSPHATE 63.75 MILLIMOLE(S): 236; 224 INJECTION, SOLUTION INTRAVENOUS at 23:59

## 2024-08-16 RX ADMIN — Medication 100 GRAM(S): at 23:11

## 2024-08-16 RX ADMIN — CHLORHEXIDINE GLUCONATE 1 APPLICATION(S): 40 SOLUTION TOPICAL at 05:31

## 2024-08-16 RX ADMIN — Medication 50 MILLILITER(S): at 17:49

## 2024-08-16 RX ADMIN — Medication 40 MILLIGRAM(S): at 21:26

## 2024-08-16 RX ADMIN — PIPERACILLIN SODIUM AND TAZOBACTAM SODIUM 25 GRAM(S): 3; .375 INJECTION, POWDER, FOR SOLUTION INTRAVENOUS at 18:15

## 2024-08-16 RX ADMIN — KETOROLAC TROMETHAMINE 15 MILLIGRAM(S): 30 INJECTION, SOLUTION INTRAMUSCULAR at 23:10

## 2024-08-16 RX ADMIN — ACETAMINOPHEN 650 MILLIGRAM(S): 325 TABLET ORAL at 13:24

## 2024-08-16 RX ADMIN — ENOXAPARIN SODIUM 40 MILLIGRAM(S): 100 INJECTION SUBCUTANEOUS at 05:30

## 2024-08-16 RX ADMIN — HYDROMORPHONE HYDROCHLORIDE 0.5 MILLIGRAM(S): 2 TABLET ORAL at 09:40

## 2024-08-16 RX ADMIN — KETOROLAC TROMETHAMINE 15 MILLIGRAM(S): 30 INJECTION, SOLUTION INTRAMUSCULAR at 12:40

## 2024-08-16 RX ADMIN — ACETAMINOPHEN 650 MILLIGRAM(S): 325 TABLET ORAL at 23:10

## 2024-08-16 RX ADMIN — KETOROLAC TROMETHAMINE 15 MILLIGRAM(S): 30 INJECTION, SOLUTION INTRAMUSCULAR at 17:48

## 2024-08-16 RX ADMIN — KETOROLAC TROMETHAMINE 15 MILLIGRAM(S): 30 INJECTION, SOLUTION INTRAMUSCULAR at 18:05

## 2024-08-16 RX ADMIN — HYDROMORPHONE HYDROCHLORIDE 0.25 MILLIGRAM(S): 2 TABLET ORAL at 15:30

## 2024-08-16 RX ADMIN — ACETAMINOPHEN 650 MILLIGRAM(S): 325 TABLET ORAL at 12:24

## 2024-08-16 RX ADMIN — ACETAMINOPHEN 650 MILLIGRAM(S): 325 TABLET ORAL at 18:47

## 2024-08-16 RX ADMIN — PIPERACILLIN SODIUM AND TAZOBACTAM SODIUM 25 GRAM(S): 3; .375 INJECTION, POWDER, FOR SOLUTION INTRAVENOUS at 12:24

## 2024-08-16 RX ADMIN — HYDROMORPHONE HYDROCHLORIDE 0.5 MILLIGRAM(S): 2 TABLET ORAL at 04:03

## 2024-08-16 RX ADMIN — HYDROMORPHONE HYDROCHLORIDE 0.25 MILLIGRAM(S): 2 TABLET ORAL at 15:13

## 2024-08-16 RX ADMIN — HYDROMORPHONE HYDROCHLORIDE 0.5 MILLIGRAM(S): 2 TABLET ORAL at 04:27

## 2024-08-16 RX ADMIN — HYDROMORPHONE HYDROCHLORIDE 0.5 MILLIGRAM(S): 2 TABLET ORAL at 09:26

## 2024-08-16 NOTE — DIETITIAN INITIAL EVALUATION ADULT - NS FNS REASON FOR WEIGHT CHANG
weight hx per EMR:    90 kg - 198 lbs (7/3/24)  79.8 kg - 175.56 lbs (7/18/24)    uncertain etiology for weight loss, possibly decline in PO intake  suspected PCM - however will not diagnose at this time as patient only meets one criteria for malnutrition at this time/decreased po intake/other (specify)

## 2024-08-16 NOTE — DIETITIAN INITIAL EVALUATION ADULT - EDUCATION DIETARY MODIFICATIONS
encouraged intake of oral nutrition supplement and discussed benefits/(1) partially meets; needs review/practice/verbalization

## 2024-08-16 NOTE — PROGRESS NOTE ADULT - ASSESSMENT
34y F w/ PMHx of G6PD deficiency,  section x4, s/p laparoscopic cholecystectomy in [place] (24) complicated by post-operative leak requiring exploratory laparotomy x 2 (24 and May 2024), s/p PTC drain placement by IR now presents s/p laparoscopic converted to open Enrrique-en-y hepaticojejunostomy.     PLAN:  - Monitor vitals specifically HR (tachy to 110-120s)  - Monitor outputs: UOP, NGT, right drain, b/l stents into ostomy  - NPO w/ IVF  - Monitor bowel function  - Shen removed, passed TOV  - DVT ppx  - GI PPX with protonix  - Encourage OOB and ambulate as tolerated  - PT consult if needed  - Pain control  - Nausea control PRN  - Dressing changes daily: gauze and paper tape    BLUE TEAM SPECTRA: [id number]   34y F w/ PMHx of G6PD deficiency,  section x4, s/p laparoscopic cholecystectomy in [place] (24) complicated by post-operative leak requiring exploratory laparotomy x 2 (24 and May 2024), s/p PTC drain placement by IR now presents s/p laparoscopic converted to open Enrrique-en-y hepaticojejunostomy.     PLAN:  - Trend LFTs  - GI PPX with protonix  - Monitor vitals specifically HR (tachy to 110-120s)  - Monitor outputs: UOP, NGT, right drain, b/l stents into ostomy  - NPO w/ IVF  - Monitor bowel function  - Shen removed, passed TOV  - DVT ppx  - Encourage OOB and ambulate as tolerated  - PT consult if needed  - Pain control  - Nausea control PRN  - Dressing changes daily: gauze and paper tape    BLUE TEAM SPECTRA: [id number]

## 2024-08-16 NOTE — DIETITIAN INITIAL EVALUATION ADULT - NAME AND PHONE
Carri Corbett, RD [phone] or via Teams    Patient is at high nutrition risk, RD to f/u in 3-5 days or PRN

## 2024-08-16 NOTE — PROGRESS NOTE ADULT - ASSESSMENT
Assessment & Plan  34y Female POD# 2 s/p open Enrrique-en-y hepaticojejunostomy.    NEURO:  #Acute pain  - IV Tylenol 1g q6h  - Toradol 15mg q6h  - Dilaudid 0.25/0.5mg PRN    RESP:   #Oxygenation    -saturating well on RA  #Activity    -increase as tolerated    -encourage use of incentive spirometer    CARDS:   #Hypotensive intraoperatively- resolved  - remains off pressors  #Sinus tachycardia  - -124  - Trop 10, EKG sinus tachycardia    GI/NUTR:   #Diet,   NPO except meds    -aspiration precautions, HOB 30  #GI Prophylaxis    -not indicated  #Bowel regimen    -last bowel movement unknown  #s/p laparoscopic converted to open Enrrique-en-y hepaticojejunostomy  - pain control  - hemodynamic monitoring  - NPO  - NG tube to intermittent suction    /RENAL:   #urine output in critically ill  - Shen dc'd  #maintain euvolemia  - LR @ 100mL/hr    Labs:   BUN/Cr- 4/0.6  -->,  4/0.5  -->    Electrolytes-(08-15 @ 20:40)Na 137 // K 3.6 // Mg 2.0 // Phos 2.8       HEME/ONC:   #DVT prophylaxis   - Lovenox 40mg daily  - downtrending Hgb, follow    Labs: Hb/Hct:  11.5/35.2  (08-15 @ 14:27)  -->,  9.6/29.4  (08-15 @ 20:40)  -->                      Plts:  363  -->,  327  -->                 PTT/INR:          ID:  #monitor for leukocytosis/fever  WBC- 10.83  --->>,  12.38  --->>,  12.31  --->>  Temp trend- 24hrs T(F): 99.7 (08-15 @ 22:26), Max: 100.3 (08-15 @ 20:00)  Current antibiotics-piperacillin/tazobactam IVPB. 3.375mg every 6 hours      ENDO:    -finger stick glucose q6 while NPO    -Glucose goal 140-180    MSK:     Activity - Ambulate as Tolerated:     Time/Priority:  Routine (08-14-24 @ 20:15)    SKIN:  #DTI screening    -wound assessment pending    LINES/DRAINS:  PIV, NGT    ADVANCED DIRECTIVES:  Full Code    HCP/Emergency Contact- Ayden Love [phone]    INDICATION FOR SICU/SDU: hemodynamic monitoring s/p hepaticojejunostomy requiring pressors intraop    DISPO:  SDU    To be discussed with SICU attending Dr. Angulo. Assessment & Plan  34y Female POD# 2 s/p open Enrrique-en-y hepaticojejunostomy.    NEURO:  #Acute pain  - IV Tylenol 1g q6h  - Toradol 15mg q6h  - Dilaudid 0.25/0.5mg PRN    RESP:   #Oxygenation    -saturating well on RA  #Activity    -increase as tolerated    -encourage use of incentive spirometer - pulling 1L    CARDS:   #Hypotensive intraoperatively- resolved  - remains off pressors  #Sinus tachycardia  - -124  - Trop 10, EKG sinus tachycardia    GI/NUTR:   #Diet,   NPO except meds    -aspiration precautions, HOB 30  #GI Prophylaxis    -not indicated  #Bowel regimen    -last bowel movement unknown  #s/p laparoscopic converted to open Enrrique-en-y hepaticojejunostomy  - pain control  - hemodynamic monitoring  - NPO  - NG tube to intermittent suction    /RENAL:   #urine output in critically ill  - Shen dc'd > voiding spontaneously  #maintain euvolemia  - LR @ 100mL/hr    Labs:   BUN/Cr- 4/0.6  -->,  4/0.5  -->    Electrolytes-(08-15 @ 20:40)Na 137 // K 3.6 // Mg 2.0 // Phos 2.8       HEME/ONC:   #DVT prophylaxis   - Lovenox 40mg daily  - downtrending Hgb, follow    Labs: Hb/Hct:  11.5/35.2  (08-15 @ 14:27)  -->,  9.6/29.4  (08-15 @ 20:40)  -->                      Plts:  363  -->,  327  -->                 PTT/INR:          ID:  #monitor for leukocytosis/fever  WBC- 10.83  --->>,  12.38  --->>,  12.31  --->>  Temp trend- 24hrs T(F): 99.7 (08-15 @ 22:26), Max: 100.3 (08-15 @ 20:00)  Current antibiotics-piperacillin/tazobactam IVPB. 3.375mg every 6 hours      ENDO:    -finger stick glucose q6 while NPO    -Glucose goal 140-180    MSK:     Activity - Ambulate as Tolerated:     Time/Priority:  Routine (08-14-24 @ 20:15)    SKIN:  #DTI screening    -wound assessment pending    LINES/DRAINS:  PIV, NGT    ADVANCED DIRECTIVES:  Full Code    HCP/Emergency Contact- Ayden Love [phone]    INDICATION FOR SICU/SDU: hemodynamic monitoring s/p hepaticojejunostomy requiring pressors intraop    DISPO:  SDU    To be discussed with SICU attending Dr. Angulo. Assessment & Plan  34y Female POD# 2 s/p open Enrrique-en-y hepaticojejunostomy.    NEURO:  #Acute pain  - IV Tylenol 1g q6h  - Toradol 15mg q6h  - Dilaudid 0.25/0.5mg PRN    RESP:   #Oxygenation    -saturating well on RA  #Activity    -increase as tolerated    -encourage use of incentive spirometer - pulling 1L    CARDS:   #Hypotensive intraoperatively- resolved  - remains off pressors  #Sinus tachycardia  - -124  - Trop 10, EKG sinus tachycardia    GI/NUTR:   #Diet,     - dc NG tube > advance to clear diet.    -aspiration precautions, HOB 30  #GI Prophylaxis    -not indicated  #Bowel regimen    -last bowel movement unknown  #s/p laparoscopic converted to open Enrrique-en-y hepaticojejunostomy  - pain control  - hemodynamic monitoring    /RENAL:   #urine output in critically ill  - Shen dc'd > voiding spontaneously  #maintain euvolemia  - LR @ 100mL/hr    Labs:   BUN/Cr- 4/0.6  -->,  4/0.5  -->    Electrolytes-(08-15 @ 20:40)Na 137 // K 3.6 // Mg 2.0 // Phos 2.8       HEME/ONC:   #DVT prophylaxis   - Lovenox 40mg daily  - downtrending Hgb, follow    Labs: Hb/Hct:  11.5/35.2  (08-15 @ 14:27)  -->,  9.6/29.4  (08-15 @ 20:40)  -->                      Plts:  363  -->,  327  -->                 PTT/INR:      - trend coags    ID:  #monitor for leukocytosis/fever  WBC- 10.83  --->>,  12.38  --->>,  12.31  --->>  Temp trend- 24hrs T(F): 99.7 (08-15 @ 22:26), Max: 100.3 (08-15 @ 20:00)  Current antibiotics-piperacillin/tazobactam IVPB. 3.375mg every 6 hours      ENDO:    -finger stick glucose q6 while NPO    -Glucose goal 140-180    MSK:     Activity - Ambulate as Tolerated:     Time/Priority:  Routine (08-14-24 @ 20:15)   - PT > out of bed.    SKIN:  #DTI screening    -wound assessment pending    LINES/DRAINS:  PIV    ADVANCED DIRECTIVES:  Full Code    HCP/Emergency Contact- Ayden Love [phone]    INDICATION FOR SICU/SDU: hemodynamic monitoring s/p hepaticojejunostomy requiring pressors intraop    DISPO:  SDU    To be discussed with SICU attending Dr. Angulo.

## 2024-08-16 NOTE — DIETITIAN INITIAL EVALUATION ADULT - OTHER INFO
Patient is a 35yo F with a pmhx of G6PD deficiency,  section x4, s/p laparoscopic cholecystectomy in [facility] (24) complicated by post-operative leak requiring exploratory laparotomy x 2 (24 and May 2024), s/p PTC drain placement by IR now presents s/p laparoscopic converted to open Enrrique-en-y hepaticojejunostomy.    NG tube dc > advanced to clear diet

## 2024-08-16 NOTE — PROGRESS NOTE ADULT - SUBJECTIVE AND OBJECTIVE BOX
GENERAL SURGERY PROGRESS NOTE    Patient: MICHAEL LAL , 34y ([birth year])Female   MRN: [id number]  Location: [place] [address] A  Visit: 24 Inpatient  Date: 24 @ 07:54    Hospital Day #: 3  Post-Op Day #: 2    Procedure/Dx/Injuries: S/P LAP CONVERTED TOP OPEN HEPATICOTOMY W/ HEPATICOJEJUNOSTOMY, piotr, RESECTION OF RIGHT HEPATIC ARTERY    Events of past 24 hours: tachy between 110-120s, EKG sinus tachy, trops negative, NGT in place with 300cc output, R drain 90cc serosang, stents 10cc bilious    PAST MEDICAL & SURGICAL HISTORY:  Glucose 6 phosphatase deficiency  Gestational diabetes   delivery delivered  S/P cholecystectomy  History of exploratory laparotomy      Vitals:   T(F): 99 (24 @ 04:00), Max: 100.3 (08-15-24 @ 20:00)  HR: 120 (24 @ 04:00)  BP: 124/78 (24 @ 04:00)  RR: 26 (24 @ 04:00)  SpO2: 99% (24 @ 04:00)      Diet, NPO:   Except Medications     Special Instructions for Nursing:  Except Medications      Fluids:     I & O's:    08-15-24 @ 07:01  -  24 @ 07:00  --------------------------------------------------------  IN:    IV PiggyBack: 200 mL    Lactated Ringers: 2400 mL    Lactated Ringers Bolus: 500 mL  Total IN: 3100 mL    OUT:    Bulb (mL): 100 mL    Drain (mL): 20 mL    Indwelling Catheter - Urethral (mL): 1445 mL    Nasogastric/Oral tube (mL): 300 mL    Voided (mL): 600 mL  Total OUT: 2465 mL    Total NET: 635 mL      PHYSICAL EXAM:  General: NAD  HEENT: NCAT, EOMI, NGT in place to LCWS  Neck: Trachea ML  Cardiac: tachycardic  Respiratory: Chest rise equal bilaterally, no labored breathing  Abdomen: Soft, non-distended, mildly tender near incision sites  Skin: Warm/dry, normal color, no jaundice  Incision/wound: healing well, dressings in place, clean, dry and intact    MEDICATIONS  (STANDING):  acetaminophen   IVPB .. 1000 milliGRAM(s) IV Intermittent once  chlorhexidine 2% Cloths 1 Application(s) Topical <User Schedule>  enoxaparin Injectable 40 milliGRAM(s) SubCutaneous every 24 hours  ketorolac   Injectable 15 milliGRAM(s) IV Push every 6 hours  lactated ringers. 1000 milliLiter(s) (100 mL/Hr) IV Continuous <Continuous>  piperacillin/tazobactam IVPB.. 3.375 Gram(s) IV Intermittent once  piperacillin/tazobactam IVPB.. 3.375 Gram(s) IV Intermittent every 6 hours    MEDICATIONS  (PRN):  HYDROmorphone  Injectable 0.25 milliGRAM(s) IV Push every 4 hours PRN Moderate Pain (4 - 6)  HYDROmorphone  Injectable 0.5 milliGRAM(s) IV Push every 4 hours PRN Severe Pain (7 - 10)    DVT PROPHYLAXIS: enoxaparin Injectable 40 milliGRAM(s) SubCutaneous every 24 hours    ANTIBIOTICS:  piperacillin/tazobactam IVPB.. 3.375 Gram(s)  piperacillin/tazobactam IVPB.. 3.375 Gram(s)    LAB/STUDIES:  POCT Blood Glucose.: 94 mg/dL (16 Aug 2024 05:38)  POCT Blood Glucose.: 104 mg/dL (16 Aug 2024 00:24)  POCT Blood Glucose.: 112 mg/dL (15 Aug 2024 12:23)  POCT Blood Glucose.: 127 mg/dL (15 Aug 2024 08:22)                          9.6    12.31 )-----------( 327      ( 15 Aug 2024 20:40 )             29.4       Auto Neutrophil %: 83.1 % (08-15-24 @ 14:27)  Auto Immature Granulocyte %: 0.3 % (08-15-24 @ 14:27)    08-15    137  |  102  |  4<L>  ----------------------------<  109<H>  3.6   |  26  |  0.5<L>      Calcium: 7.8 mg/dL (08-15-24 @ 20:40)      LFTs:             5.6  | 1.6  | 82       ------------------[230     ( 14 Aug 2024 20:32 )  3.5  | 1.0  | 67             Lactate, Blood: 1.6 mmol/L (24 @ 20:32)      Coags:     14.50  ----< 1.27    ( 14 Aug 2024 20:32 )     x           CARDIAC MARKERS ( 15 Aug 2024 14:27 )  x     / x     / x     / x     / 2.1 ng/mL    Urinalysis Basic - ( 15 Aug 2024 20:40 )    Color: x / Appearance: x / SG: x / pH: x  Gluc: 109 mg/dL / Ketone: x  / Bili: x / Urobili: x   Blood: x / Protein: x / Nitrite: x   Leuk Esterase: x / RBC: x / WBC x   Sq Epi: x / Non Sq Epi: x / Bacteria: x

## 2024-08-16 NOTE — DIETITIAN INITIAL EVALUATION ADULT - PERTINENT MEDS FT
MEDICATIONS  (STANDING):  acetaminophen     Tablet .. 650 milliGRAM(s) Oral every 6 hours  chlorhexidine 2% Cloths 1 Application(s) Topical <User Schedule>  enoxaparin Injectable 40 milliGRAM(s) SubCutaneous every 24 hours  ketorolac   Injectable 15 milliGRAM(s) IV Push every 6 hours  lactated ringers. 1000 milliLiter(s) (100 mL/Hr) IV Continuous <Continuous>  pantoprazole    Tablet 40 milliGRAM(s) Oral before breakfast  piperacillin/tazobactam IVPB.. 3.375 Gram(s) IV Intermittent every 6 hours  piperacillin/tazobactam IVPB.. 3.375 Gram(s) IV Intermittent once    MEDICATIONS  (PRN):  HYDROmorphone  Injectable 0.25 milliGRAM(s) IV Push every 4 hours PRN Severe Pain (7 - 10)  oxyCODONE    IR 5 milliGRAM(s) Oral every 6 hours PRN Moderate Pain (4 - 6)  traMADol 25 milliGRAM(s) Oral every 8 hours PRN Mild Pain (1 - 3)

## 2024-08-16 NOTE — DIETITIAN INITIAL EVALUATION ADULT - OTHER CALCULATIONS
Energy: 1478 - 1848 kcal/day (20 - 25 kcal/kg)  Protein: 88 - 103 gm/day (1.2 - 1.4 gm/kg)  Fluid: 1 mL/kcal   estimated needs with consideration for age, acuity of illness, critical care setting, suspected PCM

## 2024-08-16 NOTE — DIETITIAN INITIAL EVALUATION ADULT - ORAL NUTRITION SUPPLEMENTS
Continue Ensure Clear 3x/day (240 kcal, 8 gm Protein each) to aid in optimizing kcal and protein intake

## 2024-08-16 NOTE — DIETITIAN INITIAL EVALUATION ADULT - NS FNS DIET ORDER
Diet, Clear Liquid:   Supplement Feeding Modality:  Oral  Ensure Clear Cans or Servings Per Day:  3       Frequency:  Three Times a day (08-16-24 @ 10:30)

## 2024-08-16 NOTE — DIETITIAN INITIAL EVALUATION ADULT - NSFNSGIIOFT_GEN_A_CORE
08-15-24 @ 07:01  -  08-16-24 @ 07:00  --------------------------------------------------------  OUT:    Nasogastric/Oral tube (mL): 300 mL  Total OUT: 300 mL    Total NET: -300 mL

## 2024-08-16 NOTE — DIETITIAN INITIAL EVALUATION ADULT - ORAL INTAKE PTA/DIET HISTORY
Patient reports usually with fair PO intake - but had been experiencing some nausea. Once nausea subsided, she was able to eat again. No vitamin/mineral supplements. She was drinking coconut water. UBW: 163 lbs. NKFA, no food intolerances.

## 2024-08-16 NOTE — DIETITIAN INITIAL EVALUATION ADULT - PERTINENT LABORATORY DATA
08-15    137  |  102  |  4<L>  ----------------------------<  109<H>  3.6   |  26  |  0.5<L>    Ca    7.8<L>      15 Aug 2024 20:40  Phos  2.8     08-15  Mg     2.0     08-15    TPro  5.0<L>  /  Alb  2.8<L>  /  TBili  0.9  /  DBili  0.3  /  AST  26  /  ALT  38  /  AlkPhos  192<H>  08-16  POCT Blood Glucose.: 94 mg/dL (08-16-24 @ 05:38)  A1C with Estimated Average Glucose Result: 6.3 % (08-01-24 @ 00:00)

## 2024-08-16 NOTE — DIETITIAN INITIAL EVALUATION ADULT - ADD RECOMMEND
1) As medically feasible and tolerated by patient advance diet to Low fat diet within 3-5 days   2) Ensure Clears 3x/day  3) Monitor electrolytes, BG, renal profile  4) Monitor BM, GI s/s

## 2024-08-16 NOTE — PROGRESS NOTE ADULT - SUBJECTIVE AND OBJECTIVE BOX
MICHAEL LAL   [id number]   [birth year]    34yF  ============================================================   DATE OF INITIAL SICU/SDU CONSULT: 08-14-24    INDICATION FOR SICU CONSULT: hemodynamic monitoring s/p hepaticojejunostomy requiring pressors intraoperatively     SICU COURSE EVENTS :  08-14 - admitted to SICU service  8/15 - sinus tachycardia with chest pain (more with swallowing) (EKG sinus tachycardia and troponin negative), removing garcia at midnight, 500cc LR bolus for sinus tachycardia. Garcia dc'd.     24HOUR EVENTS  8/15  NIGHT:  - stents/ostomy bag with bilious output, werner drain serous  - pain ok  - chase-febrile > Oferimev  - tachycardic while asleep up to 124    DAY  -D/c garcia at midnight  - chest pain when swallowing/ chest pressure --> EKG sinus tachycardia, troponin and CBC pending  - RON drain with serous output (non-bilious)  - troponin 10 (negative)  - remove garcia at midnight  - 40 meq KCl and 1g magnesium sulfate    [X] A ten-point review of systems was negative except as expressed in note.  [X] History was obtained from patient. If unable to participate in their care, history obtained from review of the chart and collateral sources of information.  ============================================================      MICHAEL LAL   [id number]   [birth year]    34yF  ============================================================   DATE OF INITIAL SICU/SDU CONSULT: 08-14-24    INDICATION FOR SICU CONSULT: hemodynamic monitoring s/p hepaticojejunostomy requiring pressors intraoperatively     SICU COURSE EVENTS :  08-14 - admitted to SICU service  8/15 - sinus tachycardia with chest pain (more with swallowing) (EKG sinus tachycardia and troponin negative), removing garcia at midnight, 500cc LR bolus for sinus tachycardia. Garcia dc'd.     24HOUR EVENTS  8/15  NIGHT:  - stents/ostomy bag with bilious output, werner drain serous  - pain ok  - chase-febrile > Oferimev  - tachycardic while asleep up to 124    DAY  -D/c garcia at midnight  - chest pain when swallowing/ chest pressure --> EKG sinus tachycardia, troponin and CBC pending  - RON drain with serous output (non-bilious)  - troponin 10 (negative)  - remove garcia at midnight  - 40 meq KCl and 1g magnesium sulfate    [X] A ten-point review of systems was negative except as expressed in note.  [X] History was obtained from patient. If unable to participate in their care, history obtained from review of the chart and collateral sources of information.  ============================================================   Daily     Daily     Diet, NPO:   Except Medications     Special Instructions for Nursing:  Except Medications (08-14-24 @ 20:15)      CURRENT MEDS:  Neurologic Medications  HYDROmorphone  Injectable 0.25 milliGRAM(s) IV Push every 4 hours PRN Moderate Pain (4 - 6)  HYDROmorphone  Injectable 0.5 milliGRAM(s) IV Push every 4 hours PRN Severe Pain (7 - 10)  ketorolac   Injectable 15 milliGRAM(s) IV Push every 6 hours    Respiratory Medications    Cardiovascular Medications    Gastrointestinal Medications  lactated ringers. 1000 milliLiter(s) IV Continuous <Continuous>    Genitourinary Medications    Hematologic/Oncologic Medications  enoxaparin Injectable 40 milliGRAM(s) SubCutaneous every 24 hours    Antimicrobial/Immunologic Medications  piperacillin/tazobactam IVPB.. 3.375 Gram(s) IV Intermittent once  piperacillin/tazobactam IVPB.. 3.375 Gram(s) IV Intermittent every 6 hours    Endocrine/Metabolic Medications    Topical/Other Medications  chlorhexidine 2% Cloths 1 Application(s) Topical <User Schedule>      ICU Vital Signs Last 24 Hrs  T(C): 37.3 (16 Aug 2024 08:00), Max: 37.9 (15 Aug 2024 20:00)  T(F): 99.2 (16 Aug 2024 08:00), Max: 100.3 (15 Aug 2024 20:00)  HR: 114 (16 Aug 2024 08:00) (110 - 128)  BP: 124/79 (16 Aug 2024 08:00) (114/65 - 124/79)  BP(mean): 97 (16 Aug 2024 08:00) (83 - 97)  ABP: --  ABP(mean): --  RR: 26 (16 Aug 2024 04:00) (19 - 26)  SpO2: 98% (16 Aug 2024 08:00) (96% - 100%)    O2 Parameters below as of 16 Aug 2024 04:00  Patient On (Oxygen Delivery Method): room air            Adult Advanced Hemodynamics Last 24 Hrs  CVP(mm Hg): --  CVP(cm H2O): --  CO: --  CI: --  PA: --  PA(mean): --  PCWP: --  SVR: --  SVRI: --  PVR: --  PVRI: --          I&O's Summary    15 Aug 2024 07:01  -  16 Aug 2024 07:00  --------------------------------------------------------  IN: 3100 mL / OUT: 2465 mL / NET: 635 mL      I&O's Detail    15 Aug 2024 07:01  -  16 Aug 2024 07:00  --------------------------------------------------------  IN:    IV PiggyBack: 200 mL    Lactated Ringers: 2400 mL    Lactated Ringers Bolus: 500 mL  Total IN: 3100 mL    OUT:    Bulb (mL): 100 mL    Drain (mL): 20 mL    Indwelling Catheter - Urethral (mL): 1445 mL    Nasogastric/Oral tube (mL): 300 mL    Voided (mL): 600 mL  Total OUT: 2465 mL    Total NET: 635 mL          PHYSICAL EXAM:    General: NAD, A&Ox3.  Lungs:  clear to auscultation, Normal expansion/effort. 1L on incentive spirometer  Cardiovascular : tachycardic rate 110-120s, regular rhythm  GI: Abdomen soft, milf to palpation, Non-distended. Subcostal incision covered with surgical dressing (lightly stained with serosanguinous fluid). Right hepatic stents (x2) with ostomy appliance over it (some bilious fluid present), left drain (serosanguinous fluid present). NG tube in place (non-bilious/non-bloody output).  Extremities: Extremities warm, pink, well-perfused.  Derm: Good skin turgor, no skin breakdown.    : no garcia in place      CXR:     LABS:  CAPILLARY BLOOD GLUCOSE      POCT Blood Glucose.: 94 mg/dL (16 Aug 2024 05:38)  POCT Blood Glucose.: 104 mg/dL (16 Aug 2024 00:24)  POCT Blood Glucose.: 112 mg/dL (15 Aug 2024 12:23)                          9.6    12.31 )-----------( 327      ( 15 Aug 2024 20:40 )             29.4       08-15    137  |  102  |  4<L>  ----------------------------<  109<H>  3.6   |  26  |  0.5<L>    Ca    7.8<L>      15 Aug 2024 20:40  Phos  2.8     08-15  Mg     2.0     08-15    TPro  5.6<L>  /  Alb  3.5  /  TBili  1.6<H>  /  DBili  1.0<H>  /  AST  82<H>  /  ALT  67<H>  /  AlkPhos  230<H>  08-14      PT/INR - ( 14 Aug 2024 20:32 )   PT: 14.50 sec;   INR: 1.27 ratio           CARDIAC MARKERS ( 15 Aug 2024 14:27 )  x     / x     / x     / x     / 2.1 ng/mL      Urinalysis Basic - ( 15 Aug 2024 20:40 )    Color: x / Appearance: x / SG: x / pH: x  Gluc: 109 mg/dL / Ketone: x  / Bili: x / Urobili: x   Blood: x / Protein: x / Nitrite: x   Leuk Esterase: x / RBC: x / WBC x   Sq Epi: x / Non Sq Epi: x / Bacteria: x     MICHAEL LAL   [id number]   [birth year]    34yF  ============================================================   DATE OF INITIAL SICU/SDU CONSULT: 08-14-24    INDICATION FOR SICU CONSULT: hemodynamic monitoring s/p hepaticojejunostomy requiring pressors intraoperatively     SICU COURSE EVENTS :  08-14 - admitted to SICU service  8/15 - sinus tachycardia with chest pain (more with swallowing) (EKG sinus tachycardia and troponin negative), removing garcia at midnight, 500cc LR bolus for sinus tachycardia. Garcia dc'd.     24HOUR EVENTS  8/15  NIGHT:  - stents/ostomy bag with bilious output, werner drain serous  - pain intermittent.  - chase-febrile > Oferimev  - tachycardic while asleep up to 124  - having spontaneous UOP.    DAY  -D/c garcia at midnight  - chest pain when swallowing/ chest pressure --> EKG sinus tachycardia, troponin and CBC pending  - RON drain with serous output (non-bilious)  - troponin 10 (negative)  - remove garcia at midnight  - 40 meq KCl and 1g magnesium sulfate    [X] A ten-point review of systems was negative except as expressed in note.  [X] History was obtained from patient. If unable to participate in their care, history obtained from review of the chart and collateral sources of information.  ============================================================   Daily     Daily     Diet, NPO:   Except Medications     Special Instructions for Nursing:  Except Medications (08-14-24 @ 20:15)      CURRENT MEDS:  Neurologic Medications  HYDROmorphone  Injectable 0.25 milliGRAM(s) IV Push every 4 hours PRN Moderate Pain (4 - 6)  HYDROmorphone  Injectable 0.5 milliGRAM(s) IV Push every 4 hours PRN Severe Pain (7 - 10)  ketorolac   Injectable 15 milliGRAM(s) IV Push every 6 hours    Respiratory Medications    Cardiovascular Medications    Gastrointestinal Medications  lactated ringers. 1000 milliLiter(s) IV Continuous <Continuous>    Genitourinary Medications    Hematologic/Oncologic Medications  enoxaparin Injectable 40 milliGRAM(s) SubCutaneous every 24 hours    Antimicrobial/Immunologic Medications  piperacillin/tazobactam IVPB.. 3.375 Gram(s) IV Intermittent once  piperacillin/tazobactam IVPB.. 3.375 Gram(s) IV Intermittent every 6 hours    Endocrine/Metabolic Medications    Topical/Other Medications  chlorhexidine 2% Cloths 1 Application(s) Topical <User Schedule>      ICU Vital Signs Last 24 Hrs  T(C): 37.3 (16 Aug 2024 08:00), Max: 37.9 (15 Aug 2024 20:00)  T(F): 99.2 (16 Aug 2024 08:00), Max: 100.3 (15 Aug 2024 20:00)  HR: 114 (16 Aug 2024 08:00) (110 - 128)  BP: 124/79 (16 Aug 2024 08:00) (114/65 - 124/79)  BP(mean): 97 (16 Aug 2024 08:00) (83 - 97)  ABP: --  ABP(mean): --  RR: 26 (16 Aug 2024 04:00) (19 - 26)  SpO2: 98% (16 Aug 2024 08:00) (96% - 100%)    O2 Parameters below as of 16 Aug 2024 04:00  Patient On (Oxygen Delivery Method): room air            Adult Advanced Hemodynamics Last 24 Hrs  CVP(mm Hg): --  CVP(cm H2O): --  CO: --  CI: --  PA: --  PA(mean): --  PCWP: --  SVR: --  SVRI: --  PVR: --  PVRI: --          I&O's Summary    15 Aug 2024 07:01  -  16 Aug 2024 07:00  --------------------------------------------------------  IN: 3100 mL / OUT: 2465 mL / NET: 635 mL      I&O's Detail    15 Aug 2024 07:01  -  16 Aug 2024 07:00  --------------------------------------------------------  IN:    IV PiggyBack: 200 mL    Lactated Ringers: 2400 mL    Lactated Ringers Bolus: 500 mL  Total IN: 3100 mL    OUT:    Bulb (mL): 100 mL    Drain (mL): 20 mL    Indwelling Catheter - Urethral (mL): 1445 mL    Nasogastric/Oral tube (mL): 300 mL    Voided (mL): 600 mL  Total OUT: 2465 mL    Total NET: 635 mL          PHYSICAL EXAM:    General: NAD, A&Ox3.  Lungs:  clear to auscultation, Normal expansion/effort. 1L on incentive spirometer  Cardiovascular : tachycardic rate 110-120s, regular rhythm  GI: Abdomen soft, milf to palpation, Non-distended. Subcostal incision covered with surgical dressing (lightly stained with serosanguinous fluid). Right hepatic stents (x2) with ostomy appliance over it (some bilious fluid present), left drain (serosanguinous fluid present). NG tube in place (non-bilious/non-bloody output).  Extremities: Extremities warm, pink, well-perfused.  Derm: Good skin turgor, no skin breakdown.    : no garcia in place      CXR:     LABS:  CAPILLARY BLOOD GLUCOSE      POCT Blood Glucose.: 94 mg/dL (16 Aug 2024 05:38)  POCT Blood Glucose.: 104 mg/dL (16 Aug 2024 00:24)  POCT Blood Glucose.: 112 mg/dL (15 Aug 2024 12:23)                          9.6    12.31 )-----------( 327      ( 15 Aug 2024 20:40 )             29.4       08-15    137  |  102  |  4<L>  ----------------------------<  109<H>  3.6   |  26  |  0.5<L>    Ca    7.8<L>      15 Aug 2024 20:40  Phos  2.8     08-15  Mg     2.0     08-15    TPro  5.6<L>  /  Alb  3.5  /  TBili  1.6<H>  /  DBili  1.0<H>  /  AST  82<H>  /  ALT  67<H>  /  AlkPhos  230<H>  08-14      PT/INR - ( 14 Aug 2024 20:32 )   PT: 14.50 sec;   INR: 1.27 ratio           CARDIAC MARKERS ( 15 Aug 2024 14:27 )  x     / x     / x     / x     / 2.1 ng/mL      Urinalysis Basic - ( 15 Aug 2024 20:40 )    Color: x / Appearance: x / SG: x / pH: x  Gluc: 109 mg/dL / Ketone: x  / Bili: x / Urobili: x   Blood: x / Protein: x / Nitrite: x   Leuk Esterase: x / RBC: x / WBC x   Sq Epi: x / Non Sq Epi: x / Bacteria: x

## 2024-08-16 NOTE — DIETITIAN INITIAL EVALUATION ADULT - COLLABORATION WITH OTHER PROVIDERS
Interventions: meals and snacks, nutrition education   Monitoring/Evaluation: energy intake, weight, labs, skin status, NFPF

## 2024-08-17 LAB
ALBUMIN SERPL ELPH-MCNC: 2.8 G/DL — LOW (ref 3.5–5.2)
ALP SERPL-CCNC: 243 U/L — HIGH (ref 30–115)
ALT FLD-CCNC: 27 U/L — SIGNIFICANT CHANGE UP (ref 0–41)
ANION GAP SERPL CALC-SCNC: 9 MMOL/L — SIGNIFICANT CHANGE UP (ref 7–14)
APTT BLD: 35.6 SEC — SIGNIFICANT CHANGE UP (ref 27–39.2)
AST SERPL-CCNC: 16 U/L — SIGNIFICANT CHANGE UP (ref 0–41)
BASOPHILS # BLD AUTO: 0.01 K/UL — SIGNIFICANT CHANGE UP (ref 0–0.2)
BASOPHILS NFR BLD AUTO: 0.1 % — SIGNIFICANT CHANGE UP (ref 0–1)
BILIRUB DIRECT SERPL-MCNC: 0.3 MG/DL — SIGNIFICANT CHANGE UP (ref 0–0.3)
BILIRUB INDIRECT FLD-MCNC: 0.4 MG/DL — SIGNIFICANT CHANGE UP (ref 0.2–1.2)
BILIRUB SERPL-MCNC: 0.7 MG/DL — SIGNIFICANT CHANGE UP (ref 0.2–1.2)
BUN SERPL-MCNC: 6 MG/DL — LOW (ref 10–20)
CALCIUM SERPL-MCNC: 7.7 MG/DL — LOW (ref 8.4–10.5)
CHLORIDE SERPL-SCNC: 101 MMOL/L — SIGNIFICANT CHANGE UP (ref 98–110)
CO2 SERPL-SCNC: 26 MMOL/L — SIGNIFICANT CHANGE UP (ref 17–32)
CREAT SERPL-MCNC: <0.5 MG/DL — LOW (ref 0.7–1.5)
EGFR: 134 ML/MIN/1.73M2 — SIGNIFICANT CHANGE UP
EOSINOPHIL # BLD AUTO: 0.55 K/UL — SIGNIFICANT CHANGE UP (ref 0–0.7)
EOSINOPHIL NFR BLD AUTO: 5.9 % — SIGNIFICANT CHANGE UP (ref 0–8)
GLUCOSE SERPL-MCNC: 88 MG/DL — SIGNIFICANT CHANGE UP (ref 70–99)
HCT VFR BLD CALC: 25.3 % — LOW (ref 37–47)
HGB BLD-MCNC: 8.3 G/DL — LOW (ref 12–16)
IMM GRANULOCYTES NFR BLD AUTO: 0.3 % — SIGNIFICANT CHANGE UP (ref 0.1–0.3)
INR BLD: 1.43 RATIO — HIGH (ref 0.65–1.3)
LYMPHOCYTES # BLD AUTO: 1.3 K/UL — SIGNIFICANT CHANGE UP (ref 1.2–3.4)
LYMPHOCYTES # BLD AUTO: 13.8 % — LOW (ref 20.5–51.1)
MAGNESIUM SERPL-MCNC: 1.7 MG/DL — LOW (ref 1.8–2.4)
MCHC RBC-ENTMCNC: 29.5 PG — SIGNIFICANT CHANGE UP (ref 27–31)
MCHC RBC-ENTMCNC: 32.8 G/DL — SIGNIFICANT CHANGE UP (ref 32–37)
MCV RBC AUTO: 90 FL — SIGNIFICANT CHANGE UP (ref 81–99)
MONOCYTES # BLD AUTO: 0.59 K/UL — SIGNIFICANT CHANGE UP (ref 0.1–0.6)
MONOCYTES NFR BLD AUTO: 6.3 % — SIGNIFICANT CHANGE UP (ref 1.7–9.3)
NEUTROPHILS # BLD AUTO: 6.92 K/UL — HIGH (ref 1.4–6.5)
NEUTROPHILS NFR BLD AUTO: 73.6 % — SIGNIFICANT CHANGE UP (ref 42.2–75.2)
NRBC # BLD: 0 /100 WBCS — SIGNIFICANT CHANGE UP (ref 0–0)
PHOSPHATE SERPL-MCNC: 3.1 MG/DL — SIGNIFICANT CHANGE UP (ref 2.1–4.9)
PLATELET # BLD AUTO: 302 K/UL — SIGNIFICANT CHANGE UP (ref 130–400)
PMV BLD: 10 FL — SIGNIFICANT CHANGE UP (ref 7.4–10.4)
POTASSIUM SERPL-MCNC: 3.6 MMOL/L — SIGNIFICANT CHANGE UP (ref 3.5–5)
POTASSIUM SERPL-SCNC: 3.6 MMOL/L — SIGNIFICANT CHANGE UP (ref 3.5–5)
PROT SERPL-MCNC: 4.9 G/DL — LOW (ref 6–8)
PROTHROM AB SERPL-ACNC: 16.4 SEC — HIGH (ref 9.95–12.87)
RBC # BLD: 2.81 M/UL — LOW (ref 4.2–5.4)
RBC # FLD: 15.2 % — HIGH (ref 11.5–14.5)
SODIUM SERPL-SCNC: 136 MMOL/L — SIGNIFICANT CHANGE UP (ref 135–146)
WBC # BLD: 9.4 K/UL — SIGNIFICANT CHANGE UP (ref 4.8–10.8)
WBC # FLD AUTO: 9.4 K/UL — SIGNIFICANT CHANGE UP (ref 4.8–10.8)

## 2024-08-17 PROCEDURE — 99233 SBSQ HOSP IP/OBS HIGH 50: CPT | Mod: 24,25

## 2024-08-17 PROCEDURE — 99233 SBSQ HOSP IP/OBS HIGH 50: CPT | Mod: 24

## 2024-08-17 RX ORDER — POTASSIUM CHLORIDE 10 MEQ
40 TABLET, EXT RELEASE, PARTICLES/CRYSTALS ORAL ONCE
Refills: 0 | Status: COMPLETED | OUTPATIENT
Start: 2024-08-17 | End: 2024-08-17

## 2024-08-17 RX ADMIN — Medication 50 MILLILITER(S): at 23:09

## 2024-08-17 RX ADMIN — KETOROLAC TROMETHAMINE 15 MILLIGRAM(S): 30 INJECTION, SOLUTION INTRAMUSCULAR at 05:14

## 2024-08-17 RX ADMIN — ACETAMINOPHEN 650 MILLIGRAM(S): 325 TABLET ORAL at 01:20

## 2024-08-17 RX ADMIN — PIPERACILLIN SODIUM AND TAZOBACTAM SODIUM 25 GRAM(S): 3; .375 INJECTION, POWDER, FOR SOLUTION INTRAVENOUS at 11:24

## 2024-08-17 RX ADMIN — HYDROMORPHONE HYDROCHLORIDE 0.25 MILLIGRAM(S): 2 TABLET ORAL at 09:40

## 2024-08-17 RX ADMIN — HYDROMORPHONE HYDROCHLORIDE 0.25 MILLIGRAM(S): 2 TABLET ORAL at 16:15

## 2024-08-17 RX ADMIN — HYDROMORPHONE HYDROCHLORIDE 0.25 MILLIGRAM(S): 2 TABLET ORAL at 05:30

## 2024-08-17 RX ADMIN — KETOROLAC TROMETHAMINE 15 MILLIGRAM(S): 30 INJECTION, SOLUTION INTRAMUSCULAR at 12:19

## 2024-08-17 RX ADMIN — TRAMADOL HYDROCHLORIDE 25 MILLIGRAM(S): 200 TABLET, EXTENDED RELEASE ORAL at 14:45

## 2024-08-17 RX ADMIN — OXYCODONE HYDROCHLORIDE 5 MILLIGRAM(S): 5 TABLET ORAL at 09:00

## 2024-08-17 RX ADMIN — HYDROMORPHONE HYDROCHLORIDE 0.25 MILLIGRAM(S): 2 TABLET ORAL at 05:11

## 2024-08-17 RX ADMIN — ACETAMINOPHEN 650 MILLIGRAM(S): 325 TABLET ORAL at 11:25

## 2024-08-17 RX ADMIN — KETOROLAC TROMETHAMINE 15 MILLIGRAM(S): 30 INJECTION, SOLUTION INTRAMUSCULAR at 05:30

## 2024-08-17 RX ADMIN — PIPERACILLIN SODIUM AND TAZOBACTAM SODIUM 25 GRAM(S): 3; .375 INJECTION, POWDER, FOR SOLUTION INTRAVENOUS at 23:10

## 2024-08-17 RX ADMIN — ENOXAPARIN SODIUM 40 MILLIGRAM(S): 100 INJECTION SUBCUTANEOUS at 05:17

## 2024-08-17 RX ADMIN — ACETAMINOPHEN 650 MILLIGRAM(S): 325 TABLET ORAL at 05:45

## 2024-08-17 RX ADMIN — CHLORHEXIDINE GLUCONATE 1 APPLICATION(S): 40 SOLUTION TOPICAL at 05:19

## 2024-08-17 RX ADMIN — ACETAMINOPHEN 650 MILLIGRAM(S): 325 TABLET ORAL at 01:07

## 2024-08-17 RX ADMIN — HYDROMORPHONE HYDROCHLORIDE 0.25 MILLIGRAM(S): 2 TABLET ORAL at 21:29

## 2024-08-17 RX ADMIN — HYDROMORPHONE HYDROCHLORIDE 0.25 MILLIGRAM(S): 2 TABLET ORAL at 20:59

## 2024-08-17 RX ADMIN — OXYCODONE HYDROCHLORIDE 5 MILLIGRAM(S): 5 TABLET ORAL at 23:51

## 2024-08-17 RX ADMIN — Medication 40 MILLIGRAM(S): at 06:53

## 2024-08-17 RX ADMIN — OXYCODONE HYDROCHLORIDE 5 MILLIGRAM(S): 5 TABLET ORAL at 08:10

## 2024-08-17 RX ADMIN — KETOROLAC TROMETHAMINE 15 MILLIGRAM(S): 30 INJECTION, SOLUTION INTRAMUSCULAR at 18:07

## 2024-08-17 RX ADMIN — PIPERACILLIN SODIUM AND TAZOBACTAM SODIUM 25 GRAM(S): 3; .375 INJECTION, POWDER, FOR SOLUTION INTRAVENOUS at 05:12

## 2024-08-17 RX ADMIN — HYDROMORPHONE HYDROCHLORIDE 0.25 MILLIGRAM(S): 2 TABLET ORAL at 09:26

## 2024-08-17 RX ADMIN — PIPERACILLIN SODIUM AND TAZOBACTAM SODIUM 25 GRAM(S): 3; .375 INJECTION, POWDER, FOR SOLUTION INTRAVENOUS at 18:07

## 2024-08-17 RX ADMIN — KETOROLAC TROMETHAMINE 15 MILLIGRAM(S): 30 INJECTION, SOLUTION INTRAMUSCULAR at 01:07

## 2024-08-17 RX ADMIN — ACETAMINOPHEN 650 MILLIGRAM(S): 325 TABLET ORAL at 05:15

## 2024-08-17 NOTE — PROGRESS NOTE ADULT - ASSESSMENT
Assessment & Plan  34y Female POD# 2 s/p open Enrrique-en-y hepaticojejunostomy.    NEURO:  #Acute pain  - IV Tylenol 1g q6h  - Toradol 15mg q6h  - Dilaudid 0.25/0.5mg PRN    RESP:   #Oxygenation    -saturating well on RA  #Activity    -increase as tolerated    -encourage use of incentive spirometer    CARDS:   #Hypotensive intraoperatively- resolved  - remains off pressors  #Sinus tachycardia  - -124  - Trop 10, EKG sinus tachycardia    GI/NUTR:   #Diet,   NPO except meds    -aspiration precautions, HOB 30  #GI Prophylaxis    -not indicated  #Bowel regimen    -last bowel movement unknown  #s/p laparoscopic converted to open Enrrique-en-y hepaticojejunostomy  - pain control  - hemodynamic monitoring  - Clear fluids    /RENAL:   #urine output in critically ill  - monitor  #maintain euvolemia  - LR @ 50mL/hr    Labs:   BUN/Cr- 4/0.5  -->,  7/0.5  -->    Electrolytes-(08-16 @ 21:23)Na 136 // K 3.5 // Mg 1.8 // Phos 2.5         HEME/ONC:   #DVT prophylaxis   - Lovenox 40mg daily  - downtrending Hgb, follow    Labs: Hb/Hct:  9.2/28.5  (08-16 @ 13:30)  -->,  9.2/28.7  (08-16 @ 21:23)  -->                      Plts:  315  -->,  318  -->                 PTT/INR:            ID:  #monitor for leukocytosis/fever  WBC- 12.31  --->>,  13.11  --->>,  10.96  --->>  Temp trend- 24hrs T(F): 98.8 (08-17 @ 00:00), Max: 99.2 (08-16 @ 08:00)  Current antibiotics-piperacillin/tazobactam IVPB.. 3.375 every 6 hours      ENDO:    -Glucose goal 140-180    MSK:     Activity - Ambulate as Tolerated:     Time/Priority:  Routine (08-14-24 @ 20:15)    SKIN:  #DTI screening    -wound assessment pending    LINES/DRAINS:  PIV    ADVANCED DIRECTIVES:  Full Code    HCP/Emergency Contact- Ayden Love [phone]    INDICATION FOR SICU/SDU: hemodynamic monitoring s/p hepaticojejunostomy requiring pressors intraop    DISPO:  SDU    To be discussed with SICU attending Dr. Angulo. Assessment & Plan  34y Female POD# 2 s/p open Enrrique-en-y hepaticojejunostomy.    NEURO:  #Acute pain  - IV Tylenol 1g q6h  - Toradol 15mg q6h  - tramadol 25mg q8h  - Dilaudid 0.25/0.5mg PRN    RESP:   #Oxygenation    -saturating well on RA  #Activity    -increase as tolerated    -encourage use of incentive spirometer    CARDS:   #Hypotensive intraoperatively- resolved  - remains off pressors  #Sinus tachycardia  - -124  - Trop 10, EKG sinus tachycardia    GI/NUTR:   #Diet,   NPO except meds    -aspiration precautions, HOB 30  #GI Prophylaxis    -not indicated  #Bowel regimen    -last bowel movement unknown  #s/p laparoscopic converted to open Enrrique-en-y hepaticojejunostomy  - pain control  - hemodynamic monitoring  - Clear fluids    /RENAL:   #urine output in critically ill  - monitor  #maintain euvolemia  - LR @ 50mL/hr    Labs:   BUN/Cr- 4/0.5  -->,  7/0.5  -->    Electrolytes-(08-16 @ 21:23)Na 136 // K 3.5 // Mg 1.8 // Phos 2.5         HEME/ONC:   #DVT prophylaxis   - Lovenox 40mg daily  - downtrending Hgb, follow    Labs: Hb/Hct:  9.2/28.5  (08-16 @ 13:30)  -->,  9.2/28.7  (08-16 @ 21:23)  -->                      Plts:  315  -->,  318  -->                 PTT/INR:            ID:  #monitor for leukocytosis/fever  WBC- 12.31  --->>,  13.11  --->>,  10.96  --->>  Temp trend- 24hrs T(F): 98.8 (08-17 @ 00:00), Max: 99.2 (08-16 @ 08:00)  Current antibiotics-piperacillin/tazobactam IVPB.. 3.375 every 6 hours      ENDO:    -Glucose goal 140-180    MSK:     Activity - Ambulate as Tolerated:     Time/Priority:  Routine (08-14-24 @ 20:15)    SKIN:  #DTI screening    -wound assessment pending    LINES/DRAINS:  PIV    ADVANCED DIRECTIVES:  Full Code    HCP/Emergency Contact- Aydne Kubi [phone]    INDICATION FOR SICU/SDU: hemodynamic monitoring s/p hepaticojejunostomy requiring pressors intraop    DISPO:  SDU    To be discussed with SICU attending Dr. Angulo. 34y Female POD# 3 s/p open Enrrique-en-y hepaticojejunostomy.    NEURO:  #Acute pain  - IV Tylenol 1g q6h  - Toradol 15mg q6h  - tramadol 25mg q8h  - Dilaudid 0.25/0.5mg PRN    RESP:   #Oxygenation    -saturating well on RA  #Activity    -increase as tolerated    -encourage use of incentive spirometer - pulling 1L    CARDS:   #Hypotensive intraoperatively- resolved  - remains off pressors  #Sinus tachycardia  - -120s, suspect likely stress reactive after procedure.  - Trop 10, EKG sinus tachycardia    GI/NUTR:   #Diet,     - clear liquid diet    -aspiration precautions, HOB 30  #GI Prophylaxis    -not indicated  #Bowel regimen    -passing gas after resuming clear liquid (8/17); no BM yet.  #s/p laparoscopic converted to open Enrrique-en-y hepaticojejunostomy  - pain control  - hemodynamic monitoring    /RENAL:   #urine output     - no garcia, voiding spontaneously  #maintain euvolemia  - LR @ 100mL/hr    Labs:   BUN/Cr- 4/0.6  -->,  4/0.5  -->    Electrolytes-(08-15 @ 20:40)Na 137 // K 3.6 // Mg 2.0 // Phos 2.8       HEME/ONC:   #DVT prophylaxis   - Lovenox 40mg daily  - downtrending Hgb (10.3 --> 11.5 --> 9.6 --> 9.2 --> 9.2), stabilize, likely hemodilution.   Labs: Hb/Hct:  9.2/28.5  (08-16 @ 13:30)  -->,  9.2/28.7  (08-16 @ 21:23)  -->                      Plts:  315  -->,  318  -->                 PTT/INR:        - trend coags    ID:  #monitor for leukocytosis/fever  WBC- 12.31  --->>,  13.11  --->>,  10.96  --->>  Temp trend- 24hrs T(F): 98.8 (08-17 @ 00:00), Max: 99.2 (08-16 @ 08:00)  Current antibiotics-piperacillin/tazobactam IVPB.. 3.375 every 6 hours    ENDO:    - Glucose goal 140-180    MSK:     Activity - Ambulate as Tolerated:     Time/Priority:  Routine (08-14-24 @ 20:15)    SKIN:  #DTI screening    -wound assessment pending    LINES/DRAINS:  PIV    ADVANCED DIRECTIVES:  Full Code    [facility] [phone]  LINES/DRAINS:  PIV    ADVANCED DIRECTIVES:  Full Code    [facility]Emergency [facility] [phone]    INDICATION FOR SICU/SDU: hemodynamic monitoring s/p hepaticojejunostomy requiring pressors intraop    DISPO:  SDU    To be discussed with SICU attending Dr. Angulo.

## 2024-08-17 NOTE — CHART NOTE - NSCHARTNOTEFT_GEN_A_CORE
SICU Transfer Note    MICHAEL LAL  34y (1990)  [id number]      Transfer from: SICU  Transfer to: Surgery-      SICU COURSE:  34y Female HD# 3d, pmhx of G6PD deficiency,  section x4, s/p laparoscopic cholecystectomy in [facility] (24) complicated by post-operative leak requiring exploratory laparotomy x 2 (24 and May 2024), s/p PTC drain placement by IR now presents s/p laparoscopic converted to open Enrrique-en-y hepaticojejunostomy. Patient was medically cleared by ASHLIE Gonzalez. Intraoperatively, patient required up to 0.6 Phenylephrine and received 1u pRBCs; case was converted to open with extensive lysis of adhesions and E5 injury of CHD. Post-operatively patient no longer on pressors and was extubated prior to PACU. SICU consulted for hemodynamic monitoring. During SICU stay patient remained sinus tachycardic 100-120s and gradually improving. Remains normotensive without requirement of pressures. She has advanced to PO clear liquid diet, adequate pain control, and able to get out of bed and ambulating.    SICU COURSE EVENTS :  : admitted to SICU service  8/15: sinus tachycardia with chest pain (more with swallowing) (EKG sinus tachycardia and troponin negative), removing garcia at midnight, 500cc LR bolus for sinus tachycardia. Garcia dc'd.  : NG tube removed. Slowly tolerating clears. Persistent tachycardia.      PAST MEDICAL & SURGICAL HISTORY:  Glucose 6 phosphatase deficiency  Gestational diabetes   delivery delivered  S/P cholecystectomy  History of exploratory laparotomy      Allergies    celemin&amp; celepid given in [facility] caused Rigors, was able to tolerate albumin (Other)  sulfa drugs (Rash)    Intolerances      MEDICATIONS  (STANDING):  acetaminophen     Tablet .. 650 milliGRAM(s) Oral every 6 hours  chlorhexidine 2% Cloths 1 Application(s) Topical <User Schedule>  enoxaparin Injectable 40 milliGRAM(s) SubCutaneous every 24 hours  ketorolac   Injectable 15 milliGRAM(s) IV Push every 6 hours  lactated ringers. 1000 milliLiter(s) (50 mL/Hr) IV Continuous <Continuous>  pantoprazole    Tablet 40 milliGRAM(s) Oral before breakfast  piperacillin/tazobactam IVPB.. 3.375 Gram(s) IV Intermittent every 6 hours    MEDICATIONS  (PRN):  HYDROmorphone  Injectable 0.25 milliGRAM(s) IV Push every 4 hours PRN Severe Pain (7 - 10)  oxyCODONE    IR 5 milliGRAM(s) Oral every 6 hours PRN Moderate Pain (4 - 6)  traMADol 25 milliGRAM(s) Oral every 8 hours PRN Mild Pain (1 - 3)      Vital Signs Last 24 Hrs  T(C): 36.7 (17 Aug 2024 07:00), Max: 37.3 (16 Aug 2024 12:00)  T(F): 98.1 (17 Aug 2024 07:00), Max: 99.1 (16 Aug 2024 12:00)  HR: 109 (17 Aug 2024 07:00) (105 - 123)  BP: 134/88 (17 Aug 2024 07:00) (124/74 - 140/92)  BP(mean): 106 (17 Aug 2024 07:00) (94 - 112)  RR: 22 (17 Aug 2024 07:00) (14 - 24)  SpO2: 98% (17 Aug 2024 07:00) (97% - 100%)    Parameters below as of 17 Aug 2024 07:00  Patient On (Oxygen Delivery Method): room air      I&O's Summary    16 Aug 2024 07:01  -  17 Aug 2024 07:00  --------------------------------------------------------  IN: 2760 mL / OUT: 2156 mL / NET: 604 mL        LABS  LABS:                        9.2    10.96 )-----------( 318      ( 16 Aug 2024 21:23 )             28.7       08-16    136  |  100  |  7<L>  ----------------------------<  79  3.5   |  26  |  0.5<L>    Ca    8.1<L>      16 Aug 2024 21:23  Phos  2.5     08-16  Mg     1.8     08-16    TPro  5.1<L>  /  Alb  3.0<L>  /  TBili  1.0  /  DBili  0.4<H>  /  AST  21  /  ALT  36  /  AlkPhos  211<H>  08-16        CARDIAC MARKERS ( 15 Aug 2024 14:27 )  x     / x     / x     / x     / 2.1 ng/mL            Assessment & Plan:    34y Female POD# 3 s/p open Enrrique-en-y hepaticojejunostomy.    NEURO:  #Acute pain  - IV Tylenol 1g q6h  - Toradol 15mg q6h  - Dilaudid 0.25/0.5mg PRN    RESP:   #Oxygenation    -saturating well on RA  #Activity    -increase as tolerated    -encourage use of incentive spirometer - pulling 1L    CARDS:   #Hypotensive intraoperatively- resolved  - remains off pressors  #Sinus tachycardia  - -120s, suspect likely stress reactive after procedure.  - Trop 10, EKG sinus tachycardia    GI/NUTR:   #Diet,     - clear liquid diet    -aspiration precautions, HOB 30  #GI Prophylaxis    -not indicated  #Bowel regimen    -passing gas after resuming clear liquid (); no BM yet.  #s/p laparoscopic converted to open Enrrique-en-y hepaticojejunostomy  - pain control  - hemodynamic monitoring    /RENAL:   #urine output     - no garcia, voiding spontaneously  #maintain euvolemia  - LR @ 100mL/hr    Labs:   BUN/Cr- 4/0.6  -->,  4/0.5  -->    Electrolytes-(08-15 @ 20:40)Na 137 // K 3.6 // Mg 2.0 // Phos 2.8       HEME/ONC:   #DVT prophylaxis   - Lovenox 40mg daily  - downtrending Hgb (10.3 --> 11.5 --> 9.6 --> 9.2 --> 9.2), stabilize, likely hemodilution.   Labs: Hb/Hct:  9.2/28.5  ( @ 13:30)  -->,  9.2/28.7  ( @ 21:23)  -->                      Plts:  315  -->,  318  -->                 PTT/INR:        - trend coags    ID:  #monitor for leukocytosis/fever  WBC- 12.31  --->>,  13.11  --->>,  10.96  --->>  Temp trend- 24hrs T(F): 98.8 (08-17 @ 00:00), Max: 99.2 ( @ 08:00)  Current antibiotics-piperacillin/tazobactam IVPB.. 3.375 every 6 hours    ENDO:    - Glucose goal 140-180    MSK:     Activity - Ambulate as Tolerated:     Time/Priority:  Routine (24 @ 20:15)    SKIN:  #DTI screening    -wound assessment pending    LINES/DRAINS:  PIV    ADVANCED DIRECTIVES:  Full Code    HCP/Emergency Contact- Ayden Love [phone]      Follow Up:  - labs    Signed out to:  Date:  Time: SICU Transfer Note    MICHAEL LAL  34y (1990)  [id number]      Transfer from: SICU  Transfer to: Surgery-      SICU COURSE:  34y Female HD# 3d, pmhx of G6PD deficiency,  section x4, s/p laparoscopic cholecystectomy in [facility] (24) complicated by post-operative leak requiring exploratory laparotomy x 2 (24 and May 2024), s/p PTC drain placement by IR now presents s/p laparoscopic converted to open Enrrique-en-y hepaticojejunostomy. Patient was medically cleared by ASHLIE Gonzalez. Intraoperatively, patient required up to 0.6 Phenylephrine and received 1u pRBCs; case was converted to open with extensive lysis of adhesions and E5 injury of CHD. Post-operatively patient no longer on pressors and was extubated prior to PACU. SICU consulted for hemodynamic monitoring. During SICU stay patient remained sinus tachycardic 100-120s and gradually improving. Remains normotensive without requirement of pressures. She has advanced to PO clear liquid diet, adequate pain control, and able to get out of bed and ambulating.    SICU COURSE EVENTS :  : admitted to SICU service  8/15: sinus tachycardia with chest pain (more with swallowing) (EKG sinus tachycardia and troponin negative), removing garcia at midnight, 500cc LR bolus for sinus tachycardia. Garcia dc'd.  : NG tube removed. Slowly tolerating clears. Persistent tachycardia.      PAST MEDICAL & SURGICAL HISTORY:  Glucose 6 phosphatase deficiency  Gestational diabetes   delivery delivered  S/P cholecystectomy  History of exploratory laparotomy      Allergies    celemin&amp; celepid given in [facility] caused Rigors, was able to tolerate albumin (Other)  sulfa drugs (Rash)    Intolerances      MEDICATIONS  (STANDING):  acetaminophen     Tablet .. 650 milliGRAM(s) Oral every 6 hours  chlorhexidine 2% Cloths 1 Application(s) Topical <User Schedule>  enoxaparin Injectable 40 milliGRAM(s) SubCutaneous every 24 hours  ketorolac   Injectable 15 milliGRAM(s) IV Push every 6 hours  lactated ringers. 1000 milliLiter(s) (50 mL/Hr) IV Continuous <Continuous>  pantoprazole    Tablet 40 milliGRAM(s) Oral before breakfast  piperacillin/tazobactam IVPB.. 3.375 Gram(s) IV Intermittent every 6 hours    MEDICATIONS  (PRN):  HYDROmorphone  Injectable 0.25 milliGRAM(s) IV Push every 4 hours PRN Severe Pain (7 - 10)  oxyCODONE    IR 5 milliGRAM(s) Oral every 6 hours PRN Moderate Pain (4 - 6)  traMADol 25 milliGRAM(s) Oral every 8 hours PRN Mild Pain (1 - 3)      Vital Signs Last 24 Hrs  T(C): 36.7 (17 Aug 2024 07:00), Max: 37.3 (16 Aug 2024 12:00)  T(F): 98.1 (17 Aug 2024 07:00), Max: 99.1 (16 Aug 2024 12:00)  HR: 109 (17 Aug 2024 07:00) (105 - 123)  BP: 134/88 (17 Aug 2024 07:00) (124/74 - 140/92)  BP(mean): 106 (17 Aug 2024 07:00) (94 - 112)  RR: 22 (17 Aug 2024 07:00) (14 - 24)  SpO2: 98% (17 Aug 2024 07:00) (97% - 100%)    Parameters below as of 17 Aug 2024 07:00  Patient On (Oxygen Delivery Method): room air      I&O's Summary    16 Aug 2024 07:01  -  17 Aug 2024 07:00  --------------------------------------------------------  IN: 2760 mL / OUT: 2156 mL / NET: 604 mL        LABS  LABS:                        9.2    10.96 )-----------( 318      ( 16 Aug 2024 21:23 )             28.7       08-16    136  |  100  |  7<L>  ----------------------------<  79  3.5   |  26  |  0.5<L>    Ca    8.1<L>      16 Aug 2024 21:23  Phos  2.5     08-16  Mg     1.8     08-16    TPro  5.1<L>  /  Alb  3.0<L>  /  TBili  1.0  /  DBili  0.4<H>  /  AST  21  /  ALT  36  /  AlkPhos  211<H>  08-16        CARDIAC MARKERS ( 15 Aug 2024 14:27 )  x     / x     / x     / x     / 2.1 ng/mL            Assessment & Plan:    34y Female POD# 3 s/p open Enrrique-en-y hepaticojejunostomy.    NEURO:  #Acute pain  - IV Tylenol 1g q6h  - Toradol 15mg q6h  - tramadol 25mg q8h  - Dilaudid 0.25/0.5mg PRN    RESP:   #Oxygenation    -saturating well on RA  #Activity    -increase as tolerated    -encourage use of incentive spirometer - pulling 1L    CARDS:   #Hypotensive intraoperatively- resolved  - remains off pressors  #Sinus tachycardia  - -120s, suspect likely stress reactive after procedure.  - Trop 10, EKG sinus tachycardia    GI/NUTR:   #Diet,     - clear liquid diet    -aspiration precautions, HOB 30  #GI Prophylaxis    -not indicated  #Bowel regimen    -passing gas after resuming clear liquid (); no BM yet.  #s/p laparoscopic converted to open Enrrique-en-y hepaticojejunostomy  - pain control  - hemodynamic monitoring    /RENAL:   #urine output     - no garcia, voiding spontaneously  #maintain euvolemia  - LR @ 100mL/hr    Labs:   BUN/Cr- 4/0.6  -->,  4/0.5  -->    Electrolytes-(08-15 @ 20:40)Na 137 // K 3.6 // Mg 2.0 // Phos 2.8       HEME/ONC:   #DVT prophylaxis   - Lovenox 40mg daily  - downtrending Hgb (10.3 --> 11.5 --> 9.6 --> 9.2 --> 9.2), stabilize, likely hemodilution.   Labs: Hb/Hct:  9.2/28.5  ( @ 13:30)  -->,  9.2/28.7  ( @ 21:23)  -->                      Plts:  315  -->,  318  -->                 PTT/INR:        - trend coags    ID:  #monitor for leukocytosis/fever  WBC- 12.31  --->>,  13.11  --->>,  10.96  --->>  Temp trend- 24hrs T(F): 98.8 ( @ 00:00), Max: 99.2 ( @ 08:00)  Current antibiotics-piperacillin/tazobactam IVPB.. 3.375 every 6 hours    ENDO:    - Glucose goal 140-180    MSK:     Activity - Ambulate as Tolerated:     Time/Priority:  Routine (24 @ 20:15)    SKIN:  #DTI screening    -wound assessment pending    LINES/DRAINS:  PIV    ADVANCED DIRECTIVES:  Full Code    HCP/Emergency Contact- Ayden Love [phone]      Follow Up:  - labs    Signed out to:  Date:  Time: SICU Transfer Note    MICHAEL LAL  34y (1990)  [id number]      Transfer from: SICU  Transfer to: Surgery-      SICU COURSE:  34y Female HD# 3d, pmhx of G6PD deficiency,  section x4, s/p laparoscopic cholecystectomy in [facility] (24) complicated by post-operative leak requiring exploratory laparotomy x 2 (24 and May 2024), s/p PTC drain placement by IR now presents s/p laparoscopic converted to open Enrrique-en-y hepaticojejunostomy. Patient was medically cleared by ASHLIE Gonzalez. Intraoperatively, patient required up to 0.6 Phenylephrine and received 1u pRBCs; case was converted to open with extensive lysis of adhesions and E5 injury of CHD. Post-operatively patient no longer on pressors and was extubated prior to PACU. SICU consulted for hemodynamic monitoring. During SICU stay patient remained sinus tachycardic 100-120s and gradually improving. Remains normotensive without requirement of pressures. She has advanced to PO clear liquid diet, adequate pain control, and able to get out of bed and ambulating.    SICU COURSE EVENTS :  : admitted to SICU service  8/15: sinus tachycardia with chest pain (more with swallowing) (EKG sinus tachycardia and troponin negative), removing garcia at midnight, 500cc LR bolus for sinus tachycardia. Garcia dc'd.  : NG tube removed. Slowly tolerating clears. Persistent tachycardia.      PAST MEDICAL & SURGICAL HISTORY:  Glucose 6 phosphatase deficiency  Gestational diabetes   delivery delivered  S/P cholecystectomy  History of exploratory laparotomy      Allergies    celemin&amp; celepid given in [facility] caused Rigors, was able to tolerate albumin (Other)  sulfa drugs (Rash)    Intolerances      MEDICATIONS  (STANDING):  acetaminophen     Tablet .. 650 milliGRAM(s) Oral every 6 hours  chlorhexidine 2% Cloths 1 Application(s) Topical <User Schedule>  enoxaparin Injectable 40 milliGRAM(s) SubCutaneous every 24 hours  ketorolac   Injectable 15 milliGRAM(s) IV Push every 6 hours  lactated ringers. 1000 milliLiter(s) (50 mL/Hr) IV Continuous <Continuous>  pantoprazole    Tablet 40 milliGRAM(s) Oral before breakfast  piperacillin/tazobactam IVPB.. 3.375 Gram(s) IV Intermittent every 6 hours    MEDICATIONS  (PRN):  HYDROmorphone  Injectable 0.25 milliGRAM(s) IV Push every 4 hours PRN Severe Pain (7 - 10)  oxyCODONE    IR 5 milliGRAM(s) Oral every 6 hours PRN Moderate Pain (4 - 6)  traMADol 25 milliGRAM(s) Oral every 8 hours PRN Mild Pain (1 - 3)      Vital Signs Last 24 Hrs  T(C): 36.7 (17 Aug 2024 07:00), Max: 37.3 (16 Aug 2024 12:00)  T(F): 98.1 (17 Aug 2024 07:00), Max: 99.1 (16 Aug 2024 12:00)  HR: 109 (17 Aug 2024 07:00) (105 - 123)  BP: 134/88 (17 Aug 2024 07:00) (124/74 - 140/92)  BP(mean): 106 (17 Aug 2024 07:00) (94 - 112)  RR: 22 (17 Aug 2024 07:00) (14 - 24)  SpO2: 98% (17 Aug 2024 07:00) (97% - 100%)    Parameters below as of 17 Aug 2024 07:00  Patient On (Oxygen Delivery Method): room air      I&O's Summary    16 Aug 2024 07:01  -  17 Aug 2024 07:00  --------------------------------------------------------  IN: 2760 mL / OUT: 2156 mL / NET: 604 mL        LABS  LABS:                        9.2    10.96 )-----------( 318      ( 16 Aug 2024 21:23 )             28.7       08-16    136  |  100  |  7<L>  ----------------------------<  79  3.5   |  26  |  0.5<L>    Ca    8.1<L>      16 Aug 2024 21:23  Phos  2.5     08-16  Mg     1.8     08-16    TPro  5.1<L>  /  Alb  3.0<L>  /  TBili  1.0  /  DBili  0.4<H>  /  AST  21  /  ALT  36  /  AlkPhos  211<H>  08-16        CARDIAC MARKERS ( 15 Aug 2024 14:27 )  x     / x     / x     / x     / 2.1 ng/mL            Assessment & Plan:    34y Female POD# 3 s/p open Enrrique-en-y hepaticojejunostomy.    NEURO:  #Acute pain  - IV Tylenol 1g q6h  - Toradol 15mg q6h  - tramadol 25mg q8h  - Dilaudid 0.25/0.5mg PRN    RESP:   #Oxygenation    -saturating well on RA  #Activity    -increase as tolerated    -encourage use of incentive spirometer - pulling 1L    CARDS:   #Hypotensive intraoperatively- resolved  - remains off pressors  #Sinus tachycardia  - -120s, suspect likely stress reactive after procedure.  - Trop 10, EKG sinus tachycardia    GI/NUTR:   #Diet,     - clear liquid diet    -aspiration precautions, HOB 30  #GI Prophylaxis    -not indicated  #Bowel regimen    -passing gas after resuming clear liquid (); no BM yet.  #s/p laparoscopic converted to open Enrrique-en-y hepaticojejunostomy  - pain control  - hemodynamic monitoring    /RENAL:   #urine output     - no garcia, voiding spontaneously  #maintain euvolemia  - LR @ 100mL/hr    Labs:   BUN/Cr- 4/0.6  -->,  4/0.5  -->    Electrolytes-(08-15 @ 20:40)Na 137 // K 3.6 // Mg 2.0 // Phos 2.8       HEME/ONC:   #DVT prophylaxis   - Lovenox 40mg daily  - downtrending Hgb (10.3 --> 11.5 --> 9.6 --> 9.2 --> 9.2), stabilize, likely hemodilution.   Labs: Hb/Hct:  9.2/28.5  ( @ 13:30)  -->,  9.2/28.7  ( @ 21:23)  -->                      Plts:  315  -->,  318  -->                 PTT/INR:        - trend coags    ID:  #monitor for leukocytosis/fever  WBC- 12.31  --->>,  13.11  --->>,  10.96  --->>  Temp trend- 24hrs T(F): 98.8 ( @ 00:00), Max: 99.2 ( @ 08:00)  Current antibiotics-piperacillin/tazobactam IVPB.. 3.375 every 6 hours    ENDO:    - Glucose goal 140-180    MSK:     Activity - Ambulate as Tolerated:     Time/Priority:  Routine (24 @ 20:15)    SKIN:  #DTI screening    -wound assessment pending    LINES/DRAINS:  PIV    ADVANCED DIRECTIVES:  Full Code    HCP/Emergency Contact- Ayden Love [phone]      Follow Up:  - labs    Signed out to: Tyler Estes  Date: 2024  Time: 2024

## 2024-08-17 NOTE — PROGRESS NOTE ADULT - SUBJECTIVE AND OBJECTIVE BOX
MICHAEL LAL   [id number]   [birth year]    34yF  ============================================================   DATE OF INITIAL SICU/SDU CONSULT: 08-14-24    INDICATION FOR SICU CONSULT: hemodynamic monitoring s/p hepaticojejunostomy requiring pressors intraoperatively     SICU COURSE EVENTS :  8/14: admitted to SICU service  8/15: sinus tachycardia with chest pain (more with swallowing) (EKG sinus tachycardia and troponin negative), removing garcia at midnight, 500cc LR bolus for sinus tachycardia. Garcia dc'd.  8/16: NG tube removed. Slowly tolerating clears. Persistent tachycardia.     24HOUR EVENTS  8/16  NIGHT  - Zofran x1 for nausea, slow intake  - HR low 120s  - RA, pulling 750cc on IS  - K repleted with Kphos 15, KCl 40 PO  - Mg 1g    DAY  - d/c NGT, clears w/ clear ensures  - APAP, tramdol, and oxy for pain reg    [X] A ten-point review of systems was negative except as expressed in note.  [X] History was obtained from patient. If unable to participate in their care, history obtained from review of the chart and collateral sources of information.  ============================================================      MICHAEL LAL   [id number]   [birth year]    34yF  ============================================================   DATE OF INITIAL SICU/SDU CONSULT: 08-14-24    INDICATION FOR SICU CONSULT: hemodynamic monitoring s/p hepaticojejunostomy requiring pressors intraoperatively     SICU COURSE EVENTS :  8/14: admitted to SICU service  8/15: sinus tachycardia with chest pain (more with swallowing) (EKG sinus tachycardia and troponin negative), removing garcia at midnight, 500cc LR bolus for sinus tachycardia. Garcia dc'd.  8/16: NG tube removed. Slowly tolerating clears. Persistent tachycardia.     24HOUR EVENTS  8/16  NIGHT  - Zofran x1 for nausea, slow intake  - HR low 120s  - RA, pulling 750cc on IS  - K repleted with Kphos 15, KCl 40 PO  - Mg 1g    DAY  - d/c NGT, clears w/ clear ensures  - APAP, tramdol, and oxy for pain reg    [X] A ten-point review of systems was negative except as expressed in note.  [X] History was obtained from patient. If unable to participate in their care, history obtained from review of the chart and collateral sources of information.  ============================================================     Daily     Daily     Diet, Clear Liquid:   Supplement Feeding Modality:  Oral  Ensure Clear Cans or Servings Per Day:  3       Frequency:  Three Times a day (08-16-24 @ 10:30)      CURRENT MEDS:  Neurologic Medications  acetaminophen     Tablet .. 650 milliGRAM(s) Oral every 6 hours  HYDROmorphone  Injectable 0.25 milliGRAM(s) IV Push every 4 hours PRN Severe Pain (7 - 10)  ketorolac   Injectable 15 milliGRAM(s) IV Push every 6 hours  oxyCODONE    IR 5 milliGRAM(s) Oral every 6 hours PRN Moderate Pain (4 - 6)  traMADol 25 milliGRAM(s) Oral every 8 hours PRN Mild Pain (1 - 3)    Respiratory Medications    Cardiovascular Medications    Gastrointestinal Medications  lactated ringers. 1000 milliLiter(s) IV Continuous <Continuous>  pantoprazole    Tablet 40 milliGRAM(s) Oral before breakfast    Genitourinary Medications    Hematologic/Oncologic Medications  enoxaparin Injectable 40 milliGRAM(s) SubCutaneous every 24 hours    Antimicrobial/Immunologic Medications  piperacillin/tazobactam IVPB.. 3.375 Gram(s) IV Intermittent every 6 hours    Endocrine/Metabolic Medications    Topical/Other Medications  chlorhexidine 2% Cloths 1 Application(s) Topical <User Schedule>      ICU Vital Signs Last 24 Hrs  T(C): 36.6 (17 Aug 2024 11:00), Max: 37.1 (16 Aug 2024 17:00)  T(F): 97.9 (17 Aug 2024 11:00), Max: 98.8 (16 Aug 2024 17:00)  HR: 105 (17 Aug 2024 11:00) (105 - 123)  BP: 134/87 (17 Aug 2024 11:00) (124/74 - 140/92)  BP(mean): 107 (17 Aug 2024 11:00) (94 - 112)  ABP: --  ABP(mean): --  RR: 22 (17 Aug 2024 11:00) (16 - 24)  SpO2: 99% (17 Aug 2024 11:00) (97% - 100%)    O2 Parameters below as of 17 Aug 2024 11:00  Patient On (Oxygen Delivery Method): room air            Adult Advanced Hemodynamics Last 24 Hrs  CVP(mm Hg): --  CVP(cm H2O): --  CO: --  CI: --  PA: --  PA(mean): --  PCWP: --  SVR: --  SVRI: --  PVR: --  PVRI: --          I&O's Summary    16 Aug 2024 07:01  -  17 Aug 2024 07:00  --------------------------------------------------------  IN: 2760 mL / OUT: 2156 mL / NET: 604 mL    17 Aug 2024 07:01  -  17 Aug 2024 12:39  --------------------------------------------------------  IN: 300 mL / OUT: 0 mL / NET: 300 mL      I&O's Detail    16 Aug 2024 07:01  -  17 Aug 2024 07:00  --------------------------------------------------------  IN:    IV PiggyBack: 300 mL    IV PiggyBack: 100 mL    IV PiggyBack: 100 mL    IV PiggyBack: 250 mL    Lactated Ringers: 900 mL    Lactated Ringers: 750 mL    Oral Fluid: 360 mL  Total IN: 2760 mL    OUT:    Bulb (mL): 45 mL    Drain (mL): 10 mL    Indwelling Catheter - Urethral (mL): 1 mL    Nasogastric/Oral tube (mL): 0 mL    Voided (mL): 2100 mL  Total OUT: 2156 mL    Total NET: 604 mL      17 Aug 2024 07:01  -  17 Aug 2024 12:39  --------------------------------------------------------  IN:    Lactated Ringers: 300 mL  Total IN: 300 mL    OUT:  Total OUT: 0 mL    Total NET: 300 mL          PHYSICAL EXAM:    General: NAD, A&Ox3.  Lungs:  clear to auscultation, Normal expansion/effort. 1L on incentive spirometer  Cardiovascular : tachycardic rate 100-110s, regular rhythm  GI: Abdomen soft, mild tenderness palpation, Non-distended. Subcostal incision covered with surgical dressing (lightly stained with serosanguinous fluid). Right hepatic stents (x2) with ostomy appliance over it (some bilious fluid present), left drain (serous fluid present).  Extremities: Extremities warm, pink, well-perfused.  Derm: Good skin turgor, no skin breakdown.        CXR:     LABS:  CAPILLARY BLOOD GLUCOSE      POCT Blood Glucose.: 81 mg/dL (16 Aug 2024 19:27)                          9.2    10.96 )-----------( 318      ( 16 Aug 2024 21:23 )             28.7       08-16    136  |  100  |  7<L>  ----------------------------<  79  3.5   |  26  |  0.5<L>    Ca    8.1<L>      16 Aug 2024 21:23  Phos  2.5     08-16  Mg     1.8     08-16    TPro  5.1<L>  /  Alb  3.0<L>  /  TBili  1.0  /  DBili  0.4<H>  /  AST  21  /  ALT  36  /  AlkPhos  211<H>  08-16        CARDIAC MARKERS ( 15 Aug 2024 14:27 )  x     / x     / x     / x     / 2.1 ng/mL      Urinalysis Basic - ( 16 Aug 2024 21:23 )    Color: x / Appearance: x / SG: x / pH: x  Gluc: 79 mg/dL / Ketone: x  / Bili: x / Urobili: x   Blood: x / Protein: x / Nitrite: x   Leuk Esterase: x / RBC: x / WBC x   Sq Epi: x / Non Sq Epi: x / Bacteria: x

## 2024-08-17 NOTE — PROGRESS NOTE ADULT - SUBJECTIVE AND OBJECTIVE BOX
GENERAL SURGERY PROGRESS NOTE     MICHAEL LAL  [address] [place] [address] day :4d    POD:  Procedure: Diagnostic laparoscopy with laparotomy    Lysis of peritoneal adhesions    Hepaticotomy, with hepaticojejunostomy      Surgical Attending: Paz Forde  Overnight events: No acute events overnight. Pt reports mild tenderness to palpation at incision sites. Pt has right side drain with stents draining bilious output into ostomy appliance. She is tolerating CLD without any nausea or vomiting. She denies any gas or BM at this time    T(F): 98.8 (24 @ 00:00), Max: 99.2 (24 @ 08:00)  HR: 118 (24 @ 00:00) (105 - 123)  BP: 134/80 (24 @ 00:00) (119/69 - 140/92)  ABP: --  ABP(mean): --  RR: 24 (24 @ 00:00) (13 - 26)  SpO2: 98% (24 @ 00:00) (97% - 100%)    IN'S / OUT's:    08-15-24 @ 07:01  -  24 @ 07:00  --------------------------------------------------------  IN:    IV PiggyBack: 200 mL    Lactated Ringers: 2400 mL    Lactated Ringers Bolus: 500 mL  Total IN: 3100 mL    OUT:    Bulb (mL): 100 mL    Drain (mL): 20 mL    Indwelling Catheter - Urethral (mL): 1445 mL    Nasogastric/Oral tube (mL): 300 mL    Voided (mL): 600 mL  Total OUT: 2465 mL    Total NET: 635 mL      24 @ 07:01  -  24 @ 03:11  --------------------------------------------------------  IN:    IV PiggyBack: 300 mL    IV PiggyBack: 25 mL    IV PiggyBack: 62.5 mL    IV PiggyBack: 100 mL    Lactated Ringers: 900 mL    Lactated Ringers: 450 mL    Oral Fluid: 360 mL  Total IN: 2197.5 mL    OUT:    Bulb (mL): 25 mL    Drain (mL): 10 mL    Indwelling Catheter - Urethral (mL): 1 mL    Nasogastric/Oral tube (mL): 0 mL    Voided (mL): 1700 mL  Total OUT: 1736 mL    Total NET: 461.5 mL          PHYSICAL EXAM:  GENERAL: NAD  CHEST/LUNG: Equal chest rise b/l  HEART: Regular rate and rhythm  ABDOMEN: Soft, mildly tender at incision sites, Nondistended; Right side drain with stents draining bilious fluid into ostomy appliance, surgical site with dressing in place, Dressing C/D/I with no evidence of bleeding or drainage   EXTREMITIES:  No clubbing, cyanosis, or edema      LABS  Labs:  CAPILLARY BLOOD GLUCOSE      POCT Blood Glucose.: 81 mg/dL (16 Aug 2024 19:27)  POCT Blood Glucose.: 94 mg/dL (16 Aug 2024 05:38)                          9.2    10.96 )-----------( 318      ( 16 Aug 2024 21:23 )             28.7       Auto Neutrophil %: 73.7 % (24 @ 13:30)  Auto Immature Granulocyte %: 0.3 % (24 @ 13:30)        136  |  100  |  7<L>  ----------------------------<  79  3.5   |  26  |  0.5<L>      Calcium: 8.1 mg/dL (24 @ 21:23)      LFTs:             5.1  | 1.0  | 21       ------------------[211     ( 16 Aug 2024 21:23 )  3.0  | 0.4  | 36          Lipase:x      Amylase:x         Lactate, Blood: 1.6 mmol/L (24 @ 20:32)      Coags:    CARDIAC MARKERS ( 15 Aug 2024 14:27 )  x     / x     / x     / x     / 2.1 ng/mL          Urinalysis Basic - ( 16 Aug 2024 21:23 )    Color: x / Appearance: x / SG: x / pH: x  Gluc: 79 mg/dL / Ketone: x  / Bili: x / Urobili: x   Blood: x / Protein: x / Nitrite: x   Leuk Esterase: x / RBC: x / WBC x   Sq Epi: x / Non Sq Epi: x / Bacteria: x            RADIOLOGY & ADDITIONAL TESTS:      A/P:  MICHAEL LAL is a 34y F w/ PMHx of G6PD deficiency,  section x4, s/p laparoscopic cholecystectomy in [facility] (24) complicated by post-operative leak requiring exploratory laparotomy x 2 (24 and May 2024), s/p PTC drain placement by IR now presents s/p laparoscopic converted to open Enrrique-en-y hepaticojejunostomy.       PLAN:   - CLD  - monitor for return of Bowel function  - monitor right side stent drain quality and quantity   - multi modal pain  control  - continue Zosyn  - DVT and GI ppx  - encourage ambulation as tolerated   - daily dressing changes  - Daily labs, replete electrolytes as needed       #Antibiotics: piperacillin/tazobactam IVPB.. 3.375 Gram(s) IV Intermittent every 6 hours, 24 @ 20:20   Day **  #DVT ppx: enoxaparin Injectable 40 milliGRAM(s) SubCutaneous every 24 hours    #GI ppx: pantoprazole    Tablet 40 milliGRAM(s) Oral before breakfast    Disposition:  SDU    Above plan to be discussed with Attending Surgeon Dr. Villafana  , patient, patient family, and rest of health care team    [employer] [id number]

## 2024-08-18 LAB
ALBUMIN SERPL ELPH-MCNC: 2.9 G/DL — LOW (ref 3.5–5.2)
ALP SERPL-CCNC: 309 U/L — HIGH (ref 30–115)
ALT FLD-CCNC: 29 U/L — SIGNIFICANT CHANGE UP (ref 0–41)
ANION GAP SERPL CALC-SCNC: 10 MMOL/L — SIGNIFICANT CHANGE UP (ref 7–14)
APTT BLD: 36.5 SEC — SIGNIFICANT CHANGE UP (ref 27–39.2)
AST SERPL-CCNC: 26 U/L — SIGNIFICANT CHANGE UP (ref 0–41)
BASOPHILS # BLD AUTO: 0.01 K/UL — SIGNIFICANT CHANGE UP (ref 0–0.2)
BASOPHILS NFR BLD AUTO: 0.1 % — SIGNIFICANT CHANGE UP (ref 0–1)
BILIRUB DIRECT SERPL-MCNC: 0.3 MG/DL — SIGNIFICANT CHANGE UP (ref 0–0.3)
BILIRUB INDIRECT FLD-MCNC: 0.4 MG/DL — SIGNIFICANT CHANGE UP (ref 0.2–1.2)
BILIRUB SERPL-MCNC: 0.7 MG/DL — SIGNIFICANT CHANGE UP (ref 0.2–1.2)
BUN SERPL-MCNC: 5 MG/DL — LOW (ref 10–20)
CALCIUM SERPL-MCNC: 8 MG/DL — LOW (ref 8.4–10.4)
CHLORIDE SERPL-SCNC: 100 MMOL/L — SIGNIFICANT CHANGE UP (ref 98–110)
CO2 SERPL-SCNC: 27 MMOL/L — SIGNIFICANT CHANGE UP (ref 17–32)
CREAT SERPL-MCNC: 0.5 MG/DL — LOW (ref 0.7–1.5)
EGFR: 127 ML/MIN/1.73M2 — SIGNIFICANT CHANGE UP
EOSINOPHIL # BLD AUTO: 0.38 K/UL — SIGNIFICANT CHANGE UP (ref 0–0.7)
EOSINOPHIL NFR BLD AUTO: 5.1 % — SIGNIFICANT CHANGE UP (ref 0–8)
GLUCOSE SERPL-MCNC: 78 MG/DL — SIGNIFICANT CHANGE UP (ref 70–99)
HCT VFR BLD CALC: 29.2 % — LOW (ref 37–47)
HGB BLD-MCNC: 9.4 G/DL — LOW (ref 12–16)
IMM GRANULOCYTES NFR BLD AUTO: 0.3 % — SIGNIFICANT CHANGE UP (ref 0.1–0.3)
INR BLD: 1.36 RATIO — HIGH (ref 0.65–1.3)
LYMPHOCYTES # BLD AUTO: 1.36 K/UL — SIGNIFICANT CHANGE UP (ref 1.2–3.4)
LYMPHOCYTES # BLD AUTO: 18.1 % — LOW (ref 20.5–51.1)
MAGNESIUM SERPL-MCNC: 1.9 MG/DL — SIGNIFICANT CHANGE UP (ref 1.8–2.4)
MCHC RBC-ENTMCNC: 29.2 PG — SIGNIFICANT CHANGE UP (ref 27–31)
MCHC RBC-ENTMCNC: 32.2 G/DL — SIGNIFICANT CHANGE UP (ref 32–37)
MCV RBC AUTO: 90.7 FL — SIGNIFICANT CHANGE UP (ref 81–99)
MONOCYTES # BLD AUTO: 0.66 K/UL — HIGH (ref 0.1–0.6)
MONOCYTES NFR BLD AUTO: 8.8 % — SIGNIFICANT CHANGE UP (ref 1.7–9.3)
NEUTROPHILS # BLD AUTO: 5.07 K/UL — SIGNIFICANT CHANGE UP (ref 1.4–6.5)
NEUTROPHILS NFR BLD AUTO: 67.6 % — SIGNIFICANT CHANGE UP (ref 42.2–75.2)
NRBC # BLD: 0 /100 WBCS — SIGNIFICANT CHANGE UP (ref 0–0)
PHOSPHATE SERPL-MCNC: 3.3 MG/DL — SIGNIFICANT CHANGE UP (ref 2.1–4.9)
PLATELET # BLD AUTO: 358 K/UL — SIGNIFICANT CHANGE UP (ref 130–400)
PMV BLD: 9.8 FL — SIGNIFICANT CHANGE UP (ref 7.4–10.4)
POTASSIUM SERPL-MCNC: 3.6 MMOL/L — SIGNIFICANT CHANGE UP (ref 3.5–5)
POTASSIUM SERPL-SCNC: 3.6 MMOL/L — SIGNIFICANT CHANGE UP (ref 3.5–5)
PROT SERPL-MCNC: 5.4 G/DL — LOW (ref 6–8)
PROTHROM AB SERPL-ACNC: 15.6 SEC — HIGH (ref 9.95–12.87)
RBC # BLD: 3.22 M/UL — LOW (ref 4.2–5.4)
RBC # FLD: 15 % — HIGH (ref 11.5–14.5)
SODIUM SERPL-SCNC: 137 MMOL/L — SIGNIFICANT CHANGE UP (ref 135–146)
WBC # BLD: 7.5 K/UL — SIGNIFICANT CHANGE UP (ref 4.8–10.8)
WBC # FLD AUTO: 7.5 K/UL — SIGNIFICANT CHANGE UP (ref 4.8–10.8)

## 2024-08-18 PROCEDURE — 99233 SBSQ HOSP IP/OBS HIGH 50: CPT | Mod: 24

## 2024-08-18 RX ORDER — POTASSIUM CHLORIDE 10 MEQ
40 TABLET, EXT RELEASE, PARTICLES/CRYSTALS ORAL EVERY 4 HOURS
Refills: 0 | Status: COMPLETED | OUTPATIENT
Start: 2024-08-18 | End: 2024-08-19

## 2024-08-18 RX ORDER — LIDOCAINE/BENZALKONIUM/ALCOHOL
1 SOLUTION, NON-ORAL TOPICAL DAILY
Refills: 0 | Status: DISCONTINUED | OUTPATIENT
Start: 2024-08-18 | End: 2024-08-20

## 2024-08-18 RX ORDER — HYDROMORPHONE HYDROCHLORIDE 2 MG/1
0.2 TABLET ORAL ONCE
Refills: 0 | Status: DISCONTINUED | OUTPATIENT
Start: 2024-08-18 | End: 2024-08-18

## 2024-08-18 RX ORDER — ONDANSETRON 2 MG/ML
4 INJECTION, SOLUTION INTRAMUSCULAR; INTRAVENOUS ONCE
Refills: 0 | Status: COMPLETED | OUTPATIENT
Start: 2024-08-18 | End: 2024-08-18

## 2024-08-18 RX ORDER — KETOROLAC TROMETHAMINE 30 MG/ML
15 INJECTION, SOLUTION INTRAMUSCULAR EVERY 6 HOURS
Refills: 0 | Status: DISCONTINUED | OUTPATIENT
Start: 2024-08-18 | End: 2024-08-20

## 2024-08-18 RX ORDER — METHOCARBAMOL 750 MG/1
500 TABLET, FILM COATED ORAL EVERY 8 HOURS
Refills: 0 | Status: DISCONTINUED | OUTPATIENT
Start: 2024-08-18 | End: 2024-08-20

## 2024-08-18 RX ADMIN — ACETAMINOPHEN 650 MILLIGRAM(S): 325 TABLET ORAL at 12:34

## 2024-08-18 RX ADMIN — KETOROLAC TROMETHAMINE 15 MILLIGRAM(S): 30 INJECTION, SOLUTION INTRAMUSCULAR at 11:27

## 2024-08-18 RX ADMIN — HYDROMORPHONE HYDROCHLORIDE 0.25 MILLIGRAM(S): 2 TABLET ORAL at 01:39

## 2024-08-18 RX ADMIN — HYDROMORPHONE HYDROCHLORIDE 0.25 MILLIGRAM(S): 2 TABLET ORAL at 07:47

## 2024-08-18 RX ADMIN — ACETAMINOPHEN 650 MILLIGRAM(S): 325 TABLET ORAL at 05:32

## 2024-08-18 RX ADMIN — PIPERACILLIN SODIUM AND TAZOBACTAM SODIUM 25 GRAM(S): 3; .375 INJECTION, POWDER, FOR SOLUTION INTRAVENOUS at 17:46

## 2024-08-18 RX ADMIN — ONDANSETRON 4 MILLIGRAM(S): 2 INJECTION, SOLUTION INTRAMUSCULAR; INTRAVENOUS at 11:26

## 2024-08-18 RX ADMIN — KETOROLAC TROMETHAMINE 15 MILLIGRAM(S): 30 INJECTION, SOLUTION INTRAMUSCULAR at 21:40

## 2024-08-18 RX ADMIN — Medication 40 MILLIGRAM(S): at 05:32

## 2024-08-18 RX ADMIN — Medication 40 MILLIEQUIVALENT(S): at 23:02

## 2024-08-18 RX ADMIN — PIPERACILLIN SODIUM AND TAZOBACTAM SODIUM 25 GRAM(S): 3; .375 INJECTION, POWDER, FOR SOLUTION INTRAVENOUS at 12:34

## 2024-08-18 RX ADMIN — ACETAMINOPHEN 650 MILLIGRAM(S): 325 TABLET ORAL at 23:03

## 2024-08-18 RX ADMIN — HYDROMORPHONE HYDROCHLORIDE 0.25 MILLIGRAM(S): 2 TABLET ORAL at 02:09

## 2024-08-18 RX ADMIN — ENOXAPARIN SODIUM 40 MILLIGRAM(S): 100 INJECTION SUBCUTANEOUS at 05:32

## 2024-08-18 RX ADMIN — PIPERACILLIN SODIUM AND TAZOBACTAM SODIUM 25 GRAM(S): 3; .375 INJECTION, POWDER, FOR SOLUTION INTRAVENOUS at 23:03

## 2024-08-18 RX ADMIN — Medication 25 GRAM(S): at 01:42

## 2024-08-18 RX ADMIN — Medication 1 PATCH: at 19:02

## 2024-08-18 RX ADMIN — Medication 40 MILLIEQUIVALENT(S): at 01:42

## 2024-08-18 RX ADMIN — Medication 1 PATCH: at 17:52

## 2024-08-18 RX ADMIN — KETOROLAC TROMETHAMINE 15 MILLIGRAM(S): 30 INJECTION, SOLUTION INTRAMUSCULAR at 21:10

## 2024-08-18 RX ADMIN — OXYCODONE HYDROCHLORIDE 5 MILLIGRAM(S): 5 TABLET ORAL at 00:21

## 2024-08-18 RX ADMIN — METHOCARBAMOL 500 MILLIGRAM(S): 750 TABLET, FILM COATED ORAL at 14:20

## 2024-08-18 RX ADMIN — PIPERACILLIN SODIUM AND TAZOBACTAM SODIUM 25 GRAM(S): 3; .375 INJECTION, POWDER, FOR SOLUTION INTRAVENOUS at 05:31

## 2024-08-18 RX ADMIN — CHLORHEXIDINE GLUCONATE 1 APPLICATION(S): 40 SOLUTION TOPICAL at 06:55

## 2024-08-18 RX ADMIN — HYDROMORPHONE HYDROCHLORIDE 0.2 MILLIGRAM(S): 2 TABLET ORAL at 14:07

## 2024-08-18 RX ADMIN — OXYCODONE HYDROCHLORIDE 5 MILLIGRAM(S): 5 TABLET ORAL at 17:46

## 2024-08-18 RX ADMIN — METHOCARBAMOL 500 MILLIGRAM(S): 750 TABLET, FILM COATED ORAL at 21:10

## 2024-08-18 RX ADMIN — ACETAMINOPHEN 650 MILLIGRAM(S): 325 TABLET ORAL at 17:46

## 2024-08-18 NOTE — PROGRESS NOTE ADULT - ASSESSMENT
A/P:  MICHAEL LAL is a 34y F w/ PMHx of G6PD deficiency,  section x4, s/p laparoscopic cholecystectomy in [facility] (24) complicated by post-operative leak requiring exploratory laparotomy x 2 (24 and May 2024), POD# 4 s/p PTC drain placement by IR now presents s/p laparoscopic converted to open Enrrique-en-y hepaticojejunostomy.   24hr events: Zofran x1 for nausea, no nausea afterwards, no vomiting. 1L on IS, electrolytes repleted. Ambulating from bed to chair. States the abdominal pain is better. Passed ToV. Downgraded to 4C from SICU SDU. +g, -bm. 2g Mg and 40mEQ for KCl PO given.    PLAN:   - CLD  - monitor for return of Bowel function  - monitor right side stent drain quality and quantity   - multi modal pain  control  - continue Zosyn  - DVT and GI ppx  - encourage ambulation as tolerated   - dressing changes as needed  - Daily labs, replete electrolytes as needed

## 2024-08-18 NOTE — PROGRESS NOTE ADULT - ATTENDING COMMENTS
pt examined  s/p exntensive REBEKAH   with Hepatico-Jejunostomy  doing well  vitals improing  abdomen soft . non tender  malgorzata drain sero sang      will advance to liquid diet   oob ambulate
pt examined  s/p exntensive REBEKAH   with Hepatico-Jejunostomy  doing well  vitals improing  abdomen soft . non tner       will advance to liquid diet   oob ambulate
Please see my Consult SICU note.
POD 3.  Patient complains of incisional pain but better.  On IS able to do 1L.  Was started on clears. Has flatus, no BM.  RON 45 serous. Bilary stents draining adequately.  Sinus tachycardia better.    PE:  AAO x3  Chest: bilateral breath sounds  CV : rrr  Abdomen: soft    ASSESSMENT:  33 y/o female with CBD Injury.  S/P Ex. Lap. Extensive REBEKAH.  Hepaticotomy, with hepaticojejunostomy.  Acute postoperative pain.  Atelectasis.  At risk for hemodynamic instability.  G6PD Deficiency.    PLAN:  - Tylenol, Toradol prn pain  - encourage incentive spirometer  - keep MAP>65; on ivf   - continue clear liquid diet  - follow serum electrolytes and UOP  - ID - on Zosyn  - DVT prop  - OOB
POD 2.  Patient feels better.  NGT removed by primary team.  On IS able to do 1L.  Still in sinus tachy with -120/min.  RON drain with serosanguinous    ASSESSMENT:  33 y/o female with CBD Injury.  S/P Ex. Lap. Extensive REBEKAH.  Hepaticotomy, with hepaticojejunostomy.  Acute postoperative pain.  Atelectasis.  At risk for hemodynamic instability.  G6PD Deficiency.    PLAN:  - pain control - on Tylenol, Toradol; avoid opioids  - incentive spirometer  - chest PT prn  - keep MAP>65; on ivf   - start clears  - follow serum electrolytes and UOP  - ID - on Zosyn  - DVT prop  - needs help for OOB to chair  Continue SDU care today

## 2024-08-18 NOTE — PROGRESS NOTE ADULT - SUBJECTIVE AND OBJECTIVE BOX
GENERAL SURGERY PROGRESS NOTE     MICHAEL LAL  [address] [place] [address] day : 5d    POD: 4d  Procedure: Diagnostic laparoscopy with laparotomy    Lysis of peritoneal adhesions    Hepaticotomy, with hepaticojejunostomy      Surgical Attending: Paz Forde  24hr events: Zofran x1 for nausea, no nausea afterwards, no vomiting. 1L on IS, electrolytes repleted. Ambulating from bed to chair. States the abdominal pain is better. Passed ToV. Downgraded to 4C from SICU SDU. +g, -bm. 2g Mg and 40mEQ for KCl PO given.    PHYSICAL EXAM:  GENERAL: NAD  CHEST/LUNG: Equal chest rise b/l  HEART: low 100s HR, sinus tach  ABDOMEN: Soft, mildly tender at incision sites, Nondistended; Right side drain with stents draining bilious fluid into ostomy appliance, surgical site with dressing in place, Dressing C/D/I with no evidence of bleeding or drainage   EXTREMITIES:  No clubbing, cyanosis, or edema    T(F): 98.9 (08-17-24 @ 20:00), Max: 99.1 (08-17-24 @ 16:00)  HR: 110 (08-17-24 @ 20:00) (99 - 117)  BP: 128/85 (08-17-24 @ 20:00) (128/85 - 136/88)  ABP: --  ABP(mean): --  RR: 20 (08-17-24 @ 20:00) (20 - 23)  SpO2: 98% (08-17-24 @ 20:00) (97% - 99%)    IN'S / OUT's:    08-16-24 @ 07:01  -  08-17-24 @ 07:00  --------------------------------------------------------  IN:    IV PiggyBack: 300 mL    IV PiggyBack: 100 mL    IV PiggyBack: 100 mL    IV PiggyBack: 250 mL    Lactated Ringers: 900 mL    Lactated Ringers: 750 mL    Oral Fluid: 360 mL  Total IN: 2760 mL    OUT:    Bulb (mL): 45 mL    Drain (mL): 10 mL    Indwelling Catheter - Urethral (mL): 1 mL    Nasogastric/Oral tube (mL): 0 mL    Voided (mL): 2100 mL  Total OUT: 2156 mL    Total NET: 604 mL      08-17-24 @ 07:01  -  08-18-24 @ 00:16  --------------------------------------------------------  IN:    IV PiggyBack: 100 mL    Lactated Ringers: 450 mL  Total IN: 550 mL    OUT:    Voided (mL): 700 mL  Total OUT: 700 mL    Total NET: -150 mL          MEDICATIONS  (STANDING):  acetaminophen     Tablet .. 650 milliGRAM(s) Oral every 6 hours  chlorhexidine 2% Cloths 1 Application(s) Topical <User Schedule>  enoxaparin Injectable 40 milliGRAM(s) SubCutaneous every 24 hours  lactated ringers. 1000 milliLiter(s) (50 mL/Hr) IV Continuous <Continuous>  magnesium sulfate  IVPB 2 Gram(s) IV Intermittent once  pantoprazole    Tablet 40 milliGRAM(s) Oral before breakfast  piperacillin/tazobactam IVPB.. 3.375 Gram(s) IV Intermittent every 6 hours  potassium chloride    Tablet ER 40 milliEquivalent(s) Oral once    MEDICATIONS  (PRN):  HYDROmorphone  Injectable 0.25 milliGRAM(s) IV Push every 4 hours PRN Severe Pain (7 - 10)  oxyCODONE    IR 5 milliGRAM(s) Oral every 6 hours PRN Moderate Pain (4 - 6)  traMADol 25 milliGRAM(s) Oral every 8 hours PRN Mild Pain (1 - 3)      LABS  Labs:  CAPILLARY BLOOD GLUCOSE                              8.3    9.40  )-----------( 302      ( 17 Aug 2024 21:57 )             25.3       Auto Neutrophil %: 73.6 % (08-17-24 @ 21:57)  Auto Immature Granulocyte %: 0.3 % (08-17-24 @ 21:57)    08-17    136  |  101  |  6<L>  ----------------------------<  88  3.6   |  26  |  <0.5<L>      Calcium: 7.7 mg/dL (08-17-24 @ 21:57)      LFTs:             4.9  | 0.7  | 16       ------------------[243     ( 17 Aug 2024 21:57 )  2.8  | 0.3  | 27          Lipase:x      Amylase:x             Coags:     16.40  ----< 1.43    ( 17 Aug 2024 21:57 )     35.6                Urinalysis Basic - ( 17 Aug 2024 21:57 )    Color: x / Appearance: x / SG: x / pH: x  Gluc: 88 mg/dL / Ketone: x  / Bili: x / Urobili: x   Blood: x / Protein: x / Nitrite: x   Leuk Esterase: x / RBC: x / WBC x   Sq Epi: x / Non Sq Epi: x / Bacteria: x            RADIOLOGY & ADDITIONAL TESTS:

## 2024-08-19 PROBLEM — O24.419 GESTATIONAL DIABETES MELLITUS IN PREGNANCY, UNSPECIFIED CONTROL: Chronic | Status: ACTIVE | Noted: 2024-08-01

## 2024-08-19 RX ORDER — HYDROMORPHONE HYDROCHLORIDE 2 MG/1
0.2 TABLET ORAL ONCE
Refills: 0 | Status: DISCONTINUED | OUTPATIENT
Start: 2024-08-19 | End: 2024-08-19

## 2024-08-19 RX ADMIN — OXYCODONE HYDROCHLORIDE 5 MILLIGRAM(S): 5 TABLET ORAL at 16:15

## 2024-08-19 RX ADMIN — KETOROLAC TROMETHAMINE 15 MILLIGRAM(S): 30 INJECTION, SOLUTION INTRAMUSCULAR at 14:03

## 2024-08-19 RX ADMIN — OXYCODONE HYDROCHLORIDE 5 MILLIGRAM(S): 5 TABLET ORAL at 10:14

## 2024-08-19 RX ADMIN — ENOXAPARIN SODIUM 40 MILLIGRAM(S): 100 INJECTION SUBCUTANEOUS at 05:14

## 2024-08-19 RX ADMIN — ACETAMINOPHEN 650 MILLIGRAM(S): 325 TABLET ORAL at 05:11

## 2024-08-19 RX ADMIN — OXYCODONE HYDROCHLORIDE 5 MILLIGRAM(S): 5 TABLET ORAL at 16:45

## 2024-08-19 RX ADMIN — PIPERACILLIN SODIUM AND TAZOBACTAM SODIUM 25 GRAM(S): 3; .375 INJECTION, POWDER, FOR SOLUTION INTRAVENOUS at 05:11

## 2024-08-19 RX ADMIN — OXYCODONE HYDROCHLORIDE 5 MILLIGRAM(S): 5 TABLET ORAL at 03:29

## 2024-08-19 RX ADMIN — PIPERACILLIN SODIUM AND TAZOBACTAM SODIUM 25 GRAM(S): 3; .375 INJECTION, POWDER, FOR SOLUTION INTRAVENOUS at 17:49

## 2024-08-19 RX ADMIN — Medication 40 MILLIGRAM(S): at 05:11

## 2024-08-19 RX ADMIN — Medication 1 PATCH: at 19:44

## 2024-08-19 RX ADMIN — Medication 1 PATCH: at 11:48

## 2024-08-19 RX ADMIN — PIPERACILLIN SODIUM AND TAZOBACTAM SODIUM 25 GRAM(S): 3; .375 INJECTION, POWDER, FOR SOLUTION INTRAVENOUS at 11:48

## 2024-08-19 RX ADMIN — PIPERACILLIN SODIUM AND TAZOBACTAM SODIUM 25 GRAM(S): 3; .375 INJECTION, POWDER, FOR SOLUTION INTRAVENOUS at 23:05

## 2024-08-19 RX ADMIN — METHOCARBAMOL 500 MILLIGRAM(S): 750 TABLET, FILM COATED ORAL at 14:32

## 2024-08-19 RX ADMIN — METHOCARBAMOL 500 MILLIGRAM(S): 750 TABLET, FILM COATED ORAL at 05:12

## 2024-08-19 RX ADMIN — Medication 1 PATCH: at 05:52

## 2024-08-19 RX ADMIN — HYDROMORPHONE HYDROCHLORIDE 0.2 MILLIGRAM(S): 2 TABLET ORAL at 22:53

## 2024-08-19 RX ADMIN — Medication 40 MILLIEQUIVALENT(S): at 03:43

## 2024-08-19 RX ADMIN — OXYCODONE HYDROCHLORIDE 5 MILLIGRAM(S): 5 TABLET ORAL at 10:44

## 2024-08-19 RX ADMIN — METHOCARBAMOL 500 MILLIGRAM(S): 750 TABLET, FILM COATED ORAL at 21:31

## 2024-08-19 RX ADMIN — Medication 1 PATCH: at 23:48

## 2024-08-19 RX ADMIN — KETOROLAC TROMETHAMINE 15 MILLIGRAM(S): 30 INJECTION, SOLUTION INTRAMUSCULAR at 06:59

## 2024-08-19 RX ADMIN — OXYCODONE HYDROCHLORIDE 5 MILLIGRAM(S): 5 TABLET ORAL at 03:59

## 2024-08-19 RX ADMIN — HYDROMORPHONE HYDROCHLORIDE 0.2 MILLIGRAM(S): 2 TABLET ORAL at 22:23

## 2024-08-19 RX ADMIN — Medication 40 MILLIEQUIVALENT(S): at 07:58

## 2024-08-19 RX ADMIN — CHLORHEXIDINE GLUCONATE 1 APPLICATION(S): 40 SOLUTION TOPICAL at 05:15
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
## 2024-08-19 NOTE — PHYSICAL THERAPY INITIAL EVALUATION ADULT - GAIT TRAINING, PT EVAL
Goal: By discharge: Ambulation: 200 feet without AD, Independently Stairs: 5 with 1 HR with minimal assist

## 2024-08-19 NOTE — PHYSICAL THERAPY INITIAL EVALUATION ADULT - GENERAL OBSERVATIONS, REHAB EVAL
Patient was seen from 9:28-10:00 and 10:30-10:45 for PT IE. Patient was rec'd in semi reclined in bed, +IVL, +RON drain, +abdominal binder, NAD, agreeable to participate in PT.

## 2024-08-19 NOTE — PROGRESS NOTE ADULT - SUBJECTIVE AND OBJECTIVE BOX
GENERAL SURGERY PROGRESS NOTE    Patient: MICHAEL LAL , 34y ([birth year])Female   MRN: [id number]  Location: [address]  Visit: 24 Inpatient  Date: 24 @ 04:05    Hospital Day #:6  Post-Op Day #:5    Procedure/Dx/Injuries:    Events of past 24 hours: No acute events overnight. Pt has right side drain with stents draining bilious output into ostomy appliance. She is tolerating FLD without any nausea or vomiting. Sh had passed gas. No BM.       PAST MEDICAL & SURGICAL HISTORY:  Glucose 6 phosphatase deficiency  Gestational diabetes   delivery delivered  S/P cholecystectomy  History of exploratory laparotomy      Vitals:   T(F): 98.6 (24 @ 00:11), Max: 99.9 (24 @ 05:03)  HR: 98 (24 @ 00:11)  BP: 132/90 (24 @ 00:11)  RR: 18 (24 @ 00:11)  SpO2: 98% (24 @ 00:11)      Diet, Full Liquid:   Supplement Feeding Modality:  Oral  Ensure Enlive Cans or Servings Per Day:  1       Frequency:  Three Times a day      Fluids:     I & O's:    24 @ 07:01  -  24 @ 07:00  --------------------------------------------------------  IN:    IV PiggyBack: 100 mL    Lactated Ringers: 450 mL  Total IN: 550 mL    OUT:    Bulb (mL): 20 mL    Voided (mL): 900 mL  Total OUT: 920 mL    Total NET: -370 mL        Bowel Movement: : [] YES [x] NO  Flatus: : [x] YES [] NO    PHYSICAL EXAM:  GENERAL: NAD  CHEST/LUNG: Equal chest rise b/l  HEART: RRR  ABDOMEN: Soft, mildly tender at incision sites, Nondistended; Right side drain with stents draining bilious fluid into ostomy appliance, surgical site with dressing in place, Dressing C/D/I with no evidence of bleeding or drainage   EXTREMITIES:  No clubbing, cyanosis, or edema      MEDICATIONS  (STANDING):  acetaminophen     Tablet .. 650 milliGRAM(s) Oral every 6 hours  chlorhexidine 2% Cloths 1 Application(s) Topical <User Schedule>  enoxaparin Injectable 40 milliGRAM(s) SubCutaneous every 24 hours  lactated ringers. 1000 milliLiter(s) (50 mL/Hr) IV Continuous <Continuous>  lidocaine   4% Patch 1 Patch Transdermal daily  methocarbamol 500 milliGRAM(s) Oral every 8 hours  pantoprazole    Tablet 40 milliGRAM(s) Oral before breakfast  piperacillin/tazobactam IVPB.. 3.375 Gram(s) IV Intermittent every 6 hours  potassium chloride    Tablet ER 40 milliEquivalent(s) Oral every 4 hours    MEDICATIONS  (PRN):  ketorolac   Injectable 15 milliGRAM(s) IV Push every 6 hours PRN Moderate Pain (4 - 6)  oxyCODONE    IR 5 milliGRAM(s) Oral every 6 hours PRN Moderate Pain (4 - 6)      DVT PROPHYLAXIS: enoxaparin Injectable 40 milliGRAM(s) SubCutaneous every 24 hours    GI PROPHYLAXIS: pantoprazole    Tablet 40 milliGRAM(s) Oral before breakfast    ANTICOAGULATION:   ANTIBIOTICS:  piperacillin/tazobactam IVPB.. 3.375 Gram(s)            LAB/STUDIES:  Labs:  CAPILLARY BLOOD GLUCOSE                              9.4    7.50  )-----------( 358      ( 18 Aug 2024 20:36 )             29.2       Auto Neutrophil %: 67.6 % (24 @ 20:36)  Auto Immature Granulocyte %: 0.3 % (24 @ 20:36)        137  |  100  |  5<L>  ----------------------------<  78  3.6   |  27  |  0.5<L>      Calcium: 8.0 mg/dL (24 @ 20:36)      LFTs:             5.4  | 0.7  | 26       ------------------[309     ( 18 Aug 2024 20:36 )  2.9  | 0.3  | 29          Lipase:x      Amylase:x             Coags:     15.60  ----< 1.36    ( 18 Aug 2024 20:36 )     36.5          Urinalysis Basic - ( 18 Aug 2024 20:36 )    Color: x / Appearance: x / SG: x / pH: x  Gluc: 78 mg/dL / Ketone: x  / Bili: x / Urobili: x   Blood: x / Protein: x / Nitrite: x   Leuk Esterase: x / RBC: x / WBC x   Sq Epi: x / Non Sq Epi: x / Bacteria: x        IMAGING:  < from: Xray Kidney Ureter Bladder (24 @ 21:03) >  COMPARISON: 7/3/2024    FINDINGS/  IMPRESSION:    Nonobstructive bowel gas pattern. Surgical drains. Right subhepatic skin   staples. No acute bony abnormality.      ASSESSMENT:  MICHAEL LAL is a 34y F w/ PMHx of G6PD deficiency,  section x4, s/p laparoscopic cholecystectomy in [facility] (24) complicated by post-operative leak requiring exploratory laparotomy x 2 (24 and May 2024), POD# 4 s/p PTC drain placement by IR now presents s/p laparoscopic converted to open Enrrique-en-y hepaticojejunostomy.   24hr events: Zofran x1 for nausea, no nausea afterwards, no vomiting. 1L on IS, electrolytes repleted. Ambulating from bed to chair. States the abdominal pain is better. Passed ToV. Downgraded to 4C from SICU SDU. +g, -bm. 2g Mg and 40mEQ for KCl PO given.    PLAN:   - FLD  - monitor Bowel function  - monitor drain quality and quantity   - multi modal pain  control  - continue Zosyn  - DVT and GI ppx  - encourage ambulation as tolerated   - dressing changes as needed  - Daily labs, replete electrolytes as needed      BLUE SURGERY SPECTRA: [id number]

## 2024-08-20 ENCOUNTER — TRANSCRIPTION ENCOUNTER (OUTPATIENT)
Age: 34
End: 2024-08-20

## 2024-08-20 VITALS
SYSTOLIC BLOOD PRESSURE: 118 MMHG | RESPIRATION RATE: 18 BRPM | HEART RATE: 107 BPM | DIASTOLIC BLOOD PRESSURE: 78 MMHG | OXYGEN SATURATION: 99 % | TEMPERATURE: 98 F

## 2024-08-20 DIAGNOSIS — K83.8 OTHER SPECIFIED DISEASES OF BILIARY TRACT: ICD-10-CM

## 2024-08-20 DIAGNOSIS — Z00.00 ENCOUNTER FOR GENERAL ADULT MEDICAL EXAMINATION WITHOUT ABNORMAL FINDINGS: ICD-10-CM

## 2024-08-20 LAB
ALBUMIN SERPL ELPH-MCNC: 2.9 G/DL — LOW (ref 3.5–5.2)
ALP SERPL-CCNC: 327 U/L — HIGH (ref 30–115)
ALT FLD-CCNC: 30 U/L — SIGNIFICANT CHANGE UP (ref 0–41)
ANION GAP SERPL CALC-SCNC: 8 MMOL/L — SIGNIFICANT CHANGE UP (ref 7–14)
APTT BLD: 30.7 SEC — SIGNIFICANT CHANGE UP (ref 27–39.2)
AST SERPL-CCNC: 28 U/L — SIGNIFICANT CHANGE UP (ref 0–41)
BASOPHILS # BLD AUTO: 0.01 K/UL — SIGNIFICANT CHANGE UP (ref 0–0.2)
BASOPHILS NFR BLD AUTO: 0.1 % — SIGNIFICANT CHANGE UP (ref 0–1)
BILIRUB DIRECT SERPL-MCNC: 0.2 MG/DL — SIGNIFICANT CHANGE UP (ref 0–0.3)
BILIRUB INDIRECT FLD-MCNC: 0.4 MG/DL — SIGNIFICANT CHANGE UP (ref 0.2–1.2)
BILIRUB SERPL-MCNC: 0.6 MG/DL — SIGNIFICANT CHANGE UP (ref 0.2–1.2)
BUN SERPL-MCNC: <3 MG/DL — LOW (ref 10–20)
CALCIUM SERPL-MCNC: 7.9 MG/DL — LOW (ref 8.4–10.5)
CHLORIDE SERPL-SCNC: 100 MMOL/L — SIGNIFICANT CHANGE UP (ref 98–110)
CO2 SERPL-SCNC: 25 MMOL/L — SIGNIFICANT CHANGE UP (ref 17–32)
CREAT SERPL-MCNC: 0.5 MG/DL — LOW (ref 0.7–1.5)
EGFR: 127 ML/MIN/1.73M2 — SIGNIFICANT CHANGE UP
EOSINOPHIL # BLD AUTO: 0.43 K/UL — SIGNIFICANT CHANGE UP (ref 0–0.7)
EOSINOPHIL NFR BLD AUTO: 4.1 % — SIGNIFICANT CHANGE UP (ref 0–8)
GLUCOSE SERPL-MCNC: 110 MG/DL — HIGH (ref 70–99)
HCT VFR BLD CALC: 26.3 % — LOW (ref 37–47)
HGB BLD-MCNC: 8.8 G/DL — LOW (ref 12–16)
IMM GRANULOCYTES NFR BLD AUTO: 0.3 % — SIGNIFICANT CHANGE UP (ref 0.1–0.3)
INR BLD: 1.32 RATIO — HIGH (ref 0.65–1.3)
LYMPHOCYTES # BLD AUTO: 1.69 K/UL — SIGNIFICANT CHANGE UP (ref 1.2–3.4)
LYMPHOCYTES # BLD AUTO: 16.3 % — LOW (ref 20.5–51.1)
MAGNESIUM SERPL-MCNC: 1.7 MG/DL — LOW (ref 1.8–2.4)
MCHC RBC-ENTMCNC: 30 PG — SIGNIFICANT CHANGE UP (ref 27–31)
MCHC RBC-ENTMCNC: 33.5 G/DL — SIGNIFICANT CHANGE UP (ref 32–37)
MCV RBC AUTO: 89.8 FL — SIGNIFICANT CHANGE UP (ref 81–99)
MONOCYTES # BLD AUTO: 1.09 K/UL — HIGH (ref 0.1–0.6)
MONOCYTES NFR BLD AUTO: 10.5 % — HIGH (ref 1.7–9.3)
NEUTROPHILS # BLD AUTO: 7.12 K/UL — HIGH (ref 1.4–6.5)
NEUTROPHILS NFR BLD AUTO: 68.7 % — SIGNIFICANT CHANGE UP (ref 42.2–75.2)
NRBC # BLD: 0 /100 WBCS — SIGNIFICANT CHANGE UP (ref 0–0)
PHOSPHATE SERPL-MCNC: 3.5 MG/DL — SIGNIFICANT CHANGE UP (ref 2.1–4.9)
PLATELET # BLD AUTO: 359 K/UL — SIGNIFICANT CHANGE UP (ref 130–400)
PMV BLD: 9.4 FL — SIGNIFICANT CHANGE UP (ref 7.4–10.4)
POTASSIUM SERPL-MCNC: 4 MMOL/L — SIGNIFICANT CHANGE UP (ref 3.5–5)
POTASSIUM SERPL-SCNC: 4 MMOL/L — SIGNIFICANT CHANGE UP (ref 3.5–5)
PROT SERPL-MCNC: 5.6 G/DL — LOW (ref 6–8)
PROTHROM AB SERPL-ACNC: 15.1 SEC — HIGH (ref 9.95–12.87)
RBC # BLD: 2.93 M/UL — LOW (ref 4.2–5.4)
RBC # FLD: 14.6 % — HIGH (ref 11.5–14.5)
SODIUM SERPL-SCNC: 133 MMOL/L — LOW (ref 135–146)
WBC # BLD: 10.37 K/UL — SIGNIFICANT CHANGE UP (ref 4.8–10.8)
WBC # FLD AUTO: 10.37 K/UL — SIGNIFICANT CHANGE UP (ref 4.8–10.8)

## 2024-08-20 RX ORDER — OXYCODONE AND ACETAMINOPHEN 7.5; 325 MG/1; MG/1
1 TABLET ORAL
Qty: 12 | Refills: 0
Start: 2024-08-20 | End: 2024-08-23

## 2024-08-20 RX ORDER — DOCUSATE CALCIUM 240 MG/1
1 CAPSULE ORAL
Qty: 90 | Refills: 0
Start: 2024-08-20 | End: 2024-09-18

## 2024-08-20 RX ORDER — POLYETHYLENE GLYCOL 3350 17 G/17G
17 POWDER, FOR SOLUTION ORAL
Qty: 30 | Refills: 0
Start: 2024-08-20 | End: 2024-09-18

## 2024-08-20 RX ORDER — OXYCODONE AND ACETAMINOPHEN 7.5; 325 MG/1; MG/1
1 TABLET ORAL
Qty: 30 | Refills: 0
Start: 2024-08-20

## 2024-08-20 RX ORDER — AMOXICILLIN AND CLAVULANATE POTASSIUM 250; 125 MG/1; MG/1
875 TABLET, FILM COATED ORAL
Qty: 28 | Refills: 0
Start: 2024-08-20 | End: 2024-09-02

## 2024-08-20 RX ADMIN — KETOROLAC TROMETHAMINE 15 MILLIGRAM(S): 30 INJECTION, SOLUTION INTRAMUSCULAR at 16:42

## 2024-08-20 RX ADMIN — ENOXAPARIN SODIUM 40 MILLIGRAM(S): 100 INJECTION SUBCUTANEOUS at 05:12

## 2024-08-20 RX ADMIN — Medication 1 PATCH: at 11:26

## 2024-08-20 RX ADMIN — Medication 12.5 GRAM(S): at 05:08

## 2024-08-20 RX ADMIN — METHOCARBAMOL 500 MILLIGRAM(S): 750 TABLET, FILM COATED ORAL at 05:12

## 2024-08-20 RX ADMIN — OXYCODONE HYDROCHLORIDE 5 MILLIGRAM(S): 5 TABLET ORAL at 12:05

## 2024-08-20 RX ADMIN — CHLORHEXIDINE GLUCONATE 1 APPLICATION(S): 40 SOLUTION TOPICAL at 05:17

## 2024-08-20 RX ADMIN — KETOROLAC TROMETHAMINE 15 MILLIGRAM(S): 30 INJECTION, SOLUTION INTRAMUSCULAR at 03:11

## 2024-08-20 RX ADMIN — PIPERACILLIN SODIUM AND TAZOBACTAM SODIUM 25 GRAM(S): 3; .375 INJECTION, POWDER, FOR SOLUTION INTRAVENOUS at 05:13

## 2024-08-20 RX ADMIN — OXYCODONE HYDROCHLORIDE 5 MILLIGRAM(S): 5 TABLET ORAL at 11:35

## 2024-08-20 RX ADMIN — METHOCARBAMOL 500 MILLIGRAM(S): 750 TABLET, FILM COATED ORAL at 14:03

## 2024-08-20 RX ADMIN — PIPERACILLIN SODIUM AND TAZOBACTAM SODIUM 25 GRAM(S): 3; .375 INJECTION, POWDER, FOR SOLUTION INTRAVENOUS at 11:26

## 2024-08-20 RX ADMIN — Medication 40 MILLIGRAM(S): at 05:12

## 2024-08-20 NOTE — DISCHARGE NOTE NURSING/CASE MANAGEMENT/SOCIAL WORK - NSSCCONTNUM_GEN_ALL_CORE
Verbal/written post procedure instructions were given to patient/caregiver./Instructed patient/caregiver to follow-up with primary care physician./Instructed patient/caregiver regarding signs and symptoms of infection./Keep the cast/splint/dressing clean and dry.
[phone]

## 2024-08-20 NOTE — DISCHARGE NOTE PROVIDER - NSDCFUSCHEDAPPT_GEN_ALL_CORE_FT
Argenis Novak  [facility] Physician [facility]  GENSURG [address]  Scheduled Appointment: 08/27/2024    John Barragan  [facility] PreAdmits  Scheduled Appointment: 09/10/2024    [facility] Physician [facility]  GASTRO  [address]  Scheduled Appointment: 09/10/2024

## 2024-08-20 NOTE — DISCHARGE NOTE PROVIDER - CARE PROVIDER_API CALL
Paz Forde  Complex General Surgical Oncology  [address] [facility], [address] [place] [address]  Phone: [phone]  Fax: [phone]  Follow Up Time:    Paz Forde  Complex General Surgical Oncology  [address] [facility], Floor [address] [place] [address]  Phone: [phone]  Fax: [phone]  Scheduled Appointment: 08/22/2024

## 2024-08-20 NOTE — DISCHARGE NOTE PROVIDER - NSDCMRMEDTOKEN_GEN_ALL_CORE_FT
amoxicillin-clavulanate 875 mg-125 mg oral tablet: 875 milligram(s) orally every 12 hours  Percocet 5 mg-325 mg oral tablet: 1 tab(s) orally every 8 hours as needed for  severe pain MDD: 4   amoxicillin-clavulanate 875 mg-125 mg oral tablet: 875 milligram(s) orally every 12 hours  Colace 100 mg oral capsule: 1 cap(s) orally every 8 hours  MiraLax oral powder for reconstitution: 17 gram(s) orally once a day  Percocet 5 mg-325 mg oral tablet: 1 tab(s) orally every 4 hours as needed for  severe pain MDD: 4

## 2024-08-20 NOTE — DISCHARGE NOTE PROVIDER - NSDCCPCAREPLAN_GEN_ALL_CORE_FT
PRINCIPAL DISCHARGE DIAGNOSIS  Diagnosis: Bile duct injury  Assessment and Plan of Treatment: SURGERY DISCHARGE INSTRUCTIONS  FOLLOW-UP - with Dr. Forde in 1 weeks. Call the office to make an appointment or if you have any questions/concerns.  DIET - regular.   ACTIVITY- No heavy lifting for 4-6 wks over 10-20 lbs. Walking is encouraged. No running or swimming. No driving while taking pain medication.  WOUNDCARE - Some drainage from your incisions or drain sites is normal. If you have drainage from an open incision or drain site- cover loosely with sterile gauze and tape and change daily. May shower 48 hours after surgery but no submerging wound under water for 2 weeks (tub bathing). Pat area dry when wet, keep clean and dry. Do not apply powders or lotion to wound area.   DRAINS- if you have drains, number or label each drain, for example drain 1 and drain 2. measure the output from each of them as needed when they become full or when you remember, for example once in the morning and once at night. Write down the total amount from each drain daily and bring this to your appointment.  PAIN MEDS - Take prescription pain meds as instructed but only if needed as they can be addicting. Take over the counter extra strength tylenol 500mg with food every 6 hours for pain instead of prescription pain meds if you do not need stronger pain control. Do not take tylenol in addition to your prescription pain med if your prescription pain med already has tylenol in it. No more than 4g of tylenol in 24hrs or 1g in 4 hrs. No mixing alcohol with prescription pain meds. No driving or operating machinery while taking prescription pain meds. Drink plenty of water and increase your fiber intake (unless your diet restricts fiber) while taking prescription pain meds as these can cause constipation and abdominal straining. If you do not have a bowel movement in 3 days take an over the counter stool softener of your choice daily. If you still do not have a bowel movement the following 2 days call your primary care physician.  ANTIBIOTICS- Take antibiotics as directed if prescribed.

## 2024-08-20 NOTE — PROGRESS NOTE ADULT - SUBJECTIVE AND OBJECTIVE BOX
GENERAL SURGERY PROGRESS NOTE     MICHAEL LAL  [address] [place] [address] day :6d    POD: 6D  Procedure: Diagnostic laparoscopy with laparotomy    Lysis of peritoneal adhesions    Hepaticotomy, with hepaticojejunostomy      Surgical Attending: Paz Forde  Events of past 24 hours: No acute events overnight. Pt has right side drain with stents draining bilious output into ostomy appliance. She is tolerating regular diet without any nausea or vomiting. Sh had passed gas. +bm. abdomen soft, incision site clean.    PHYSICAL EXAM:  GENERAL: NAD  CHEST/LUNG: Equal chest rise b/l  HEART: RRR  ABDOMEN: Soft, mildly tender at incision sites, Nondistended; Right side drain with stents draining bilious fluid into ostomy appliance, surgical site with dressing in place, Dressing C/D/I with no evidence of bleeding or drainage   EXTREMITIES:  No clubbing, cyanosis, or edema      T(F): 99.5 (08-20-24 @ 09:04), Max: 99.6 (08-19-24 @ 21:00)  HR: 104 (08-20-24 @ 09:04) (81 - 111)  BP: 128/88 (08-20-24 @ 09:04) (121/82 - 128/88)  ABP: --  ABP(mean): --  RR: 16 (08-20-24 @ 09:04) (16 - 18)  SpO2: 100% (08-20-24 @ 09:04) (97% - 100%)    IN'S / OUT's:    08-19-24 @ 07:01  -  08-20-24 @ 07:00  --------------------------------------------------------  IN:  Total IN: 0 mL    OUT:    Bulb (mL): 12.5 mL    Voided (mL): 400 mL  Total OUT: 412.5 mL    Total NET: -412.5 mL          MEDICATIONS  (STANDING):  acetaminophen     Tablet .. 650 milliGRAM(s) Oral every 6 hours  chlorhexidine 2% Cloths 1 Application(s) Topical <User Schedule>  enoxaparin Injectable 40 milliGRAM(s) SubCutaneous every 24 hours  lidocaine   4% Patch 1 Patch Transdermal daily  methocarbamol 500 milliGRAM(s) Oral every 8 hours  pantoprazole    Tablet 40 milliGRAM(s) Oral before breakfast  piperacillin/tazobactam IVPB.. 3.375 Gram(s) IV Intermittent every 6 hours    MEDICATIONS  (PRN):  ketorolac   Injectable 15 milliGRAM(s) IV Push every 6 hours PRN Moderate Pain (4 - 6)  oxyCODONE    IR 5 milliGRAM(s) Oral every 6 hours PRN Moderate Pain (4 - 6)      LABS  Labs:  CAPILLARY BLOOD GLUCOSE                              8.8    10.37 )-----------( 359      ( 20 Aug 2024 02:47 )             26.3       Auto Neutrophil %: 68.7 % (08-20-24 @ 02:47)  Auto Immature Granulocyte %: 0.3 % (08-20-24 @ 02:47)    08-20    133<L>  |  100  |  <3<L>  ----------------------------<  110<H>  4.0   |  25  |  0.5<L>      Calcium: 7.9 mg/dL (08-20-24 @ 02:47)      LFTs:             5.6  | 0.6  | 28       ------------------[327     ( 20 Aug 2024 02:47 )  2.9  | 0.2  | 30          Lipase:x      Amylase:x             Coags:     15.10  ----< 1.32    ( 20 Aug 2024 02:47 )     30.7                Urinalysis Basic - ( 20 Aug 2024 02:47 )    Color: x / Appearance: x / SG: x / pH: x  Gluc: 110 mg/dL / Ketone: x  / Bili: x / Urobili: x   Blood: x / Protein: x / Nitrite: x   Leuk Esterase: x / RBC: x / WBC x   Sq Epi: x / Non Sq Epi: x / Bacteria: x            RADIOLOGY & ADDITIONAL TESTS:

## 2024-08-20 NOTE — DISCHARGE NOTE NURSING/CASE MANAGEMENT/SOCIAL WORK - PATIENT PORTAL LINK FT
You can access the FollowMyHealth Patient Portal offered by [facility] by registering at the following website: [email or web address]. By joining [employer]’s FollowMyHealth portal, you will also be able to view your health information using other applications (apps) compatible with our system.

## 2024-08-20 NOTE — DISCHARGE NOTE PROVIDER - HOSPITAL COURSE
34 y.o F w/ PMH of G6PD deficiency,  section x4, s/p laparoscopic cholecystectomy in [place] (24) complicated by post-operative leak requiring exploratory laparotomy x 2 (24 and May 2024), s/p PTC drain placement by IR, presented for diagnostic laparoscopy with laparotomy w/ lysis of peritoneal adhesions, hepaticotomy with hepaticojejunostomy on . Extensive inflammation and abscess pockets within abdominal wall and liver adhesions to anterior abd wall as well as peritoneal adhesions preventing laparoscopic approach. Converted to open.  Inflammation and adhesions at guille hepatis. 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 Enrrique-en-Y hepaticojejunostomy was performed. Intraoperatively, patient required up to 0.6 Phenylephrine and received 1u pRBCs. Pt was transferred to SDU in SICU. Pt has had sinus tachycardia to 110-130, EKG and trops were ordered. Trops were negative , EKG showed sinus tachycardia. Pt slowly tolerated CLD. Pt was downgraded to floor on . Pt was stable on the floor, tolerated FLD w/o N/V and passes gas. Pt is clear to be d/c today.

## 2024-08-20 NOTE — PROGRESS NOTE ADULT - ASSESSMENT
ASSESSMENT:  MICHAEL LAL is a 34y F w/ PMHx of G6PD deficiency,  section x4, s/p laparoscopic cholecystectomy in [place] (24) complicated by post-operative leak requiring exploratory laparotomy x 2 (24 and May 2024), POD# 4 s/p PTC drain placement by IR now presents s/p laparoscopic converted to open Enrrique-en-y hepaticojejunostomy.   24hr events: Zofran x1 for nausea, no nausea afterwards, no vomiting. 1L on IS, electrolytes repleted. Ambulating from bed to chair. States the abdominal pain is better. Passed ToV. Downgraded to 4C from SICU SDU. +g, -bm. 2g Mg and 40mEQ for KCl PO given.    Events of past 24 hours: No acute events overnight. Pt has right side drain with stents draining bilious output into ostomy appliance. She is tolerating regular diet without any nausea or vomiting. Sh had passed gas. +bm. abdomen soft, incision site clean.    PLAN:   - regular diet, tolerating  - d/c today  - monitor Bowel function  - malgorzata drain removed  - ostomy bag changes teaching for hepatic stents drainage given  - multi modal pain  control  - continue Zosyn  - DVT and GI ppx  - encourage ambulation as tolerated   - dressing changes as needed  - Daily labs, replete electrolytes as needed      BLUE SURGERY SPECTRA: [id number]

## 2024-08-22 ENCOUNTER — APPOINTMENT (OUTPATIENT)
Dept: SURGERY | Facility: CLINIC | Age: 34
End: 2024-08-22
Payer: MEDICAID

## 2024-08-22 VITALS
SYSTOLIC BLOOD PRESSURE: 122 MMHG | HEIGHT: 69 IN | HEART RATE: 120 BPM | WEIGHT: 159 LBS | BODY MASS INDEX: 23.55 KG/M2 | OXYGEN SATURATION: 99 % | DIASTOLIC BLOOD PRESSURE: 70 MMHG

## 2024-08-22 DIAGNOSIS — S36.13XA INJURY OF BILE DUCT, INITIAL ENCOUNTER: ICD-10-CM

## 2024-08-22 LAB
ALBUMIN SERPL ELPH-MCNC: 3.8 G/DL
ALP BLD-CCNC: 339 U/L
ALT SERPL-CCNC: 22 U/L
ANION GAP SERPL CALC-SCNC: 16 MMOL/L
AST SERPL-CCNC: 15 U/L
BILIRUB DIRECT SERPL-MCNC: <0.2 MG/DL
BILIRUB INDIRECT SERPL-MCNC: >0.2 MG/DL
BILIRUB SERPL-MCNC: 0.4 MG/DL
BUN SERPL-MCNC: <5 MG/DL
CALCIUM SERPL-MCNC: 9 MG/DL
CHLORIDE SERPL-SCNC: 96 MMOL/L
CO2 SERPL-SCNC: 23 MMOL/L
CREAT SERPL-MCNC: 0.5 MG/DL
EGFR: 127 ML/MIN/1.73M2
GLUCOSE SERPL-MCNC: 75 MG/DL
HCT VFR BLD CALC: 33.3 %
HGB BLD-MCNC: 10.7 G/DL
MCHC RBC-ENTMCNC: 28.9 PG
MCHC RBC-ENTMCNC: 32.1 G/DL
MCV RBC AUTO: 90 FL
PLATELET # BLD AUTO: 678 K/UL
PMV BLD AUTO: 0 /100 WBCS
PMV BLD: 9.7 FL
POTASSIUM SERPL-SCNC: 5.2 MMOL/L
PROT SERPL-MCNC: 7.4 G/DL
RBC # BLD: 3.7 M/UL
RBC # FLD: 14.6 %
SODIUM SERPL-SCNC: 135 MMOL/L
SURGICAL PATHOLOGY STUDY: SIGNIFICANT CHANGE UP
WBC # FLD AUTO: 11.43 K/UL

## 2024-08-22 PROCEDURE — 99214 OFFICE O/P EST MOD 30 MIN: CPT | Mod: 24

## 2024-08-22 NOTE — ASSESSMENT
[FreeTextEntry1] : MICHAEL LAL  is a pleasant 33 year old woman  who came in 07/11/2024 for bile duct injury . She has Dr Bran have PCP as Her PCP.   she [place] with two postoperative laparotomy to address adhesion/bile duct injury, absent operative details  she had ERCP with narrowing of CHD, she had PTC with internal external drain via right biliary duct system into duodenum  she still not feeling well and had nonbilious vomiting in her visit today, her drain bag has about 200 bile daily  07/11/2024 : will send for tube study to check location of tip of her PTD, she will need to see advanced GI to attempt left and right plastic stent, repeat LFTS  07/23/2024 :still suffering discomfort, GI eval and concern of need to repeat procedure and re-stenting, patient declined GI ERCP and attempt stenting her stricture, will proceed with robotic RY hepaticojejunostomy   08/22/2024 : s/p RY HJ Jose Fong , she has her wound healing, no discharge, drain is out, she still has two ureteric stent one in bowel slided back from left duct, another PTD via right costal via right duct to Enrrique limb will send for cholangiogram around sept 4,sending today for LFTs  the above plan of care with discussed in details to the patient and all questions were answered to patient satisfaction. patient instructed to follow up with the referring physician and patient primary care provider   A total of 30 minutes was spent on this visit, obtaining h/p,  reviewing previous notes/imaging, counseling the patient on f/u , ordering tests (below), and documenting the findings in the note.

## 2024-08-22 NOTE — HISTORY OF PRESENT ILLNESS
[de-identified] : MICHAEL LAL  is a pleasant 33 year old woman  who came in 07/11/2024 for bile duct injury . She has Dr Bran have PCP as Her PCP.   she [place] with two postoperative laparotomy to address adhesion/bile duct injury, absent operative details  she had ERCP with narrowing of CHD, she had PTC with internal external drain via right biliary duct system into duodenum  she still not feeling well and had nonbilious vomiting in her visit today, her drain bag has about 200 bile daily

## 2024-08-26 RX ORDER — AMOXICILLIN AND CLAVULANATE POTASSIUM 875; 125 MG/1; MG/1
875-125 TABLET, COATED ORAL
Qty: 14 | Refills: 0 | Status: ACTIVE | COMMUNITY
Start: 2024-08-26 | End: 1900-01-01

## 2024-08-27 ENCOUNTER — APPOINTMENT (OUTPATIENT)
Dept: SURGERY | Facility: CLINIC | Age: 34
End: 2024-08-27

## 2024-08-29 DIAGNOSIS — K83.1 OBSTRUCTION OF BILE DUCT: ICD-10-CM

## 2024-08-29 DIAGNOSIS — K75.0 ABSCESS OF LIVER: ICD-10-CM
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
## 2024-08-29 DIAGNOSIS — Z88.2 ALLERGY STATUS TO SULFONAMIDES: ICD-10-CM

## 2024-08-29 DIAGNOSIS — I70.8 ATHEROSCLEROSIS OF OTHER ARTERIES: ICD-10-CM
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
## 2024-08-29 DIAGNOSIS — R00.0 TACHYCARDIA, UNSPECIFIED: ICD-10-CM

## 2024-08-29 DIAGNOSIS — T81.43XA INFECTION FOLLOWING A PROCEDURE, ORGAN AND SPACE SURGICAL SITE, INITIAL ENCOUNTER: ICD-10-CM

## 2024-08-29 DIAGNOSIS — K66.0 PERITONEAL ADHESIONS (POSTPROCEDURAL) (POSTINFECTION): ICD-10-CM

## 2024-08-29 DIAGNOSIS — Z90.49 ACQUIRED ABSENCE OF OTHER SPECIFIED PARTS OF DIGESTIVE TRACT: ICD-10-CM

## 2024-08-29 DIAGNOSIS — I97.52 ACCIDENTAL PUNCTURE AND LACERATION OF A CIRCULATORY SYSTEM ORGAN OR STRUCTURE DURING OTHER PROCEDURE: ICD-10-CM

## 2024-08-29 DIAGNOSIS — K68.11 POSTPROCEDURAL RETROPERITONEAL ABSCESS: ICD-10-CM

## 2024-08-29 DIAGNOSIS — I95.81 POSTPROCEDURAL HYPOTENSION: ICD-10-CM

## 2024-08-29 DIAGNOSIS — Z53.31 LAPAROSCOPIC SURGICAL PROCEDURE CONVERTED TO OPEN PROCEDURE: ICD-10-CM

## 2024-09-03 ENCOUNTER — APPOINTMENT (OUTPATIENT)
Dept: SURGERY | Facility: CLINIC | Age: 34
End: 2024-09-03
Payer: MEDICAID

## 2024-09-03 ENCOUNTER — INPATIENT (INPATIENT)
Facility: HOSPITAL | Age: 34
LOS: 1 days | Discharge: HOME | DRG: 201 | End: 2024-09-05
Attending: SURGERY | Admitting: SURGERY
Payer: MEDICAID

## 2024-09-03 VITALS
SYSTOLIC BLOOD PRESSURE: 108 MMHG | DIASTOLIC BLOOD PRESSURE: 84 MMHG | HEART RATE: 141 BPM | HEIGHT: 69 IN | WEIGHT: 154 LBS | OXYGEN SATURATION: 99 % | TEMPERATURE: 97 F | BODY MASS INDEX: 22.81 KG/M2

## 2024-09-03 VITALS
HEART RATE: 119 BPM | TEMPERATURE: 98 F | SYSTOLIC BLOOD PRESSURE: 107 MMHG | HEIGHT: 67 IN | OXYGEN SATURATION: 99 % | RESPIRATION RATE: 18 BRPM | DIASTOLIC BLOOD PRESSURE: 74 MMHG | WEIGHT: 153 LBS

## 2024-09-03 DIAGNOSIS — S36.13XA INJURY OF BILE DUCT, INITIAL ENCOUNTER: ICD-10-CM

## 2024-09-03 DIAGNOSIS — Z98.890 OTHER SPECIFIED POSTPROCEDURAL STATES: Chronic | ICD-10-CM

## 2024-09-03 DIAGNOSIS — R00.0 TACHYCARDIA, UNSPECIFIED: ICD-10-CM

## 2024-09-03 DIAGNOSIS — Z90.49 ACQUIRED ABSENCE OF OTHER SPECIFIED PARTS OF DIGESTIVE TRACT: Chronic | ICD-10-CM

## 2024-09-03 LAB
ALBUMIN SERPL ELPH-MCNC: 4 G/DL — SIGNIFICANT CHANGE UP (ref 3.5–5.2)
ALP SERPL-CCNC: 279 U/L — HIGH (ref 30–115)
ALT FLD-CCNC: 18 U/L — SIGNIFICANT CHANGE UP (ref 0–41)
ANION GAP SERPL CALC-SCNC: 14 MMOL/L — SIGNIFICANT CHANGE UP (ref 7–14)
AST SERPL-CCNC: 34 U/L — SIGNIFICANT CHANGE UP (ref 0–41)
BASOPHILS # BLD AUTO: 0.02 K/UL — SIGNIFICANT CHANGE UP (ref 0–0.2)
BASOPHILS NFR BLD AUTO: 0.3 % — SIGNIFICANT CHANGE UP (ref 0–1)
BILIRUB SERPL-MCNC: 0.3 MG/DL — SIGNIFICANT CHANGE UP (ref 0.2–1.2)
BUN SERPL-MCNC: 6 MG/DL — LOW (ref 10–20)
CALCIUM SERPL-MCNC: 9.6 MG/DL — SIGNIFICANT CHANGE UP (ref 8.4–10.5)
CHLORIDE SERPL-SCNC: 98 MMOL/L — SIGNIFICANT CHANGE UP (ref 98–110)
CO2 SERPL-SCNC: 24 MMOL/L — SIGNIFICANT CHANGE UP (ref 17–32)
CREAT SERPL-MCNC: 0.6 MG/DL — LOW (ref 0.7–1.5)
EGFR: 121 ML/MIN/1.73M2 — SIGNIFICANT CHANGE UP
EOSINOPHIL # BLD AUTO: 0.2 K/UL — SIGNIFICANT CHANGE UP (ref 0–0.7)
EOSINOPHIL NFR BLD AUTO: 2.9 % — SIGNIFICANT CHANGE UP (ref 0–8)
GLUCOSE SERPL-MCNC: 96 MG/DL — SIGNIFICANT CHANGE UP (ref 70–99)
HCG SERPL QL: NEGATIVE — SIGNIFICANT CHANGE UP
HCT VFR BLD CALC: 35.4 % — LOW (ref 37–47)
HGB BLD-MCNC: 11.1 G/DL — LOW (ref 12–16)
IMM GRANULOCYTES NFR BLD AUTO: 0.3 % — SIGNIFICANT CHANGE UP (ref 0.1–0.3)
LIDOCAIN IGE QN: 23 U/L — SIGNIFICANT CHANGE UP (ref 7–60)
LYMPHOCYTES # BLD AUTO: 1.58 K/UL — SIGNIFICANT CHANGE UP (ref 1.2–3.4)
LYMPHOCYTES # BLD AUTO: 22.5 % — SIGNIFICANT CHANGE UP (ref 20.5–51.1)
MCHC RBC-ENTMCNC: 29.4 PG — SIGNIFICANT CHANGE UP (ref 27–31)
MCHC RBC-ENTMCNC: 31.4 G/DL — LOW (ref 32–37)
MCV RBC AUTO: 93.7 FL — SIGNIFICANT CHANGE UP (ref 81–99)
MONOCYTES # BLD AUTO: 0.53 K/UL — SIGNIFICANT CHANGE UP (ref 0.1–0.6)
MONOCYTES NFR BLD AUTO: 7.6 % — SIGNIFICANT CHANGE UP (ref 1.7–9.3)
NEUTROPHILS # BLD AUTO: 4.66 K/UL — SIGNIFICANT CHANGE UP (ref 1.4–6.5)
NEUTROPHILS NFR BLD AUTO: 66.4 % — SIGNIFICANT CHANGE UP (ref 42.2–75.2)
NRBC # BLD: 0 /100 WBCS — SIGNIFICANT CHANGE UP (ref 0–0)
PLATELET # BLD AUTO: 573 K/UL — HIGH (ref 130–400)
PMV BLD: 10.1 FL — SIGNIFICANT CHANGE UP (ref 7.4–10.4)
POTASSIUM SERPL-MCNC: 4.7 MMOL/L — SIGNIFICANT CHANGE UP (ref 3.5–5)
POTASSIUM SERPL-SCNC: 4.7 MMOL/L — SIGNIFICANT CHANGE UP (ref 3.5–5)
PROT SERPL-MCNC: 8 G/DL — SIGNIFICANT CHANGE UP (ref 6–8)
RBC # BLD: 3.78 M/UL — LOW (ref 4.2–5.4)
RBC # FLD: 14.1 % — SIGNIFICANT CHANGE UP (ref 11.5–14.5)
SODIUM SERPL-SCNC: 136 MMOL/L — SIGNIFICANT CHANGE UP (ref 135–146)
WBC # BLD: 7.01 K/UL — SIGNIFICANT CHANGE UP (ref 4.8–10.8)
WBC # FLD AUTO: 7.01 K/UL — SIGNIFICANT CHANGE UP (ref 4.8–10.8)

## 2024-09-03 PROCEDURE — 84100 ASSAY OF PHOSPHORUS: CPT

## 2024-09-03 PROCEDURE — C1769: CPT

## 2024-09-03 PROCEDURE — C1894: CPT

## 2024-09-03 PROCEDURE — 74177 CT ABD & PELVIS W/CONTRAST: CPT | Mod: MC

## 2024-09-03 PROCEDURE — 36415 COLL VENOUS BLD VENIPUNCTURE: CPT

## 2024-09-03 PROCEDURE — C1887: CPT

## 2024-09-03 PROCEDURE — 83735 ASSAY OF MAGNESIUM: CPT

## 2024-09-03 PROCEDURE — C1729: CPT

## 2024-09-03 PROCEDURE — 85730 THROMBOPLASTIN TIME PARTIAL: CPT

## 2024-09-03 PROCEDURE — 47536 EXCHANGE BILIARY DRG CATH: CPT | Mod: 59

## 2024-09-03 PROCEDURE — 99214 OFFICE O/P EST MOD 30 MIN: CPT | Mod: 24

## 2024-09-03 PROCEDURE — 80048 BASIC METABOLIC PNL TOTAL CA: CPT

## 2024-09-03 PROCEDURE — 85610 PROTHROMBIN TIME: CPT

## 2024-09-03 PROCEDURE — 99285 EMERGENCY DEPT VISIT HI MDM: CPT

## 2024-09-03 PROCEDURE — C9399: CPT

## 2024-09-03 PROCEDURE — 85025 COMPLETE CBC W/AUTO DIFF WBC: CPT

## 2024-09-03 RX ORDER — PIPERACILLIN SODIUM AND TAZOBACTAM SODIUM 3; .375 G/15ML; G/15ML
3.38 INJECTION, POWDER, FOR SOLUTION INTRAVENOUS ONCE
Refills: 0 | Status: DISCONTINUED | OUTPATIENT
Start: 2024-09-03 | End: 2024-09-03

## 2024-09-03 RX ORDER — PIPERACILLIN SODIUM AND TAZOBACTAM SODIUM 3; .375 G/15ML; G/15ML
3.38 INJECTION, POWDER, FOR SOLUTION INTRAVENOUS ONCE
Refills: 0 | Status: COMPLETED | OUTPATIENT
Start: 2024-09-03 | End: 2024-09-03

## 2024-09-03 RX ORDER — PIPERACILLIN SODIUM AND TAZOBACTAM SODIUM 3; .375 G/15ML; G/15ML
3.38 INJECTION, POWDER, FOR SOLUTION INTRAVENOUS EVERY 8 HOURS
Refills: 0 | Status: DISCONTINUED | OUTPATIENT
Start: 2024-09-04 | End: 2024-09-05

## 2024-09-03 RX ORDER — ACETAMINOPHEN 325 MG/1
650 TABLET ORAL EVERY 6 HOURS
Refills: 0 | Status: DISCONTINUED | OUTPATIENT
Start: 2024-09-03 | End: 2024-09-05

## 2024-09-03 RX ORDER — ENOXAPARIN SODIUM 100 MG/ML
40 INJECTION SUBCUTANEOUS EVERY 24 HOURS
Refills: 0 | Status: DISCONTINUED | OUTPATIENT
Start: 2024-09-03 | End: 2024-09-05

## 2024-09-03 RX ORDER — HYDROMORPHONE HYDROCHLORIDE 2 MG/1
0.2 TABLET ORAL ONCE
Refills: 0 | Status: DISCONTINUED | OUTPATIENT
Start: 2024-09-03 | End: 2024-09-03

## 2024-09-03 RX ADMIN — ACETAMINOPHEN 650 MILLIGRAM(S): 325 TABLET ORAL at 23:40

## 2024-09-03 RX ADMIN — ACETAMINOPHEN 650 MILLIGRAM(S): 325 TABLET ORAL at 20:35

## 2024-09-03 RX ADMIN — HYDROMORPHONE HYDROCHLORIDE 0.2 MILLIGRAM(S): 2 TABLET ORAL at 22:57

## 2024-09-03 RX ADMIN — Medication 4 MILLIGRAM(S): at 13:30

## 2024-09-03 RX ADMIN — PIPERACILLIN SODIUM AND TAZOBACTAM SODIUM 25 GRAM(S): 3; .375 INJECTION, POWDER, FOR SOLUTION INTRAVENOUS at 21:18

## 2024-09-03 RX ADMIN — PIPERACILLIN SODIUM AND TAZOBACTAM SODIUM 200 GRAM(S): 3; .375 INJECTION, POWDER, FOR SOLUTION INTRAVENOUS at 15:01

## 2024-09-03 RX ADMIN — Medication 1000 MILLILITER(S): at 13:30

## 2024-09-03 NOTE — CHART NOTE - NSCHARTNOTEFT_GEN_A_CORE
34F PMHx G6PDD, PSHx  x4, 24 laparoscopic cholecystectomy ([place]) c/b postoperative leak s/p exploratory laparotomy x2 (24, 2024), s/p IR-guided PTC drain placement, s/p diagnostic laparoscopy with laparotomy with REBEKAH, 24 hepaticotomy with hepaticojejunostomy who presented to ED 9/3 from Dr. Forde's clinic with persistent RUQ pain and was found to be tachycardic to 130s in clinic. Patient was scheduled for outpatient tube study with IR 2024    -Recent noncon CT ab pel reviewed. Please obtain contrast enhanced CT abdomen pelvis.  -Plan for drain study tomorrow 2024.  -NPO after midnight  -CBC/CMP/Coags with am labs  -Hold AC until after procedure    Please call Interventional Radiology with questions or concerns:   - M-F [phone]: [phone]   - All other hours: [phone]

## 2024-09-03 NOTE — ED PROVIDER NOTE - DIFFERENTIAL DIAGNOSIS
wound infection, poor wound healing, electrolyte abnormality, biliary disease Differential Diagnosis

## 2024-09-03 NOTE — ED ADULT NURSE NOTE - NSFALLHARMRISKINTERV_ED_ALL_ED

## 2024-09-03 NOTE — ASSESSMENT
[FreeTextEntry1] : MICHAEL LAL  is a pleasant 33 year old woman  who came in 07/11/2024 for bile duct injury . She has Dr Bran have PCP as Her PCP.   she had [place] with two postoperative laparotomy to address adhesion/bile duct injury, absent operative details  she had ERCP with narrowing of CHD, she had PTC with internal external drain via right biliary duct system into duodenum  she still not feeling well and had nonbilious vomiting in her visit today, her drain bag has about 200 bile daily  07/11/2024 : will send for tube study to check location of tip of her PTD, she will need to see advanced GI to attempt left and right plastic stent, repeat LFTS  07/23/2024 :still suffering discomfort, GI eval and concern of need to repeat procedure and re-stenting, patient declined GI ERCP and attempt stenting her stricture, will proceed with robotic RY hepaticojejunostomy   08/22/2024 : s/p RY HJ Jose Fong , she has her wound healing, no discharge, drain is out, she still has two ureteric stent one in bowel slided back from left duct, another PTD via right costal via right duct to Enrrique limb will send for cholangiogram around sept 4,sending today for LFTs  09/03/2024 : today she is tachy 120-130, she went to ed recently with WNL labs and CT sending back again for ED labs and IVF possible IV abx, she has IR cholangiogram scheduled for tomorrow as outpatient  the above plan of care with discussed in details to the patient and all questions were answered to patient satisfaction. patient instructed to follow up with the referring physician and patient primary care provider   A total of 30 minutes was spent on this visit, obtaining h/p,  reviewing previous notes/imaging, counseling the patient on f/u , ordering tests (below), and documenting the findings in the note.

## 2024-09-03 NOTE — ED ADULT TRIAGE NOTE - CHIEF COMPLAINT QUOTE
Pt presents to the ED w/ c/o post op complication associated tachycardia. Pt was at her pmd get evaluated post bile duct surgery (8/14/24). Pt was tachycardic to the 140s. Pt was recommended to come to the ED for evaluation.

## 2024-09-03 NOTE — H&P ADULT - NSHPLABSRESULTS_GEN_ALL_CORE
11.1   7.01  )-----------( 573      ( 03 Sep 2024 12:42 )             35.4     09-03    136  |  98  |  6<L>  ----------------------------<  96  4.7   |  24  |  0.6<L>    Ca    9.6      03 Sep 2024 12:42    TPro  8.0  /  Alb  4.0  /  TBili  0.3  /  DBili  x   /  AST  34  /  ALT  18  /  AlkPhos  279<H>  09-03      LIVER FUNCTIONS - ( 03 Sep 2024 12:42 )  Alb: 4.0 g/dL / Pro: 8.0 g/dL / ALK PHOS: 279 U/L / ALT: 18 U/L / AST: 34 U/L / GGT: x           Urinalysis Basic - ( 03 Sep 2024 12:42 )    Color: x / Appearance: x / SG: x / pH: x  Gluc: 96 mg/dL / Ketone: x  / Bili: x / Urobili: x   Blood: x / Protein: x / Nitrite: x   Leuk Esterase: x / RBC: x / WBC x   Sq Epi: x / Non Sq Epi: x / Bacteria: x

## 2024-09-03 NOTE — HISTORY OF PRESENT ILLNESS
well developed, well nourished , in no acute distress , ambulating without difficulty , normal communication ability
[de-identified] : MICHAEL LAL  is a pleasant 33 year old woman  who came in 07/11/2024 for bile duct injury . She has Dr Bran have PCP as Her PCP.   she [place] with two postoperative laparotomy to address adhesion/bile duct injury, absent operative details  she had ERCP with narrowing of CHD, she had PTC with internal external drain via right biliary duct system into duodenum  she still not feeling well and had nonbilious vomiting in her visit today, her drain bag has about 200 bile daily

## 2024-09-03 NOTE — ED ADULT NURSE NOTE - CAS TRG GENERAL AIRWAY, MLM
Bed/Stretcher in lowest position, wheels locked, appropriate side rails in place/Call bell, personal items and telephone in reach/Instruct patient to call for assistance before getting out of bed/chair/stretcher/Non-slip footwear applied when patient is off stretcher/[place] to call system/Physically safe environment - no spills, clutter or unnecessary equipment/Purposeful proactive rounding/Room/bathroom lighting operational, light cord in reach Patent

## 2024-09-03 NOTE — CONSULT NOTE ADULT - ASSESSMENT
ASSESSMENT:  34F PMHx G6PDD, PSHx  x4, 24 laparoscopic cholecystectomy ([place]) c/b postoperative leak s/p exploratory laparotomy x2 (24, 2024), s/p IR-guided PTC drain placement, s/p diagnostic laparoscopy with laparotomy with REBEKAH, 24 hepaticotomy with hepaticojejunostomy who presented to ED 9/3 from Dr. Forde's clinic with persistent RUQ pain and was found to be tachycardic to 130s in clinic.     PLAN:  - IVF, zosyn  - Repeat vitals after fluids and zosyn to evaluate response  - Final plan pending re-evaluation    Above plan discussed with Attending Surgeon Dr. Forde, patient, patient family, and ED  24 @ 14:17

## 2024-09-03 NOTE — H&P ADULT - NSHPPHYSICALEXAM_GEN_ALL_CORE
GENERAL: A&Ox3, NAD  HEENT: Normocephalic, atraumatic  PULM: Non-labored respirations, bilateral chest rise, saturating well on RA  CV: Regular rate and rhythm  ABDOMEN: Abdomen soft, non-distended, mild RUQ tenderness, stoma with bilious output, mild drainage at R lateral aspect of incision, no rebound tenderness or guarding  : No inguinal lymphadenopathy, no inguinal hernias on palpation, no Shen  MSK: Moving extremities spontaneously, BUE and BLE sensorimotor and strength intact  NEURO: No focal deficits  SKIN: Warm, dry, normal skin color, texture, turgor

## 2024-09-03 NOTE — PATIENT PROFILE ADULT - FALL HARM RISK - HARM RISK INTERVENTIONS

## 2024-09-03 NOTE — H&P ADULT - HISTORY OF PRESENT ILLNESS
Pt is 34F PMHx G6PDD, PSHx  x4, 24 laparoscopic cholecystectomy ([place]) c/b postoperative leak s/p exploratory laparotomy x2 (24, 2024), s/p IR-guided PTC drain placement, s/p diagnostic laparoscopy with laparotomy with REBEKAH, 24 hepaticotomy with hepaticojejunostomy who presented to ED 9/3 from Dr. Forde's clinic with persistent RUQ pain and was found to be tachycardic to 130s in clinic. Was seen in ED for similar RUQ pain, CTAP demonstrated postsurgical changes without drainable collection. Per pt, pain has persisted since her surgery, has not changed in frequency or severity, without change in appetite, PO tolerance, bowels, flatus, or energy. Denies fevers, chills, CP, SOB, N/V. Reports mild purulence around stoma that was evaluated in clinic this AM, denies erythema or bleeding from stoma site. Upon arrival to ED, was afebrile, /74, , WBC 7, AST 34, ALT 18, alkaline phosphatase 279, tbili 0.3, Cr 0.6. Initially HR was responsive to fluids and zosyn (down to 98), subsequently remedios back to 110-115s.

## 2024-09-03 NOTE — ED PROVIDER NOTE - CLINICAL SUMMARY MEDICAL DECISION MAKING FREE TEXT BOX
34-year-old female, past medical history of biliary surgery in [place] status post repair at [facility] 8/14, presenting from Dr. Forde's office.  Patient was evaluated and surgeon noted wound to appear slightly infected and recommended admission to the ED for cholangiogram and further work up as well as antibiotics.  She also has been tachycardic but was evaluated in the ED 8/30 when she was also evaluated by surgery and no acute surgical intervention was needed at that time.  Patient denies fevers, chills, chest pain, shortness of breath, nausea, vomiting, urinary symptoms.  Well-appearing on exam.  Tachycardic regular rhythm.  Abdomen soft nondistended nontender.  Midline incision mildly dehisced with mild purulence. 34-year-old female, past medical history of biliary surgery in [place] status post repair at [facility] 8/14, presenting from Dr. Forde's office.  Patient was evaluated and surgeon noted wound to appear slightly infected and recommended admission to the ED for cholangiogram and further work up as well as antibiotics.  Patient is scheduled for  cholangiogram tomorrow.  She also has been tachycardic but was evaluated in the ED 8/30 when she was also evaluated by surgery and no acute surgical intervention was needed at that time.  Patient denies fevers, chills, chest pain, shortness of breath, nausea, vomiting, urinary symptoms.  Well-appearing on exam.  Tachycardic regular rhythm.  Abdomen soft nondistended nontender.  Midline incision mildly dehisced with mild purulence. Labs were ordered and reviewed.  Imaging was ordered and reviewed by me.  Appropriate medications for patient's presenting complaints were ordered and effects were reassessed.  Patient's records (prior hospital) were reviewed.  Escalation to admission/observation was considered. Discussed with Dr. Forde. Patient's pain improved and well appearing.  However HR remains at 110s.  Patient requires inpatient hospitalization.

## 2024-09-03 NOTE — ED PROVIDER NOTE - PHYSICAL EXAMINATION
VITAL SIGNS: I have reviewed nursing notes and confirm.  CONSTITUTIONAL: well-appearing, non-toxic, NAD  SKIN: Warm dry, normal skin turgor. Surgical sites intact, no erythema surrounding site, + minimal purulent drainage.  HEAD: NCAT  CARD: RRR, no murmurs, rubs or gallops  RESP: clear to ausculation b/l.  No rales, rhonchi, or wheezing.  ABD: Soft, drain to RUQ w. green/yellow discharge, + tenderness around surgical sites.  EXT: Full ROM  NEURO: normal motor. normal sensory.   PSYCH: Cooperative, appropriate.

## 2024-09-03 NOTE — ED ADULT NURSE NOTE - OBJECTIVE STATEMENT
Pt presents to the ED w/ c/o post op complication associated tachycardia. Pt was at her pmd get evaluated post bile duct surgery (8/14/24). Pt was tachycardic to the 140s. Pt was recommended to come to the ED for evaluation. Pt on continuous cardiac monitor. Care ongoing.

## 2024-09-03 NOTE — H&P ADULT - ASSESSMENT
34F PMHx G6PDD, PSHx  x4, 24 laparoscopic cholecystectomy ([place]) c/b postoperative leak s/p exploratory laparotomy x2 (24, 2024), s/p IR-guided PTC drain placement, s/p diagnostic laparoscopy with laparotomy with REBEKAH, 24 hepaticotomy with hepaticojejunostomy who presented to ED 9/3 from Dr. Forde's clinic with persistent RUQ pain and was found to be tachycardic to 130s in clinic. Initially HR was responsive to fluids and zosyn (down to 98), subsequently remedios back to 110-115s.    - Admit to 4C under Dr. Forde  - NPO@MN, D5LR  - Update IR that pt is admitted (planned for outpatient elective procedure )  - Zosyn  - F/u surgery cultures, modify antibiotic regimen as appropriate    Discussed with and approved by Dr. Forde.    Surgery - blue team ([id number])

## 2024-09-03 NOTE — PATIENT PROFILE ADULT - FUNCTIONAL ASSESSMENT - DAILY ACTIVITY ASSESSMENT TYPE
[Nutrition/ Exercise/ Weight Management] : nutrition, exercise, weight management [Body Image] : body image [Vitamins/Supplements] : vitamins/supplements Admission

## 2024-09-03 NOTE — ED PROVIDER NOTE - OBJECTIVE STATEMENT
34-year-old female with history of G6PD deficiency, status post bile duct surgery August 14, cholecystectomy on 4/28, with complications, presents to the ED due to tachycardia.  Patient was seen in hospital recently for similar complaints and discharged with follow-up with Dr. Forrester.  Patient went to follow-up appointment today, with and was noted to be tachycardic and sent to the ED for IV fluids and possible IR for cholangiography.  Patient denies any fever, chest pain, shortness of breath, complaining of abdominal pain at surgical sites.  Normal bowel movements.

## 2024-09-04 LAB
ANION GAP SERPL CALC-SCNC: 12 MMOL/L — SIGNIFICANT CHANGE UP (ref 7–14)
ANION GAP SERPL CALC-SCNC: 14 MMOL/L — SIGNIFICANT CHANGE UP (ref 7–14)
APTT BLD: 34.5 SEC — SIGNIFICANT CHANGE UP (ref 27–39.2)
BASOPHILS # BLD AUTO: 0.02 K/UL — SIGNIFICANT CHANGE UP (ref 0–0.2)
BASOPHILS # BLD AUTO: 0.02 K/UL — SIGNIFICANT CHANGE UP (ref 0–0.2)
BASOPHILS NFR BLD AUTO: 0.2 % — SIGNIFICANT CHANGE UP (ref 0–1)
BASOPHILS NFR BLD AUTO: 0.3 % — SIGNIFICANT CHANGE UP (ref 0–1)
BUN SERPL-MCNC: 4 MG/DL — LOW (ref 10–20)
BUN SERPL-MCNC: 6 MG/DL — LOW (ref 10–20)
CALCIUM SERPL-MCNC: 9 MG/DL — SIGNIFICANT CHANGE UP (ref 8.4–10.5)
CALCIUM SERPL-MCNC: 9.1 MG/DL — SIGNIFICANT CHANGE UP (ref 8.4–10.5)
CHLORIDE SERPL-SCNC: 101 MMOL/L — SIGNIFICANT CHANGE UP (ref 98–110)
CHLORIDE SERPL-SCNC: 99 MMOL/L — SIGNIFICANT CHANGE UP (ref 98–110)
CO2 SERPL-SCNC: 25 MMOL/L — SIGNIFICANT CHANGE UP (ref 17–32)
CO2 SERPL-SCNC: 28 MMOL/L — SIGNIFICANT CHANGE UP (ref 17–32)
CREAT SERPL-MCNC: 0.6 MG/DL — LOW (ref 0.7–1.5)
CREAT SERPL-MCNC: 0.7 MG/DL — SIGNIFICANT CHANGE UP (ref 0.7–1.5)
EGFR: 117 ML/MIN/1.73M2 — SIGNIFICANT CHANGE UP
EGFR: 121 ML/MIN/1.73M2 — SIGNIFICANT CHANGE UP
EOSINOPHIL # BLD AUTO: 0.25 K/UL — SIGNIFICANT CHANGE UP (ref 0–0.7)
EOSINOPHIL # BLD AUTO: 0.33 K/UL — SIGNIFICANT CHANGE UP (ref 0–0.7)
EOSINOPHIL NFR BLD AUTO: 3.8 % — SIGNIFICANT CHANGE UP (ref 0–8)
EOSINOPHIL NFR BLD AUTO: 4.2 % — SIGNIFICANT CHANGE UP (ref 0–8)
GLUCOSE SERPL-MCNC: 108 MG/DL — HIGH (ref 70–99)
GLUCOSE SERPL-MCNC: 98 MG/DL — SIGNIFICANT CHANGE UP (ref 70–99)
HCT VFR BLD CALC: 28.5 % — LOW (ref 37–47)
HCT VFR BLD CALC: 36.8 % — LOW (ref 37–47)
HGB BLD-MCNC: 11.2 G/DL — LOW (ref 12–16)
HGB BLD-MCNC: 9 G/DL — LOW (ref 12–16)
IMM GRANULOCYTES NFR BLD AUTO: 0.1 % — SIGNIFICANT CHANGE UP (ref 0.1–0.3)
IMM GRANULOCYTES NFR BLD AUTO: 0.2 % — SIGNIFICANT CHANGE UP (ref 0.1–0.3)
INR BLD: 1.1 RATIO — SIGNIFICANT CHANGE UP (ref 0.65–1.3)
LYMPHOCYTES # BLD AUTO: 1.99 K/UL — SIGNIFICANT CHANGE UP (ref 1.2–3.4)
LYMPHOCYTES # BLD AUTO: 2.64 K/UL — SIGNIFICANT CHANGE UP (ref 1.2–3.4)
LYMPHOCYTES # BLD AUTO: 30.1 % — SIGNIFICANT CHANGE UP (ref 20.5–51.1)
LYMPHOCYTES # BLD AUTO: 33.6 % — SIGNIFICANT CHANGE UP (ref 20.5–51.1)
MAGNESIUM SERPL-MCNC: 1.7 MG/DL — LOW (ref 1.8–2.4)
MAGNESIUM SERPL-MCNC: 1.9 MG/DL — SIGNIFICANT CHANGE UP (ref 1.8–2.4)
MCHC RBC-ENTMCNC: 29.1 PG — SIGNIFICANT CHANGE UP (ref 27–31)
MCHC RBC-ENTMCNC: 29.5 PG — SIGNIFICANT CHANGE UP (ref 27–31)
MCHC RBC-ENTMCNC: 30.4 G/DL — LOW (ref 32–37)
MCHC RBC-ENTMCNC: 31.6 G/DL — LOW (ref 32–37)
MCV RBC AUTO: 93.4 FL — SIGNIFICANT CHANGE UP (ref 81–99)
MCV RBC AUTO: 95.6 FL — SIGNIFICANT CHANGE UP (ref 81–99)
MONOCYTES # BLD AUTO: 0.58 K/UL — SIGNIFICANT CHANGE UP (ref 0.1–0.6)
MONOCYTES # BLD AUTO: 0.75 K/UL — HIGH (ref 0.1–0.6)
MONOCYTES NFR BLD AUTO: 8.5 % — SIGNIFICANT CHANGE UP (ref 1.7–9.3)
MONOCYTES NFR BLD AUTO: 9.8 % — HIGH (ref 1.7–9.3)
NEUTROPHILS # BLD AUTO: 3.08 K/UL — SIGNIFICANT CHANGE UP (ref 1.4–6.5)
NEUTROPHILS # BLD AUTO: 5.03 K/UL — SIGNIFICANT CHANGE UP (ref 1.4–6.5)
NEUTROPHILS NFR BLD AUTO: 51.9 % — SIGNIFICANT CHANGE UP (ref 42.2–75.2)
NEUTROPHILS NFR BLD AUTO: 57.3 % — SIGNIFICANT CHANGE UP (ref 42.2–75.2)
NRBC # BLD: 0 /100 WBCS — SIGNIFICANT CHANGE UP (ref 0–0)
NRBC # BLD: 0 /100 WBCS — SIGNIFICANT CHANGE UP (ref 0–0)
PHOSPHATE SERPL-MCNC: 4 MG/DL — SIGNIFICANT CHANGE UP (ref 2.1–4.9)
PHOSPHATE SERPL-MCNC: 4.2 MG/DL — SIGNIFICANT CHANGE UP (ref 2.1–4.9)
PLATELET # BLD AUTO: 486 K/UL — HIGH (ref 130–400)
PLATELET # BLD AUTO: 533 K/UL — HIGH (ref 130–400)
PMV BLD: 9.2 FL — SIGNIFICANT CHANGE UP (ref 7.4–10.4)
PMV BLD: 9.7 FL — SIGNIFICANT CHANGE UP (ref 7.4–10.4)
POTASSIUM SERPL-MCNC: 3.8 MMOL/L — SIGNIFICANT CHANGE UP (ref 3.5–5)
POTASSIUM SERPL-MCNC: 3.8 MMOL/L — SIGNIFICANT CHANGE UP (ref 3.5–5)
POTASSIUM SERPL-SCNC: 3.8 MMOL/L — SIGNIFICANT CHANGE UP (ref 3.5–5)
POTASSIUM SERPL-SCNC: 3.8 MMOL/L — SIGNIFICANT CHANGE UP (ref 3.5–5)
PROTHROM AB SERPL-ACNC: 12.5 SEC — SIGNIFICANT CHANGE UP (ref 9.95–12.87)
RBC # BLD: 3.05 M/UL — LOW (ref 4.2–5.4)
RBC # BLD: 3.85 M/UL — LOW (ref 4.2–5.4)
RBC # FLD: 14 % — SIGNIFICANT CHANGE UP (ref 11.5–14.5)
RBC # FLD: 14.1 % — SIGNIFICANT CHANGE UP (ref 11.5–14.5)
SODIUM SERPL-SCNC: 138 MMOL/L — SIGNIFICANT CHANGE UP (ref 135–146)
SODIUM SERPL-SCNC: 141 MMOL/L — SIGNIFICANT CHANGE UP (ref 135–146)
WBC # BLD: 5.93 K/UL — SIGNIFICANT CHANGE UP (ref 4.8–10.8)
WBC # BLD: 8.78 K/UL — SIGNIFICANT CHANGE UP (ref 4.8–10.8)
WBC # FLD AUTO: 5.93 K/UL — SIGNIFICANT CHANGE UP (ref 4.8–10.8)
WBC # FLD AUTO: 8.78 K/UL — SIGNIFICANT CHANGE UP (ref 4.8–10.8)

## 2024-09-04 PROCEDURE — 74177 CT ABD & PELVIS W/CONTRAST: CPT | Mod: 26

## 2024-09-04 PROCEDURE — 47536 EXCHANGE BILIARY DRG CATH: CPT | Mod: 59

## 2024-09-04 PROCEDURE — 99223 1ST HOSP IP/OBS HIGH 75: CPT | Mod: 24

## 2024-09-04 RX ORDER — KETOROLAC TROMETHAMINE 30 MG/ML
15 INJECTION, SOLUTION INTRAMUSCULAR ONCE
Refills: 0 | Status: DISCONTINUED | OUTPATIENT
Start: 2024-09-04 | End: 2024-09-04

## 2024-09-04 RX ORDER — DEXTROSE, SODIUM ACETATE, POTASSIUM CHLORIDE, POTASSIUM PHOSPHATE, AND SODIUM CHLORIDE 5; .15; .13; .28; .091 G/100ML; G/100ML; G/100ML; G/100ML; G/100ML
1000 INJECTION, SOLUTION INTRAVENOUS
Refills: 0 | Status: DISCONTINUED | OUTPATIENT
Start: 2024-09-04 | End: 2024-09-05

## 2024-09-04 RX ADMIN — PIPERACILLIN SODIUM AND TAZOBACTAM SODIUM 25 GRAM(S): 3; .375 INJECTION, POWDER, FOR SOLUTION INTRAVENOUS at 21:42

## 2024-09-04 RX ADMIN — ACETAMINOPHEN 650 MILLIGRAM(S): 325 TABLET ORAL at 05:28

## 2024-09-04 RX ADMIN — PIPERACILLIN SODIUM AND TAZOBACTAM SODIUM 25 GRAM(S): 3; .375 INJECTION, POWDER, FOR SOLUTION INTRAVENOUS at 05:28

## 2024-09-04 RX ADMIN — KETOROLAC TROMETHAMINE 15 MILLIGRAM(S): 30 INJECTION, SOLUTION INTRAMUSCULAR at 17:49

## 2024-09-04 RX ADMIN — ACETAMINOPHEN 650 MILLIGRAM(S): 325 TABLET ORAL at 17:49

## 2024-09-04 RX ADMIN — ACETAMINOPHEN 650 MILLIGRAM(S): 325 TABLET ORAL at 12:20

## 2024-09-04 RX ADMIN — Medication 110 MILLILITER(S): at 00:33

## 2024-09-04 RX ADMIN — ACETAMINOPHEN 650 MILLIGRAM(S): 325 TABLET ORAL at 17:32

## 2024-09-04 RX ADMIN — DEXTROSE, SODIUM ACETATE, POTASSIUM CHLORIDE, POTASSIUM PHOSPHATE, AND SODIUM CHLORIDE 110 MILLILITER(S): 5; .15; .13; .28; .091 INJECTION, SOLUTION INTRAVENOUS at 13:32

## 2024-09-04 RX ADMIN — PIPERACILLIN SODIUM AND TAZOBACTAM SODIUM 25 GRAM(S): 3; .375 INJECTION, POWDER, FOR SOLUTION INTRAVENOUS at 14:23

## 2024-09-04 RX ADMIN — ACETAMINOPHEN 650 MILLIGRAM(S): 325 TABLET ORAL at 12:23

## 2024-09-04 RX ADMIN — KETOROLAC TROMETHAMINE 15 MILLIGRAM(S): 30 INJECTION, SOLUTION INTRAMUSCULAR at 17:32

## 2024-09-04 RX ADMIN — Medication 25 GRAM(S): at 05:28

## 2024-09-04 RX ADMIN — ENOXAPARIN SODIUM 40 MILLIGRAM(S): 100 INJECTION SUBCUTANEOUS at 17:32

## 2024-09-04 RX ADMIN — ACETAMINOPHEN 650 MILLIGRAM(S): 325 TABLET ORAL at 00:10

## 2024-09-04 NOTE — CONSULT NOTE ADULT - SUBJECTIVE AND OBJECTIVE BOX
INTERVENTIONAL RADIOLOGY CONSULT:     Procedure Requested: Cholangiogram    HPI:  Pt is 34F PMHx G6PDD, PSHx  x4, 24 laparoscopic cholecystectomy ([facility]) c/b postoperative leak s/p exploratory laparotomy x2 (24, 2024), s/p IR-guided PTC drain placement, s/p diagnostic laparoscopy with laparotomy with REBEKAH, 24 hepaticotomy with hepaticojejunostomy who presented to ED 9/3 from Dr. Forde's clinic with persistent RUQ pain and was found to be tachycardic to 130s in clinic. Was seen in ED for similar RUQ pain, CTAP demonstrated postsurgical changes without drainable collection. Per pt, pain has persisted since her surgery, has not changed in frequency or severity, without change in appetite, PO tolerance, bowels, flatus, or energy. Denies fevers, chills, CP, SOB, N/V. Reports mild purulence around stoma that was evaluated in clinic this AM, denies erythema or bleeding from stoma site. Upon arrival to ED, was afebrile, /74, , WBC 7, AST 34, ALT 18, alkaline phosphatase 279, tbili 0.3, Cr 0.6. Initially HR was responsive to fluids and zosyn (down to 98), subsequently remedios back to 110-115s. (03 Sep 2024 16:11)      PAST MEDICAL & SURGICAL HISTORY:  Glucose 6 phosphatase deficiency      Gestational diabetes       delivery delivered      S/P cholecystectomy      History of exploratory laparotomy          MEDICATIONS  (STANDING):  acetaminophen     Tablet .. 650 milliGRAM(s) Oral every 6 hours  dextrose 5% + lactated ringers. 1000 milliLiter(s) (110 mL/Hr) IV Continuous <Continuous>  enoxaparin Injectable 40 milliGRAM(s) SubCutaneous every 24 hours  piperacillin/tazobactam IVPB.. 3.375 Gram(s) IV Intermittent every 8 hours    MEDICATIONS  (PRN):      Allergies    sulfa drugs (Rash)  celemin&amp; celepid given in [facility] caused Rigors, was able to tolerate albumin (Other)    Intolerances          FAMILY HISTORY:  FHx: diabetes mellitus (Mother)        Physical Exam:   Vital Signs Last 24 Hrs  T(C): 36.8 (04 Sep 2024 04:28), Max: 36.9 (03 Sep 2024 17:16)  T(F): 98.3 (04 Sep 2024 04:28), Max: 98.5 (03 Sep 2024 17:16)  HR: 96 (04 Sep 2024 04:28) (96 - 119)  BP: 103/68 (04 Sep 2024 04:28) (103/68 - 127/80)  BP(mean): --  RR: 18 (04 Sep 2024 04:28) (18 - 18)  SpO2: 100% (04 Sep 2024 04:28) (98% - 100%)    Parameters below as of 03 Sep 2024 21:51  Patient On (Oxygen Delivery Method): room air          Labs:                         9.0    8.78  )-----------( 533      ( 04 Sep 2024 00:15 )             28.5     09-    141  |  101  |  6<L>  ----------------------------<  98  3.8   |  28  |  0.7    Ca    9.0      04 Sep 2024 00:15  Phos  4.2     09-04  Mg     1.7     09-04    TPro  8.0  /  Alb  4.0  /  TBili  0.3  /  DBili  x   /  AST  34  /  ALT  18  /  AlkPhos  279<H>  0903    PT/INR - ( 04 Sep 2024 00:15 )   PT: 12.50 sec;   INR: 1.10 ratio         PTT - ( 04 Sep 2024 00:15 )  PTT:34.5 sec    Pertinent labs:                      9.0    8.78  )-----------( 533      ( 04 Sep 2024 00:15 )             28.5       09-04    141  |  101  |  6<L>  ----------------------------<  98  3.8   |  28  |  0.7    Ca    9.0      04 Sep 2024 00:15  Phos  4.2     09-04  Mg     1.7     09-04    TPro  8.0  /  Alb  4.0  /  TBili  0.3  /  DBili  x   /  AST  34  /  ALT  18  /  AlkPhos  279<H>  0903      PT/INR - ( 04 Sep 2024 00:15 )   PT: 12.50 sec;   INR: 1.10 ratio         PTT - ( 04 Sep 2024 00:15 )  PTT:34.5 sec    Radiology & Additional Studies:     Radiology imaging reviewed.       ASSESSMENT AND PLAN:  34F PMHx 24 laparoscopic cholecystectomy ([place]) c/b postoperative leak s/p exploratory laparotomy x2 (24, 2024), s/p IR-guided PTC drain placement, s/p diagnostic laparoscopy with laparotomy with REBEKAH, 24 hepaticotomy with hepaticojejunostomy presented to ED 9/3 from Dr. Forde's clinic with persistent RUQ pain. Patient had scheduled outpatient cholangiogram  prior to admission.    -Imaging reviewed.  -Plan for cholangiogram today .   -Remain NPO    Please call Interventional Radiology with questions or concerns:   - M-F [id number]: [phone]   - All other hours: [phone]  
GENERAL SURGERY CONSULT NOTE    Patient: MICHAEL LAL , 34y (89)Female   MRN: [id number]  Location: [facility] ED  Visit: 24 Emergency  Date: 24 @ 14:17    HPI:  Pt is 34F PMHx G6PDD, PSHx  x4, 24 laparoscopic cholecystectomy ([place]) c/b postoperative leak s/p exploratory laparotomy x2 (24, 2024), s/p IR-guided PTC drain placement, s/p diagnostic laparoscopy with laparotomy with REBEKAH, 24 hepaticotomy with hepaticojejunostomy who presented to ED 9/3 from Dr. Forde's clinic with persistent RUQ pain and was found to be tachycardic to 130s in clinic. Was seen in ED for similar RUQ pain, CTAP demonstrated postsurgical changes without drainable collection. Per pt, pain has persisted since her surgery, has not changed in frequency or severity, without change in appetite, PO tolerance, bowels, flatus, or energy. Denies fevers, chills, CP, SOB, N/V. Reports mild purulence around stoma that was evaluated in clinic this AM, denies erythema or bleeding from stoma site. Upon arrival to ED, was afebrile, /74, , WBC 7, AST 34, ALT 18, alkaline phosphatase 279, tbili 0.3, Cr 0.6.     PAST MEDICAL & SURGICAL HISTORY:  Glucose 6 phosphatase deficiency  Gestational diabetes   delivery delivered  S/P cholecystectomy  History of exploratory laparotomy    Home Medications:  None    VITALS:  T(F): 97.6 (24 @ 11:19), Max: 97.6 (24 @ 11:19)  HR: 98 (24 @ 14:12) (98 - 119)  BP: 107/74 (24 @ 11:19) (107/74 - 107/74)  RR: 18 (24 @ 11:19) (18 - 18)  SpO2: 99% (24 @ 11:19) (99% - 99%)    PHYSICAL EXAM:  GENERAL: A&Ox3, NAD  HEENT: Normocephalic, atraumatic  PULM: Non-labored respirations, bilateral chest rise, saturating well on RA  CV: Regular rate and rhythm  ADBOMEN: Abdomen soft, non-distended, mild RUQ tenderness, stoma with bilious output, mild drainage at R lateral aspect of incision, no rebound tenderness or guarding  : No inguinal lymphadenopathy, no inguinal hernias on palpation, no Shen  MSK: Moving extremities spontaneously, BUE and BLE sensorimotor and strength intact  NEURO: No focal deficits  SKIN: Warm, dry, normal skin color, texture, turgor      LAB/STUDIES:                        11.1   7.01  )-----------( 573      ( 03 Sep 2024 12:42 )             35.4         136  |  98  |  6<L>  ----------------------------<  96  4.7   |  24  |  0.6<L>    Ca    9.6      03 Sep 2024 12:42    TPro  8.0  /  Alb  4.0  /  TBili  0.3  /  DBili  x   /  AST  34  /  ALT  18  /  AlkPhos  279<H>        LIVER FUNCTIONS - ( 03 Sep 2024 12:42 )  Alb: 4.0 g/dL / Pro: 8.0 g/dL / ALK PHOS: 279 U/L / ALT: 18 U/L / AST: 34 U/L / GGT: x           Urinalysis Basic - ( 03 Sep 2024 12:42 )    Color: x / Appearance: x / SG: x / pH: x  Gluc: 96 mg/dL / Ketone: x  / Bili: x / Urobili: x   Blood: x / Protein: x / Nitrite: x   Leuk Esterase: x / RBC: x / WBC x   Sq Epi: x / Non Sq Epi: x / Bacteria: x

## 2024-09-04 NOTE — PROCEDURE NOTE - PROCEDURE FINDINGS AND DETAILS
R biliary ductal catheter injected- no sideholes intrahepatic. Exchanged for sheath, repeat cholangiogram shows connection to left hepatic duct from right. Existing catheter then exchanged for an 8fr I/E biliary drainage w sideholes connecting both left and right intrahepatic ducts with the jejunum.  Connected to external drainage bag.

## 2024-09-05 ENCOUNTER — TRANSCRIPTION ENCOUNTER (OUTPATIENT)
Age: 34
End: 2024-09-05

## 2024-09-05 VITALS
SYSTOLIC BLOOD PRESSURE: 97 MMHG | RESPIRATION RATE: 18 BRPM | DIASTOLIC BLOOD PRESSURE: 61 MMHG | TEMPERATURE: 98 F | HEART RATE: 83 BPM | OXYGEN SATURATION: 98 %

## 2024-09-05 PROCEDURE — 99233 SBSQ HOSP IP/OBS HIGH 50: CPT | Mod: 24

## 2024-09-05 RX ORDER — IBUPROFEN 600 MG
1 TABLET ORAL
Qty: 15 | Refills: 0
Start: 2024-09-05 | End: 2024-09-09

## 2024-09-05 RX ORDER — METHOCARBAMOL 750 MG/1
500 TABLET, FILM COATED ORAL EVERY 8 HOURS
Refills: 0 | Status: DISCONTINUED | OUTPATIENT
Start: 2024-09-05 | End: 2024-09-05

## 2024-09-05 RX ORDER — AMOXICILLIN AND CLAVULANATE POTASSIUM 250; 125 MG/1; MG/1
875 TABLET, FILM COATED ORAL
Qty: 10 | Refills: 0
Start: 2024-09-05 | End: 2024-09-09

## 2024-09-05 RX ORDER — ACETAMINOPHEN 325 MG/1
2 TABLET ORAL
Qty: 0 | Refills: 0 | DISCHARGE
Start: 2024-09-05

## 2024-09-05 RX ORDER — KETOROLAC TROMETHAMINE 30 MG/ML
15 INJECTION, SOLUTION INTRAMUSCULAR EVERY 8 HOURS
Refills: 0 | Status: DISCONTINUED | OUTPATIENT
Start: 2024-09-05 | End: 2024-09-05

## 2024-09-05 RX ORDER — METHOCARBAMOL 750 MG/1
1 TABLET, FILM COATED ORAL
Qty: 15 | Refills: 0
Start: 2024-09-05 | End: 2024-09-09

## 2024-09-05 RX ADMIN — ACETAMINOPHEN 650 MILLIGRAM(S): 325 TABLET ORAL at 05:29

## 2024-09-05 RX ADMIN — DEXTROSE, SODIUM ACETATE, POTASSIUM CHLORIDE, POTASSIUM PHOSPHATE, AND SODIUM CHLORIDE 110 MILLILITER(S): 5; .15; .13; .28; .091 INJECTION, SOLUTION INTRAVENOUS at 05:26

## 2024-09-05 RX ADMIN — Medication 100 GRAM(S): at 05:27

## 2024-09-05 RX ADMIN — ACETAMINOPHEN 650 MILLIGRAM(S): 325 TABLET ORAL at 09:56

## 2024-09-05 RX ADMIN — ACETAMINOPHEN 650 MILLIGRAM(S): 325 TABLET ORAL at 05:27

## 2024-09-05 RX ADMIN — KETOROLAC TROMETHAMINE 15 MILLIGRAM(S): 30 INJECTION, SOLUTION INTRAMUSCULAR at 12:05

## 2024-09-05 RX ADMIN — PIPERACILLIN SODIUM AND TAZOBACTAM SODIUM 25 GRAM(S): 3; .375 INJECTION, POWDER, FOR SOLUTION INTRAVENOUS at 05:27

## 2024-09-05 NOTE — PROGRESS NOTE ADULT - SUBJECTIVE AND OBJECTIVE BOX
GENERAL SURGERY PROGRESS NOTE     MICHAEL LAL  [address] [place] [address] day :3d    POD:1d  Procedure: Biliary catheter evaluation and exchange by IR  Surgical Attending: Paz Forde  Overnight events: No acute events overnight. Biliary drain in place draining bile. She is tolerating a diet without any nausea or vomiting.     T(F): 98.2 (24 @ 08:18), Max: 98.5 (24 @ 04:49)  HR: 83 (24 @ 08:18) (77 - 114)  BP: 97/61 (24 @ 08:18) (96/63 - 133/85)  ABP: --  ABP(mean): --  RR: 18 (24 @ 08:18) (16 - 18)  SpO2: 98% (24 @ 08:18) (97% - 100%)    IN'S / OUT's:    24 @ 07:01  -  24 @ 07:00  --------------------------------------------------------  IN:    dextrose 5% + lactated ringers: 220 mL    dextrose 5% + sodium chloride 0.45% w/ Additives: 220 mL  Total IN: 440 mL    OUT:  Total OUT: 0 mL    Total NET: 440 mL          PHYSICAL EXAM:  GENERAL: NAD  CHEST/LUNG: equal chest rise b/l   HEART: Regular rate and rhythm  ABDOMEN: Soft, mild tenderness in RUQ, Nondistended; Right abdominal incision with dressing in place with small area of serous drainage.  EXTREMITIES:  No clubbing, cyanosis, or edema      LABS  Labs:  CAPILLARY BLOOD GLUCOSE                              11.2   5.93  )-----------( 486      ( 04 Sep 2024 21:44 )             36.8       Auto Neutrophil %: 51.9 % (24 @ 21:44)  Auto Immature Granulocyte %: 0.2 % (24 @ 21:44)        138  |  99  |  4<L>  ----------------------------<  108<H>  3.8   |  25  |  0.6<L>      Calcium: 9.1 mg/dL (24 @ 21:44)      LFTs:             8.0  | 0.3  | 34       ------------------[279     ( 03 Sep 2024 12:42 )  4.0  | x    | 18          Lipase:23     Amylase:x             Coags:     12.50  ----< 1.10    ( 04 Sep 2024 00:15 )     34.5                Urinalysis Basic - ( 04 Sep 2024 21:44 )    Color: x / Appearance: x / SG: x / pH: x  Gluc: 108 mg/dL / Ketone: x  / Bili: x / Urobili: x   Blood: x / Protein: x / Nitrite: x   Leuk Esterase: x / RBC: x / WBC x   Sq Epi: x / Non Sq Epi: x / Bacteria: x            RADIOLOGY & ADDITIONAL TESTS:      A/P:  MICHAEL LAL is a 34F PMHx G6PDD, PSHx  x4, 24 laparoscopic cholecystectomy ([facility]) c/b postoperative leak s/p exploratory laparotomy x2 (24, 2024), s/p IR-guided PTC drain placement, s/p diagnostic laparoscopy with laparotomy with REBEKAH, 24 hepaticotomy with hepaticojejunostomy who presented to ED 9/3 from Dr. Forde's clinic with persistent RUQ pain and was found to be tachycardic to 130s in clinic. She is now s/p biliary catheter evaluation and exchange with IR on .     PLAN:   - monitor drain output quality and quantity  - continue zosyn while in patient  - DVT ppx  - multi modal pain control  - encourage ambulation and IS as tolerated   - Plan for discharge home today       #Antibiotics: piperacillin/tazobactam IVPB.. 3.375 Gram(s) IV Intermittent every 8 hours, 24 @ 06:00   Day **  #DVT ppx: enoxaparin Injectable 40 milliGRAM(s) SubCutaneous every 24 hours    Disposition:  4C, DC home     Above plan to be discussed with Attending Surgeon Dr. Forde  , patient, patient family, and rest of health care team    [employer] [id number]    
 GENERAL SURGERY PROGRESS NOTE     Patient: MICHAEL LAL , 34y (89)Female   MRN: [id number]  Location: [place] [address] A  Visit: 24 Inpatient  Date: 24 @ 08:06      Admitted :24 (1d)  LOS: 1d    Procedure/Dx/Injuries: Tachycardia  RUQ  S/p hepaticotomy with hepaticojejunostomy on       Events of past 24 hours: Patient seen and examined at bedside. No acute events overnight. Patient is endorsing RUQ pain. Afebrile, tachy to 110s overnight, resolved. Magnesium 1.7, repleted overnight.    >>> <<<>>> <<<>>> <<<>>> <<<>>> <<<>>> <<<>>> <<<>>> <<<>>> <<<>>> <<<    Vitals:   T(F): 98.3 (24 @ 04:28), Max: 98.5 (24 @ 17:16)  HR: 96 (24 @ 04:28)  BP: 103/68 (24 @ 04:28)  RR: 18 (24 @ 04:28)  SpO2: 100% (24 @ 04:28)      PHYSICAL EXAM:  General Appearance: NAD  HEENT: Normocephalic, atraumatic, trachea midline.  Heart: Regular rate and rhythm.  Lungs: No increased work of breathing or accessory muscle use. Symmetric chest wall rise and fall.   Abdomen: Abdomen soft, non-distended, mild RUQ tenderness, stoma with bilious output, mild drainage at R lateral aspect of incision, no rebound tenderness or guarding.   MSK/Extremities: Warm & well-perfused.   Skin: Warm, dry. No jaundice.   Incision/wound: Healing well, dressings in place,  mild drainage at R lateral aspect of incision.  >>> <<<>>> <<<>>> <<<>>> <<<>>> <<<>>> <<<>>> <<<>>> <<<>>> <<<>>> <<<     Is & Os:   Diet, NPO after Midnight:      NPO Start Date: 03-Sep-2024,   NPO Start Time: 23:59  Except Medications  Diet, Regular    Fluids:     24 @ 07:01  -  24 @ 07:00  --------------------------------------------------------  IN:  Total IN: 0 mL    OUT:    Voided (mL): 5 mL  Total OUT: 5 mL    Total NET: -5 mL    >>> <<<>>> <<<>>> <<<>>> <<<>>> <<<>>> <<<>>> <<<>>> <<<>>> <<<>>> <<<    MEDICATIONS  (STANDING):  acetaminophen     Tablet .. 650 milliGRAM(s) Oral every 6 hours  dextrose 5% + lactated ringers. 1000 milliLiter(s) (110 mL/Hr) IV Continuous <Continuous>  enoxaparin Injectable 40 milliGRAM(s) SubCutaneous every 24 hours  piperacillin/tazobactam IVPB.. 3.375 Gram(s) IV Intermittent every 8 hours    MEDICATIONS  (PRN):      DVT PROPHYLAXIS: enoxaparin Injectable 40 milliGRAM(s) SubCutaneous every 24 hours    GI PROPHYLAXIS:   ANTICOAGULATION:   ANTIBIOTICS:  piperacillin/tazobactam IVPB.. 3.375 Gram(s)      >>> <<<>>> <<<>>> <<<>>> <<<>>> <<<>>> <<<>>> <<<>>> <<<>>> <<<>>> <<<      LAB/STUDIES:  Labs:  CAPILLARY BLOOD GLUCOSE                          9.0    8.78  )-----------( 533      ( 04 Sep 2024 00:15 )             28.5       Auto Neutrophil %: 57.3 % (24 @ 00:15)  Auto Immature Granulocyte %: 0.1 % (24 @ 00:15)  Auto Neutrophil %: 66.4 % (24 @ 12:42)  Auto Immature Granulocyte %: 0.3 % (09-03-24 @ 12:42)        141  |  101  |  6<L>  ----------------------------<  98  3.8   |  28  |  0.7      Calcium: 9.0 mg/dL (24 @ 00:15)      LFTs:             8.0  | 0.3  | 34       ------------------[279     ( 03 Sep 2024 12:42 )  4.0  | x    | 18          Lipase:23     Amylase:x             Coags:     12.50  ----< 1.10    ( 04 Sep 2024 00:15 )     34.5          Urinalysis Basic - ( 04 Sep 2024 00:15 )    Color: x / Appearance: x / SG: x / pH: x  Gluc: 98 mg/dL / Ketone: x  / Bili: x / Urobili: x   Blood: x / Protein: x / Nitrite: x   Leuk Esterase: x / RBC: x / WBC x   Sq Epi: x / Non Sq Epi: x / Bacteria: x    >>> <<<>>> <<<>>> <<<>>> <<<>>> <<<>>> <<<>>> <<<>>> <<<>>> <<<>>> <<<    IMAGING:  < from: CT Abdomen and Pelvis w/ Oral Cont (24 @ 12:36) >  IMPRESSION:  No bowel obstruction.    Pelvic ascites.    Right anterior wall postsurgical changes noted. No drainable fluid   collection identified.    < end of copied text >    >>> <<<>>> <<<>>> <<<>>> <<<>>> <<<>>> <<<>>> <<<>>> <<<>>> <<<>>> <<<    ASSESSMENT:  34F PMHx G6PDD, PSHx  x4, 24 laparoscopic cholecystectomy ([place]) c/b postoperative leak s/p exploratory laparotomy x2 (24, 2024), s/p IR-guided PTC drain placement, s/p diagnostic laparoscopy with laparotomy with REBEKAH, 24 hepaticotomy with hepaticojejunostomy who presented to ED 9/3 from Dr. Forde's clinic with persistent RUQ pain and was found to be tachycardic to 130s in clinic. Physical exam, labs and imaging as described above.       PLAN:  - NPO for drain study with IR today  - F/u CT abdomen/pelvis with IV contrast  - Monitor vitals  - Monitor labs and replete as necessary  - Monitor for bowel function  - Pain control  - Encourage ambulation as tolerated  - DVT Prophylaxis    Lines/Tubes: PIV    [employer]UE [employer] [id number]

## 2024-09-05 NOTE — DISCHARGE NOTE PROVIDER - NSDCMRMEDTOKEN_GEN_ALL_CORE_FT
acetaminophen 325 mg oral tablet: 2 tab(s) orally every 6 hours  amoxicillin-clavulanate 875 mg-125 mg oral tablet: 875 milligram(s) orally every 12 hours  Colace 100 mg oral capsule: 1 cap(s) orally every 8 hours  ibuprofen 600 mg oral tablet: 1 tab(s) orally every 8 hours  methocarbamol 500 mg oral tablet: 1 tab(s) orally every 8 hours  MiraLax oral powder for reconstitution: 17 gram(s) orally once a day  Percocet 5 mg-325 mg oral tablet: 1 tab(s) orally every 4 hours as needed for  severe pain MDD: 4  Percocet 5 mg-325 mg oral tablet: 1 tab(s) orally 2 times a day MDD: 2

## 2024-09-05 NOTE — DISCHARGE NOTE PROVIDER - HOSPITAL COURSE
Pt is 34F PMHx G6PDD, PSHx  x4, 24 laparoscopic cholecystectomy ([facility]) c/b postoperative leak s/p exploratory laparotomy x2 (24, 2024), s/p IR-guided PTC drain placement, s/p diagnostic laparoscopy with laparotomy with REBEKAH, 24 hepaticotomy with hepaticojejunostomy who presented to ED 9/3 from Dr. Forde's clinic with persistent RUQ pain and was found to be tachycardic to 130s in clinic. Was seen in ED for similar RUQ pain. Per patient, pain has persisted since her surgery, has not changed in frequency or severity, without change in appetite, PO tolerance, bowels, flatus, or energy. Reported mild purulence around stoma that was evaluated in clinic, denied erythema or bleeding from stoma site. Upon arrival to ED, was afebrile, /74, , WBC 7, AST 34, ALT 18, alkaline phosphatase 279, tbili 0.3, Cr 0.6. Patient was given fluids and antibiotics with improvement in tachycardia. On CT, patient was found to have internal/external biliary drain terminating within a mildly inflamed small bowel loop and new collection anterior to the liver at the level of the gallbladder fossa measuring 1.7 x 0.9 x 2.1 cm. Right anterior abdominal wall subcutaneous collection measuring 2.3 x 0.9 cm. IR was consulted and planned for a cholangiogram. On , patient went with IR for biliary catheter evaluation and exchange in which right biliary ductal catheter injected- no sideholes intrahepatic. Right catheter was exchanged for sheath, repeat cholangiogram showed connection to left hepatic duct from right. Existing catheter then exchanged for an 8fr I/E biliary drainage w sideholes connecting both left and right intrahepatic ducts with the jejunum. Both were connected to an external drainage bag. Patient did well after the procedure. Pain was controlled, she tolerated her diet, and was able to ambulate independently. Discussed with patient the option of visiting nursing services. Per patient, she has had these drains and the drainage bag in the past and can manage on her own with help from her mother. Patient is now medically cleared for discharge.

## 2024-09-05 NOTE — DISCHARGE NOTE PROVIDER - CARE PROVIDER_API CALL
Pza Forde  Complex General Surgical Oncology  [address] [facility], [place], [address] [place] [address]  Phone: [phone]  Fax: [phone]  Follow Up Time:

## 2024-09-05 NOTE — DISCHARGE NOTE PROVIDER - NSDCCPCAREPLAN_GEN_ALL_CORE_FT
PRINCIPAL DISCHARGE DIAGNOSIS  Diagnosis: Tachycardia  Assessment and Plan of Treatment: Continue regular diet.  Dressings: Change dressings as needed/when saturated, OK to shower normally.   Please empy your drainage bag as needed. Measure output and document quality (color and consistency) of fluid before emtyping.   Pain : Take ibuprofen, tylenol as needed for pain. You can also take robaxin.   Follow up : Call to schedule a follow up appointment with Dr. Forde in 1-2 weeks.        PRINCIPAL DISCHARGE DIAGNOSIS  Diagnosis: Tachycardia  Assessment and Plan of Treatment: Continue regular diet.  Dressings: Change dressings as needed/when saturated, OK to shower normally.   Please empy your drainage bag as needed. Measure output and document quality (color and consistency) of fluid before emtyping.   Pain : Take ibuprofen, tylenol as needed for pain. You can also take robaxin three times a day for pain.   Antibiotics: Please take augmentin as prescribed. Take with food.   Follow up : Call to schedule a follow up appointment with Dr. Forde in 1-2 weeks.

## 2024-09-05 NOTE — DISCHARGE NOTE PROVIDER - NSDCFUSCHEDAPPT_GEN_ALL_CORE_FT
John Barragan  [facility] PreAdmits  Scheduled Appointment: 09/10/2024    [facility]  GASTRO  Gildardo Langley  Scheduled Appointment: 09/10/2024

## 2024-09-05 NOTE — DISCHARGE NOTE PROVIDER - NPI NUMBER (FOR SYSADMIN USE ONLY) :
Electrodesiccation Text: The wound bed was treated with electrodesiccation after the biopsy was performed. [[id number]]

## 2024-09-05 NOTE — DISCHARGE NOTE NURSING/CASE MANAGEMENT/SOCIAL WORK - PATIENT PORTAL LINK FT
You can access the FollowMyHealth Patient Portal offered by [facility] by registering at the following website: [email or web address]. By joining [employer]’s FollowMyHealth portal, you will also be able to view your health information using other applications (apps) compatible with our system.

## 2024-09-10 ENCOUNTER — OUTPATIENT (OUTPATIENT)
Dept: OUTPATIENT SERVICES | Facility: HOSPITAL | Age: 34
LOS: 1 days | End: 2024-09-10

## 2024-09-10 ENCOUNTER — APPOINTMENT (OUTPATIENT)
Dept: GASTROENTEROLOGY | Facility: CLINIC | Age: 34
End: 2024-09-10

## 2024-09-10 VITALS
OXYGEN SATURATION: 100 % | BODY MASS INDEX: 22.81 KG/M2 | TEMPERATURE: 97.9 F | SYSTOLIC BLOOD PRESSURE: 120 MMHG | DIASTOLIC BLOOD PRESSURE: 83 MMHG | WEIGHT: 154 LBS | HEIGHT: 69 IN | HEART RATE: 96 BPM

## 2024-09-10 DIAGNOSIS — Z98.890 OTHER SPECIFIED POSTPROCEDURAL STATES: Chronic | ICD-10-CM

## 2024-09-10 DIAGNOSIS — Z00.00 ENCOUNTER FOR GENERAL ADULT MEDICAL EXAMINATION WITHOUT ABNORMAL FINDINGS: ICD-10-CM

## 2024-09-10 PROCEDURE — 99214 OFFICE O/P EST MOD 30 MIN: CPT

## 2024-09-11 ENCOUNTER — INPATIENT (INPATIENT)
Facility: HOSPITAL | Age: 34
LOS: 5 days | Discharge: HOME HEALTH | DRG: 251 | End: 2024-09-17
Attending: SURGERY | Admitting: SURGERY
Payer: MEDICAID

## 2024-09-11 VITALS
DIASTOLIC BLOOD PRESSURE: 68 MMHG | SYSTOLIC BLOOD PRESSURE: 108 MMHG | HEIGHT: 67 IN | TEMPERATURE: 99 F | RESPIRATION RATE: 18 BRPM | OXYGEN SATURATION: 100 % | HEART RATE: 93 BPM | WEIGHT: 149.91 LBS

## 2024-09-11 DIAGNOSIS — Z90.49 ACQUIRED ABSENCE OF OTHER SPECIFIED PARTS OF DIGESTIVE TRACT: Chronic | ICD-10-CM

## 2024-09-11 DIAGNOSIS — Z98.890 OTHER SPECIFIED POSTPROCEDURAL STATES: Chronic | ICD-10-CM

## 2024-09-11 DIAGNOSIS — R10.9 UNSPECIFIED ABDOMINAL PAIN: ICD-10-CM

## 2024-09-11 LAB
ALBUMIN SERPL ELPH-MCNC: 4.1 G/DL — SIGNIFICANT CHANGE UP (ref 3.5–5.2)
ALP SERPL-CCNC: 208 U/L — HIGH (ref 30–115)
ALT FLD-CCNC: 17 U/L — SIGNIFICANT CHANGE UP (ref 0–41)
ANION GAP SERPL CALC-SCNC: 14 MMOL/L — SIGNIFICANT CHANGE UP (ref 7–14)
APTT BLD: 35.6 SEC — SIGNIFICANT CHANGE UP (ref 27–39.2)
AST SERPL-CCNC: 29 U/L — SIGNIFICANT CHANGE UP (ref 0–41)
BASOPHILS # BLD AUTO: 0.01 K/UL — SIGNIFICANT CHANGE UP (ref 0–0.2)
BASOPHILS NFR BLD AUTO: 0.1 % — SIGNIFICANT CHANGE UP (ref 0–1)
BILIRUB DIRECT SERPL-MCNC: <0.2 MG/DL — SIGNIFICANT CHANGE UP (ref 0–0.3)
BILIRUB SERPL-MCNC: 0.3 MG/DL — SIGNIFICANT CHANGE UP (ref 0.2–1.2)
BLD GP AB SCN SERPL QL: SIGNIFICANT CHANGE UP
BUN SERPL-MCNC: 6 MG/DL — LOW (ref 10–20)
CALCIUM SERPL-MCNC: 9.1 MG/DL — SIGNIFICANT CHANGE UP (ref 8.4–10.5)
CHLORIDE SERPL-SCNC: 103 MMOL/L — SIGNIFICANT CHANGE UP (ref 98–110)
CO2 SERPL-SCNC: 24 MMOL/L — SIGNIFICANT CHANGE UP (ref 17–32)
CREAT SERPL-MCNC: 0.6 MG/DL — LOW (ref 0.7–1.5)
EGFR: 121 ML/MIN/1.73M2 — SIGNIFICANT CHANGE UP
EOSINOPHIL # BLD AUTO: 0.28 K/UL — SIGNIFICANT CHANGE UP (ref 0–0.7)
EOSINOPHIL NFR BLD AUTO: 3.4 % — SIGNIFICANT CHANGE UP (ref 0–8)
GLUCOSE SERPL-MCNC: 88 MG/DL — SIGNIFICANT CHANGE UP (ref 70–99)
HCG SERPL QL: NEGATIVE — SIGNIFICANT CHANGE UP
HCT VFR BLD CALC: 30.6 % — LOW (ref 37–47)
HGB BLD-MCNC: 9.9 G/DL — LOW (ref 12–16)
IMM GRANULOCYTES NFR BLD AUTO: 0.2 % — SIGNIFICANT CHANGE UP (ref 0.1–0.3)
INR BLD: 1.03 RATIO — SIGNIFICANT CHANGE UP (ref 0.65–1.3)
LIDOCAIN IGE QN: 27 U/L — SIGNIFICANT CHANGE UP (ref 7–60)
LYMPHOCYTES # BLD AUTO: 2.27 K/UL — SIGNIFICANT CHANGE UP (ref 1.2–3.4)
LYMPHOCYTES # BLD AUTO: 27.3 % — SIGNIFICANT CHANGE UP (ref 20.5–51.1)
MCHC RBC-ENTMCNC: 29.4 PG — SIGNIFICANT CHANGE UP (ref 27–31)
MCHC RBC-ENTMCNC: 32.4 G/DL — SIGNIFICANT CHANGE UP (ref 32–37)
MCV RBC AUTO: 90.8 FL — SIGNIFICANT CHANGE UP (ref 81–99)
MONOCYTES # BLD AUTO: 0.54 K/UL — SIGNIFICANT CHANGE UP (ref 0.1–0.6)
MONOCYTES NFR BLD AUTO: 6.5 % — SIGNIFICANT CHANGE UP (ref 1.7–9.3)
NEUTROPHILS # BLD AUTO: 5.19 K/UL — SIGNIFICANT CHANGE UP (ref 1.4–6.5)
NEUTROPHILS NFR BLD AUTO: 62.5 % — SIGNIFICANT CHANGE UP (ref 42.2–75.2)
NRBC # BLD: 0 /100 WBCS — SIGNIFICANT CHANGE UP (ref 0–0)
PLATELET # BLD AUTO: 430 K/UL — HIGH (ref 130–400)
PMV BLD: 9.5 FL — SIGNIFICANT CHANGE UP (ref 7.4–10.4)
POTASSIUM SERPL-MCNC: 4.4 MMOL/L — SIGNIFICANT CHANGE UP (ref 3.5–5)
POTASSIUM SERPL-SCNC: 4.4 MMOL/L — SIGNIFICANT CHANGE UP (ref 3.5–5)
PROT SERPL-MCNC: 7.7 G/DL — SIGNIFICANT CHANGE UP (ref 6–8)
PROTHROM AB SERPL-ACNC: 11.7 SEC — SIGNIFICANT CHANGE UP (ref 9.95–12.87)
RBC # BLD: 3.37 M/UL — LOW (ref 4.2–5.4)
RBC # FLD: 14 % — SIGNIFICANT CHANGE UP (ref 11.5–14.5)
SODIUM SERPL-SCNC: 141 MMOL/L — SIGNIFICANT CHANGE UP (ref 135–146)
WBC # BLD: 8.31 K/UL — SIGNIFICANT CHANGE UP (ref 4.8–10.8)
WBC # FLD AUTO: 8.31 K/UL — SIGNIFICANT CHANGE UP (ref 4.8–10.8)

## 2024-09-11 PROCEDURE — 99223 1ST HOSP IP/OBS HIGH 75: CPT | Mod: 24

## 2024-09-11 PROCEDURE — 80076 HEPATIC FUNCTION PANEL: CPT

## 2024-09-11 PROCEDURE — 80202 ASSAY OF VANCOMYCIN: CPT

## 2024-09-11 PROCEDURE — 36573 INSJ PICC RS&I 5 YR+: CPT | Mod: LT

## 2024-09-11 PROCEDURE — 85025 COMPLETE CBC W/AUTO DIFF WBC: CPT

## 2024-09-11 PROCEDURE — 74177 CT ABD & PELVIS W/CONTRAST: CPT | Mod: 26,MC

## 2024-09-11 PROCEDURE — 85730 THROMBOPLASTIN TIME PARTIAL: CPT

## 2024-09-11 PROCEDURE — 86901 BLOOD TYPING SEROLOGIC RH(D): CPT

## 2024-09-11 PROCEDURE — 80048 BASIC METABOLIC PNL TOTAL CA: CPT

## 2024-09-11 PROCEDURE — 99285 EMERGENCY DEPT VISIT HI MDM: CPT

## 2024-09-11 PROCEDURE — C1751: CPT

## 2024-09-11 PROCEDURE — 87070 CULTURE OTHR SPECIMN AEROBIC: CPT

## 2024-09-11 PROCEDURE — 87205 SMEAR GRAM STAIN: CPT

## 2024-09-11 PROCEDURE — 83735 ASSAY OF MAGNESIUM: CPT

## 2024-09-11 PROCEDURE — 86850 RBC ANTIBODY SCREEN: CPT

## 2024-09-11 PROCEDURE — 86900 BLOOD TYPING SEROLOGIC ABO: CPT

## 2024-09-11 PROCEDURE — 85610 PROTHROMBIN TIME: CPT

## 2024-09-11 PROCEDURE — 84100 ASSAY OF PHOSPHORUS: CPT

## 2024-09-11 PROCEDURE — 36415 COLL VENOUS BLD VENIPUNCTURE: CPT

## 2024-09-11 RX ORDER — ENOXAPARIN SODIUM 100 MG/ML
40 INJECTION SUBCUTANEOUS EVERY 24 HOURS
Refills: 0 | Status: DISCONTINUED | OUTPATIENT
Start: 2024-09-11 | End: 2024-09-17

## 2024-09-11 RX ORDER — PIPERACILLIN SODIUM AND TAZOBACTAM SODIUM 3; .375 G/15ML; G/15ML
3.38 INJECTION, POWDER, FOR SOLUTION INTRAVENOUS ONCE
Refills: 0 | Status: COMPLETED | OUTPATIENT
Start: 2024-09-11 | End: 2024-09-11

## 2024-09-11 RX ORDER — PIPERACILLIN SODIUM AND TAZOBACTAM SODIUM 3; .375 G/15ML; G/15ML
3.38 INJECTION, POWDER, FOR SOLUTION INTRAVENOUS EVERY 8 HOURS
Refills: 0 | Status: DISCONTINUED | OUTPATIENT
Start: 2024-09-12 | End: 2024-09-13

## 2024-09-11 RX ORDER — ACETAMINOPHEN 325 MG/1
650 TABLET ORAL EVERY 6 HOURS
Refills: 0 | Status: DISCONTINUED | OUTPATIENT
Start: 2024-09-11 | End: 2024-09-17

## 2024-09-11 RX ORDER — OXYCODONE HYDROCHLORIDE 5 MG/1
2.5 TABLET ORAL EVERY 6 HOURS
Refills: 0 | Status: DISCONTINUED | OUTPATIENT
Start: 2024-09-11 | End: 2024-09-12

## 2024-09-11 RX ORDER — HYDROMORPHONE HYDROCHLORIDE 2 MG/1
1 TABLET ORAL ONCE
Refills: 0 | Status: DISCONTINUED | OUTPATIENT
Start: 2024-09-11 | End: 2024-09-11

## 2024-09-11 RX ADMIN — Medication 100 MILLILITER(S): at 20:09

## 2024-09-11 RX ADMIN — OXYCODONE HYDROCHLORIDE 2.5 MILLIGRAM(S): 5 TABLET ORAL at 22:56

## 2024-09-11 RX ADMIN — PIPERACILLIN SODIUM AND TAZOBACTAM SODIUM 25 GRAM(S): 3; .375 INJECTION, POWDER, FOR SOLUTION INTRAVENOUS at 22:21

## 2024-09-11 RX ADMIN — OXYCODONE HYDROCHLORIDE 2.5 MILLIGRAM(S): 5 TABLET ORAL at 22:26

## 2024-09-11 RX ADMIN — Medication 4 MILLIGRAM(S): at 14:40

## 2024-09-11 RX ADMIN — HYDROMORPHONE HYDROCHLORIDE 1 MILLIGRAM(S): 2 TABLET ORAL at 19:04

## 2024-09-11 RX ADMIN — PIPERACILLIN SODIUM AND TAZOBACTAM SODIUM 200 GRAM(S): 3; .375 INJECTION, POWDER, FOR SOLUTION INTRAVENOUS at 20:09

## 2024-09-11 NOTE — ED PROVIDER NOTE - ATTENDING CONTRIBUTION TO CARE
34-year-old female with a past medical history of G6PD, multiple C-sections, laparoscopic cholecystectomy in April, complicated by postoperative leak status post ex lap, IR guided PTC drain, hepaticotomy with hepaticojejunostomy, recent mission for right upper quadrant pain, found to have internal/external biliary drain terminating within a mildly inflamed small bowel loop on CT with new collection anterior to the liver, status post biliary catheter evaluation and exchange on September 4, Felicita presents with worsening abdominal pain in that area, right upper quadrant the last couple of days.  No fever or vomiting.  Denies any bleeding around the site.  On exam nontoxic, vital signs noted, labs reviewed and similar to prior.  Abdomen soft, nondistended, right upper quadrant tenderness with some guarding.  No diffuse peritonitis.  CT abdomen pelvis done, disposition pending read and surgery recommendations.

## 2024-09-11 NOTE — ED PROVIDER NOTE - OBJECTIVE STATEMENT
Patient is a 34y female 4/22/24 laparoscopic cholecystectomy ([facility]) c/b postoperative leak s/p exploratory laparotomy x2 (4/28/24, 5/2024), s/p IR-guided PTC drain placement, s/p diagnostic laparoscopy with laparotomy with REBEKAH, 8/24/24 hepaticotomy with hepaticojejunostomy who presents with ruq abd pain x2 days. denies nausea vomiting diarrhea. no fevers chills cp sob.

## 2024-09-11 NOTE — ED ADULT NURSE NOTE - NSFALLUNIVINTERV_ED_ALL_ED
Bed/Stretcher in lowest position, wheels locked, appropriate side rails in place/Call bell, personal items and telephone in reach/Instruct patient to call for assistance before getting out of bed/chair/stretcher/Non-slip footwear applied when patient is off stretcher/[place] to call system/Physically safe environment - no spills, clutter or unnecessary equipment/Purposeful proactive rounding/Room/bathroom lighting operational, light cord in reach

## 2024-09-11 NOTE — ED ADULT TRIAGE NOTE - CHIEF COMPLAINT QUOTE
Pt c/o Surgical site pain since last night. Pt had bile duct surgery on 8/14. Denies fever, vomiting.

## 2024-09-11 NOTE — H&P ADULT - HISTORY OF PRESENT ILLNESS
Patient is a 34-year-old female with a past medical history of G6PD, multiple C-sections, laparoscopic cholecystectomy in April, complicated by postoperative leak status post ex lap, IR guided PTC drain, hepaticotomy with hepaticojejunostomy, recent mission for right upper quadrant pain, found to have internal/external biliary drain terminating within a mildly inflamed small bowel loop on CT with new collection anterior to the liver, status post biliary catheter evaluation and exchange on September 4 now presents back to the ED with worsening right upper quadrant the last couple of days.  No fever or vomiting.  Denies any bleeding around the site.

## 2024-09-11 NOTE — ED PROVIDER NOTE - PHYSICAL EXAMINATION
CONSTITUTIONAL: mild distress  SKIN: Warm dry, normal skin turgor  HEAD: NCAT  EYES: EOMI, PERRLA, no scleral icterus, conjunctiva pink  ENT: normal pharynx with no erythema or exudates  NECK: Supple; non tender. Full ROM.  CARD: RRR, no murmurs.  RESP: clear to ausculation b/l. No crackles or wheezing.  ABD: right sided abd pain with guarding, surgery scars clean dry intact, drain in place with serosanguinous fluid  EXT: Full ROM, no bony tenderness, no pedal edema, no calf tenderness  NEURO: normal motor. normal sensory. CN II-XII intact. Cerebellar testing normal. Normal gait.  PSYCH: Cooperative, appropriate.

## 2024-09-11 NOTE — H&P ADULT - NSHPPHYSICALEXAM_GEN_ALL_CORE
T(C): 37.2 (09-11-24 @ 15:39), Max: 37.2 (09-11-24 @ 13:14)  HR: 82 (09-11-24 @ 15:39) (82 - 93)  BP: 101/67 (09-11-24 @ 15:39) (101/67 - 108/68)  RR: 18 (09-11-24 @ 15:39) (18 - 18)  SpO2: 100% (09-11-24 @ 15:39) (100% - 100%)    CONSTITUTIONAL: Well groomed, no apparent distress  GI:  Soft, moderate tenderness to RUQ, Nondistended; Right and midline abdominal incision with dressing in place with small amount of seropurulent drainage.  NEURO: CN II-XII intact; normal reflexes in upper and lower extremities, sensation intact in upper and lower extremities b/l to light touch   PSYCH: Appropriate insight/judgment; A+O x 3, mood and affect appropriate, recent/remote memory intact

## 2024-09-11 NOTE — ED PROVIDER NOTE - CLINICAL SUMMARY MEDICAL DECISION MAKING FREE TEXT BOX
34-year-old signed out to me pending CT scan/surgery disposition.  Patient was stabilized and was admitted for further evaluation and care.  IV antibiotics started.

## 2024-09-11 NOTE — H&P ADULT - ASSESSMENT
Assessment:   Patient is a 34-year-old female with a past medical history of G6PD, multiple C-sections, laparoscopic cholecystectomy in April, complicated by postoperative leak status post ex lap, IR guided PTC drain, hepaticotomy with hepaticojejunostomy, recent mission for right upper quadrant pain, found to have internal/external biliary drain terminating within a mildly inflamed small bowel loop on CT with new collection anterior to the liver, status post biliary catheter evaluation and exchange on September 4 now presents back to the ED with worsening right upper quadrant since yesterday. Patient states pain does not radiate. Passing gas and bowel movements, biliary drain functioning with light green output in the bag. No fever or vomiting.  Denies any bleeding around the site.     Plan:  - Case and plan discussed with surgical attending   - Please admit the patient to surgery service under Dr. Forde to surgical floor 4C  - IR consult for drainage of liver/subq stable collections as well as cholangiogram.  - Clear liquid diet tonight, NPO @ [facility], preop labs incase procedure 9/12  - Pain control APAP, Oxycodone   - IV Zosyn    [phone]

## 2024-09-11 NOTE — H&P ADULT - NSHPLABSRESULTS_GEN_ALL_CORE
.  LABS:                         9.9    8.31  )-----------( 430      ( 11 Sep 2024 14:43 )             30.6     09-11    141  |  103  |  6<L>  ----------------------------<  88  4.4   |  24  |  0.6<L>    Ca    9.1      11 Sep 2024 14:43    TPro  x   /  Alb  x   /  TBili  x   /  DBili  <0.2  /  AST  x   /  ALT  x   /  AlkPhos  x   09-11    PT/INR - ( 11 Sep 2024 14:43 )   PT: 11.70 sec;   INR: 1.03 ratio         PTT - ( 11 Sep 2024 14:43 )  PTT:35.6 sec  Urinalysis Basic - ( 11 Sep 2024 14:43 )    Color: x / Appearance: x / SG: x / pH: x  Gluc: 88 mg/dL / Ketone: x  / Bili: x / Urobili: x   Blood: x / Protein: x / Nitrite: x   Leuk Esterase: x / RBC: x / WBC x   Sq Epi: x / Non Sq Epi: x / Bacteria: x            RADIOLOGY, EKG & ADDITIONAL TESTS:   < from: CT Abdomen and Pelvis w/ IV Cont (09.11.24 @ 16:58) >    IMPRESSION:    Since 9/4/2024, no significant changes noted.    Interval removal of the abdominal jejunostomy tube.    Internal/external biliary drain remains in place. Inflammatory changes   are again noted in the gallbladder fossa/guille hepatis.    No significant change in 1.7 cm collection anterior to the liver at the   level of the gallbladder fossa (5/45).    No significant change in the right anterior abdominal wall subcutaneous   collection measuring 2.3 cm (5/62).    Stable to decreased ascites most prominent in the pelvis.    < end of copied text >            ------------------------------------------------------------------------

## 2024-09-12 ENCOUNTER — APPOINTMENT (OUTPATIENT)
Dept: SURGERY | Facility: CLINIC | Age: 34
End: 2024-09-12

## 2024-09-12 LAB
ALBUMIN SERPL ELPH-MCNC: 3.4 G/DL — LOW (ref 3.5–5.2)
ALP SERPL-CCNC: 184 U/L — HIGH (ref 30–115)
ALT FLD-CCNC: 15 U/L — SIGNIFICANT CHANGE UP (ref 0–41)
ANION GAP SERPL CALC-SCNC: 9 MMOL/L — SIGNIFICANT CHANGE UP (ref 7–14)
ANION GAP SERPL CALC-SCNC: 9 MMOL/L — SIGNIFICANT CHANGE UP (ref 7–14)
APTT BLD: 34.3 SEC — SIGNIFICANT CHANGE UP (ref 27–39.2)
AST SERPL-CCNC: 25 U/L — SIGNIFICANT CHANGE UP (ref 0–41)
BASOPHILS # BLD AUTO: 0.01 K/UL — SIGNIFICANT CHANGE UP (ref 0–0.2)
BASOPHILS # BLD AUTO: 0.02 K/UL — SIGNIFICANT CHANGE UP (ref 0–0.2)
BASOPHILS NFR BLD AUTO: 0.1 % — SIGNIFICANT CHANGE UP (ref 0–1)
BASOPHILS NFR BLD AUTO: 0.3 % — SIGNIFICANT CHANGE UP (ref 0–1)
BILIRUB DIRECT SERPL-MCNC: <0.2 MG/DL — SIGNIFICANT CHANGE UP (ref 0–0.3)
BILIRUB INDIRECT FLD-MCNC: >0.2 MG/DL — SIGNIFICANT CHANGE UP (ref 0.2–1.2)
BILIRUB SERPL-MCNC: 0.4 MG/DL — SIGNIFICANT CHANGE UP (ref 0.2–1.2)
BLD GP AB SCN SERPL QL: SIGNIFICANT CHANGE UP
BUN SERPL-MCNC: 6 MG/DL — LOW (ref 10–20)
BUN SERPL-MCNC: 7 MG/DL — LOW (ref 10–20)
CALCIUM SERPL-MCNC: 8.7 MG/DL — SIGNIFICANT CHANGE UP (ref 8.4–10.5)
CALCIUM SERPL-MCNC: 9.3 MG/DL — SIGNIFICANT CHANGE UP (ref 8.4–10.5)
CHLORIDE SERPL-SCNC: 100 MMOL/L — SIGNIFICANT CHANGE UP (ref 98–110)
CHLORIDE SERPL-SCNC: 101 MMOL/L — SIGNIFICANT CHANGE UP (ref 98–110)
CO2 SERPL-SCNC: 27 MMOL/L — SIGNIFICANT CHANGE UP (ref 17–32)
CO2 SERPL-SCNC: 28 MMOL/L — SIGNIFICANT CHANGE UP (ref 17–32)
CREAT SERPL-MCNC: 0.7 MG/DL — SIGNIFICANT CHANGE UP (ref 0.7–1.5)
CREAT SERPL-MCNC: 0.7 MG/DL — SIGNIFICANT CHANGE UP (ref 0.7–1.5)
EGFR: 117 ML/MIN/1.73M2 — SIGNIFICANT CHANGE UP
EGFR: 117 ML/MIN/1.73M2 — SIGNIFICANT CHANGE UP
EOSINOPHIL # BLD AUTO: 0.29 K/UL — SIGNIFICANT CHANGE UP (ref 0–0.7)
EOSINOPHIL # BLD AUTO: 0.35 K/UL — SIGNIFICANT CHANGE UP (ref 0–0.7)
EOSINOPHIL NFR BLD AUTO: 4 % — SIGNIFICANT CHANGE UP (ref 0–8)
EOSINOPHIL NFR BLD AUTO: 4.1 % — SIGNIFICANT CHANGE UP (ref 0–8)
GLUCOSE SERPL-MCNC: 86 MG/DL — SIGNIFICANT CHANGE UP (ref 70–99)
GLUCOSE SERPL-MCNC: 94 MG/DL — SIGNIFICANT CHANGE UP (ref 70–99)
HCT VFR BLD CALC: 28.4 % — LOW (ref 37–47)
HCT VFR BLD CALC: 30.7 % — LOW (ref 37–47)
HGB BLD-MCNC: 10 G/DL — LOW (ref 12–16)
HGB BLD-MCNC: 9.2 G/DL — LOW (ref 12–16)
IMM GRANULOCYTES NFR BLD AUTO: 0.2 % — SIGNIFICANT CHANGE UP (ref 0.1–0.3)
IMM GRANULOCYTES NFR BLD AUTO: 0.3 % — SIGNIFICANT CHANGE UP (ref 0.1–0.3)
INR BLD: 1.05 RATIO — SIGNIFICANT CHANGE UP (ref 0.65–1.3)
LYMPHOCYTES # BLD AUTO: 1.76 K/UL — SIGNIFICANT CHANGE UP (ref 1.2–3.4)
LYMPHOCYTES # BLD AUTO: 2.21 K/UL — SIGNIFICANT CHANGE UP (ref 1.2–3.4)
LYMPHOCYTES # BLD AUTO: 25 % — SIGNIFICANT CHANGE UP (ref 20.5–51.1)
LYMPHOCYTES # BLD AUTO: 25.3 % — SIGNIFICANT CHANGE UP (ref 20.5–51.1)
MAGNESIUM SERPL-MCNC: 1.7 MG/DL — LOW (ref 1.8–2.4)
MAGNESIUM SERPL-MCNC: 1.8 MG/DL — SIGNIFICANT CHANGE UP (ref 1.8–2.4)
MCHC RBC-ENTMCNC: 29.6 PG — SIGNIFICANT CHANGE UP (ref 27–31)
MCHC RBC-ENTMCNC: 29.8 PG — SIGNIFICANT CHANGE UP (ref 27–31)
MCHC RBC-ENTMCNC: 32.4 G/DL — SIGNIFICANT CHANGE UP (ref 32–37)
MCHC RBC-ENTMCNC: 32.6 G/DL — SIGNIFICANT CHANGE UP (ref 32–37)
MCV RBC AUTO: 91.3 FL — SIGNIFICANT CHANGE UP (ref 81–99)
MCV RBC AUTO: 91.4 FL — SIGNIFICANT CHANGE UP (ref 81–99)
MONOCYTES # BLD AUTO: 0.59 K/UL — SIGNIFICANT CHANGE UP (ref 0.1–0.6)
MONOCYTES # BLD AUTO: 0.71 K/UL — HIGH (ref 0.1–0.6)
MONOCYTES NFR BLD AUTO: 8.1 % — SIGNIFICANT CHANGE UP (ref 1.7–9.3)
MONOCYTES NFR BLD AUTO: 8.4 % — SIGNIFICANT CHANGE UP (ref 1.7–9.3)
NEUTROPHILS # BLD AUTO: 4.35 K/UL — SIGNIFICANT CHANGE UP (ref 1.4–6.5)
NEUTROPHILS # BLD AUTO: 5.43 K/UL — SIGNIFICANT CHANGE UP (ref 1.4–6.5)
NEUTROPHILS NFR BLD AUTO: 61.9 % — SIGNIFICANT CHANGE UP (ref 42.2–75.2)
NEUTROPHILS NFR BLD AUTO: 62.3 % — SIGNIFICANT CHANGE UP (ref 42.2–75.2)
NRBC # BLD: 0 /100 WBCS — SIGNIFICANT CHANGE UP (ref 0–0)
NRBC # BLD: 0 /100 WBCS — SIGNIFICANT CHANGE UP (ref 0–0)
PHOSPHATE SERPL-MCNC: 3.7 MG/DL — SIGNIFICANT CHANGE UP (ref 2.1–4.9)
PHOSPHATE SERPL-MCNC: 4.4 MG/DL — SIGNIFICANT CHANGE UP (ref 2.1–4.9)
PLATELET # BLD AUTO: 363 K/UL — SIGNIFICANT CHANGE UP (ref 130–400)
PLATELET # BLD AUTO: 370 K/UL — SIGNIFICANT CHANGE UP (ref 130–400)
PMV BLD: 9.5 FL — SIGNIFICANT CHANGE UP (ref 7.4–10.4)
PMV BLD: 9.6 FL — SIGNIFICANT CHANGE UP (ref 7.4–10.4)
POTASSIUM SERPL-MCNC: 3.6 MMOL/L — SIGNIFICANT CHANGE UP (ref 3.5–5)
POTASSIUM SERPL-MCNC: 3.8 MMOL/L — SIGNIFICANT CHANGE UP (ref 3.5–5)
POTASSIUM SERPL-SCNC: 3.6 MMOL/L — SIGNIFICANT CHANGE UP (ref 3.5–5)
POTASSIUM SERPL-SCNC: 3.8 MMOL/L — SIGNIFICANT CHANGE UP (ref 3.5–5)
PROT SERPL-MCNC: 6.7 G/DL — SIGNIFICANT CHANGE UP (ref 6–8)
PROTHROM AB SERPL-ACNC: 12 SEC — SIGNIFICANT CHANGE UP (ref 9.95–12.87)
RBC # BLD: 3.11 M/UL — LOW (ref 4.2–5.4)
RBC # BLD: 3.36 M/UL — LOW (ref 4.2–5.4)
RBC # FLD: 13.9 % — SIGNIFICANT CHANGE UP (ref 11.5–14.5)
RBC # FLD: 14 % — SIGNIFICANT CHANGE UP (ref 11.5–14.5)
SODIUM SERPL-SCNC: 137 MMOL/L — SIGNIFICANT CHANGE UP (ref 135–146)
SODIUM SERPL-SCNC: 137 MMOL/L — SIGNIFICANT CHANGE UP (ref 135–146)
WBC # BLD: 7.03 K/UL — SIGNIFICANT CHANGE UP (ref 4.8–10.8)
WBC # BLD: 8.73 K/UL — SIGNIFICANT CHANGE UP (ref 4.8–10.8)
WBC # FLD AUTO: 7.03 K/UL — SIGNIFICANT CHANGE UP (ref 4.8–10.8)
WBC # FLD AUTO: 8.73 K/UL — SIGNIFICANT CHANGE UP (ref 4.8–10.8)

## 2024-09-12 PROCEDURE — 99232 SBSQ HOSP IP/OBS MODERATE 35: CPT | Mod: 24

## 2024-09-12 RX ORDER — KETOROLAC TROMETHAMINE 30 MG/ML
INJECTION, SOLUTION INTRAMUSCULAR
Refills: 0 | Status: DISCONTINUED | OUTPATIENT
Start: 2024-09-12 | End: 2024-09-17

## 2024-09-12 RX ORDER — KETOROLAC TROMETHAMINE 30 MG/ML
15 INJECTION, SOLUTION INTRAMUSCULAR ONCE
Refills: 0 | Status: DISCONTINUED | OUTPATIENT
Start: 2024-09-12 | End: 2024-09-12

## 2024-09-12 RX ORDER — OXYCODONE HYDROCHLORIDE 5 MG/1
5 TABLET ORAL EVERY 6 HOURS
Refills: 0 | Status: DISCONTINUED | OUTPATIENT
Start: 2024-09-12 | End: 2024-09-17

## 2024-09-12 RX ORDER — SENNA 187 MG
1 TABLET ORAL AT BEDTIME
Refills: 0 | Status: DISCONTINUED | OUTPATIENT
Start: 2024-09-12 | End: 2024-09-17

## 2024-09-12 RX ORDER — ACETAMINOPHEN 325 MG/1
1000 TABLET ORAL ONCE
Refills: 0 | Status: COMPLETED | OUTPATIENT
Start: 2024-09-12 | End: 2024-09-12

## 2024-09-12 RX ORDER — KETOROLAC TROMETHAMINE 30 MG/ML
15 INJECTION, SOLUTION INTRAMUSCULAR EVERY 6 HOURS
Refills: 0 | Status: DISCONTINUED | OUTPATIENT
Start: 2024-09-12 | End: 2024-09-17

## 2024-09-12 RX ADMIN — PIPERACILLIN SODIUM AND TAZOBACTAM SODIUM 25 GRAM(S): 3; .375 INJECTION, POWDER, FOR SOLUTION INTRAVENOUS at 05:38

## 2024-09-12 RX ADMIN — Medication 1 TABLET(S): at 22:52

## 2024-09-12 RX ADMIN — ACETAMINOPHEN 1000 MILLIGRAM(S): 325 TABLET ORAL at 12:15

## 2024-09-12 RX ADMIN — ACETAMINOPHEN 650 MILLIGRAM(S): 325 TABLET ORAL at 02:57

## 2024-09-12 RX ADMIN — Medication 100 MILLILITER(S): at 05:38

## 2024-09-12 RX ADMIN — ACETAMINOPHEN 650 MILLIGRAM(S): 325 TABLET ORAL at 18:00

## 2024-09-12 RX ADMIN — OXYCODONE HYDROCHLORIDE 5 MILLIGRAM(S): 5 TABLET ORAL at 19:55

## 2024-09-12 RX ADMIN — KETOROLAC TROMETHAMINE 15 MILLIGRAM(S): 30 INJECTION, SOLUTION INTRAMUSCULAR at 15:32

## 2024-09-12 RX ADMIN — OXYCODONE HYDROCHLORIDE 2.5 MILLIGRAM(S): 5 TABLET ORAL at 11:23

## 2024-09-12 RX ADMIN — KETOROLAC TROMETHAMINE 15 MILLIGRAM(S): 30 INJECTION, SOLUTION INTRAMUSCULAR at 23:10

## 2024-09-12 RX ADMIN — OXYCODONE HYDROCHLORIDE 5 MILLIGRAM(S): 5 TABLET ORAL at 20:30

## 2024-09-12 RX ADMIN — ACETAMINOPHEN 650 MILLIGRAM(S): 325 TABLET ORAL at 02:27

## 2024-09-12 RX ADMIN — OXYCODONE HYDROCHLORIDE 2.5 MILLIGRAM(S): 5 TABLET ORAL at 10:47

## 2024-09-12 RX ADMIN — PIPERACILLIN SODIUM AND TAZOBACTAM SODIUM 25 GRAM(S): 3; .375 INJECTION, POWDER, FOR SOLUTION INTRAVENOUS at 15:03

## 2024-09-12 RX ADMIN — ACETAMINOPHEN 400 MILLIGRAM(S): 325 TABLET ORAL at 11:45

## 2024-09-12 RX ADMIN — ACETAMINOPHEN 650 MILLIGRAM(S): 325 TABLET ORAL at 05:38

## 2024-09-12 RX ADMIN — KETOROLAC TROMETHAMINE 15 MILLIGRAM(S): 30 INJECTION, SOLUTION INTRAMUSCULAR at 15:02

## 2024-09-12 RX ADMIN — KETOROLAC TROMETHAMINE 15 MILLIGRAM(S): 30 INJECTION, SOLUTION INTRAMUSCULAR at 22:52

## 2024-09-12 RX ADMIN — PIPERACILLIN SODIUM AND TAZOBACTAM SODIUM 25 GRAM(S): 3; .375 INJECTION, POWDER, FOR SOLUTION INTRAVENOUS at 22:52

## 2024-09-12 NOTE — CONSULT NOTE ADULT - ASSESSMENT
A/P: Patient is a 34F with PMH of G6PD, multiple C-sections, laparoscopic cholecystectomy in April, complicated by postoperative leak status post ex lap, IR guided PTC drain, hepaticotomy with hepaticojejunostomy, recent mission for right upper quadrant pain, found to have internal/external biliary drain terminating within a mildly inflamed small bowel loop on CT with new collection anterior to the liver, status post biliary catheter evaluation and exchange on September 4 now presents back to the with worsening right upper quadrant.  Pain medicine services now consulted.     Patient seen and evaluated at bedside by me. Patient endorsing worsening RUQ abdominal pain at site of PTC drain and lap vania incision site. Patient states pain increased at home despite taking tylenol and ibuprofen.     #RUQ abdominal pain  - IV Ketoralac 15mg q6h standing  - Oxycodone 5mg q6h PRN for severe pain  - Acetaminophen 650mg q6h standing    #Opioid induced constipation  - Bowel regimen as per primary team

## 2024-09-12 NOTE — PROGRESS NOTE ADULT - SUBJECTIVE AND OBJECTIVE BOX
GENERAL SURGERY PROGRESS NOTE     MICHAEL LAL  34y  Female  Hospital day :1d  OVERNIGHT EVENTS:  Admitted under Dr. Forde, pending 4C bed placement, in ED3 bed 14. LACY. Evaluated by IR, collections too small for drainage. This AM, pt resting in bed, afebrile, HDS, reporting worsening of pain, states pain preventing her from ambulating. Denies fevers, chills, CP, SOB, N/V.    T(F): 98.2 (09-12-24 @ 07:41), Max: 99 (09-11-24 @ 13:14)  HR: 81 (09-12-24 @ 07:41) (79 - 93)  BP: 103/69 (09-12-24 @ 07:41) (101/67 - 111/71)  ABP: --  ABP(mean): --  RR: 16 (09-12-24 @ 07:41) (16 - 18)  SpO2: 100% (09-12-24 @ 07:41) (100% - 100%)    DIET/FLUIDS: lactated ringers. 1000 milliLiter(s) IV Continuous <Continuous>                                                                             DRAINS:   09-11-24 @ 07:01  -  09-12-24 @ 07:00  --------------------------------------------------------  OUT: 50 mL      BM: Not recorded    URINE: Not recorded     GI proph:    AC/ proph: enoxaparin Injectable 40 milliGRAM(s) SubCutaneous every 24 hours    ABx: piperacillin/tazobactam IVPB.. 3.375 Gram(s) IV Intermittent every 8 hours    PHYSICAL EXAM:  GENERAL: A&Ox3, NAD  HEENT: Normocephalic, atraumatic  PULM: Non-labored respirations, bilateral chest rise, saturating well on RA  CV: Regular rate and rhythm  ABDOMEN: Abdomen soft, non-distended, tender to palpation at R lateral aspect of incision with firmness, mild purulent drainage, no erythema, induration, or fluctuance, no bleeding, tenderness in lateral-most aspect of RUQ, no rebound tenderness, drain with bilious output  MSK: Moving extremities spontaneously, BUE and BLE sensorimotor and strength intact  NEURO: No focal deficits  SKIN: Warm, dry, normal skin color, texture, turgor    LABS  Labs:  CAPILLARY BLOOD GLUCOSE                        10.0   8.73  )-----------( 370      ( 12 Sep 2024 00:34 )             30.7       Auto Neutrophil %: 62.3 % (09-12-24 @ 00:34)  Auto Immature Granulocyte %: 0.2 % (09-12-24 @ 00:34)  Auto Neutrophil %: 62.5 % (09-11-24 @ 14:43)  Auto Immature Granulocyte %: 0.2 % (09-11-24 @ 14:43)    09-12    137  |  101  |  6<L>  ----------------------------<  94  3.6   |  27  |  0.7      Calcium: 9.3 mg/dL (09-12-24 @ 00:34)    LFTs:             6.7  | 0.4  | 25       ------------------[184     ( 12 Sep 2024 00:34 )  3.4  | <0.2 | 15          Lipase:x      Amylase:x        Coags:     12.00  ----< 1.05    ( 12 Sep 2024 00:34 )     34.3      Urinalysis Basic - ( 12 Sep 2024 00:34 )    Color: x / Appearance: x / SG: x / pH: x  Gluc: 94 mg/dL / Ketone: x  / Bili: x / Urobili: x   Blood: x / Protein: x / Nitrite: x   Leuk Esterase: x / RBC: x / WBC x   Sq Epi: x / Non Sq Epi: x / Bacteria: x    RADIOLOGY & ADDITIONAL TESTS:  ACC: [id number] EXAM:  CT ABDOMEN AND PELVIS IC   ORDERED BY: MERCY VILLALTA     PROCEDURE DATE:  09/11/2024          INTERPRETATION:  CLINICAL INFORMATION: Abdominal pain status post IR   procedure.    COMPARISON: 9/4/2024    CONTRAST/COMPLICATIONS:  IV Contrast: Omnipaque 350  100 cc administered   0 cc discarded  Oral Contrast: NONE  Complications: None reported at time of study completion    PROCEDURE:  CT of the Abdomen and Pelvis was performed.  Sagittal and coronal reformats were performed.    FINDINGS:  01. LIVER: Normal morphology.  No focal lesion.  02. SPLEEN: Normal.  03. PANCREAS: Normal.  04. GALLBLADDER/BILIARY TREE: Status post cholecystectomy.   Internal/external biliary drain remains in place. Inflammatory changes   are again noted in the gallbladder fossa/guille hepatis. No significant   change in 1.7 cm collection anterior to the liver at the level of the   gallbladder fossa (5/45). Interval decrease in trace central intrahepatic   biliary ductal dilation.  05. ADRENALS: Unremarkable.  06. KIDNEYS: Symmetric enhancement.  No hydronephrosis or renal stone.  07. LYMPHADENOPATHY/RETROPERITONEUM: No enlarged lymph nodes.  08. BOWEL: Interval removal of the abdominal jejunostomy tube. No bowel   obstruction.  09. PELVIC VISCERA: Uterine calcification likely a fibroid.   Underdistended urinary bladder.  10. PELVIC LYMPH NODES: No enlarged lymph nodes.  11. VASCULATURE: No abdominal aortic aneurysm.  12. PERITONEUM/ABDOMINAL WALL: Stable to decreased ascites most prominent   in the pelvis. Surgical changes of the anterior abdominal wall. Right   anterior abdominal wall subcutaneous collection measuring 2.3   centimeters, stable (5/62).  13. SKELETAL: No aggressive lesion.  14. LUNG BASES: Clear.    IMPRESSION:    Since 9/4/2024, no significant changes noted.    Interval removal of the abdominal jejunostomy tube.    Internal/external biliary drain remains in place. Inflammatory changes   are again noted in the gallbladder fossa/guille hepatis.    No significant change in 1.7 cm collection anterior to the liver at the   level of the gallbladder fossa (5/45).    No significant change in the right anterior abdominal wall subcutaneous   collection measuring 2.3 cm (5/62).    Stable to decreased ascites most prominent in the pelvis.

## 2024-09-12 NOTE — PATIENT PROFILE ADULT - FALL HARM RISK - HARM RISK INTERVENTIONS
Assistance with ambulation/Assistance OOB with selected safe patient handling equipment/Communicate Risk of Fall with Harm to all staff/Reinforce activity limits and safety measures with patient and family/Review medications for side effects contributing to fall risk/Sit up slowly, dangle for a short time, stand at bedside before walking/Tailored Fall Risk Interventions/Toileting schedule using arm’s reach rule for commode and bathroom/Visual Cue: Yellow wristband and red socks/Bed in lowest position, wheels locked, appropriate side rails in place/Call bell, personal items and telephone in reach/Instruct patient to call for assistance before getting out of bed or chair/Non-slip footwear when patient is out of bed/[place] to call system/Physically safe environment - no spills, clutter or unnecessary equipment/Purposeful Proactive Rounding/Room/bathroom lighting operational, light cord in reach

## 2024-09-12 NOTE — PROGRESS NOTE ADULT - ASSESSMENT
SESSMENT  34F PMHx G6PD, multiple C-sections, laparoscopic cholecystectomy in April, complicated by postoperative leak status post ex lap, IR guided PTC drain, hepaticotomy with hepaticojejunostomy, recent mission for right upper quadrant pain, found to have internal/external biliary drain terminating within a mildly inflamed small bowel loop on CT with new collection anterior to the liver, status post biliary catheter evaluation and exchange on September 4 who returned to the ED on 9/11 with worsening right upper quadrant since 9/10.    - IR evaluated: collections too small to be drained, PTC drain flushing without resistance, no further intervention  - Pain management APAP, oxycodone  - IV zosyn  - Final plan pending discussion with attending    Surgery - blue team ([id number]) SESSMENT  34F PMHx G6PD, multiple C-sections, laparoscopic cholecystectomy in April, complicated by postoperative leak status post ex lap, IR guided PTC drain, hepaticotomy with hepaticojejunostomy, recent mission for right upper quadrant pain, found to have internal/external biliary drain terminating within a mildly inflamed small bowel loop on CT with new collection anterior to the liver, status post biliary catheter evaluation and exchange on September 4 who returned to the ED on 9/11 with worsening right upper quadrant since 9/10.    - IR evaluated: collections too small to be drained, PTC drain flushing without resistance, no further intervention  - Pain management APAP, oxycodone  - IV zosyn  - Resume regular diet  - Consult to pain management    Surgery - blue team ([id number])

## 2024-09-12 NOTE — PATIENT PROFILE ADULT - FALL HARM RISK - FACTORS NURSING JUDGEMENT
CONSTITUTIONAL: No fever, no chills, no fatigue  EYES: No eye redness, no visual changes  ENT: No ear pain, no sore throat  CARDIOVASCULAR: +chest pain, no palpitations  RESPIRATORY: +cough, +SOB  GI: No abdominal pain, no nausea, no vomiting, no constipation, no diarrhea  GENITOURINARY: No dysuria, no frequency, no hematuria  MUSCULOSKELETAL: No back pain, no joint pain, no myalgias  SKIN: No rash, no peripheral edema  NEURO: No headache, no confusion    ALL OTHER SYSTEMS NEGATIVE. Yes

## 2024-09-13 LAB
ALBUMIN SERPL ELPH-MCNC: 3.2 G/DL — LOW (ref 3.5–5.2)
ALP SERPL-CCNC: 236 U/L — HIGH (ref 30–115)
ALT FLD-CCNC: 54 U/L — HIGH (ref 0–41)
ANION GAP SERPL CALC-SCNC: 8 MMOL/L — SIGNIFICANT CHANGE UP (ref 7–14)
AST SERPL-CCNC: 77 U/L — HIGH (ref 0–41)
BASOPHILS # BLD AUTO: 0.01 K/UL — SIGNIFICANT CHANGE UP (ref 0–0.2)
BASOPHILS NFR BLD AUTO: 0.1 % — SIGNIFICANT CHANGE UP (ref 0–1)
BILIRUB DIRECT SERPL-MCNC: <0.2 MG/DL — SIGNIFICANT CHANGE UP (ref 0–0.3)
BILIRUB INDIRECT FLD-MCNC: >0.1 MG/DL — LOW (ref 0.2–1.2)
BILIRUB SERPL-MCNC: 0.3 MG/DL — SIGNIFICANT CHANGE UP (ref 0.2–1.2)
BUN SERPL-MCNC: 6 MG/DL — LOW (ref 10–20)
CALCIUM SERPL-MCNC: 8.6 MG/DL — SIGNIFICANT CHANGE UP (ref 8.4–10.5)
CHLORIDE SERPL-SCNC: 104 MMOL/L — SIGNIFICANT CHANGE UP (ref 98–110)
CO2 SERPL-SCNC: 26 MMOL/L — SIGNIFICANT CHANGE UP (ref 17–32)
CREAT SERPL-MCNC: 0.5 MG/DL — LOW (ref 0.7–1.5)
EGFR: 127 ML/MIN/1.73M2 — SIGNIFICANT CHANGE UP
EOSINOPHIL # BLD AUTO: 0.42 K/UL — SIGNIFICANT CHANGE UP (ref 0–0.7)
EOSINOPHIL NFR BLD AUTO: 5.6 % — SIGNIFICANT CHANGE UP (ref 0–8)
GLUCOSE SERPL-MCNC: 100 MG/DL — HIGH (ref 70–99)
GRAM STN FLD: SIGNIFICANT CHANGE UP
HCT VFR BLD CALC: 28.9 % — LOW (ref 37–47)
HGB BLD-MCNC: 9.5 G/DL — LOW (ref 12–16)
IMM GRANULOCYTES NFR BLD AUTO: 0.4 % — HIGH (ref 0.1–0.3)
LYMPHOCYTES # BLD AUTO: 1.89 K/UL — SIGNIFICANT CHANGE UP (ref 1.2–3.4)
LYMPHOCYTES # BLD AUTO: 25 % — SIGNIFICANT CHANGE UP (ref 20.5–51.1)
MAGNESIUM SERPL-MCNC: 1.9 MG/DL — SIGNIFICANT CHANGE UP (ref 1.8–2.4)
MCHC RBC-ENTMCNC: 29.6 PG — SIGNIFICANT CHANGE UP (ref 27–31)
MCHC RBC-ENTMCNC: 32.9 G/DL — SIGNIFICANT CHANGE UP (ref 32–37)
MCV RBC AUTO: 90 FL — SIGNIFICANT CHANGE UP (ref 81–99)
MONOCYTES # BLD AUTO: 0.65 K/UL — HIGH (ref 0.1–0.6)
MONOCYTES NFR BLD AUTO: 8.6 % — SIGNIFICANT CHANGE UP (ref 1.7–9.3)
NEUTROPHILS # BLD AUTO: 4.56 K/UL — SIGNIFICANT CHANGE UP (ref 1.4–6.5)
NEUTROPHILS NFR BLD AUTO: 60.3 % — SIGNIFICANT CHANGE UP (ref 42.2–75.2)
NRBC # BLD: 0 /100 WBCS — SIGNIFICANT CHANGE UP (ref 0–0)
PHOSPHATE SERPL-MCNC: 2.8 MG/DL — SIGNIFICANT CHANGE UP (ref 2.1–4.9)
PLATELET # BLD AUTO: 369 K/UL — SIGNIFICANT CHANGE UP (ref 130–400)
PMV BLD: 9.8 FL — SIGNIFICANT CHANGE UP (ref 7.4–10.4)
POTASSIUM SERPL-MCNC: 3.7 MMOL/L — SIGNIFICANT CHANGE UP (ref 3.5–5)
POTASSIUM SERPL-SCNC: 3.7 MMOL/L — SIGNIFICANT CHANGE UP (ref 3.5–5)
PROT SERPL-MCNC: 6.1 G/DL — SIGNIFICANT CHANGE UP (ref 6–8)
RBC # BLD: 3.21 M/UL — LOW (ref 4.2–5.4)
RBC # FLD: 13.8 % — SIGNIFICANT CHANGE UP (ref 11.5–14.5)
SODIUM SERPL-SCNC: 138 MMOL/L — SIGNIFICANT CHANGE UP (ref 135–146)
SPECIMEN SOURCE: SIGNIFICANT CHANGE UP
WBC # BLD: 7.56 K/UL — SIGNIFICANT CHANGE UP (ref 4.8–10.8)
WBC # FLD AUTO: 7.56 K/UL — SIGNIFICANT CHANGE UP (ref 4.8–10.8)

## 2024-09-13 PROCEDURE — 99232 SBSQ HOSP IP/OBS MODERATE 35: CPT | Mod: 25,24

## 2024-09-13 RX ORDER — VANCOMYCIN/0.9 % SOD CHLORIDE 1.75G/25
1000 PLASTIC BAG, INJECTION (ML) INTRAVENOUS ONCE
Refills: 0 | Status: COMPLETED | OUTPATIENT
Start: 2024-09-13 | End: 2024-09-13

## 2024-09-13 RX ORDER — CEFEPIME 2 G/1
2000 INJECTION, POWDER, FOR SOLUTION INTRAVENOUS EVERY 8 HOURS
Refills: 0 | Status: DISCONTINUED | OUTPATIENT
Start: 2024-09-13 | End: 2024-09-16

## 2024-09-13 RX ORDER — VANCOMYCIN/0.9 % SOD CHLORIDE 1.75G/25
1 PLASTIC BAG, INJECTION (ML) INTRAVENOUS EVERY 12 HOURS
Refills: 0 | Status: DISCONTINUED | OUTPATIENT
Start: 2024-09-13 | End: 2024-09-13

## 2024-09-13 RX ORDER — VANCOMYCIN/0.9 % SOD CHLORIDE 1.75G/25
1000 PLASTIC BAG, INJECTION (ML) INTRAVENOUS EVERY 12 HOURS
Refills: 0 | Status: DISCONTINUED | OUTPATIENT
Start: 2024-09-13 | End: 2024-09-17

## 2024-09-13 RX ORDER — METRONIDAZOLE 250 MG
500 TABLET ORAL EVERY 8 HOURS
Refills: 0 | Status: DISCONTINUED | OUTPATIENT
Start: 2024-09-13 | End: 2024-09-16

## 2024-09-13 RX ORDER — POTASSIUM CHLORIDE 10 MEQ
20 TABLET, EXT RELEASE, PARTICLES/CRYSTALS ORAL ONCE
Refills: 0 | Status: COMPLETED | OUTPATIENT
Start: 2024-09-13 | End: 2024-09-13

## 2024-09-13 RX ADMIN — ACETAMINOPHEN 650 MILLIGRAM(S): 325 TABLET ORAL at 17:46

## 2024-09-13 RX ADMIN — PIPERACILLIN SODIUM AND TAZOBACTAM SODIUM 25 GRAM(S): 3; .375 INJECTION, POWDER, FOR SOLUTION INTRAVENOUS at 05:59

## 2024-09-13 RX ADMIN — KETOROLAC TROMETHAMINE 15 MILLIGRAM(S): 30 INJECTION, SOLUTION INTRAMUSCULAR at 09:33

## 2024-09-13 RX ADMIN — KETOROLAC TROMETHAMINE 15 MILLIGRAM(S): 30 INJECTION, SOLUTION INTRAMUSCULAR at 15:26

## 2024-09-13 RX ADMIN — Medication 500 MILLIGRAM(S): at 09:31

## 2024-09-13 RX ADMIN — Medication 500 MILLIGRAM(S): at 22:09

## 2024-09-13 RX ADMIN — Medication 500 MILLIGRAM(S): at 14:30

## 2024-09-13 RX ADMIN — CEFEPIME 100 MILLIGRAM(S): 2 INJECTION, POWDER, FOR SOLUTION INTRAVENOUS at 09:34

## 2024-09-13 RX ADMIN — KETOROLAC TROMETHAMINE 15 MILLIGRAM(S): 30 INJECTION, SOLUTION INTRAMUSCULAR at 09:48

## 2024-09-13 RX ADMIN — Medication 50 MILLIEQUIVALENT(S): at 12:25

## 2024-09-13 RX ADMIN — KETOROLAC TROMETHAMINE 15 MILLIGRAM(S): 30 INJECTION, SOLUTION INTRAMUSCULAR at 15:11

## 2024-09-13 RX ADMIN — Medication 250 MILLIGRAM(S): at 17:47

## 2024-09-13 RX ADMIN — ACETAMINOPHEN 650 MILLIGRAM(S): 325 TABLET ORAL at 05:57

## 2024-09-13 RX ADMIN — Medication 100 GRAM(S): at 15:06

## 2024-09-13 RX ADMIN — OXYCODONE HYDROCHLORIDE 5 MILLIGRAM(S): 5 TABLET ORAL at 22:08

## 2024-09-13 RX ADMIN — Medication 250 MILLIGRAM(S): at 11:06

## 2024-09-13 RX ADMIN — CEFEPIME 100 MILLIGRAM(S): 2 INJECTION, POWDER, FOR SOLUTION INTRAVENOUS at 22:09

## 2024-09-13 RX ADMIN — Medication 1 TABLET(S): at 22:09

## 2024-09-13 RX ADMIN — CEFEPIME 100 MILLIGRAM(S): 2 INJECTION, POWDER, FOR SOLUTION INTRAVENOUS at 14:28

## 2024-09-13 RX ADMIN — ACETAMINOPHEN 650 MILLIGRAM(S): 325 TABLET ORAL at 18:01

## 2024-09-13 NOTE — PROGRESS NOTE ADULT - SUBJECTIVE AND OBJECTIVE BOX
GENERAL SURGERY PROGRESS NOTE     Patient: MICHAEL LAL , 34y ([birth year])Female   MRN: [id number]  Location: [facility] ED Hold 021 A  Visit: 09-11-24 Inpatient  Date: 09-13-24 @ 01:03        Admitted :09-11-24 (2d)  LOS: 2d    Procedure/Dx/Injuries: Abdominal pain        Events of past 24 hours:   Patient seen and examined at bedside.   CT showed   No acute events overnight. Afebrile, VSS.  >>> <<<>>> <<<>>> <<<>>> <<<>>> <<<>>> <<<>>> <<<>>> <<<>>> <<<>>> <<<    Vitals:   T(F): 98 (09-12-24 @ 15:57), Max: 98.2 (09-12-24 @ 07:41)  HR: 76 (09-12-24 @ 19:41)  BP: 113/68 (09-12-24 @ 19:41)  RR: 18 (09-12-24 @ 19:41)  SpO2: 97% (09-12-24 @ 19:41)      PHYSICAL EXAM:  General Appearance: NAD  HEENT: Normocephalic, atraumatic, trachea midline  Heart: s1, s2,    Lungs: No increased work of breathing or accessory muscle use. Symmetric chest wall rise and fall. Bilateral breath sounds  Abdomen:  Soft, nontender, nondistended. No rebound or guarding.  MSK/Extremities: Warm & well-perfused.   Skin: Warm, dry. No jaundice.   Incision/wound: healing well, dressings in place, clean, dry and intact  >>> <<<>>> <<<>>> <<<>>> <<<>>> <<<>>> <<<>>> <<<>>> <<<>>> <<<>>> <<<   Is & Os:   Diet, Regular    Fluids:     09-11-24 @ 07:01  -  09-12-24 @ 07:00  --------------------------------------------------------  IN:  Total IN: 0 mL    OUT:    Drain (mL): 50 mL  Total OUT: 50 mL    Total NET: -50 mL          Bowel Movement: :   Flatus: :   >>> <<<>>> <<<>>> <<<>>> <<<>>> <<<>>> <<<>>> <<<>>> <<<>>> <<<>>> <<<    MEDICATIONS  (STANDING):  acetaminophen     Tablet .. 650 milliGRAM(s) Oral every 6 hours  enoxaparin Injectable 40 milliGRAM(s) SubCutaneous every 24 hours  ketorolac   Injectable 15 milliGRAM(s) IV Push every 6 hours  ketorolac   Injectable      piperacillin/tazobactam IVPB.. 3.375 Gram(s) IV Intermittent every 8 hours  senna 1 Tablet(s) Oral at bedtime    MEDICATIONS  (PRN):  oxyCODONE    IR 5 milliGRAM(s) Oral every 6 hours PRN Severe Pain (7 - 10)      DVT PROPHYLAXIS: enoxaparin Injectable 40 milliGRAM(s) SubCutaneous every 24 hours    GI PROPHYLAXIS:   ANTICOAGULATION:   ANTIBIOTICS:  piperacillin/tazobactam IVPB.. 3.375 Gram(s)      >>> <<<>>> <<<>>> <<<>>> <<<>>> <<<>>> <<<>>> <<<>>> <<<>>> <<<>>> <<<        LAB/STUDIES:  Labs:  CAPILLARY BLOOD GLUCOSE                              9.2    7.03  )-----------( 363      ( 12 Sep 2024 20:51 )             28.4       Auto Neutrophil %: 61.9 % (09-12-24 @ 20:51)  Auto Immature Granulocyte %: 0.3 % (09-12-24 @ 20:51)    09-12    137  |  100  |  7<L>  ----------------------------<  86  3.8   |  28  |  0.7      Calcium: 8.7 mg/dL (09-12-24 @ 20:51)      LFTs:             6.7  | 0.4  | 25       ------------------[184     ( 12 Sep 2024 00:34 )  3.4  | <0.2 | 15          Lipase:x      Amylase:x             Coags:     12.00  ----< 1.05    ( 12 Sep 2024 00:34 )     34.3                Urinalysis Basic - ( 12 Sep 2024 20:51 )    Color: x / Appearance: x / SG: x / pH: x  Gluc: 86 mg/dL / Ketone: x  / Bili: x / Urobili: x   Blood: x / Protein: x / Nitrite: x   Leuk Esterase: x / RBC: x / WBC x   Sq Epi: x / Non Sq Epi: x / Bacteria: x              >>> <<<>>> <<<>>> <<<>>> <<<>>> <<<>>> <<<>>> <<<>>> <<<>>> <<<>>> <<<  ASSESSMENT:  34y F admitted for   with the above physical exam, labs and imaging.   Patient seen and examined at bedside. NAD.  Tolerating Diet:  Diet, Regular (09-12-24 @ 11:57) [Active]            PLAN:  -***  - Monitor vitals  - Monitor labs and replete as necessary  - Monitor for bowel function  - Continue Pain Medications if necessary  - Continue Antibiotics if necessary  - Encourage ambulation as tolerated  - Monitor urine output  - DVT and GI Prophylaxis  - Follow PT/Physiatry if necessary  - Contact social work/case management for necessary patient needs and dispo planning      Lines/Tubes: PIV,     BLUE TEAM SPECTRA [id number] GENERAL SURGERY PROGRESS NOTE     Patient: MICHAEL LAL , 34y ([birth year])Female   MRN: [id number]  Location: [facility] ED Hold 021 A  Visit: 09-11-24 Inpatient  Date: 09-13-24 @ 01:03        Admitted :09-11-24 (2d)  LOS: 2d    Procedure/Dx/Injuries: Abdominal pain        Events of past 24 hours:   Patient seen and examined at bedside.   CT showed   No acute events overnight. Afebrile, VSS.  >>> <<<>>> <<<>>> <<<>>> <<<>>> <<<>>> <<<>>> <<<>>> <<<>>> <<<>>> <<<    Vitals:   T(F): 98 (09-12-24 @ 15:57), Max: 98.2 (09-12-24 @ 07:41)  HR: 76 (09-12-24 @ 19:41)  BP: 113/68 (09-12-24 @ 19:41)  RR: 18 (09-12-24 @ 19:41)  SpO2: 97% (09-12-24 @ 19:41)      PHYSICAL EXAM:  General Appearance: NAD  HEENT: Normocephalic, atraumatic, trachea midline  Heart: s1, s2,    Lungs: No increased work of breathing or accessory muscle use. Symmetric chest wall rise and fall. Bilateral breath sounds  Abdomen:  Soft, nontender, nondistended. No rebound or guarding.  MSK/Extremities: Warm & well-perfused.   Skin: Warm, dry. No jaundice.   Incision/wound: healing well, dressings in place, clean, dry and intact  >>> <<<>>> <<<>>> <<<>>> <<<>>> <<<>>> <<<>>> <<<>>> <<<>>> <<<>>> <<<   Is & Os:   Diet, Regular    Fluids:     09-11-24 @ 07:01  -  09-12-24 @ 07:00  --------------------------------------------------------  IN:  Total IN: 0 mL    OUT:    Drain (mL): 50 mL  Total OUT: 50 mL    Total NET: -50 mL          Bowel Movement: :   Flatus: :   >>> <<<>>> <<<>>> <<<>>> <<<>>> <<<>>> <<<>>> <<<>>> <<<>>> <<<>>> <<<    MEDICATIONS  (STANDING):  acetaminophen     Tablet .. 650 milliGRAM(s) Oral every 6 hours  enoxaparin Injectable 40 milliGRAM(s) SubCutaneous every 24 hours  ketorolac   Injectable 15 milliGRAM(s) IV Push every 6 hours  ketorolac   Injectable      piperacillin/tazobactam IVPB.. 3.375 Gram(s) IV Intermittent every 8 hours  senna 1 Tablet(s) Oral at bedtime    MEDICATIONS  (PRN):  oxyCODONE    IR 5 milliGRAM(s) Oral every 6 hours PRN Severe Pain (7 - 10)      DVT PROPHYLAXIS: enoxaparin Injectable 40 milliGRAM(s) SubCutaneous every 24 hours    GI PROPHYLAXIS:   ANTICOAGULATION:   ANTIBIOTICS:  piperacillin/tazobactam IVPB.. 3.375 Gram(s)      >>> <<<>>> <<<>>> <<<>>> <<<>>> <<<>>> <<<>>> <<<>>> <<<>>> <<<>>> <<<        LAB/STUDIES:  Labs:  CAPILLARY BLOOD GLUCOSE                              9.2    7.03  )-----------( 363      ( 12 Sep 2024 20:51 )             28.4       Auto Neutrophil %: 61.9 % (09-12-24 @ 20:51)  Auto Immature Granulocyte %: 0.3 % (09-12-24 @ 20:51)    09-12    137  |  100  |  7<L>  ----------------------------<  86  3.8   |  28  |  0.7      Calcium: 8.7 mg/dL (09-12-24 @ 20:51)      LFTs:             6.7  | 0.4  | 25       ------------------[184     ( 12 Sep 2024 00:34 )  3.4  | <0.2 | 15          Lipase:x      Amylase:x             Coags:     12.00  ----< 1.05    ( 12 Sep 2024 00:34 )     34.3                Urinalysis Basic - ( 12 Sep 2024 20:51 )    Color: x / Appearance: x / SG: x / pH: x  Gluc: 86 mg/dL / Ketone: x  / Bili: x / Urobili: x   Blood: x / Protein: x / Nitrite: x   Leuk Esterase: x / RBC: x / WBC x   Sq Epi: x / Non Sq Epi: x / Bacteria: x              >>> <<<>>> <<<>>> <<<>>> <<<>>> <<<>>> <<<>>> <<<>>> <<<>>> <<<>>> <<<  ASSESSMENT:  34y F admitted for   with the above physical exam, labs and imaging.   Patient seen and examined at bedside. NAD.  Tolerating Diet:  Diet, Regular (09-12-24 @ 11:57) [Active]            PLAN:  -***  - Monitor vitals  - Monitor labs and replete as necessary  - Monitor for bowel function  - Continue Pain Medications if necessary  - Continue Antibiotics if necessary  - ID recs appreciated   - F/U IR c/s for PICC line placement  - Encourage ambulation as tolerated  - Monitor urine output  - DVT and GI Prophylaxis  - Follow PT/Physiatry if necessary  - Contact social work/case management for necessary patient needs and dispo planning      Lines/Tubes: PIV,     BLUE TEAM SPECTRA [id number]

## 2024-09-13 NOTE — CONSULT NOTE ADULT - SUBJECTIVE AND OBJECTIVE BOX
HPI: Patient is a 34F with PMH of G6PD, multiple C-sections, laparoscopic cholecystectomy in April, complicated by postoperative leak status post ex lap, IR guided PTC drain, hepaticotomy with hepaticojejunostomy, recent mission for right upper quadrant pain, found to have internal/external biliary drain terminating within a mildly inflamed small bowel loop on CT with new collection anterior to the liver, status post biliary catheter evaluation and exchange on  now presents back to the with worsening right upper quadrant.  Pain medicine services now consulted.     Patient seen and evaluated at bedside by me. Patient endorsing worsening RUQ abdominal pain at site of PTC drain and lap vania incision site. Patient states pain increased at home despite taking tylenol and ibuprofen.     Current Inpatient Medication Regimen:  acetaminophen     Tablet .. 650 milliGRAM(s) Oral every 6 hours  enoxaparin Injectable 40 milliGRAM(s) SubCutaneous every 24 hours  lactated ringers. 1000 milliLiter(s) IV Continuous <Continuous>  oxyCODONE    IR 2.5 milliGRAM(s) Oral every 6 hours PRN  piperacillin/tazobactam IVPB.. 3.375 Gram(s) IV Intermittent every 8 hours      Home Analgesic Regimen:      Allergies:  sulfa drugs (Rash)  celemin&amp; celepid given in [facility] caused Rigors, was able to tolerate albumin (Other)      Past Medical History:  Abdominal pain    Encounter for immunization    Encounter for routine postpartum follow-up    Gestational diabetes mellitus in pregnancy, unspecified control    Glucose-6-phosphate dehydrogenase (G6PD) deficiency without anemia    Injury of bile duct, initial encounter    Nausea    No pertinent family history in first degree relatives    No pertinent family history in first degree relatives    FHx: diabetes mellitus (Mother)    Glucose 6 phosphatase deficiency    H/O  section    Gestational diabetes    Abdominal pain     delivery delivered    G6PD deficiency    S/P cholecystectomy    History of exploratory laparotomy      Review of Systems:  General: no fevers or chills  Eyes: no diplopia or blurred vision  ENT: no rhinorrhea  CV: no chest pain  Resp: no cough or dyspnea  GI: abdominal pain laparoscopic cholecystectomy in April, complicated by postoperative leak status post ex la  : no urinary incontinence or dysuria  Psych: [ no ] depression, [no  ] anxiety    Physical Exam:  T(C): 36.8 (24 @ 07:41), Max: 37.2 (24 @ 15:39)  HR: 81 (24 @ 07:41) (79 - 88)  BP: 103/69 (24 @ 07:41) (101/67 - 111/71)  RR: 16 (24 @ 07:41) (16 - 18)  SpO2: 100% (24 @ 07:41) (100% - 100%)  Gen: NAD  Eyes: [ no ] glasses, [ no ] scleral icterus  Head: [ x ] Normocephalic / Atraumatic  CV: no JVD  Lungs: nonlabored breathing  Abdomen: abdominal pain laparoscopic cholecystectomy in April, complicated by postoperative leak status post ex lap  : [ no ] garcia catheter in place  Back: [ no ] tenderness to palpation  Neuro: [ x ] AOx3  Extremities: [ x ] full ROM in upper/lower extremities  Psych: [ x ] normal affect      Labs:  CBC  8.73 K/uL [4.80 - 10.80] > 10.0 g/dL<L> [12.0 - 16.0] / 30.7 %<L> [37.0 - 47.0] < 370 K/uL [130 - 400]      BMP  137 mmol/L [135 - 146] | 101 mmol/L [98 - 110] | 6 mg/dL<L> [10 - 20]  3.6 mmol/L [3.5 - 5.0] | 27 mmol/L [17 - 32] | 0.7 mg/dL [0.7 - 1.5]    94 mg/dL [70 - 99]  CBC  8.31 K/uL [4.80 - 10.80] > 9.9 g/dL<L> [12.0 - 16.0] / 30.6 %<L> [37.0 - 47.0] < 430 K/uL<H> [130 - 400]      BMP  141 mmol/L [135 - 146] | 103 mmol/L [98 - 110] | 6 mg/dL<L> [10 - 20]  4.4 mmol/L [3.5 - 5.0] | 24 mmol/L [17 - 32] | 0.6 mg/dL<L> [0.7 - 1.5]    88 mg/dL [70 - 99]        Imaging Studies:    ACC: [id number] EXAM:  CT ABDOMEN AND PELVIS IC   ORDERED BY: MERCY VILLALTA     PROCEDURE DATE:  2024          INTERPRETATION:  CLINICAL INFORMATION: Abdominal pain status post IR   procedure.    COMPARISON: 2024    CONTRAST/COMPLICATIONS:  IV Contrast: Omnipaque 350  100 cc administered   0 cc discarded  Oral Contrast: NONE  Complications: None reported at time of study completion    PROCEDURE:  CT of the Abdomen and Pelvis was performed.  Sagittal and coronal reformats were performed.    FINDINGS:  01. LIVER: Normal morphology.  No focal lesion.  02. SPLEEN: Normal.  03. PANCREAS: Normal.  04. GALLBLADDER/BILIARY TREE: Status post cholecystectomy.   Internal/external biliary drain remains in place. Inflammatory changes   are again noted in the gallbladder fossa/guille hepatis. No significant   change in 1.7 cm collection anterior to the liver at the level of the   gallbladder fossa (45). Interval decrease in trace central intrahepatic   biliary ductal dilation.  05. ADRENALS: Unremarkable.  06. KIDNEYS: Symmetric enhancement.  No hydronephrosis or renal stone.  07. LYMPHADENOPATHY/RETROPERITONEUM: No enlarged lymph nodes.  08. BOWEL: Interval removal of the abdominal jejunostomy tube. No bowel   obstruction.  09. PELVIC VISCERA: Uterine calcification likely a fibroid.   Underdistended urinary bladder.  10. PELVIC LYMPH NODES: No enlarged lymph nodes.  11. VASCULATURE: No abdominal aortic aneurysm.  12. PERITONEUM/ABDOMINAL WALL: Stable to decreased ascites most prominent   in the pelvis. Surgical changes of the anterior abdominal wall. Right   anterior abdominal wall subcutaneous collection measuring 2.3   centimeters, stable ().  13. SKELETAL: No aggressive lesion.  14. LUNG BASES: Clear.    IMPRESSION:    Since 2024, no significant changes noted.    Interval removal of the abdominal jejunostomy tube.    Internal/external biliary drain remains in place. Inflammatory changes   are again noted in the gallbladder fossa/guille hepatis.    No significant change in 1.7 cm collection anterior to the liver at the   level of the gallbladder fossa (45).    No significant change in the right anterior abdominal wall subcutaneous   collection measuring 2.3 cm ().    Stable to decreased ascites most prominent in the pelvis.    --- End of Report ---            Opioid Risk Assessment Tool                                                                           Female       Male  Family History  Alcohol                                                              1                3  Illegal drugs                                                       2                3  Rx drugs                                                            4                4    Personal History   Alcohol                                                              3                3  Illegal drugs                                                       4                4  Rx drugs                                                            5                5    Age between 16—45 years                                1                1  History of preadolescent sexual abuse               3                0    Psychological disease  ADD, OCD, bipolar, schizophrenia                      2                2  Depression                                                       1                1    Total Score                                                     1           __    0 - 3 = low risk for future opioid abuse  4 - 7 = moderate risk for future opioid abuse  8+ = high risk for future opioid abuse  
INTERVENTIONAL RADIOLOGY CONSULT:     Procedure Requested:     HPI:  Patient is a 34-year-old female with a past medical history of G6PD, multiple C-sections, laparoscopic cholecystectomy in April, complicated by postoperative leak status post ex lap, IR guided PTC drain, hepaticotomy with hepaticojejunostomy, recent mission for right upper quadrant pain, found to have internal/external biliary drain terminating within a mildly inflamed small bowel loop on CT with new collection anterior to the liver, status post biliary catheter evaluation and exchange on  now presents back to the ED with worsening right upper quadrant the last couple of days.  No fever or vomiting.  Denies any bleeding around the site.   (11 Sep 2024 19:08)      PAST MEDICAL & SURGICAL HISTORY:  Glucose 6 phosphatase deficiency      Gestational diabetes       delivery delivered      S/P cholecystectomy      History of exploratory laparotomy          MEDICATIONS  (STANDING):  acetaminophen     Tablet .. 650 milliGRAM(s) Oral every 6 hours  enoxaparin Injectable 40 milliGRAM(s) SubCutaneous every 24 hours  lactated ringers. 1000 milliLiter(s) (100 mL/Hr) IV Continuous <Continuous>  piperacillin/tazobactam IVPB.. 3.375 Gram(s) IV Intermittent every 8 hours    MEDICATIONS  (PRN):  oxyCODONE    IR 2.5 milliGRAM(s) Oral every 6 hours PRN Severe Pain (7 - 10)      Allergies    sulfa drugs (Rash)  celemin&amp; celepid given in [facility] caused Rigors, was able to tolerate albumin (Other)    Intolerances          FAMILY HISTORY:  FHx: diabetes mellitus (Mother)        Physical Exam:   Vital Signs Last 24 Hrs  T(C): 36.8 (12 Sep 2024 07:41), Max: 37.2 (11 Sep 2024 13:14)  T(F): 98.2 (12 Sep 2024 07:41), Max: 99 (11 Sep 2024 13:14)  HR: 81 (12 Sep 2024 07:41) (79 - 93)  BP: 103/69 (12 Sep 2024 07:41) (101/67 - 111/71)  BP(mean): 77 (12 Sep 2024 00:26) (77 - 77)  RR: 16 (12 Sep 2024 07:41) (16 - 18)  SpO2: 100% (12 Sep 2024 07:41) (100% - 100%)    Parameters below as of 12 Sep 2024 07:41  Patient On (Oxygen Delivery Method): room air          Labs:                         10.0   8.73  )-----------( 370      ( 12 Sep 2024 00:34 )             30.7     09-12    137  |  101  |  6<L>  ----------------------------<  94  3.6   |  27  |  0.7    Ca    9.3      12 Sep 2024 00:34  Phos  4.4     09-12  Mg     1.7     09-12    TPro  6.7  /  Alb  3.4<L>  /  TBili  0.4  /  DBili  <0.2  /  AST  25  /  ALT  15  /  AlkPhos  184<H>  0912    PT/INR - ( 12 Sep 2024 00:34 )   PT: 12.00 sec;   INR: 1.05 ratio         PTT - ( 12 Sep 2024 00:34 )  PTT:34.3 sec    Pertinent labs:                      10.0   8.73  )-----------( 370      ( 12 Sep 2024 00:34 )             30.7       09-12    137  |  101  |  6<L>  ----------------------------<  94  3.6   |  27  |  0.7    Ca    9.3      12 Sep 2024 00:34  Phos  4.4     09-12  Mg     1.7     -12    TPro  6.7  /  Alb  3.4<L>  /  TBili  0.4  /  DBili  <0.2  /  AST  25  /  ALT  15  /  AlkPhos  184<H>  0912      PT/INR - ( 12 Sep 2024 00:34 )   PT: 12.00 sec;   INR: 1.05 ratio         PTT - ( 12 Sep 2024 00:34 )  PTT:34.3 sec    Radiology & Additional Studies:     Radiology imaging reviewed.       ASSESSMENT AND PLAN:  34F hx G6PD, multiple C-sections, laparoscopic cholecystectomy in April, complicated by postoperative leak status post ex lap, IR guided PTC drain sp exchange  p/w worsening RUQ pain. IR consulted for evaluation of fluid collections and PTC drain.    -Imaging reviewed. Fluid collections are too small to drain.  -Patient seen at bedside. PTC flushes without resistance. No role for acute IR intervention.    Please call Interventional Radiology with questions or concerns:   - M-F [phone]: [phone]   - All other hours: [phone]  
  MICHAEL LAL  34y, Female  Allergy: sulfa drugs (Rash)  celemin&amp; celepid given in [place] caused Rigors, was able to tolerate albumin (Other)      All historical available data reviewed.    HPI:  Patient is a 34-year-old female with a past medical history of G6PD, multiple C-sections, laparoscopic cholecystectomy in April, complicated by postoperative leak status post ex lap, IR guided PTC drain, hepaticotomy with hepaticojejunostomy, recent mission for right upper quadrant pain, found to have internal/external biliary drain terminating within a mildly inflamed small bowel loop on CT with new collection anterior to the liver, status post biliary catheter evaluation and exchange on  now presents back to the ED with worsening right upper quadrant the last couple of days.  No fever or vomiting.  Denies any bleeding around the site.   (11 Sep 2024 19:08)    FAMILY HISTORY:  FHx: diabetes mellitus (Mother)      PAST MEDICAL & SURGICAL HISTORY:  Glucose 6 phosphatase deficiency      Gestational diabetes       delivery delivered      S/P cholecystectomy      History of exploratory laparotomy            VITALS:  T(F): 98, Max: 98.2 (24 @ 07:41)  HR: 76  BP: 113/68  RR: 18Vital Signs Last 24 Hrs  T(C): 36.7 (12 Sep 2024 15:57), Max: 36.8 (12 Sep 2024 07:41)  T(F): 98 (12 Sep 2024 15:57), Max: 98.2 (12 Sep 2024 07:41)  HR: 76 (12 Sep 2024 19:41) (76 - 96)  BP: 113/68 (12 Sep 2024 19:41) (102/68 - 113/68)  BP(mean): --  RR: 18 (12 Sep 2024 19:41) (16 - 18)  SpO2: 97% (12 Sep 2024 19:41) (97% - 100%)    Parameters below as of 12 Sep 2024 19:41  Patient On (Oxygen Delivery Method): room air        TESTS & MEASUREMENTS:                        9.2    7.03  )-----------( 363      ( 12 Sep 2024 20:51 )             28.4     -    137  |  100  |  7<L>  ----------------------------<  86  3.8   |  28  |  0.7    Ca    8.7      12 Sep 2024 20:51  Phos  3.7       Mg     1.8         TPro  6.7  /  Alb  3.4<L>  /  TBili  0.4  /  DBili  <0.2  /  AST  25  /  ALT  15  /  AlkPhos  184<H>      LIVER FUNCTIONS - ( 12 Sep 2024 00:34 )  Alb: 3.4 g/dL / Pro: 6.7 g/dL / ALK PHOS: 184 U/L / ALT: 15 U/L / AST: 25 U/L / GGT: x             Urinalysis Basic - ( 12 Sep 2024 20:51 )    Color: x / Appearance: x / SG: x / pH: x  Gluc: 86 mg/dL / Ketone: x  / Bili: x / Urobili: x   Blood: x / Protein: x / Nitrite: x   Leuk Esterase: x / RBC: x / WBC x   Sq Epi: x / Non Sq Epi: x / Bacteria: x          RADIOLOGY & ADDITIONAL TESTS:  Personal review of radiological diagnostics performed  Echo and EKG results noted when applicable.     MEDICATIONS:  acetaminophen     Tablet .. 650 milliGRAM(s) Oral every 6 hours  enoxaparin Injectable 40 milliGRAM(s) SubCutaneous every 24 hours  ketorolac   Injectable      ketorolac   Injectable 15 milliGRAM(s) IV Push every 6 hours  magnesium sulfate  IVPB 1 Gram(s) IV Intermittent once  oxyCODONE    IR 5 milliGRAM(s) Oral every 6 hours PRN  piperacillin/tazobactam IVPB.. 3.375 Gram(s) IV Intermittent every 8 hours  potassium chloride  20 mEq/100 mL IVPB 20 milliEquivalent(s) IV Intermittent once  senna 1 Tablet(s) Oral at bedtime      ANTIBIOTICS:  piperacillin/tazobactam IVPB.. 3.375 Gram(s) IV Intermittent every 8 hours

## 2024-09-13 NOTE — CONSULT NOTE ADULT - ASSESSMENT
34-year-old female with a past medical history of G6PD, multiple C-sections, laparoscopic cholecystectomy in April, complicated by postoperative leak status post ex lap, IR guided PTC drain, hepaticotomy with hepaticojejunostomy, recent mission for right upper quadrant pain, found to have internal/external biliary drain terminating within a mildly inflamed small bowel loop on CT with new collection anterior to the liver, status post biliary catheter evaluation and exchange on September 4 now presents back to the ED with worsening right upper quadrant the last couple of days.  No fever or vomiting.  Denies any bleeding around the site.    IMPRESSION/RECOMMENDATIONS   34-year-old female with a past medical history of G6PD, multiple C-sections, laparoscopic cholecystectomy in April, complicated by postoperative leak status post ex lap, IR guided PTC drain, hepaticotomy with hepaticojejunostomy, recent mission for right upper quadrant pain, found to have internal/external biliary drain terminating within a mildly inflamed small bowel loop on CT with new collection anterior to the liver, status post biliary catheter evaluation and exchange on September 4 now presents back to the ED with worsening right upper quadrant the last couple of days.  No fever or vomiting.  Denies any bleeding around the site.    IMPRESSION/RECOMMENDATIONS   Intraabdominal abscess 1.7 cm collection anterior to the liver at the level of the gallbladder fossa  Anterior abdominal wall subcutaneous abscess 2.3 cm  Localized peritonitis  No diffuse peritonitis  9/12 IVR : collections too small to drain  6/28 WCx CoNS  7/28 BCx NG    9/11 CT Abdomen and Pelvis w/ IV Cont   Since 9/4/2024, no significant changes noted.  Interval removal of the abdominal jejunostomy tube.  Internal/external biliary drain remains in place. Inflammatory changes are again noted in the gallbladder fossa/guille hepatis.  No significant change in 1.7 cm collection anterior to the liver at the level of the gallbladder fossa (5/45).  No significant change in the right anterior abdominal wall subcutaneous   collection measuring 2.3 cm (5/62).  Decreased ascites    -please send off fluid from drain for bacterial cultures  -Vancomycin 1 gm iv q12h  -Cefepime 2 gm iv q8h  -Flagyl 500 mg po q8h

## 2024-09-13 NOTE — CONSULT NOTE ADULT - CONSULT REASON
fevers
Complex surgical history, multiple admissions for RUQ pain, appreciate eval and recs for management
Eval abdominal collections and cholangiogram

## 2024-09-14 LAB
ALBUMIN SERPL ELPH-MCNC: 3.3 G/DL — LOW (ref 3.5–5.2)
ALP SERPL-CCNC: 212 U/L — HIGH (ref 30–115)
ALT FLD-CCNC: 41 U/L — SIGNIFICANT CHANGE UP (ref 0–41)
ANION GAP SERPL CALC-SCNC: 10 MMOL/L — SIGNIFICANT CHANGE UP (ref 7–14)
AST SERPL-CCNC: 35 U/L — SIGNIFICANT CHANGE UP (ref 0–41)
BASOPHILS # BLD AUTO: 0.01 K/UL — SIGNIFICANT CHANGE UP (ref 0–0.2)
BASOPHILS NFR BLD AUTO: 0.1 % — SIGNIFICANT CHANGE UP (ref 0–1)
BILIRUB DIRECT SERPL-MCNC: <0.2 MG/DL — SIGNIFICANT CHANGE UP (ref 0–0.3)
BILIRUB INDIRECT FLD-MCNC: >0 MG/DL — LOW (ref 0.2–1.2)
BILIRUB SERPL-MCNC: 0.2 MG/DL — SIGNIFICANT CHANGE UP (ref 0.2–1.2)
BUN SERPL-MCNC: 5 MG/DL — LOW (ref 10–20)
CALCIUM SERPL-MCNC: 8.6 MG/DL — SIGNIFICANT CHANGE UP (ref 8.4–10.4)
CHLORIDE SERPL-SCNC: 103 MMOL/L — SIGNIFICANT CHANGE UP (ref 98–110)
CO2 SERPL-SCNC: 26 MMOL/L — SIGNIFICANT CHANGE UP (ref 17–32)
CREAT SERPL-MCNC: 0.6 MG/DL — LOW (ref 0.7–1.5)
EGFR: 121 ML/MIN/1.73M2 — SIGNIFICANT CHANGE UP
EOSINOPHIL # BLD AUTO: 0.54 K/UL — SIGNIFICANT CHANGE UP (ref 0–0.7)
EOSINOPHIL NFR BLD AUTO: 7.2 % — SIGNIFICANT CHANGE UP (ref 0–8)
GLUCOSE SERPL-MCNC: 97 MG/DL — SIGNIFICANT CHANGE UP (ref 70–99)
HCT VFR BLD CALC: 28.9 % — LOW (ref 37–47)
HGB BLD-MCNC: 9.3 G/DL — LOW (ref 12–16)
IMM GRANULOCYTES NFR BLD AUTO: 0.3 % — SIGNIFICANT CHANGE UP (ref 0.1–0.3)
LYMPHOCYTES # BLD AUTO: 2.16 K/UL — SIGNIFICANT CHANGE UP (ref 1.2–3.4)
LYMPHOCYTES # BLD AUTO: 28.9 % — SIGNIFICANT CHANGE UP (ref 20.5–51.1)
MAGNESIUM SERPL-MCNC: 1.9 MG/DL — SIGNIFICANT CHANGE UP (ref 1.8–2.4)
MCHC RBC-ENTMCNC: 29.9 PG — SIGNIFICANT CHANGE UP (ref 27–31)
MCHC RBC-ENTMCNC: 32.2 G/DL — SIGNIFICANT CHANGE UP (ref 32–37)
MCV RBC AUTO: 92.9 FL — SIGNIFICANT CHANGE UP (ref 81–99)
MONOCYTES # BLD AUTO: 0.7 K/UL — HIGH (ref 0.1–0.6)
MONOCYTES NFR BLD AUTO: 9.4 % — HIGH (ref 1.7–9.3)
NEUTROPHILS # BLD AUTO: 4.04 K/UL — SIGNIFICANT CHANGE UP (ref 1.4–6.5)
NEUTROPHILS NFR BLD AUTO: 54.1 % — SIGNIFICANT CHANGE UP (ref 42.2–75.2)
NRBC # BLD: 0 /100 WBCS — SIGNIFICANT CHANGE UP (ref 0–0)
PHOSPHATE SERPL-MCNC: 3.3 MG/DL — SIGNIFICANT CHANGE UP (ref 2.1–4.9)
PLATELET # BLD AUTO: 330 K/UL — SIGNIFICANT CHANGE UP (ref 130–400)
PMV BLD: 10.4 FL — SIGNIFICANT CHANGE UP (ref 7.4–10.4)
POTASSIUM SERPL-MCNC: 4 MMOL/L — SIGNIFICANT CHANGE UP (ref 3.5–5)
POTASSIUM SERPL-SCNC: 4 MMOL/L — SIGNIFICANT CHANGE UP (ref 3.5–5)
PROT SERPL-MCNC: 6 G/DL — SIGNIFICANT CHANGE UP (ref 6–8)
RBC # BLD: 3.11 M/UL — LOW (ref 4.2–5.4)
RBC # FLD: 14 % — SIGNIFICANT CHANGE UP (ref 11.5–14.5)
SODIUM SERPL-SCNC: 139 MMOL/L — SIGNIFICANT CHANGE UP (ref 135–146)
WBC # BLD: 7.47 K/UL — SIGNIFICANT CHANGE UP (ref 4.8–10.8)
WBC # FLD AUTO: 7.47 K/UL — SIGNIFICANT CHANGE UP (ref 4.8–10.8)

## 2024-09-14 PROCEDURE — 99232 SBSQ HOSP IP/OBS MODERATE 35: CPT | Mod: 25,24

## 2024-09-14 RX ORDER — POTASSIUM PHOSPHATE 236; 224 MG/ML; MG/ML
15 INJECTION, SOLUTION INTRAVENOUS ONCE
Refills: 0 | Status: COMPLETED | OUTPATIENT
Start: 2024-09-14 | End: 2024-09-14

## 2024-09-14 RX ADMIN — ACETAMINOPHEN 650 MILLIGRAM(S): 325 TABLET ORAL at 06:09

## 2024-09-14 RX ADMIN — ACETAMINOPHEN 650 MILLIGRAM(S): 325 TABLET ORAL at 06:47

## 2024-09-14 RX ADMIN — OXYCODONE HYDROCHLORIDE 5 MILLIGRAM(S): 5 TABLET ORAL at 14:21

## 2024-09-14 RX ADMIN — Medication 250 MILLIGRAM(S): at 06:08

## 2024-09-14 RX ADMIN — Medication 250 MILLIGRAM(S): at 16:50

## 2024-09-14 RX ADMIN — Medication 500 MILLIGRAM(S): at 21:05

## 2024-09-14 RX ADMIN — OXYCODONE HYDROCHLORIDE 5 MILLIGRAM(S): 5 TABLET ORAL at 19:17

## 2024-09-14 RX ADMIN — POTASSIUM PHOSPHATE 63.75 MILLIMOLE(S): 236; 224 INJECTION, SOLUTION INTRAVENOUS at 02:03

## 2024-09-14 RX ADMIN — CEFEPIME 100 MILLIGRAM(S): 2 INJECTION, POWDER, FOR SOLUTION INTRAVENOUS at 06:08

## 2024-09-14 RX ADMIN — KETOROLAC TROMETHAMINE 15 MILLIGRAM(S): 30 INJECTION, SOLUTION INTRAMUSCULAR at 06:08

## 2024-09-14 RX ADMIN — Medication 500 MILLIGRAM(S): at 06:09

## 2024-09-14 RX ADMIN — KETOROLAC TROMETHAMINE 15 MILLIGRAM(S): 30 INJECTION, SOLUTION INTRAMUSCULAR at 06:47

## 2024-09-14 RX ADMIN — CEFEPIME 100 MILLIGRAM(S): 2 INJECTION, POWDER, FOR SOLUTION INTRAVENOUS at 12:54

## 2024-09-14 RX ADMIN — Medication 500 MILLIGRAM(S): at 12:53

## 2024-09-14 RX ADMIN — Medication 100 GRAM(S): at 00:33

## 2024-09-14 RX ADMIN — CEFEPIME 100 MILLIGRAM(S): 2 INJECTION, POWDER, FOR SOLUTION INTRAVENOUS at 21:05

## 2024-09-14 NOTE — PROGRESS NOTE ADULT - SUBJECTIVE AND OBJECTIVE BOX
GENERAL SURGERY PROGRESS NOTE     MICHAEL LAL  [address] [place] [address] day :4d    POD: 31d  Procedure: S/p Hepatojejunostomy  Surgical Attending: Paz Forde  Overnight events: No acute events overnight. Pt is tolerating a regular diet without any nausea or vomiting. Biliary drain in place draining bilious output. She reports her abdominal pain is well controlled at this time    T(F): 97.9 (09-13-24 @ 23:26), Max: 98 (09-13-24 @ 08:05)  HR: 81 (09-13-24 @ 23:26) (70 - 96)  BP: 115/76 (09-13-24 @ 23:26) (106/73 - 116/77)  ABP: --  ABP(mean): --  RR: 18 (09-13-24 @ 19:35) (18 - 18)  SpO2: 100% (09-13-24 @ 23:26) (95% - 100%)    IN'S / OUT's:      PHYSICAL EXAM:  GENERAL: NAD  CHEST/LUNG: equal chest rise b/l  HEART: Regular rate and rhythm  ABDOMEN: Soft, mildly tender to palpation in RUQ, Nondistended; Biliary drain in place draining bilious output  EXTREMITIES:  No clubbing, cyanosis, or edema      LABS  Labs:  CAPILLARY BLOOD GLUCOSE                              9.5    7.56  )-----------( 369      ( 13 Sep 2024 20:53 )             28.9       Auto Neutrophil %: 60.3 % (09-13-24 @ 20:53)  Auto Immature Granulocyte %: 0.4 % (09-13-24 @ 20:53)    09-13    138  |  104  |  6<L>  ----------------------------<  100<H>  3.7   |  26  |  0.5<L>      Calcium: 8.6 mg/dL (09-13-24 @ 20:53)      LFTs:             6.1  | 0.3  | 77       ------------------[236     ( 13 Sep 2024 20:53 )  3.2  | <0.2 | 54          Lipase:x      Amylase:x             Coags:            Urinalysis Basic - ( 13 Sep 2024 20:53 )    Color: x / Appearance: x / SG: x / pH: x  Gluc: 100 mg/dL / Ketone: x  / Bili: x / Urobili: x   Blood: x / Protein: x / Nitrite: x   Leuk Esterase: x / RBC: x / WBC x   Sq Epi: x / Non Sq Epi: x / Bacteria: x        Culture - Abscess with Gram Stain (collected 12 Sep 2024 22:27)  Source: .Abscess abdominal wall  Gram Stain (13 Sep 2024 14:23):    Moderate polymorphonuclear leukocytes per low power field    No organisms seen per oil power field          RADIOLOGY & ADDITIONAL TESTS:      A/P:  MICHAEL LAL is a 34F PMHx G6PD, multiple C-sections, laparoscopic cholecystectomy in April, complicated by postoperative leak status post ex lap, IR guided PTC drain, hepaticotomy with hepaticojejunostomy, recent mission for right upper quadrant pain, found to have internal/external biliary drain terminating within a mildly inflamed small bowel loop on CT with new collection anterior to the liver, status post biliary catheter evaluation and exchange on September 4 who returned to the ED on 9/11 with worsening right upper quadrant since 9/10.      PLAN:   - monitor drain output quality and quantity   - F/u Cultures  - ID recs appreciated - continue cefepime, flagyl, Vanc  - DVT ppx  - daily labs, replete electrolytes as tolerated   - serial abdominal exams  - multi modal pain control  - encourage ambulation and IS as tolerated       #Antibiotics: cefepime   IVPB 2000 milliGRAM(s) IV Intermittent every 8 hours, 09-13-24 @ 09:12  metroNIDAZOLE    Tablet 500 milliGRAM(s) Oral every 8 hours, 09-13-24 @ 09:12  vancomycin  IVPB 1000 milliGRAM(s) IV Intermittent every 12 hours, 09-13-24 @ 12:39   Day **  #DVT ppx: enoxaparin Injectable 40 milliGRAM(s) SubCutaneous every 24 hours    Disposition:  ED3, pending bed on 4C    Above plan to be discussed with Attending Surgeon Dr. Forde  , patient, patient family, and rest of health care team    [id number]

## 2024-09-15 LAB
ANION GAP SERPL CALC-SCNC: 9 MMOL/L — SIGNIFICANT CHANGE UP (ref 7–14)
BASOPHILS # BLD AUTO: 0.01 K/UL — SIGNIFICANT CHANGE UP (ref 0–0.2)
BASOPHILS NFR BLD AUTO: 0.1 % — SIGNIFICANT CHANGE UP (ref 0–1)
BUN SERPL-MCNC: 7 MG/DL — LOW (ref 10–20)
CALCIUM SERPL-MCNC: 8.5 MG/DL — SIGNIFICANT CHANGE UP (ref 8.4–10.5)
CHLORIDE SERPL-SCNC: 101 MMOL/L — SIGNIFICANT CHANGE UP (ref 98–110)
CO2 SERPL-SCNC: 27 MMOL/L — SIGNIFICANT CHANGE UP (ref 17–32)
CREAT SERPL-MCNC: 0.6 MG/DL — LOW (ref 0.7–1.5)
EGFR: 121 ML/MIN/1.73M2 — SIGNIFICANT CHANGE UP
EOSINOPHIL # BLD AUTO: 0.47 K/UL — SIGNIFICANT CHANGE UP (ref 0–0.7)
EOSINOPHIL NFR BLD AUTO: 6.6 % — SIGNIFICANT CHANGE UP (ref 0–8)
GLUCOSE SERPL-MCNC: 99 MG/DL — SIGNIFICANT CHANGE UP (ref 70–99)
HCT VFR BLD CALC: 30 % — LOW (ref 37–47)
HGB BLD-MCNC: 9.5 G/DL — LOW (ref 12–16)
IMM GRANULOCYTES NFR BLD AUTO: 0.1 % — SIGNIFICANT CHANGE UP (ref 0.1–0.3)
LYMPHOCYTES # BLD AUTO: 2.1 K/UL — SIGNIFICANT CHANGE UP (ref 1.2–3.4)
LYMPHOCYTES # BLD AUTO: 29.7 % — SIGNIFICANT CHANGE UP (ref 20.5–51.1)
MAGNESIUM SERPL-MCNC: 2 MG/DL — SIGNIFICANT CHANGE UP (ref 1.8–2.4)
MCHC RBC-ENTMCNC: 29.3 PG — SIGNIFICANT CHANGE UP (ref 27–31)
MCHC RBC-ENTMCNC: 31.7 G/DL — LOW (ref 32–37)
MCV RBC AUTO: 92.6 FL — SIGNIFICANT CHANGE UP (ref 81–99)
MONOCYTES # BLD AUTO: 0.64 K/UL — HIGH (ref 0.1–0.6)
MONOCYTES NFR BLD AUTO: 9 % — SIGNIFICANT CHANGE UP (ref 1.7–9.3)
NEUTROPHILS # BLD AUTO: 3.85 K/UL — SIGNIFICANT CHANGE UP (ref 1.4–6.5)
NEUTROPHILS NFR BLD AUTO: 54.5 % — SIGNIFICANT CHANGE UP (ref 42.2–75.2)
NRBC # BLD: 0 /100 WBCS — SIGNIFICANT CHANGE UP (ref 0–0)
PHOSPHATE SERPL-MCNC: 3.3 MG/DL — SIGNIFICANT CHANGE UP (ref 2.1–4.9)
PLATELET # BLD AUTO: 367 K/UL — SIGNIFICANT CHANGE UP (ref 130–400)
PMV BLD: 9.6 FL — SIGNIFICANT CHANGE UP (ref 7.4–10.4)
POTASSIUM SERPL-MCNC: 3.9 MMOL/L — SIGNIFICANT CHANGE UP (ref 3.5–5)
POTASSIUM SERPL-SCNC: 3.9 MMOL/L — SIGNIFICANT CHANGE UP (ref 3.5–5)
RBC # BLD: 3.24 M/UL — LOW (ref 4.2–5.4)
RBC # FLD: 13.9 % — SIGNIFICANT CHANGE UP (ref 11.5–14.5)
SODIUM SERPL-SCNC: 137 MMOL/L — SIGNIFICANT CHANGE UP (ref 135–146)
VANCOMYCIN TROUGH SERPL-MCNC: 9.8 UG/ML — SIGNIFICANT CHANGE UP (ref 5–10)
WBC # BLD: 7.08 K/UL — SIGNIFICANT CHANGE UP (ref 4.8–10.8)
WBC # FLD AUTO: 7.08 K/UL — SIGNIFICANT CHANGE UP (ref 4.8–10.8)

## 2024-09-15 PROCEDURE — 99232 SBSQ HOSP IP/OBS MODERATE 35: CPT | Mod: 25,24

## 2024-09-15 RX ADMIN — CEFEPIME 100 MILLIGRAM(S): 2 INJECTION, POWDER, FOR SOLUTION INTRAVENOUS at 05:37

## 2024-09-15 RX ADMIN — ACETAMINOPHEN 650 MILLIGRAM(S): 325 TABLET ORAL at 05:38

## 2024-09-15 RX ADMIN — CEFEPIME 100 MILLIGRAM(S): 2 INJECTION, POWDER, FOR SOLUTION INTRAVENOUS at 21:26

## 2024-09-15 RX ADMIN — ACETAMINOPHEN 650 MILLIGRAM(S): 325 TABLET ORAL at 06:16

## 2024-09-15 RX ADMIN — KETOROLAC TROMETHAMINE 15 MILLIGRAM(S): 30 INJECTION, SOLUTION INTRAMUSCULAR at 17:45

## 2024-09-15 RX ADMIN — Medication 250 MILLIGRAM(S): at 17:41

## 2024-09-15 RX ADMIN — ACETAMINOPHEN 650 MILLIGRAM(S): 325 TABLET ORAL at 17:41

## 2024-09-15 RX ADMIN — ACETAMINOPHEN 650 MILLIGRAM(S): 325 TABLET ORAL at 11:10

## 2024-09-15 RX ADMIN — Medication 500 MILLIGRAM(S): at 21:26

## 2024-09-15 RX ADMIN — Medication 500 MILLIGRAM(S): at 13:04

## 2024-09-15 RX ADMIN — ENOXAPARIN SODIUM 40 MILLIGRAM(S): 100 INJECTION SUBCUTANEOUS at 05:38

## 2024-09-15 RX ADMIN — KETOROLAC TROMETHAMINE 15 MILLIGRAM(S): 30 INJECTION, SOLUTION INTRAMUSCULAR at 13:04

## 2024-09-15 RX ADMIN — CEFEPIME 100 MILLIGRAM(S): 2 INJECTION, POWDER, FOR SOLUTION INTRAVENOUS at 13:05

## 2024-09-15 RX ADMIN — Medication 250 MILLIGRAM(S): at 05:37

## 2024-09-15 RX ADMIN — KETOROLAC TROMETHAMINE 15 MILLIGRAM(S): 30 INJECTION, SOLUTION INTRAMUSCULAR at 06:16

## 2024-09-15 RX ADMIN — KETOROLAC TROMETHAMINE 15 MILLIGRAM(S): 30 INJECTION, SOLUTION INTRAMUSCULAR at 13:16

## 2024-09-15 RX ADMIN — Medication 500 MILLIGRAM(S): at 05:38

## 2024-09-15 RX ADMIN — Medication 25 GRAM(S): at 07:38

## 2024-09-15 RX ADMIN — KETOROLAC TROMETHAMINE 15 MILLIGRAM(S): 30 INJECTION, SOLUTION INTRAMUSCULAR at 17:41

## 2024-09-15 RX ADMIN — KETOROLAC TROMETHAMINE 15 MILLIGRAM(S): 30 INJECTION, SOLUTION INTRAMUSCULAR at 05:38

## 2024-09-15 NOTE — PROGRESS NOTE ADULT - SUBJECTIVE AND OBJECTIVE BOX
GENERAL SURGERY PROGRESS NOTE     Patient: MICHAEL LAL , 34y ([birth year])Female   MRN: [id number]  Location: [address]  Visit: 09-11-24 Inpatient  Date: 09-15-24 @ 08:20        Admitted :09-11-24 (4d)  LOS: 4d    Procedure/Dx/Injuries: Abdominal pain         Events of past 24 hours: Patient seen and examined at bedside. No acute events overnight. Patient denies nausea, vomiting and is passing gas and having bowel movements. Drain with 70 cc bilious output.   >>> <<<>>> <<<>>> <<<>>> <<<>>> <<<>>> <<<>>> <<<>>> <<<>>> <<<>>> <<<    Vitals:   T(F): 97.7 (09-14-24 @ 22:30), Max: 98.2 (09-14-24 @ 15:58)  HR: 90 (09-14-24 @ 22:30)  BP: 112/72 (09-14-24 @ 22:30)  RR: 18 (09-14-24 @ 22:30)  SpO2: 100% (09-14-24 @ 22:30)      PHYSICAL EXAM:  General Appearance: NAD  Lungs: No increased work of breathing or accessory muscle use. Symmetric chest wall rise and fall. Saturating well on room air  Abdomen:  Soft, nondistended, mildly tender in the RUQ. Biliary drain in place with 70 cc bilious output  MSK/Extremities: Warm & well-perfused.   Skin: Warm, dry. No jaundice.   >>> <<<>>> <<<>>> <<<>>> <<<>>> <<<>>> <<<>>> <<<>>> <<<>>> <<<>>> <<<   Is & Os:   Diet, Regular    Fluids:     09-14-24 @ 07:01  -  09-15-24 @ 07:00  --------------------------------------------------------  IN:  Total IN: 0 mL    OUT:    Drain (mL): 70 mL  Total OUT: 70 mL    Total NET: -70 mL          Bowel Movement: +  Flatus: +  >>> <<<>>> <<<>>> <<<>>> <<<>>> <<<>>> <<<>>> <<<>>> <<<>>> <<<>>> <<<    MEDICATIONS  (STANDING):  acetaminophen     Tablet .. 650 milliGRAM(s) Oral every 6 hours  cefepime   IVPB 2000 milliGRAM(s) IV Intermittent every 8 hours  enoxaparin Injectable 40 milliGRAM(s) SubCutaneous every 24 hours  ketorolac   Injectable      ketorolac   Injectable 15 milliGRAM(s) IV Push every 6 hours  metroNIDAZOLE    Tablet 500 milliGRAM(s) Oral every 8 hours  senna 1 Tablet(s) Oral at bedtime  vancomycin  IVPB 1000 milliGRAM(s) IV Intermittent every 12 hours    MEDICATIONS  (PRN):  oxyCODONE    IR 5 milliGRAM(s) Oral every 6 hours PRN Severe Pain (7 - 10)      DVT PROPHYLAXIS: enoxaparin Injectable 40 milliGRAM(s) SubCutaneous every 24 hours    GI PROPHYLAXIS:   ANTICOAGULATION:   ANTIBIOTICS:  cefepime   IVPB 2000 milliGRAM(s)  metroNIDAZOLE    Tablet 500 milliGRAM(s)  vancomycin  IVPB 1000 milliGRAM(s)      >>> <<<>>> <<<>>> <<<>>> <<<>>> <<<>>> <<<>>> <<<>>> <<<>>> <<<>>> <<<        LAB/STUDIES:  Labs:  CAPILLARY BLOOD GLUCOSE                              9.3    7.47  )-----------( 330      ( 14 Sep 2024 20:39 )             28.9       Auto Neutrophil %: 54.1 % (09-14-24 @ 20:39)  Auto Immature Granulocyte %: 0.3 % (09-14-24 @ 20:39)    09-14    139  |  103  |  5<L>  ----------------------------<  97  4.0   |  26  |  0.6<L>      Calcium: 8.6 mg/dL (09-14-24 @ 20:39)      LFTs:             6.0  | 0.2  | 35       ------------------[212     ( 14 Sep 2024 20:39 )  3.3  | <0.2 | 41          Lipase:x      Amylase:x             Coags:            Urinalysis Basic - ( 14 Sep 2024 20:39 )    Color: x / Appearance: x / SG: x / pH: x  Gluc: 97 mg/dL / Ketone: x  / Bili: x / Urobili: x   Blood: x / Protein: x / Nitrite: x   Leuk Esterase: x / RBC: x / WBC x   Sq Epi: x / Non Sq Epi: x / Bacteria: x        Culture - Abscess with Gram Stain (collected 12 Sep 2024 22:27)  Source: .Abscess abdominal wall  Gram Stain (13 Sep 2024 14:23):    Moderate polymorphonuclear leukocytes per low power field    No organisms seen per oil power field  Preliminary Report (14 Sep 2024 09:16):    No growth to date

## 2024-09-15 NOTE — PROGRESS NOTE ADULT - ASSESSMENT
MICHAEL LAL is a 34F PMHx G6PD, multiple C-sections, laparoscopic cholecystectomy in April, complicated by postoperative leak status post ex lap, IR guided PTC drain, hepaticotomy with hepaticojejunostomy, recent mission for right upper quadrant pain, found to have internal/external biliary drain terminating within a mildly inflamed small bowel loop on CT with new collection anterior to the liver, status post biliary catheter evaluation and exchange on September 4 who returned to the ED on 9/11 with worsening right upper quadrant since 9/10.    PLAN:  - Monitor drain output quality and quantity   - Follow up with cultures  - Continue antibiotics per ID recs: cefepime, flagyl, and vancomycin  - Monitor vitals  - Monitor labs and replete as necessary  - Continue multimodal pain regimen  - Encourage ambulation as tolerated  - Serial abdominal exams  - DVT ppx  - Follow up with IR for PICC placement      BLUE TEAM SPECTRA [id number]

## 2024-09-16 ENCOUNTER — TRANSCRIPTION ENCOUNTER (OUTPATIENT)
Age: 34
End: 2024-09-16

## 2024-09-16 LAB
ALBUMIN SERPL ELPH-MCNC: 3.5 G/DL — SIGNIFICANT CHANGE UP (ref 3.5–5.2)
ALP SERPL-CCNC: 176 U/L — HIGH (ref 30–115)
ALT FLD-CCNC: 24 U/L — SIGNIFICANT CHANGE UP (ref 0–41)
AST SERPL-CCNC: 18 U/L — SIGNIFICANT CHANGE UP (ref 0–41)
BASOPHILS # BLD AUTO: 0.01 K/UL — SIGNIFICANT CHANGE UP (ref 0–0.2)
BASOPHILS NFR BLD AUTO: 0.1 % — SIGNIFICANT CHANGE UP (ref 0–1)
BILIRUB DIRECT SERPL-MCNC: <0.2 MG/DL — SIGNIFICANT CHANGE UP (ref 0–0.3)
BILIRUB INDIRECT FLD-MCNC: >0 MG/DL — LOW (ref 0.2–1.2)
BILIRUB SERPL-MCNC: 0.2 MG/DL — SIGNIFICANT CHANGE UP (ref 0.2–1.2)
EOSINOPHIL # BLD AUTO: 0.4 K/UL — SIGNIFICANT CHANGE UP (ref 0–0.7)
EOSINOPHIL NFR BLD AUTO: 5.6 % — SIGNIFICANT CHANGE UP (ref 0–8)
HCT VFR BLD CALC: 28.7 % — LOW (ref 37–47)
HGB BLD-MCNC: 9.3 G/DL — LOW (ref 12–16)
IMM GRANULOCYTES NFR BLD AUTO: 0.3 % — SIGNIFICANT CHANGE UP (ref 0.1–0.3)
LYMPHOCYTES # BLD AUTO: 2.17 K/UL — SIGNIFICANT CHANGE UP (ref 1.2–3.4)
LYMPHOCYTES # BLD AUTO: 30.3 % — SIGNIFICANT CHANGE UP (ref 20.5–51.1)
MAGNESIUM SERPL-MCNC: 1.7 MG/DL — LOW (ref 1.8–2.4)
MCHC RBC-ENTMCNC: 30 PG — SIGNIFICANT CHANGE UP (ref 27–31)
MCHC RBC-ENTMCNC: 32.4 G/DL — SIGNIFICANT CHANGE UP (ref 32–37)
MCV RBC AUTO: 92.6 FL — SIGNIFICANT CHANGE UP (ref 81–99)
MONOCYTES # BLD AUTO: 0.62 K/UL — HIGH (ref 0.1–0.6)
MONOCYTES NFR BLD AUTO: 8.7 % — SIGNIFICANT CHANGE UP (ref 1.7–9.3)
NEUTROPHILS # BLD AUTO: 3.94 K/UL — SIGNIFICANT CHANGE UP (ref 1.4–6.5)
NEUTROPHILS NFR BLD AUTO: 55 % — SIGNIFICANT CHANGE UP (ref 42.2–75.2)
NRBC # BLD: 0 /100 WBCS — SIGNIFICANT CHANGE UP (ref 0–0)
PHOSPHATE SERPL-MCNC: 3 MG/DL — SIGNIFICANT CHANGE UP (ref 2.1–4.9)
PLATELET # BLD AUTO: 301 K/UL — SIGNIFICANT CHANGE UP (ref 130–400)
PMV BLD: 10.5 FL — HIGH (ref 7.4–10.4)
PROT SERPL-MCNC: 6 G/DL — SIGNIFICANT CHANGE UP (ref 6–8)
RBC # BLD: 3.1 M/UL — LOW (ref 4.2–5.4)
RBC # FLD: 14.1 % — SIGNIFICANT CHANGE UP (ref 11.5–14.5)
WBC # BLD: 7.16 K/UL — SIGNIFICANT CHANGE UP (ref 4.8–10.8)
WBC # FLD AUTO: 7.16 K/UL — SIGNIFICANT CHANGE UP (ref 4.8–10.8)

## 2024-09-16 PROCEDURE — 36573 INSJ PICC RS&I 5 YR+: CPT | Mod: LT

## 2024-09-16 RX ORDER — POTASSIUM CHLORIDE 10 MEQ
20 TABLET, EXT RELEASE, PARTICLES/CRYSTALS ORAL
Refills: 0 | Status: COMPLETED | OUTPATIENT
Start: 2024-09-16 | End: 2024-09-17

## 2024-09-16 RX ORDER — ERTAPENEM SODIUM 1 G/1
1 INJECTION, POWDER, LYOPHILIZED, FOR SOLUTION INTRAMUSCULAR; INTRAVENOUS
Qty: 21 | Refills: 0
Start: 2024-09-16 | End: 2024-10-06

## 2024-09-16 RX ORDER — ERTAPENEM SODIUM 1 G/1
1000 INJECTION, POWDER, LYOPHILIZED, FOR SOLUTION INTRAMUSCULAR; INTRAVENOUS EVERY 24 HOURS
Refills: 0 | Status: DISCONTINUED | OUTPATIENT
Start: 2024-09-16 | End: 2024-09-17

## 2024-09-16 RX ORDER — VANCOMYCIN/0.9 % SOD CHLORIDE 1.75G/25
1 PLASTIC BAG, INJECTION (ML) INTRAVENOUS
Qty: 42 | Refills: 0
Start: 2024-09-16 | End: 2024-10-06

## 2024-09-16 RX ADMIN — ACETAMINOPHEN 650 MILLIGRAM(S): 325 TABLET ORAL at 06:35

## 2024-09-16 RX ADMIN — ACETAMINOPHEN 650 MILLIGRAM(S): 325 TABLET ORAL at 01:30

## 2024-09-16 RX ADMIN — CEFEPIME 100 MILLIGRAM(S): 2 INJECTION, POWDER, FOR SOLUTION INTRAVENOUS at 05:48

## 2024-09-16 RX ADMIN — KETOROLAC TROMETHAMINE 15 MILLIGRAM(S): 30 INJECTION, SOLUTION INTRAMUSCULAR at 23:39

## 2024-09-16 RX ADMIN — ACETAMINOPHEN 650 MILLIGRAM(S): 325 TABLET ORAL at 17:33

## 2024-09-16 RX ADMIN — ACETAMINOPHEN 650 MILLIGRAM(S): 325 TABLET ORAL at 05:47

## 2024-09-16 RX ADMIN — KETOROLAC TROMETHAMINE 15 MILLIGRAM(S): 30 INJECTION, SOLUTION INTRAMUSCULAR at 00:32

## 2024-09-16 RX ADMIN — Medication 250 MILLIGRAM(S): at 17:32

## 2024-09-16 RX ADMIN — ACETAMINOPHEN 650 MILLIGRAM(S): 325 TABLET ORAL at 13:42

## 2024-09-16 RX ADMIN — Medication 250 MILLIGRAM(S): at 05:49

## 2024-09-16 RX ADMIN — ACETAMINOPHEN 650 MILLIGRAM(S): 325 TABLET ORAL at 23:37

## 2024-09-16 RX ADMIN — ERTAPENEM SODIUM 120 MILLIGRAM(S): 1 INJECTION, POWDER, LYOPHILIZED, FOR SOLUTION INTRAMUSCULAR; INTRAVENOUS at 21:23

## 2024-09-16 RX ADMIN — KETOROLAC TROMETHAMINE 15 MILLIGRAM(S): 30 INJECTION, SOLUTION INTRAMUSCULAR at 01:30

## 2024-09-16 RX ADMIN — KETOROLAC TROMETHAMINE 15 MILLIGRAM(S): 30 INJECTION, SOLUTION INTRAMUSCULAR at 13:42

## 2024-09-16 RX ADMIN — ACETAMINOPHEN 650 MILLIGRAM(S): 325 TABLET ORAL at 00:31

## 2024-09-16 RX ADMIN — KETOROLAC TROMETHAMINE 15 MILLIGRAM(S): 30 INJECTION, SOLUTION INTRAMUSCULAR at 17:33

## 2024-09-16 RX ADMIN — ENOXAPARIN SODIUM 40 MILLIGRAM(S): 100 INJECTION SUBCUTANEOUS at 05:48

## 2024-09-16 RX ADMIN — KETOROLAC TROMETHAMINE 15 MILLIGRAM(S): 30 INJECTION, SOLUTION INTRAMUSCULAR at 05:48

## 2024-09-16 RX ADMIN — KETOROLAC TROMETHAMINE 15 MILLIGRAM(S): 30 INJECTION, SOLUTION INTRAMUSCULAR at 06:35

## 2024-09-16 RX ADMIN — Medication 500 MILLIGRAM(S): at 05:47

## 2024-09-16 NOTE — PROGRESS NOTE ADULT - ASSESSMENT
MICHAEL LAL is a 34F PMHx G6PD, multiple C-sections, laparoscopic cholecystectomy in April, complicated by postoperative leak status post ex lap, IR guided PTC drain, hepaticotomy with hepaticojejunostomy, recent mission for right upper quadrant pain, found to have internal/external biliary drain terminating within a mildly inflamed small bowel loop on CT with new collection anterior to the liver, status post biliary catheter evaluation and exchange on September 4 who returned to the ED on 9/11 with worsening right upper quadrant since 9/10.    PLAN:  - Monitor drain output quality and quantity   - Follow up with cultures  - Follow up with IR for PICC  - Continue antibiotics per ID recs: cefepime, flagyl, and vancomycin  - Monitor vitals  - Monitor labs and replete as necessary  - Continue multimodal pain regimen  - Encourage ambulation as tolerated  - Serial abdominal exams  - DVT ppx

## 2024-09-16 NOTE — PROGRESS NOTE ADULT - SUBJECTIVE AND OBJECTIVE BOX
MICHAEL LAL  34y, Female    All available historical data reviewed    OVERNIGHT EVENTS:  feels well and has no new complaints  No fevers   no abd pain    ROS:  General: Denies rigors, nightsweats  HEENT: Denies headache, rhinorrhea, sore throat, eye pain  CV: Denies CP, palpitations  PULM: Denies wheezing, hemoptysis  GI: Denies hematemesis, hematochezia, melena  : Denies discharge, hematuria  MSK: Denies arthralgias, myalgias  SKIN: Denies rash, lesions  NEURO: Denies paresthesias, weakness  PSYCH: Denies depression, anxiety    VITALS:  T(F): 98.5, Max: 98.6 (09-15-24 @ 10:29)  HR: 89  BP: 104/68  RR: 18Vital Signs Last 24 Hrs  T(C): 36.9 (16 Sep 2024 00:30), Max: 37 (15 Sep 2024 10:29)  T(F): 98.5 (16 Sep 2024 00:30), Max: 98.6 (15 Sep 2024 10:29)  HR: 89 (16 Sep 2024 00:30) (89 - 95)  BP: 104/68 (16 Sep 2024 00:30) (104/68 - 109/74)  BP(mean): --  RR: 18 (16 Sep 2024 00:30) (18 - 18)  SpO2: 100% (16 Sep 2024 00:30) (96% - 100%)    Parameters below as of 16 Sep 2024 00:30  Patient On (Oxygen Delivery Method): room air        TESTS & MEASUREMENTS:                        9.5    7.08  )-----------( 367      ( 15 Sep 2024 21:38 )             30.0     09-15    137  |  101  |  7<L>  ----------------------------<  99  3.9   |  27  |  0.6<L>    Ca    8.5      15 Sep 2024 21:38  Phos  3.3     09-15  Mg     2.0     09-15    TPro  6.0  /  Alb  3.5  /  TBili  0.2  /  DBili  <0.2  /  AST  18  /  ALT  24  /  AlkPhos  176<H>  09-16    LIVER FUNCTIONS - ( 16 Sep 2024 06:13 )  Alb: 3.5 g/dL / Pro: 6.0 g/dL / ALK PHOS: 176 U/L / ALT: 24 U/L / AST: 18 U/L / GGT: x             Culture - Abscess with Gram Stain (collected 09-12-24 @ 22:27)  Source: .Abscess abdominal wall  Gram Stain (09-13-24 @ 14:23):    Moderate polymorphonuclear leukocytes per low power field    No organisms seen per oil power field  Preliminary Report (09-14-24 @ 09:16):    No growth to date      Urinalysis Basic - ( 15 Sep 2024 21:38 )    Color: x / Appearance: x / SG: x / pH: x  Gluc: 99 mg/dL / Ketone: x  / Bili: x / Urobili: x   Blood: x / Protein: x / Nitrite: x   Leuk Esterase: x / RBC: x / WBC x   Sq Epi: x / Non Sq Epi: x / Bacteria: x          Social History:  Tobacco Use: No  Alcohol Use: No  Drug Use: No    RADIOLOGY & ADDITIONAL TESTS:  Personal review of radiological diagnostics performed  Echo and EKG results noted when applicable.     MEDICATIONS:  acetaminophen     Tablet .. 650 milliGRAM(s) Oral every 6 hours  cefepime   IVPB 2000 milliGRAM(s) IV Intermittent every 8 hours  enoxaparin Injectable 40 milliGRAM(s) SubCutaneous every 24 hours  ketorolac   Injectable      ketorolac   Injectable 15 milliGRAM(s) IV Push every 6 hours  metroNIDAZOLE    Tablet 500 milliGRAM(s) Oral every 8 hours  oxyCODONE    IR 5 milliGRAM(s) Oral every 6 hours PRN  senna 1 Tablet(s) Oral at bedtime  vancomycin  IVPB 1000 milliGRAM(s) IV Intermittent every 12 hours      ANTIBIOTICS:  cefepime   IVPB 2000 milliGRAM(s) IV Intermittent every 8 hours  metroNIDAZOLE    Tablet 500 milliGRAM(s) Oral every 8 hours  vancomycin  IVPB 1000 milliGRAM(s) IV Intermittent every 12 hours

## 2024-09-16 NOTE — DISCHARGE NOTE PROVIDER - NSDCCPCAREPLAN_GEN_ALL_CORE_FT
PRINCIPAL DISCHARGE DIAGNOSIS  Diagnosis: Abdominal pain  Assessment and Plan of Treatment: Pain control: You may take over-the-counter tylenol and motrin three times per day with food for up to 3 days.  Medications: You may continue your home medications as prescribed. IV antibitoics were set up, continue as prescribed.   Follow up: Please call the number provided to make an appointment with Dr. Forde in 1-2 weeks. Please call with any questions or concerns including fevers, worsening pain.

## 2024-09-16 NOTE — DISCHARGE NOTE PROVIDER - HOSPITAL COURSE
34-year-old female with a past medical history of G6PD, multiple C-sections, laparoscopic cholecystectomy in April, complicated by postoperative leak status post ex lap, IR guided PTC drain, hepaticotomy with hepaticojejunostomy, recent mission for right upper quadrant pain, found to have internal/external biliary drain terminating within a mildly inflamed small bowel loop on CT with new collection anterior to the liver, status post biliary catheter evaluation and exchange on September 4 now presents back to the ED with worsening right upper quadrant the last couple of days.  No fever or vomiting.  Denies any bleeding around the site. CTAP reveals no significant changes in anterior liver and abdominal wall. Patient was admitted to surgical team. IR evaluated patient, recommended continue drain flushes, no further IR drainage necessary.  Pain is well controlled. PTC drain continues with bilious output. Patient is tolerating diet, ambulating, passing gas, and having bowel movements. Patient is medically appropriate for discharge with IV abx.

## 2024-09-16 NOTE — PROGRESS NOTE ADULT - ASSESSMENT
· Assessment	   34-year-old female with a past medical history of G6PD, multiple C-sections, laparoscopic cholecystectomy in April, complicated by postoperative leak status post ex lap, IR guided PTC drain, hepaticotomy with hepaticojejunostomy, recent mission for right upper quadrant pain, found to have internal/external biliary drain terminating within a mildly inflamed small bowel loop on CT with new collection anterior to the liver, status post biliary catheter evaluation and exchange on September 4 now presents back to the ED with worsening right upper quadrant the last couple of days.  No fever or vomiting.  Denies any bleeding around the site.    IMPRESSION/RECOMMENDATIONS   Intraabdominal abscess 1.7 cm collection anterior to the liver at the level of the gallbladder fossa  Anterior abdominal wall subcutaneous abscess 2.3 cm  Resolved  peritonitis  Clinically asymptomatic  9/12 IVR : collections too small to drain  9/12 Abd drainage: NG  WBC 7.0  6/28 WCx CoNS  7/28 BCx NG    9/11 CT Abdomen and Pelvis w/ IV Cont   Since 9/4/2024, no significant changes noted.  Interval removal of the abdominal jejunostomy tube.  Internal/external biliary drain remains in place. Inflammatory changes are again noted in the gallbladder fossa/guille hepatis.  No significant change in 1.7 cm collection anterior to the liver at the level of the gallbladder fossa (5/45).  No significant change in the right anterior abdominal wall subcutaneous   collection measuring 2.3 cm (5/62).  Decreased ascites    -PICC   -Vancomycin 1 gm iv q12h  -Ertapenem 1 gm iv q24h  -duration 3 weeks starting 9/16    f/u with Dr Maya [id number] at [facility] on tuesday via telehealth . Pt will be called  between 10-12.30 am.  [address] [place] [address]  [phone]  Fax [phone]     Please do not hesitate to recall ID if any questions arise either through [employer] or through [employer]

## 2024-09-16 NOTE — DISCHARGE NOTE PROVIDER - CARE PROVIDER_API CALL
Paz Forde  Complex General Surgical Oncology  [address] [facility] [place] [address]  Phone: [phone]  Fax: [phone]  Follow Up Time: 2 weeks    Argenis Burgos  NP in Adult Health  [address] [facility], [facility] [address] [place] [address]  Phone: [phone]  Fax: [phone]  Follow Up Time: 2 weeks    Alex Malhotra  Infectious Disease  [address] [place] [address]  Phone: [phone]  Fax: [phone]  Follow Up Time: 1 month

## 2024-09-16 NOTE — PROGRESS NOTE ADULT - SUBJECTIVE AND OBJECTIVE BOX
GENERAL SURGERY PROGRESS NOTE     Patient: MICHAEL LAL , 34y ([birth year])Female   MRN: [id number]  Location: [address]  Visit: 09-11-24 Inpatient  Date: 09-16-24 @ 01:19        Admitted :09-11-24 (5d)  LOS: 5d    Procedure/Dx/Injuries: Abdominal pain         Events of past 24 hours: Patient seen and examined at bedside. She is on a regular diet and tolerating it without any nausea or vomiting. Patient is passing gas and having bowel movements. Tolerating without any issues. Biliary drain in place with bilious output.   >>> <<<>>> <<<>>> <<<>>> <<<>>> <<<>>> <<<>>> <<<>>> <<<>>> <<<>>> <<<    Vitals:   T(F): 98.5 (09-16-24 @ 00:30), Max: 98.6 (09-15-24 @ 10:29)  HR: 89 (09-16-24 @ 00:30)  BP: 104/68 (09-16-24 @ 00:30)  RR: 18 (09-16-24 @ 00:30)  SpO2: 100% (09-16-24 @ 00:30)      PHYSICAL EXAM:  General Appearance: NAD  Lungs: No increased work of breathing or accessory muscle use. Symmetric chest wall rise and fall. Saturating well on room air  Abdomen:  Soft, nondistended, mildly tender in the RUQ. Biliary drain in place with  bilious output  MSK/Extremities: Warm & well-perfused.   Skin: Warm, dry. No jaundice.   >>> <<<>>> <<<>>> <<<>>> <<<>>> <<<>>> <<<>>> <<<>>> <<<>>> <<<>>> <<<   Is & Os:   Diet, Regular    Fluids:     09-14-24 @ 07:01  -  09-15-24 @ 07:00  --------------------------------------------------------  IN:  Total IN: 0 mL    OUT:    Drain (mL): 70 mL  Total OUT: 70 mL    Total NET: -70 mL          Bowel Movement: +  Flatus: +  >>> <<<>>> <<<>>> <<<>>> <<<>>> <<<>>> <<<>>> <<<>>> <<<>>> <<<>>> <<<    MEDICATIONS  (STANDING):  acetaminophen     Tablet .. 650 milliGRAM(s) Oral every 6 hours  cefepime   IVPB 2000 milliGRAM(s) IV Intermittent every 8 hours  enoxaparin Injectable 40 milliGRAM(s) SubCutaneous every 24 hours  ketorolac   Injectable      ketorolac   Injectable 15 milliGRAM(s) IV Push every 6 hours  metroNIDAZOLE    Tablet 500 milliGRAM(s) Oral every 8 hours  senna 1 Tablet(s) Oral at bedtime  vancomycin  IVPB 1000 milliGRAM(s) IV Intermittent every 12 hours    MEDICATIONS  (PRN):  oxyCODONE    IR 5 milliGRAM(s) Oral every 6 hours PRN Severe Pain (7 - 10)      DVT PROPHYLAXIS: enoxaparin Injectable 40 milliGRAM(s) SubCutaneous every 24 hours    GI PROPHYLAXIS:   ANTICOAGULATION:   ANTIBIOTICS:  cefepime   IVPB 2000 milliGRAM(s)  metroNIDAZOLE    Tablet 500 milliGRAM(s)  vancomycin  IVPB 1000 milliGRAM(s)      >>> <<<>>> <<<>>> <<<>>> <<<>>> <<<>>> <<<>>> <<<>>> <<<>>> <<<>>> <<<        LAB/STUDIES:  Labs:  CAPILLARY BLOOD GLUCOSE                              9.5    7.08  )-----------( 367      ( 15 Sep 2024 21:38 )             30.0       Auto Neutrophil %: 54.5 % (09-15-24 @ 21:38)  Auto Immature Granulocyte %: 0.1 % (09-15-24 @ 21:38)    09-15    137  |  101  |  7<L>  ----------------------------<  99  3.9   |  27  |  0.6<L>      Calcium: 8.5 mg/dL (09-15-24 @ 21:38)      LFTs:             6.0  | 0.2  | 35       ------------------[212     ( 14 Sep 2024 20:39 )  3.3  | <0.2 | 41          Lipase:x      Amylase:x             Coags:            Urinalysis Basic - ( 15 Sep 2024 21:38 )    Color: x / Appearance: x / SG: x / pH: x  Gluc: 99 mg/dL / Ketone: x  / Bili: x / Urobili: x   Blood: x / Protein: x / Nitrite: x   Leuk Esterase: x / RBC: x / WBC x   Sq Epi: x / Non Sq Epi: x / Bacteria: x

## 2024-09-16 NOTE — DISCHARGE NOTE PROVIDER - PROVIDER TOKENS
PROVIDER:[TOKEN:[[id number]],FOLLOWUP:[2 weeks]],PROVIDER:[TOKEN:[[id number]],FOLLOWUP:[2 weeks]],PROVIDER:[TOKEN:[[id number]],FOLLOWUP:[1 month]]

## 2024-09-16 NOTE — DISCHARGE NOTE PROVIDER - CARE PROVIDERS DIRECT ADDRESSES
,[email or web address],[email or web address]

## 2024-09-16 NOTE — DISCHARGE NOTE PROVIDER - NSDCMRMEDTOKEN_GEN_ALL_CORE_FT
acetaminophen 325 mg oral tablet: 2 tab(s) orally every 6 hours  Colace 100 mg oral capsule: 1 cap(s) orally every 8 hours  ibuprofen 600 mg oral tablet: 1 tab(s) orally every 8 hours  methocarbamol 500 mg oral tablet: 1 tab(s) orally every 8 hours  MiraLax oral powder for reconstitution: 17 gram(s) orally once a day  Percocet 5 mg-325 mg oral tablet: 1 tab(s) orally every 4 hours as needed for  severe pain MDD: 4

## 2024-09-16 NOTE — PROCEDURE NOTE - ADDITIONAL PROCEDURE DETAILS
Image-guided placement of 5fr single lumen 44cm BARD powerPICC in left basilic vein. Tip at cavoatrial junction. Flushes and aspirates well. Okay to use immediately.

## 2024-09-17 ENCOUNTER — TRANSCRIPTION ENCOUNTER (OUTPATIENT)
Age: 34
End: 2024-09-17

## 2024-09-17 VITALS
RESPIRATION RATE: 18 BRPM | SYSTOLIC BLOOD PRESSURE: 103 MMHG | OXYGEN SATURATION: 99 % | DIASTOLIC BLOOD PRESSURE: 66 MMHG | HEART RATE: 67 BPM | TEMPERATURE: 98 F

## 2024-09-17 RX ORDER — VANCOMYCIN/0.9 % SOD CHLORIDE 1.75G/25
1250 PLASTIC BAG, INJECTION (ML) INTRAVENOUS EVERY 12 HOURS
Refills: 0 | Status: DISCONTINUED | OUTPATIENT
Start: 2024-09-17 | End: 2024-09-17

## 2024-09-17 RX ORDER — CHLORHEXIDINE GLUCONATE 40 MG/ML
1 SOLUTION TOPICAL DAILY
Refills: 0 | Status: DISCONTINUED | OUTPATIENT
Start: 2024-09-17 | End: 2024-09-17

## 2024-09-17 RX ADMIN — ACETAMINOPHEN 650 MILLIGRAM(S): 325 TABLET ORAL at 05:46

## 2024-09-17 RX ADMIN — ACETAMINOPHEN 650 MILLIGRAM(S): 325 TABLET ORAL at 05:16

## 2024-09-17 RX ADMIN — ENOXAPARIN SODIUM 40 MILLIGRAM(S): 100 INJECTION SUBCUTANEOUS at 05:17

## 2024-09-17 RX ADMIN — ACETAMINOPHEN 650 MILLIGRAM(S): 325 TABLET ORAL at 12:28

## 2024-09-17 RX ADMIN — Medication 50 MILLIEQUIVALENT(S): at 02:48

## 2024-09-17 RX ADMIN — Medication 50 MILLIEQUIVALENT(S): at 05:16

## 2024-09-17 RX ADMIN — ACETAMINOPHEN 650 MILLIGRAM(S): 325 TABLET ORAL at 17:25

## 2024-09-17 RX ADMIN — Medication 12.5 GRAM(S): at 00:18

## 2024-09-17 RX ADMIN — KETOROLAC TROMETHAMINE 15 MILLIGRAM(S): 30 INJECTION, SOLUTION INTRAMUSCULAR at 05:16

## 2024-09-17 RX ADMIN — ACETAMINOPHEN 650 MILLIGRAM(S): 325 TABLET ORAL at 00:07

## 2024-09-17 RX ADMIN — ACETAMINOPHEN 650 MILLIGRAM(S): 325 TABLET ORAL at 18:30

## 2024-09-17 RX ADMIN — CHLORHEXIDINE GLUCONATE 1 APPLICATION(S): 40 SOLUTION TOPICAL at 11:31

## 2024-09-17 RX ADMIN — KETOROLAC TROMETHAMINE 15 MILLIGRAM(S): 30 INJECTION, SOLUTION INTRAMUSCULAR at 05:46

## 2024-09-17 RX ADMIN — Medication 166.67 MILLIGRAM(S): at 05:46

## 2024-09-17 RX ADMIN — KETOROLAC TROMETHAMINE 15 MILLIGRAM(S): 30 INJECTION, SOLUTION INTRAMUSCULAR at 00:09

## 2024-09-17 RX ADMIN — Medication 166.67 MILLIGRAM(S): at 17:24

## 2024-09-17 RX ADMIN — ACETAMINOPHEN 650 MILLIGRAM(S): 325 TABLET ORAL at 11:31

## 2024-09-17 RX ADMIN — Medication 50 MILLIEQUIVALENT(S): at 00:18

## 2024-09-17 NOTE — PROGRESS NOTE ADULT - PROVIDER SPECIALTY LIST ADULT
Surgery
Infectious Disease
Brow Lift Text: A midfrontal incision was made medially to the defect to allow access to the tissues just superior to the left eyebrow. Following careful dissection inferiorly in a supraperiosteal plane to the level of the left eyebrow, several 3-0 monocryl sutures were used to resuspend the eyebrow orbicularis oculi muscular unit to the superior frontal bone periosteum. This resulted in an appropriate reapproximation of static eyebrow symmetry and correction of the left brow ptosis.

## 2024-09-17 NOTE — PHARMACOTHERAPY INTERVENTION NOTE - COMMENTS
As per policy, discontinued duplicative vancomycin trough order.      Bisi Cole, PharmD   Clinical Pharmacy Specialist, Infectious Diseases  Tele-Antimicrobial Stewardship Program (Tele-ASP)  Tele-ASP Phone: [phone]  
I spoke with the Blue Team spectra [id number] will correct the dose of vancomycin from 1mg to 1000 mg
As per policy, ordered a vancomycin trough for 9/18 @1600 in order to assist with vancomycin pharmacokinetic monitoring.        Melissa QuinterosD   Clinical Pharmacy Specialist, Infectious Diseases  Tele-Antimicrobial Stewardship Program (Tele-ASP)  Tele-ASP Phone: [phone]  
As per policy, adjusted vancomycin dose from 1000 mg IV q12h to 1250 mg IV q12h since the vancomycin level 9/15 was 9.8 mg/L. As per [employer] calculations, current vancomycin AUC/GWEN is 333 mg/L*h, which is lower than the therapeutic range of 400 - 600 mg/L*h. Increasing the dose is predicted to yield an AUC/GWEN of 417 mg/L*h. Scr 0.5.      Bisi Cole, PharmD   Clinical Pharmacy Specialist, Infectious Diseases  Tele-Antimicrobial Stewardship Program (Tele-ASP)  Tele-ASP Phone: [phone]

## 2024-09-17 NOTE — PROGRESS NOTE ADULT - ASSESSMENT
MICHAEL LAL is a 34F PMHx G6PD, multiple C-sections, laparoscopic cholecystectomy in April, complicated by postoperative leak status post ex lap, IR guided PTC drain, hepaticotomy with hepaticojejunostomy, recent mission for right upper quadrant pain, found to have internal/external biliary drain terminating within a mildly inflamed small bowel loop on CT with new collection anterior to the liver, status post biliary catheter evaluation and exchange on September 4 who returned to the ED on 9/11 with worsening right upper quadrant since 9/10.    PLAN:  - Monitor drain output quality and quantity   - Follow up with cultures  - f/u CM for insurance authorization for IV abx  - Continue antibiotics per ID recs: cefepime, flagyl, and vancomycin  - Monitor vitals  - Monitor labs and replete as necessary  - Continue multimodal pain regimen  - Encourage ambulation as tolerated  - Serial abdominal exams  - DVT ppx

## 2024-09-17 NOTE — DISCHARGE NOTE NURSING/CASE MANAGEMENT/SOCIAL WORK - PATIENT PORTAL LINK FT
You can access the FollowMyHealth Patient Portal offered by [facility] by registering at the following website: [email or web address]. By joining [employer]’s FollowMyHealth portal, you will also be able to view your health information using other applications (apps) compatible with our system.

## 2024-09-17 NOTE — PROGRESS NOTE ADULT - SUBJECTIVE AND OBJECTIVE BOX
GENERAL SURGERY PROGRESS NOTE    Patient: MICHAEL LAL , 34y ([birth year])Female   MRN: [id number]  Location: [address]  Visit: 24 Inpatient  Date: 24 @ 02:08    Hospital Day #: 7  Post-Op Day #: 34    Procedure/Dx/Injuries:     Events of past 24 hours: s/p PICC line insertion. waiting for auth for IV abx. Mg and K repleted overnight    PAST MEDICAL & SURGICAL HISTORY:  Glucose 6 phosphatase deficiency      Gestational diabetes       delivery delivered      S/P cholecystectomy      History of exploratory laparotomy          Vitals:   T(F): 98.4 (24 @ 00:37), Max: 98.7 (24 @ 13:48)  HR: 91 (24 @ 00:37)  BP: 106/65 (24 @ 00:37)  RR: 18 (24 @ 00:37)  SpO2: 100% (24 @ 00:37)      Diet, Regular      Fluids:     I & O's:      Bowel Movement: : [] YES [] NO  Flatus: : [] YES [] NO    PHYSICAL EXAM:  General Appearance: NAD  Lungs: No increased work of breathing or accessory muscle use. Symmetric chest wall rise and fall. Saturating well on room air  Abdomen:  Soft, nondistended, mildly tender in the RUQ. Biliary drain in place with  bilious output  MSK/Extremities: Warm & well-perfused.   Skin: Warm, dry. No jaundice.     MEDICATIONS  (STANDING):  acetaminophen     Tablet .. 650 milliGRAM(s) Oral every 6 hours  enoxaparin Injectable 40 milliGRAM(s) SubCutaneous every 24 hours  ertapenem  IVPB 1000 milliGRAM(s) IV Intermittent every 24 hours  ketorolac   Injectable      ketorolac   Injectable 15 milliGRAM(s) IV Push every 6 hours  potassium chloride  20 mEq/100 mL IVPB 20 milliEquivalent(s) IV Intermittent every 2 hours  senna 1 Tablet(s) Oral at bedtime  vancomycin  IVPB 1000 milliGRAM(s) IV Intermittent every 12 hours    MEDICATIONS  (PRN):  oxyCODONE    IR 5 milliGRAM(s) Oral every 6 hours PRN Severe Pain (7 - 10)      DVT PROPHYLAXIS: enoxaparin Injectable 40 milliGRAM(s) SubCutaneous every 24 hours    GI PROPHYLAXIS:   ANTICOAGULATION:   ANTIBIOTICS:  ertapenem  IVPB 1000 milliGRAM(s)  vancomycin  IVPB 1000 milliGRAM(s)            LAB/STUDIES:  Labs:  CAPILLARY BLOOD GLUCOSE                              9.3    7.16  )-----------( 301      ( 16 Sep 2024 21:47 )             28.7       Auto Neutrophil %: 55.0 % (24 @ 21:47)  Auto Immature Granulocyte %: 0.3 % (24 @ 21:47)        137  |  104  |  7<L>  ----------------------------<  91  3.5   |  25  |  0.5<L>      Magnesium: 1.7 mg/dL (24 @ 21:47)      LFTs:             6.0  | 0.2  | 18       ------------------[176     ( 16 Sep 2024 06:13 )  3.5  | <0.2 | 24          Lipase:x      Amylase:x             Coags:            Urinalysis Basic - ( 16 Sep 2024 21:47 )    Color: x / Appearance: x / SG: x / pH: x  Gluc: 91 mg/dL / Ketone: x  / Bili: x / Urobili: x   Blood: x / Protein: x / Nitrite: x   Leuk Esterase: x / RBC: x / WBC x   Sq Epi: x / Non Sq Epi: x / Bacteria: x                IMAGING:  < from: IR PICC (IR PICC .) (24 @ 13:00) >  IMPRESSION:    Successful left sided PICC placement ready for immediate use.    < end of copied text >

## 2024-09-17 NOTE — DISCHARGE NOTE NURSING/CASE MANAGEMENT/SOCIAL WORK - NSDCPEFALRISK_GEN_ALL_CORE
For information on Fall & Injury Prevention, visit: [email or web address] OR  [email or web address] OR  https://www.cdc.gov/steadi/patient.html

## 2024-09-18 LAB
CULTURE RESULTS: SIGNIFICANT CHANGE UP
SPECIMEN SOURCE: SIGNIFICANT CHANGE UP

## 2024-09-18 NOTE — PHYSICAL EXAM
[Alert] : alert [Sclera] : the sclera and conjunctiva were normal [Normal Appearance] : the appearance of the neck was normal [No Respiratory Distress] : no respiratory distress [Heart Rate And Rhythm] : heart rate was normal and rhythm regular [Bowel Sounds] : normal bowel sounds [Abdomen Tenderness] : non-tender [No Masses] : no abdominal mass palpated [Abdomen Soft] : soft [] : no hepatosplenomegaly [Other: ___] : [unfilled]

## 2024-09-18 NOTE — HISTORY OF PRESENT ILLNESS
[FreeTextEntry1] : 35 y/o F w/ no PMHx of robotic RY hepaticojejunostomy, laparoscopic cholecystectomy in [place] with two postoperative laparotomies to address adhesion/bile duct injury, ERCP showing narrowing of CHD, had PTC with internal external drain is here for a follow up.    Currently pt complains of RUQ abd pain. Denies fever, N/V/D, constipation,  sxs, SOB, chest pain. Pt is currently not on any medications.  she had [place] cholecyst[place] in [place] with two postoperative laparotomy to address adhesion/bile duct injury, absent operative details  she had ERCP with narrowing of CHD, she had PTC with internal external drain via right biliary duct system into duodenum  07/23/2024 :still suffering discomfort, GI eval and concern of need to repeat procedure and re-stenting, patient declined GI ERCP and attempt stenting her stricture, will proceed with robotic RY hepaticojejunostomy  08/22/2024 : s/p RY HJ Jose Fong , she has her wound healing, no discharge, drain is out, she still has two stents,  one in bowel slided back from left duct, another PTD via right costal via right duct to Enrrique limb  I had cholangiogram that was ordered by hepatobiliary surgery showed that the stent was in place and did not show any leak anemia.

## 2024-09-18 NOTE — ASSESSMENT
[FreeTextEntry1] : 33 y/o F w/ no PMHx of robotic RY hepaticojejunostomy, laparoscopic cholecystectomy in [place] with two postoperative laparotomies to address adhesion/bile duct injury, ERCP showing narrowing of CHD, had PTC with internal external drain is here for a follow up.   The patient is now status post hepaticojejunostomy and IR percutaneous drain placement into the intrahepatic ducts. Further management as per hepatobiliary surgery and IR If advanced endoscopy procedure is required to place a retrograde stent into the ducts, we are available Follow-up with surgery Follow-up in 2 months with us.  The patient was advised if she has any fever, chills, severe abdominal pain or yellowish discoloration to immediately go to the ED.  Patient states understanding.

## 2024-09-23 DIAGNOSIS — Z79.899 OTHER LONG TERM (CURRENT) DRUG THERAPY: ICD-10-CM

## 2024-09-23 DIAGNOSIS — T40.2X5A ADVERSE EFFECT OF OTHER OPIOIDS, INITIAL ENCOUNTER: ICD-10-CM

## 2024-09-23 DIAGNOSIS — R10.11 RIGHT UPPER QUADRANT PAIN: ICD-10-CM

## 2024-09-23 DIAGNOSIS — Z88.8 ALLERGY STATUS TO OTHER DRUGS, MEDICAMENTS AND BIOLOGICAL SUBSTANCES: ICD-10-CM

## 2024-09-23 DIAGNOSIS — Z79.891 LONG TERM (CURRENT) USE OF OPIATE ANALGESIC: ICD-10-CM

## 2024-09-23 DIAGNOSIS — Z88.2 ALLERGY STATUS TO SULFONAMIDES: ICD-10-CM

## 2024-09-23 DIAGNOSIS — Z93.4 OTHER ARTIFICIAL OPENINGS OF GASTROINTESTINAL TRACT STATUS: ICD-10-CM

## 2024-09-23 DIAGNOSIS — Z79.2 LONG TERM (CURRENT) USE OF ANTIBIOTICS: ICD-10-CM

## 2024-09-23 DIAGNOSIS — E83.42 HYPOMAGNESEMIA: ICD-10-CM

## 2024-09-23 DIAGNOSIS — Z79.1 LONG TERM (CURRENT) USE OF NON-STEROIDAL ANTI-INFLAMMATORIES (NSAID): ICD-10-CM

## 2024-09-23 DIAGNOSIS — E74.01 VON GIERKE DISEASE: ICD-10-CM

## 2024-09-23 DIAGNOSIS — K59.03 DRUG INDUCED CONSTIPATION: ICD-10-CM

## 2024-09-26 NOTE — DISCHARGE NOTE OB - PHYSICIAN SECTION COMPLETE
9/26/2024      RE: Jesus Peace  [address]  [place] [address]     Dear Colleague,    Thank you for the opportunity to participate in the care of your patient, Jesus Peace, at the [facility] at [facility]. Please see a copy of my visit note below.    CLINICAL NUTRITION SERVICES - PEDIATRIC ASSESSMENT NOTE    REASON FOR ASSESSMENT  Jesus Peace is a 9 year old male seen by the dietitian in Nutrition clinic for new nutrition assessment (referral and notes stating decreased appetite, ADHD). Patient is accompanied by mother.     Norbert is a 9 year old who is being evaluated via a billable video visit.        Video-Visit Details    Started video visit, though switched to telephone call due to audio connection issues.    RECOMMENDATIONS  Try for scheduled meals and snacks to help with eating at specific times in the day. May consider times of the day Norbert is most hungry/interested in eating to work around this.    Can trial the following suggestions for increased calories and protein;  Smoothies blended with Pediasure, Summit Breakfast Essentials, Boost Kids Essentials, and/or Orgain Kids  Could try strawberries and cream Jello recipe made with vanilla or strawberry supplement (from above list)  Can use nutrition supplements in baked goods, other recipes  Addition of 1/2 - 1 Tbsp olive or avocado oil drizzled on meals - consider 1 Tbsp = over 100 calories; avocado oil will bring minimal taste  Addition of 1 ounce heavy cream into nutrition supplements, shakes, smoothies, sauces, spreads, soups, etc  Granola bar ideas which are higher calorie; Tanner's, Protein z-bars, [place], Rodri or Rodri mini  Whole milk Greek yogurt as a snack or mixed into smoothies  Could trial protein toaster waffles as a breakfast option with more protein than cereal    Consider packing favorite foods with school lunch in  thermos, smoothie in thermos to keep cold, etc.    To schedule future appointment call [phone]. Family prefers to schedule RD follow up on as-needed basis. Provided contact information if future needs arise.       ANTHROPOMETRICS 9/26/24  Height: 115.7 cm, -2.92 z score  Weight: 19.5 kg, -2.89 z score  BMI: 14.57 kg/m^2, -1.06 z score  Dosing Weight: 19.5 kg - most recent wt    Comments:  Weight: Z-score improving; +0.6 over the past ~2.5 mos.  Height: Z-score -0.04 over the past ~2.5 mos though note improved overall +0.44 from lowest noted z-score ~15 mos ago.  BMI: Z-score increasing towards previous trends ~ -0.5 (+0.82 over the past ~2.5 mos).    NUTRITION HISTORY  Jesus is on a regular diet at home.     Goals for visit: Mother would like to discuss increasing nutrition and weight gain at visit today.    Typical oral intakes:  Breakfast: Strawberries whipped cream, apple juice (with meds in it) cheerios with milk (2%)  AM Snack: At school - fruit snack or granola bars (packed by family)  Lunch: Salami (doesn't eat this - reports he sometimes just doesn't want it) cheese, fruit, pretzels   Struggles to eat all of food at school  PM Snack: Smoothie or fruit, pretzels + hummus, cheese, salami or ham  Eats all of this and continuously eats after school  Dinner: Mac and cheese, carrots, fruit   Struggles to eat all of this - sitting at the table is a challenge  HS Snack: Pretzels, marshmallow or ice cream or whipped cream with strawberries  Doesn't always eat all of this  Beverages: Apple juice, smoothies, water bottles at school but loses it (doesn't drink much water), 2 - 3 cups 2% milk daily, chocolate protein shakes (Premier protein, <1 daily)    Food frequency:  Preferred foods: Strawberries + whipped cream, mac and cheese, chicken nuggets, loves ketchup, pizza, candy, smoothies, would eat carrots, does not like PB or other nut/seed butters, no trail mix, likes laughing cow cheese, doesn't like string  cheese as much, ham, salami, no sandwiches, likes crackers, likes yogurt (off and on), likes toaster waffles  Restaurants:   Chicken tenders or chicken nuggets, eats ketchup with his finger    Special considerations:  Nutrition related medical updates: Initial nutrition visit today to discuss low appetite/wt gain with ADHD medications.   Special diet: Family attempting to add a lot of fats; ice cream, whipped cream, butter.  Allergies/Intolerances: NKFA  Therapies: OT though does not follow with feeding therapy currently, psychiatry.   Vitamins/Supplements: Melatonin, gummy MVI.    Other:  Physical activity: Norbert plays soccer, occasional acting classes.  Social: Norbert is in 3rd grade in school at [employer]. Lives at home with dad, mom, two brothers, and aunt.  Food assistance: None  Eating environment: Three boys usually have the same dinners; one meal for the kids and one meal for parents. Norbert sometimes gags on foods and is picky, though not as picky as his sibling.    GI:  Stools: Since June - increased constipation; started a clean out in July and will be starting this again. Miralax daily; 1/4 - 1/2 cap daily depending on mom or dad giving.    NUTRITION RELATED PHYSICAL FINDINGS  None noted    NUTRITION RELATED LABS  Labs reviewed    NUTRITION RELATED MEDICATIONS  Medications reviewed;  Vyvanse    ESTIMATED NUTRITION NEEDS:  Based on Viola BMR (948) x 1.2 - 1.4 = 1138 - 1327 kcal/day  Energy Needs: 58 - 68 kcal/kg  Protein Needs: 1 - 1.5 g/kg  Fluid Needs: 1475 mL maintenance  Micronutrient Needs: RDA for age    PEDIATRIC NUTRITION STATUS VALIDATION  Patient does not meet criteria for malnutrition at this time given wt and BMI z-score improvements, though will continue to monitor/reassess given historic slow growth/wt loss.    NUTRITION DIAGNOSIS  Predicted suboptimal nutrient intake related to low/variable appetite and intakes as evidenced by diet recall with variable intakes at meals and snacks with  parental report of low appetite with potential to meet <100% nutrition needs PO.    INTERVENTIONS  Nutrition Prescription  Jesus to meet 100% estimated needs PO.    Nutrition Education:   Provided education on the following;  High calorie meal, snack, shake/smoothie ideas  Scheduled meals and snacks  Packing favorite foods with school lunches    Implementation:  Implementation:   Medical food supplement therapy - See above  Modify composition of meals/snacks - See above    Goals  Wt gain with BMI to trend ~ -0.6 to 1  Linear growth to trend appropriately  PO intakes to meet 100% nutrition needs    FOLLOW UP/MONITORING  Food and Beverage intake   Micronutrient intake   Anthropometric measurements    Spent 45 minutes in consult with Jesusletitia Peace and mother.      Betsy Thornton RD, LD  Phone: [phone]  Fax: [phone]  Email: [email or web address]  Patient [phone]        Please do not hesitate to contact me if you have any questions/concerns.     Sincerely,       P PEDS NUTRITION DIETICIAN   Yes

## 2024-09-27 ENCOUNTER — APPOINTMENT (OUTPATIENT)
Dept: INTERNAL MEDICINE | Facility: CLINIC | Age: 34
End: 2024-09-27

## 2024-09-27 ENCOUNTER — OUTPATIENT (OUTPATIENT)
Dept: OUTPATIENT SERVICES | Facility: HOSPITAL | Age: 34
LOS: 1 days | End: 2024-09-27
Payer: COMMERCIAL

## 2024-09-27 VITALS
WEIGHT: 154 LBS | BODY MASS INDEX: 22.81 KG/M2 | TEMPERATURE: 98.3 F | DIASTOLIC BLOOD PRESSURE: 83 MMHG | HEART RATE: 95 BPM | OXYGEN SATURATION: 99 % | SYSTOLIC BLOOD PRESSURE: 118 MMHG | HEIGHT: 69 IN

## 2024-09-27 DIAGNOSIS — D75.A GLUCOSE-6-PHOSPHATE DEHYDROGENASE: ICD-10-CM

## 2024-09-27 DIAGNOSIS — Z98.890 OTHER SPECIFIED POSTPROCEDURAL STATES: Chronic | ICD-10-CM

## 2024-09-27 DIAGNOSIS — Z90.49 ACQUIRED ABSENCE OF OTHER SPECIFIED PARTS OF DIGESTIVE TRACT: Chronic | ICD-10-CM

## 2024-09-27 DIAGNOSIS — Z00.00 ENCOUNTER FOR GENERAL ADULT MEDICAL EXAMINATION WITHOUT ABNORMAL FINDINGS: ICD-10-CM

## 2024-09-27 DIAGNOSIS — Z23 ENCOUNTER FOR IMMUNIZATION: ICD-10-CM

## 2024-09-27 LAB
BASOPHILS # BLD AUTO: 0.02 K/UL
BASOPHILS NFR BLD AUTO: 0.3 %
CHOLEST SERPL-MCNC: 143 MG/DL
EOSINOPHIL # BLD AUTO: 0.23 K/UL
EOSINOPHIL NFR BLD AUTO: 4 %
ESTIMATED AVERAGE GLUCOSE: 105 MG/DL
HBA1C MFR BLD HPLC: 5.3 %
HCT VFR BLD CALC: 33.6 %
HDLC SERPL-MCNC: 59 MG/DL
HGB BLD-MCNC: 10.9 G/DL
IMM GRANULOCYTES NFR BLD AUTO: 0.3 %
LDLC SERPL CALC-MCNC: 62 MG/DL
LYMPHOCYTES # BLD AUTO: 1.5 K/UL
LYMPHOCYTES NFR BLD AUTO: 26.2 %
MAN DIFF?: NORMAL
MCHC RBC-ENTMCNC: 30.1 PG
MCHC RBC-ENTMCNC: 32.4 G/DL
MCV RBC AUTO: 92.8 FL
MONOCYTES # BLD AUTO: 0.4 K/UL
MONOCYTES NFR BLD AUTO: 7 %
NEUTROPHILS # BLD AUTO: 3.56 K/UL
NEUTROPHILS NFR BLD AUTO: 62.2 %
NONHDLC SERPL-MCNC: 84 MG/DL
PLATELET # BLD AUTO: 421 K/UL
PMV BLD AUTO: 0 /100 WBCS
RBC # BLD: 3.62 M/UL
RBC # FLD: 13.9 %
TRIGL SERPL-MCNC: 109 MG/DL
TSH SERPL-ACNC: 2.08 UIU/ML
WBC # FLD AUTO: 5.73 K/UL

## 2024-09-27 PROCEDURE — 85027 COMPLETE CBC AUTOMATED: CPT

## 2024-09-27 PROCEDURE — 99214 OFFICE O/P EST MOD 30 MIN: CPT | Mod: 25

## 2024-09-27 PROCEDURE — 84443 ASSAY THYROID STIM HORMONE: CPT

## 2024-09-27 PROCEDURE — 90656 IIV3 VACC NO PRSV 0.5 ML IM: CPT

## 2024-09-27 PROCEDURE — 83036 HEMOGLOBIN GLYCOSYLATED A1C: CPT

## 2024-09-27 PROCEDURE — 90471 IMMUNIZATION ADMIN: CPT

## 2024-09-27 PROCEDURE — 80061 LIPID PANEL: CPT

## 2024-09-27 NOTE — PHYSICAL EXAM
[No Acute Distress] : no acute distress [Well Nourished] : well nourished [Well Developed] : well developed [Well-Appearing] : well-appearing [Normal Sclera/Conjunctiva] : normal sclera/conjunctiva [PERRL] : pupils equal round and reactive to light [EOMI] : extraocular movements intact [Normal Outer Ear/Nose] : the outer ears and nose were normal in appearance [Normal Oropharynx] : the oropharynx was normal [No JVD] : no jugular venous distention [No Lymphadenopathy] : no lymphadenopathy [Supple] : supple [Thyroid Normal, No Nodules] : the thyroid was normal and there were no nodules present [No Respiratory Distress] : no respiratory distress  [No Accessory Muscle Use] : no accessory muscle use [Clear to Auscultation] : lungs were clear to auscultation bilaterally [Normal Rate] : normal rate  [Regular Rhythm] : with a regular rhythm [Normal S1, S2] : normal S1 and S2 [No Murmur] : no murmur heard [No Carotid Bruits] : no carotid bruits [No Abdominal Bruit] : a ~M bruit was not heard ~T in the abdomen [No Varicosities] : no varicosities [Pedal Pulses Present] : the pedal pulses are present [No Edema] : there was no peripheral edema [No Palpable Aorta] : no palpable aorta [No Extremity Clubbing/Cyanosis] : no extremity clubbing/cyanosis [Soft] : abdomen soft [Non Tender] : non-tender [Non-distended] : non-distended [No Masses] : no abdominal mass palpated [No HSM] : no HSM [Normal Bowel Sounds] : normal bowel sounds [Normal Posterior Cervical Nodes] : no posterior cervical lymphadenopathy [Normal Anterior Cervical Nodes] : no anterior cervical lymphadenopathy [No CVA Tenderness] : no CVA  tenderness [No Spinal Tenderness] : no spinal tenderness [No Joint Swelling] : no joint swelling [Grossly Normal Strength/Tone] : grossly normal strength/tone [No Rash] : no rash [Coordination Grossly Intact] : coordination grossly intact [No Focal Deficits] : no focal deficits [Normal Gait] : normal gait [Deep Tendon Reflexes (DTR)] : deep tendon reflexes were 2+ and symmetric [Normal Affect] : the affect was normal [Normal Insight/Judgement] : insight and judgment were intact [de-identified] : Left arm PICC line [de-identified] : RUQ percutaneous drain present, drainage adequate

## 2024-09-27 NOTE — HISTORY OF PRESENT ILLNESS
[FreeTextEntry1] : follow up [de-identified] : 33 y/o F w/ no PMHx of robotic RY hepaticojejunostomy, laparoscopic cholecystectomy in [place] with two postoperative laparotomies to address adhesion/bile duct injury, ERCP showing narrowing of CHD, had PTC with internal external drain is here for a follow up. She recently visited GI and also had IR percutaneous drain placement into the intrahepatic ducts. GI offered Retrograde ductal dilation, have appointment with GI on 7-Jan Denies fever, N/V/D, constipation,  sxs, SOB, chest pain. Pt is currently not on any medications.

## 2024-09-27 NOTE — ASSESSMENT
[FreeTextEntry1] : 33 y/o F w/ PMHx of robotic RY hepaticojejunostomy, laparoscopic cholecystectomy in [place], multiple C-sections with two postoperative laparotomies to address adhesion/bile duct injury, ERCP showing narrowing of CHD, had PTC with internal external drain is here for a follow up.  #Robotic RY hepaticojejunostomy # Perc drains in abdomen for drainage of bile on 14/Aug #Normocytic Anemia/ G-6PD deficiency - Have PICC line/on Metronidazole  Flu shot  labs sent  not taking any oral medication except tylenol Follow up PRN/6 months

## 2024-09-28 DIAGNOSIS — Z00.00 ENCOUNTER FOR GENERAL ADULT MEDICAL EXAMINATION WITHOUT ABNORMAL FINDINGS: ICD-10-CM

## 2024-10-01 ENCOUNTER — APPOINTMENT (OUTPATIENT)
Dept: SURGERY | Facility: CLINIC | Age: 34
End: 2024-10-01
Payer: COMMERCIAL

## 2024-10-01 VITALS
WEIGHT: 154 LBS | OXYGEN SATURATION: 99 % | SYSTOLIC BLOOD PRESSURE: 120 MMHG | BODY MASS INDEX: 22.81 KG/M2 | DIASTOLIC BLOOD PRESSURE: 70 MMHG | HEART RATE: 97 BPM | HEIGHT: 69 IN

## 2024-10-01 DIAGNOSIS — S36.13XA INJURY OF BILE DUCT, INITIAL ENCOUNTER: ICD-10-CM

## 2024-10-01 PROCEDURE — 99214 OFFICE O/P EST MOD 30 MIN: CPT | Mod: 24

## 2024-10-03 DIAGNOSIS — D75.A GLUCOSE-6-PHOSPHATE DEHYDROGENASE (G6PD) DEFICIENCY WITHOUT ANEMIA: ICD-10-CM

## 2024-10-03 DIAGNOSIS — Z23 ENCOUNTER FOR IMMUNIZATION: ICD-10-CM

## 2024-10-03 DIAGNOSIS — S36.13XA INJURY OF BILE DUCT, INITIAL ENCOUNTER: ICD-10-CM

## 2024-10-03 LAB
ALBUMIN SERPL ELPH-MCNC: 4.2 G/DL
ALP BLD-CCNC: 224 U/L
ALT SERPL-CCNC: 98 U/L
AST SERPL-CCNC: 92 U/L
BILIRUB DIRECT SERPL-MCNC: 0.2 MG/DL
BILIRUB INDIRECT SERPL-MCNC: 0.2 MG/DL
BILIRUB SERPL-MCNC: 0.4 MG/DL
PROT SERPL-MCNC: 7.3 G/DL

## 2024-10-07 NOTE — HISTORY OF PRESENT ILLNESS
[de-identified] : MICHAEL LAL  is a pleasant 33 year old woman  who came in 07/11/2024 for bile duct injury . She has Dr Bran have PCP as Her PCP.   she [place] with two postoperative laparotomy to address adhesion/bile duct injury, absent operative details  she had ERCP with narrowing of CHD, she had PTC with internal external drain via right biliary duct system into duodenum  she still not feeling well and had nonbilious vomiting in her visit today, her drain bag has about 200 bile daily

## 2024-10-07 NOTE — ASSESSMENT
[FreeTextEntry1] : MICHAEL LAL  is a pleasant 33 year old woman  who came in 07/11/2024 for bile duct injury . She has Dr Bran have PCP as Her PCP.   she had [place] chole[place] in [place] with two postoperative laparotomy to address adhesion/bile duct injury, absent operative details  she had ERCP with narrowing of CHD, she had PTC with internal external drain via right biliary duct system into duodenum  she still not feeling well and had nonbilious vomiting in her visit today, her drain bag has about 200 bile daily  07/11/2024 : will send for tube study to check location of tip of her PTD, she will need to see advanced GI to attempt left and right plastic stent, repeat LFTS  07/23/2024 :still suffering discomfort, GI eval and concern of need to repeat procedure and re-stenting, patient declined GI ERCP and attempt stenting her stricture, will proceed with robotic RY hepaticojejunostomy   08/22/2024 : s/p RY HJ Jose Fong , she has her wound healing, no discharge, drain is out, she still has two ureteric stent one in bowel slided back from left duct, another PTD via right costal via right duct to Enrrique limb will send for cholangiogram around sept 4,sending today for LFTs  09/03/2024 : today she is tachy 120-130, she went to ed recently with WNL labs and CT sending back again for ED labs and IVF possible IV abx, she has IR cholangiogram scheduled for tomorrow as outpatient   10/01/2024 : no new reported symptoms, exam is unchanged, sending for labs and IR eval to remove tube less output the above plan of care with discussed in details to the patient and all questions were answered to patient satisfaction. patient instructed to follow up with the referring physician and patient primary care provider   A total of 30 minutes was spent on this visit, obtaining h/p,  reviewing previous notes/imaging, counseling the patient on f/u , ordering tests (below), and documenting the findings in the note.

## 2024-10-18 ENCOUNTER — TRANSCRIPTION ENCOUNTER (OUTPATIENT)
Age: 34
End: 2024-10-18

## 2024-10-18 ENCOUNTER — OUTPATIENT (OUTPATIENT)
Dept: OUTPATIENT SERVICES | Facility: HOSPITAL | Age: 34
LOS: 1 days | Discharge: HOME | End: 2024-10-18
Payer: MEDICAID

## 2024-10-18 VITALS
DIASTOLIC BLOOD PRESSURE: 76 MMHG | SYSTOLIC BLOOD PRESSURE: 121 MMHG | TEMPERATURE: 97 F | OXYGEN SATURATION: 100 % | RESPIRATION RATE: 18 BRPM | HEART RATE: 90 BPM

## 2024-10-18 VITALS
SYSTOLIC BLOOD PRESSURE: 110 MMHG | HEART RATE: 86 BPM | RESPIRATION RATE: 17 BRPM | OXYGEN SATURATION: 100 % | DIASTOLIC BLOOD PRESSURE: 70 MMHG

## 2024-10-18 DIAGNOSIS — Z98.890 OTHER SPECIFIED POSTPROCEDURAL STATES: Chronic | ICD-10-CM

## 2024-10-18 DIAGNOSIS — S36.13XA INJURY OF BILE DUCT, INITIAL ENCOUNTER: ICD-10-CM

## 2024-10-18 DIAGNOSIS — Z90.49 ACQUIRED ABSENCE OF OTHER SPECIFIED PARTS OF DIGESTIVE TRACT: Chronic | ICD-10-CM

## 2024-10-18 PROCEDURE — C1769: CPT

## 2024-10-18 PROCEDURE — 47536 EXCHANGE BILIARY DRG CATH: CPT

## 2024-10-18 PROCEDURE — C1729: CPT

## 2024-10-18 PROCEDURE — C1894: CPT

## 2024-10-18 NOTE — ASU DISCHARGE PLAN (ADULT/PEDIATRIC) - FINANCIAL ASSISTANCE
[facility] provides services at a reduced cost to those who are determined to be eligible through [facility]’s financial assistance program. Information regarding [facility]’s financial assistance program can be found by going to [email or web address] or by calling [phone].

## 2024-10-18 NOTE — CHART NOTE - NSCHARTNOTEFT_GEN_A_CORE
PACU ANESTHESIA ADMISSION NOTE      Procedure:   Post op diagnosis:      ____  Intubated  TV:______       Rate: ______      FiO2: ______    __x__  Patent Airway    __x__  Full return of protective reflexes    __x__  Full recovery from anesthesia / back to baseline status    Vitals:  T(C): 36.2 (10-18-24 @ 07:29), Max: 36.2 (10-18-24 @ 07:11)  HR: 90 (10-18-24 @ 07:29) (90 - 90)  BP: 121/76 (10-18-24 @ 07:29) (121/76 - 121/76)  RR: 18 (10-18-24 @ 07:29) (18 - 18)  SpO2: 100% (10-18-24 @ 07:29) (100% - 100%)    Mental Status:  __x__ Awake   ___x__ Alert   _____ Drowsy   _____ Sedated    Nausea/Vomiting:  __x__ NO  ______Yes,   See Post - Op Orders          Pain Scale (0-10):  _____    Treatment: ____ None    __x__ See Post - Op/PCA Orders    Post - Operative Fluids:   ____ Oral   __x__ See Post - Op Orders    Plan: Discharge:   _x___Home       _____Floor     _____Critical Care    _____  Other:_________________    Comments: Patient had smooth intraoperative event, no anesthesia complication.  PACU Vital signs: HR:   89         BP:   118     / 71         RR: 17            O2 Sat:  100     %     Temp 98

## 2024-10-18 NOTE — PROGRESS NOTE ADULT - SUBJECTIVE AND OBJECTIVE BOX
Interventional Radiology Outpatient Documentation    PREOPERATIVE DAY OF PROCEDURE EVALUATION:     I have personally seen and examined this patient. I agree with the history and physical which I have reviewed and noted any changes below:     Plan is for image-guided biliary drainage catheter exchange.  (Signed electronically) 10-31-24 @ 12:02     Procedure/ risks/ benefits/ goals/ alternatives were explained. All questions answered. Informed content obtained from patient. Consent placed in chart.     
PROCEDURE: Biliary drain exchange    Procedural Personnel  Attending physician(s): Joseph Barber  Fellow physician(s): None  Resident physician(s): None  Advanced practice provider(s): None    Pre-procedure diagnosis: History of biliary reconstructive surgery with an indwelling internal/external biliary drainage catheter.  Post-procedure diagnosis: Same  Additional clinical history: None    Complications: No immediate complications.    IMPRESSION:    Over-the-wire cholangiogram demonstrates no evidence of obstruction.    Plan:     The catheter was exchanged and capped. She will follow up with surgical oncology for potential removal if she tolerates.  ______________________________________________________________________    PROCEDURE SUMMARY:  - Transhepatic cholangiogram via the existing access  - Exchange of internal-external biliary drain(s) as described below  - Additional procedure(s): None    PROCEDURE DETAILS:    Pre-procedure  Consent: Informed consent for the procedure including risks, benefits and alternatives was obtained and time-out was performed prior to the procedure.  Preparation: The site was prepared and draped using maximal sterile barrier technique including cutaneous antisepsis.    Anesthesia/sedation  Level of anesthesia/sedation: Monitored anesthesia care  Anesthesia/sedation administered by: Anesthesiology    Pre-procedure antibiotics were administered.    Local anesthesia was administered. The indwelling biliary drain was exchanged over a wire for a sheath through which contrast injection was performed. A guidewire was passed through the bile ducts and into the small bowel and a new biliary drainage catheter was placed. Contrast injection was performed.  Initial cholangiogram findings: Rapid filling of the jejunum.  New biliary drain: Internal/external  New biliary drain diameter (Mauritanian): 8  External catheter securement:  Non-absorbable suture    The tube was capped and the patient was instructed to open it to gravity drainage if she experiences pain or fevers.    Contrast  Contrast volume (mL): 20    Additional Details  Additional description of procedure: None  Equipment details: None  Specimens removed: None  Estimated blood loss (mL): Less than 10

## 2024-10-18 NOTE — ASU DISCHARGE PLAN (ADULT/PEDIATRIC) - NS MD DC FALL RISK RISK
For information on Fall & Injury Prevention, visit: [email or web address] OR  [email or web address] OR  https://www.cdc.gov/steadi/patient.html

## 2024-10-22 ENCOUNTER — APPOINTMENT (OUTPATIENT)
Dept: SURGERY | Facility: CLINIC | Age: 34
End: 2024-10-22
Payer: COMMERCIAL

## 2024-10-22 VITALS
HEART RATE: 76 BPM | WEIGHT: 156 LBS | OXYGEN SATURATION: 98 % | SYSTOLIC BLOOD PRESSURE: 118 MMHG | BODY MASS INDEX: 23.11 KG/M2 | DIASTOLIC BLOOD PRESSURE: 76 MMHG | HEIGHT: 69 IN | TEMPERATURE: 97 F

## 2024-10-22 PROCEDURE — 99214 OFFICE O/P EST MOD 30 MIN: CPT | Mod: 24

## 2024-10-24 ENCOUNTER — APPOINTMENT (OUTPATIENT)
Dept: SURGERY | Facility: CLINIC | Age: 34
End: 2024-10-24
Payer: COMMERCIAL

## 2024-10-24 VITALS
TEMPERATURE: 97 F | WEIGHT: 156 LBS | BODY MASS INDEX: 23.11 KG/M2 | HEART RATE: 94 BPM | SYSTOLIC BLOOD PRESSURE: 124 MMHG | OXYGEN SATURATION: 99 % | HEIGHT: 69 IN | DIASTOLIC BLOOD PRESSURE: 82 MMHG

## 2024-10-24 LAB
ALBUMIN SERPL ELPH-MCNC: 4.5 G/DL
ALP BLD-CCNC: 234 U/L
ALT SERPL-CCNC: 133 U/L
AST SERPL-CCNC: 79 U/L
BILIRUB DIRECT SERPL-MCNC: 0.2 MG/DL
BILIRUB INDIRECT SERPL-MCNC: 0.3 MG/DL
BILIRUB SERPL-MCNC: 0.5 MG/DL
PROT SERPL-MCNC: 8.1 G/DL

## 2024-10-24 PROCEDURE — 99212 OFFICE O/P EST SF 10 MIN: CPT

## 2024-10-29 ENCOUNTER — OUTPATIENT (OUTPATIENT)
Dept: OUTPATIENT SERVICES | Facility: HOSPITAL | Age: 34
LOS: 1 days | End: 2024-10-29

## 2024-10-29 DIAGNOSIS — Z90.49 ACQUIRED ABSENCE OF OTHER SPECIFIED PARTS OF DIGESTIVE TRACT: Chronic | ICD-10-CM

## 2024-10-29 DIAGNOSIS — S36.13XA INJURY OF BILE DUCT, INITIAL ENCOUNTER: ICD-10-CM

## 2024-10-29 DIAGNOSIS — Z98.890 OTHER SPECIFIED POSTPROCEDURAL STATES: Chronic | ICD-10-CM

## 2024-10-30 DIAGNOSIS — S36.13XA INJURY OF BILE DUCT, INITIAL ENCOUNTER: ICD-10-CM

## 2024-10-30 LAB
ALBUMIN SERPL ELPH-MCNC: 4.8 G/DL
ALP BLD-CCNC: 197 U/L
ALT SERPL-CCNC: 45 U/L
AST SERPL-CCNC: 33 U/L
BILIRUB DIRECT SERPL-MCNC: 0.2 MG/DL
BILIRUB INDIRECT SERPL-MCNC: 0.2 MG/DL
BILIRUB SERPL-MCNC: 0.4 MG/DL
PROT SERPL-MCNC: 8.2 G/DL

## 2024-10-31 ENCOUNTER — APPOINTMENT (OUTPATIENT)
Dept: SURGERY | Facility: CLINIC | Age: 34
End: 2024-10-31
Payer: COMMERCIAL

## 2024-10-31 VITALS
HEIGHT: 69 IN | HEART RATE: 80 BPM | SYSTOLIC BLOOD PRESSURE: 100 MMHG | OXYGEN SATURATION: 99 % | BODY MASS INDEX: 23.25 KG/M2 | DIASTOLIC BLOOD PRESSURE: 72 MMHG | WEIGHT: 157 LBS

## 2024-10-31 DIAGNOSIS — S36.13XA INJURY OF BILE DUCT, INITIAL ENCOUNTER: ICD-10-CM

## 2024-10-31 PROCEDURE — 99212 OFFICE O/P EST SF 10 MIN: CPT

## 2025-01-17 ENCOUNTER — APPOINTMENT (OUTPATIENT)
Dept: SURGERY | Facility: CLINIC | Age: 35
End: 2025-01-17
Payer: COMMERCIAL

## 2025-01-17 VITALS
WEIGHT: 158 LBS | DIASTOLIC BLOOD PRESSURE: 80 MMHG | SYSTOLIC BLOOD PRESSURE: 112 MMHG | BODY MASS INDEX: 23.4 KG/M2 | HEIGHT: 69 IN

## 2025-01-17 DIAGNOSIS — S36.13XA INJURY OF BILE DUCT, INITIAL ENCOUNTER: ICD-10-CM

## 2025-01-17 PROCEDURE — 99212 OFFICE O/P EST SF 10 MIN: CPT

## 2025-01-18 NOTE — ED PROVIDER NOTE - IV ALTEPLASE ADMIN OUTSIDE HIDDEN
show PAST SURGICAL HISTORY:  Cataract extraction status of eye, right     Cyst of mediastinum     H/O arthroscopy of left knee     H/O arthroscopy of right knee

## 2025-01-21 ENCOUNTER — OUTPATIENT (OUTPATIENT)
Dept: OUTPATIENT SERVICES | Facility: HOSPITAL | Age: 35
LOS: 1 days | End: 2025-01-21

## 2025-01-21 ENCOUNTER — APPOINTMENT (OUTPATIENT)
Dept: GASTROENTEROLOGY | Facility: CLINIC | Age: 35
End: 2025-01-21

## 2025-01-21 VITALS
WEIGHT: 158 LBS | SYSTOLIC BLOOD PRESSURE: 106 MMHG | HEART RATE: 80 BPM | BODY MASS INDEX: 23.4 KG/M2 | OXYGEN SATURATION: 100 % | HEIGHT: 69 IN | TEMPERATURE: 97.5 F | DIASTOLIC BLOOD PRESSURE: 72 MMHG

## 2025-01-21 DIAGNOSIS — Z98.890 OTHER SPECIFIED POSTPROCEDURAL STATES: Chronic | ICD-10-CM

## 2025-01-21 DIAGNOSIS — Z00.00 ENCOUNTER FOR GENERAL ADULT MEDICAL EXAMINATION WITHOUT ABNORMAL FINDINGS: ICD-10-CM

## 2025-01-21 DIAGNOSIS — Z90.49 ACQUIRED ABSENCE OF OTHER SPECIFIED PARTS OF DIGESTIVE TRACT: Chronic | ICD-10-CM

## 2025-01-21 PROCEDURE — G2211 COMPLEX E/M VISIT ADD ON: CPT

## 2025-01-21 PROCEDURE — 99214 OFFICE O/P EST MOD 30 MIN: CPT

## 2025-01-30 ENCOUNTER — APPOINTMENT (OUTPATIENT)
Dept: SURGERY | Facility: CLINIC | Age: 35
End: 2025-01-30

## 2025-01-30 VITALS
WEIGHT: 160 LBS | OXYGEN SATURATION: 99 % | HEART RATE: 91 BPM | HEIGHT: 69 IN | SYSTOLIC BLOOD PRESSURE: 120 MMHG | BODY MASS INDEX: 23.7 KG/M2 | DIASTOLIC BLOOD PRESSURE: 70 MMHG

## 2025-01-30 PROCEDURE — 99212 OFFICE O/P EST SF 10 MIN: CPT

## 2025-03-13 ENCOUNTER — NON-APPOINTMENT (OUTPATIENT)
Age: 35
End: 2025-03-13

## 2025-03-28 ENCOUNTER — APPOINTMENT (OUTPATIENT)
Dept: INTERNAL MEDICINE | Facility: CLINIC | Age: 35
End: 2025-03-28

## 2025-04-22 ENCOUNTER — APPOINTMENT (OUTPATIENT)
Dept: GASTROENTEROLOGY | Facility: CLINIC | Age: 35
End: 2025-04-22

## 2025-05-15 ENCOUNTER — APPOINTMENT (OUTPATIENT)
Dept: SURGERY | Facility: CLINIC | Age: 35
End: 2025-05-15
Payer: COMMERCIAL

## 2025-05-15 VITALS
HEART RATE: 82 BPM | SYSTOLIC BLOOD PRESSURE: 112 MMHG | BODY MASS INDEX: 23.4 KG/M2 | WEIGHT: 158 LBS | DIASTOLIC BLOOD PRESSURE: 70 MMHG | OXYGEN SATURATION: 98 % | HEIGHT: 69 IN

## 2025-05-15 DIAGNOSIS — S36.13XA INJURY OF BILE DUCT, INITIAL ENCOUNTER: ICD-10-CM

## 2025-05-15 PROCEDURE — 99212 OFFICE O/P EST SF 10 MIN: CPT

## 2025-08-28 ENCOUNTER — APPOINTMENT (OUTPATIENT)
Dept: SURGERY | Facility: CLINIC | Age: 35
End: 2025-08-28
Payer: COMMERCIAL

## 2025-08-28 VITALS
HEIGHT: 69 IN | TEMPERATURE: 97 F | SYSTOLIC BLOOD PRESSURE: 116 MMHG | DIASTOLIC BLOOD PRESSURE: 70 MMHG | OXYGEN SATURATION: 97 % | BODY MASS INDEX: 23.55 KG/M2 | WEIGHT: 159 LBS | HEART RATE: 90 BPM

## 2025-08-28 DIAGNOSIS — S36.13XA INJURY OF BILE DUCT, INITIAL ENCOUNTER: ICD-10-CM

## 2025-08-28 DIAGNOSIS — T81.89XD OTHER COMPLICATIONS OF PROCEDURES, NOT ELSEWHERE CLASSIFIED, SUBSEQUENT ENCOUNTER: ICD-10-CM

## 2025-08-28 PROCEDURE — 99213 OFFICE O/P EST LOW 20 MIN: CPT

## 2025-08-29 ENCOUNTER — RESULT REVIEW (OUTPATIENT)
Age: 35
End: 2025-08-29

## 2025-08-29 ENCOUNTER — OUTPATIENT (OUTPATIENT)
Dept: OUTPATIENT SERVICES | Facility: HOSPITAL | Age: 35
LOS: 1 days | End: 2025-08-29
Payer: COMMERCIAL

## 2025-08-29 DIAGNOSIS — Z98.890 OTHER SPECIFIED POSTPROCEDURAL STATES: Chronic | ICD-10-CM

## 2025-08-29 DIAGNOSIS — Z00.8 ENCOUNTER FOR OTHER GENERAL EXAMINATION: ICD-10-CM

## 2025-08-29 DIAGNOSIS — Z90.49 ACQUIRED ABSENCE OF OTHER SPECIFIED PARTS OF DIGESTIVE TRACT: Chronic | ICD-10-CM

## 2025-08-29 LAB
ALBUMIN SERPL ELPH-MCNC: 4.3 G/DL
ALP BLD-CCNC: 102 U/L
ALT SERPL-CCNC: 24 U/L
ANION GAP SERPL CALC-SCNC: 14 MMOL/L
AST SERPL-CCNC: 23 U/L
BILIRUB SERPL-MCNC: 0.6 MG/DL
BUN SERPL-MCNC: 9 MG/DL
CALCIUM SERPL-MCNC: 9.6 MG/DL
CHLORIDE SERPL-SCNC: 101 MMOL/L
CO2 SERPL-SCNC: 23 MMOL/L
CREAT SERPL-MCNC: 0.8 MG/DL
EGFRCR SERPLBLD CKD-EPI 2021: 99 ML/MIN/1.73M2
GLUCOSE SERPL-MCNC: 82 MG/DL
POTASSIUM SERPL-SCNC: 4.8 MMOL/L
PROT SERPL-MCNC: 7.7 G/DL
SODIUM SERPL-SCNC: 138 MMOL/L

## 2025-08-29 PROCEDURE — 74177 CT ABD & PELVIS W/CONTRAST: CPT

## 2025-08-29 PROCEDURE — 74177 CT ABD & PELVIS W/CONTRAST: CPT | Mod: 26

## 2025-08-30 DIAGNOSIS — R10.9 UNSPECIFIED ABDOMINAL PAIN: ICD-10-CM

## 2025-08-31 PROBLEM — T81.89XD NON-HEALING SURGICAL WOUND, SUBSEQUENT ENCOUNTER: Status: ACTIVE | Noted: 2025-08-31

## 2025-09-03 ENCOUNTER — TRANSCRIPTION ENCOUNTER (OUTPATIENT)
Age: 35
End: 2025-09-03

## 2025-09-03 ENCOUNTER — APPOINTMENT (OUTPATIENT)
Dept: SURGERY | Facility: HOSPITAL | Age: 35
End: 2025-09-03

## 2025-09-03 ENCOUNTER — OUTPATIENT (OUTPATIENT)
Dept: OUTPATIENT SERVICES | Facility: HOSPITAL | Age: 35
LOS: 1 days | Discharge: HOME | End: 2025-09-03
Payer: COMMERCIAL

## 2025-09-03 VITALS
SYSTOLIC BLOOD PRESSURE: 108 MMHG | DIASTOLIC BLOOD PRESSURE: 62 MMHG | TEMPERATURE: 98 F | HEART RATE: 76 BPM | RESPIRATION RATE: 20 BRPM | OXYGEN SATURATION: 99 %

## 2025-09-03 VITALS
SYSTOLIC BLOOD PRESSURE: 106 MMHG | OXYGEN SATURATION: 98 % | RESPIRATION RATE: 16 BRPM | HEART RATE: 95 BPM | TEMPERATURE: 99 F | DIASTOLIC BLOOD PRESSURE: 58 MMHG | HEIGHT: 67 IN | WEIGHT: 154.32 LBS

## 2025-09-03 DIAGNOSIS — T81.89XA OTHER COMPLICATIONS OF PROCEDURES, NOT ELSEWHERE CLASSIFIED, INITIAL ENCOUNTER: ICD-10-CM

## 2025-09-03 DIAGNOSIS — Z98.890 OTHER SPECIFIED POSTPROCEDURAL STATES: Chronic | ICD-10-CM

## 2025-09-03 DIAGNOSIS — Z90.49 ACQUIRED ABSENCE OF OTHER SPECIFIED PARTS OF DIGESTIVE TRACT: Chronic | ICD-10-CM

## 2025-09-03 PROCEDURE — 97597 DBRDMT OPN WND 1ST 20 CM/<: CPT

## 2025-09-03 RX ORDER — ONDANSETRON HCL/PF 4 MG/2 ML
4 VIAL (ML) INJECTION
Refills: 0 | Status: DISCONTINUED | OUTPATIENT
Start: 2025-09-03 | End: 2025-09-03

## 2025-09-03 RX ORDER — HYDROMORPHONE/SOD CHLOR,ISO/PF 2 MG/10 ML
0.5 SYRINGE (ML) INJECTION
Refills: 0 | Status: DISCONTINUED | OUTPATIENT
Start: 2025-09-03 | End: 2025-09-03

## 2025-09-03 RX ORDER — KETOROLAC TROMETHAMINE 30 MG/ML
30 INJECTION, SOLUTION INTRAMUSCULAR; INTRAVENOUS ONCE
Refills: 0 | Status: DISCONTINUED | OUTPATIENT
Start: 2025-09-03 | End: 2025-09-03

## 2025-09-03 RX ORDER — ACETAMINOPHEN 500 MG/5ML
1000 LIQUID (ML) ORAL ONCE
Refills: 0 | Status: COMPLETED | OUTPATIENT
Start: 2025-09-03 | End: 2025-09-03

## 2025-09-03 RX ADMIN — Medication 400 MILLIGRAM(S): at 12:46

## 2025-09-03 RX ADMIN — Medication 1000 MILLIGRAM(S): at 13:34

## 2025-09-03 RX ADMIN — Medication 0.5 MILLIGRAM(S): at 11:18

## 2025-09-03 RX ADMIN — Medication 0.5 MILLIGRAM(S): at 11:54

## 2025-09-03 RX ADMIN — Medication 0.5 MILLIGRAM(S): at 10:53
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
## 2025-09-09 DIAGNOSIS — S31.109A UNSPECIFIED OPEN WOUND OF ABDOMINAL WALL, UNSPECIFIED QUADRANT WITHOUT PENETRATION INTO PERITONEAL CAVITY, INITIAL ENCOUNTER: ICD-10-CM
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
## 2025-09-17 PROBLEM — T81.89XA SUTURE SINUS: Status: ACTIVE | Noted: 2025-09-17

## 2025-09-18 ENCOUNTER — APPOINTMENT (OUTPATIENT)
Dept: SURGERY | Facility: CLINIC | Age: 35
End: 2025-09-18
Payer: COMMERCIAL

## 2025-09-18 VITALS
HEIGHT: 69 IN | SYSTOLIC BLOOD PRESSURE: 120 MMHG | TEMPERATURE: 97 F | WEIGHT: 155 LBS | DIASTOLIC BLOOD PRESSURE: 80 MMHG | BODY MASS INDEX: 22.96 KG/M2 | HEART RATE: 78 BPM | OXYGEN SATURATION: 98 %

## 2025-09-18 PROCEDURE — 99024 POSTOP FOLLOW-UP VISIT: CPT
